# Patient Record
Sex: FEMALE | Race: WHITE | NOT HISPANIC OR LATINO | Employment: OTHER | URBAN - METROPOLITAN AREA
[De-identification: names, ages, dates, MRNs, and addresses within clinical notes are randomized per-mention and may not be internally consistent; named-entity substitution may affect disease eponyms.]

---

## 2017-01-02 DIAGNOSIS — F32.A DEPRESSION: ICD-10-CM

## 2017-01-03 RX ORDER — CITALOPRAM 40 MG/1
TABLET ORAL
Qty: 30 TABLET | Refills: 0 | Status: SHIPPED | OUTPATIENT
Start: 2017-01-03 | End: 2017-02-01 | Stop reason: SDUPTHER

## 2017-01-13 ENCOUNTER — OFFICE VISIT (OUTPATIENT)
Dept: FAMILY MEDICINE CLINIC | Facility: CLINIC | Age: 62
End: 2017-01-13

## 2017-01-13 VITALS
WEIGHT: 209 LBS | SYSTOLIC BLOOD PRESSURE: 140 MMHG | BODY MASS INDEX: 29.26 KG/M2 | OXYGEN SATURATION: 99 % | HEIGHT: 71 IN | HEART RATE: 51 BPM | DIASTOLIC BLOOD PRESSURE: 78 MMHG | TEMPERATURE: 97.9 F

## 2017-01-13 DIAGNOSIS — M65.312 TRIGGER THUMB OF BOTH THUMBS: ICD-10-CM

## 2017-01-13 DIAGNOSIS — M51.36 DEGENERATION OF INTERVERTEBRAL DISC OF LUMBAR REGION: Primary | ICD-10-CM

## 2017-01-13 DIAGNOSIS — M65.311 TRIGGER THUMB OF BOTH THUMBS: ICD-10-CM

## 2017-01-13 PROCEDURE — 99213 OFFICE O/P EST LOW 20 MIN: CPT | Performed by: NURSE PRACTITIONER

## 2017-01-13 NOTE — LETTER
January 13, 2017     Patient: Allie Temple   YOB: 1955   Date of Visit: 1/13/2017       To Whom It May Concern:    It is my medical opinion that Allie Temple may return to full duty immediately with no restrictions.           Sincerely,        ASH Allison    CC: No Recipients

## 2017-01-13 NOTE — PROGRESS NOTES
"Anam Temple is a 61 y.o. female who presents us with back pain. Patient states she is doing better.     History of Present Illness   Reports ready to return to full duty, has been careful with twisting, and has been wearing copper belt at work.   The following portions of the patient's history were reviewed and updated as appropriate: allergies, current medications, past family history, past medical history, past social history, past surgical history and problem list.    Review of Systems   Constitutional: Positive for activity change. Negative for fever.   Respiratory: Negative for chest tightness and shortness of breath.    Gastrointestinal: Negative for constipation (no bowel or bladder dysfunction) and diarrhea.   Genitourinary: Negative for difficulty urinating.   Musculoskeletal: Positive for arthralgias (hand arthritis), back pain and gait problem. Negative for joint swelling and myalgias.   Neurological: Negative for weakness (leg) and headaches.     Visit Vitals   • /78   • Pulse 51   • Temp 97.9 °F (36.6 °C) (Oral)   • Ht 70.5\" (179.1 cm)   • Wt 209 lb (94.8 kg)   • SpO2 99%   • BMI 29.56 kg/m2     Objective   Physical Exam   Constitutional: She appears well-developed and well-nourished. No distress.   Musculoskeletal: She exhibits no edema or tenderness.        Lumbar back: She exhibits decreased range of motion. She exhibits no bony tenderness, no swelling, no edema, no pain and no spasm.        Hands:  SLR eric negative, Jhoana negative eric, - SI tenderness, negative piriformis to distraction   Neurological: No sensory deficit. She exhibits normal muscle tone.   Reflex Scores:       Patellar reflexes are 2+ on the right side and 2+ on the left side.       Achilles reflexes are 2+ on the right side and 2+ on the left side.    Assessment/Plan   Problems Addressed this Visit        Musculoskeletal and Integument    Degeneration of intervertebral disc of lumbar region - Primary      Other " Visit Diagnoses     Trigger thumb of both thumbs            Reasonable for release to full duty. Consider thumb injection if  Thumb trigger not settling with Dr. Khan. [place] prn for low back

## 2017-01-13 NOTE — MR AVS SNAPSHOT
Allie Temple   1/13/2017 2:30 PM   Office Visit    Provider:  ASH Jacobs   Department:  [facility]   Dept Phone:  [phone]                Your Full Care Plan              Today's Medication Changes          These changes are accurate as of: 1/13/17  2:56 PM.  If you have any questions, ask your nurse or doctor.               Stop taking medication(s)listed here:     methocarbamol 750 MG tablet   Commonly known as:  ROBAXIN   Stopped by:  ASH Jacobs           mometasone 50 MCG/ACT nasal spray   Commonly known as:  NASONEX   Stopped by:  ASH Jacobs                      Your Updated Medication List          This list is accurate as of: 1/13/17  2:56 PM.  Always use your most recent med list.                atenolol 25 MG tablet   Commonly known as:  TENORMIN   TAKE 1 TABLET BY MOUTH ONE TIME A DAY FOR BLOOD PRESSURE       atorvastatin 40 MG tablet   Commonly known as:  LIPITOR       Azelastine-Fluticasone 137-50 MCG/ACT suspension   1 spray into each nostril 2 (two) times a day       citalopram 40 MG tablet   Commonly known as:  CeleXA   TAKE 1 TABLET BY MOUTH ONE TIME A DAY       diclofenac 50 MG tablet   Commonly known as:  CATAFLAM   Take 1 tablet by mouth 2 (two) times a day.       * HYDROcodone-acetaminophen  MG per tablet   Commonly known as:  NORCO   Take 1 tablet by mouth Every 6 (Six) Hours As Needed for moderate pain (4-6).       * HYDROcodone-acetaminophen  MG per tablet   Commonly known as:  NORCO   Take 1 tablet by mouth Every 6 (Six) Hours As Needed for moderate pain (4-6).       ketorolac 10 MG tablet   Commonly known as:  TORADOL       lisinopril 40 MG tablet   Commonly known as:  PRINIVIL,ZESTRIL       montelukast 10 MG tablet   Commonly known as:  SINGULAIR   TAKE 1 TABLET BY MOUTH AT BEDTIME       norethindrone-ethinyl estradiol 1-5 MG-MCG tablet   Commonly known as:  FEMHRT 1/5       "TiZANidine 4 MG capsule   Commonly known as:  ZANAFLEX   Take 1 capsule by mouth 3 (Three) Times a Day.       * Notice:  This list has 2 medication(s) that are the same as other medications prescribed for you. Read the directions carefully, and ask your doctor or other care provider to review them with you.            You Were Diagnosed With        Codes Comments    Degeneration of intervertebral disc of lumbar region    -  Primary ICD-10-CM: M51.36  ICD-9-CM: 722.52     Trigger thumb of both thumbs     ICD-10-CM: M65.311, M65.312  ICD-9-CM: 727.03       Instructions     None    Patient Instructions History      [employer] Signup     [facility] [employer] allows you to send messages to your doctor, view your test results, renew your prescriptions, schedule appointments, and more. To sign up, go to [employer] and click on the Sign Up Now link in the New User? box. Enter your [employer] Activation Code exactly as it appears below along with the last four digits of your Social Security Number and your Date of Birth () to complete the sign-up process. If you do not sign up before the expiration date, you must request a new code.    [employer] Activation Code: [email or web address]  Expires: 2017  2:55 PM    If you have questions, you can email [email or web address] or call [phone] to talk to our [employer] staff. Remember, [employer] is NOT to be used for urgent needs. For medical emergencies, dial 911.               Other Info from Your Visit           Allergies     Cetirizine      Gabapentin      Penicillins      Sulfa Antibiotics      Venlafaxine        Reason for Visit     Back Pain           Vital Signs     Blood Pressure Pulse Temperature Height Weight Oxygen Saturation    140/78 51 97.9 °F (36.6 °C) (Oral) 70.5\" (179.1 cm) 209 lb (94.8 kg) 99%    Body Mass Index Smoking Status                29.56 kg/m2 Current Every Day Smoker          Problems and Diagnoses Noted     Degeneration of lumbar or " lumbosacral intervertebral disc    Trigger thumb of both thumbs

## 2017-02-01 DIAGNOSIS — J30.9 ALLERGIC RHINITIS: ICD-10-CM

## 2017-02-01 DIAGNOSIS — F32.A DEPRESSION: ICD-10-CM

## 2017-02-02 RX ORDER — MONTELUKAST SODIUM 10 MG/1
TABLET ORAL
Qty: 30 TABLET | Refills: 1 | Status: SHIPPED | OUTPATIENT
Start: 2017-02-02 | End: 2017-04-08 | Stop reason: SDUPTHER

## 2017-02-02 RX ORDER — CITALOPRAM 40 MG/1
TABLET ORAL
Qty: 30 TABLET | Refills: 0 | Status: SHIPPED | OUTPATIENT
Start: 2017-02-02 | End: 2017-03-09 | Stop reason: SDUPTHER

## 2017-02-09 ENCOUNTER — TELEPHONE (OUTPATIENT)
Dept: FAMILY MEDICINE CLINIC | Facility: CLINIC | Age: 62
End: 2017-02-09

## 2017-02-14 ENCOUNTER — OFFICE VISIT (OUTPATIENT)
Dept: FAMILY MEDICINE CLINIC | Facility: CLINIC | Age: 62
End: 2017-02-14

## 2017-02-14 VITALS
HEART RATE: 54 BPM | SYSTOLIC BLOOD PRESSURE: 148 MMHG | OXYGEN SATURATION: 97 % | HEIGHT: 71 IN | DIASTOLIC BLOOD PRESSURE: 70 MMHG | TEMPERATURE: 97.6 F | BODY MASS INDEX: 28.84 KG/M2 | WEIGHT: 206 LBS

## 2017-02-14 DIAGNOSIS — M65.311 TRIGGER FINGER OF RIGHT THUMB: Primary | ICD-10-CM

## 2017-02-14 PROCEDURE — 20600 DRAIN/INJ JOINT/BURSA W/O US: CPT | Performed by: FAMILY MEDICINE

## 2017-02-14 RX ORDER — METHYLPREDNISOLONE ACETATE 80 MG/ML
20 INJECTION, SUSPENSION INTRA-ARTICULAR; INTRALESIONAL; INTRAMUSCULAR; SOFT TISSUE ONCE
Status: COMPLETED | OUTPATIENT
Start: 2017-02-14 | End: 2017-02-15

## 2017-02-14 RX ORDER — LIDOCAINE HYDROCHLORIDE 20 MG/ML
0.25 INJECTION, SOLUTION INFILTRATION; PERINEURAL ONCE
Status: COMPLETED | OUTPATIENT
Start: 2017-02-14 | End: 2017-02-15

## 2017-02-14 RX ORDER — ATORVASTATIN CALCIUM 40 MG/1
40 TABLET, FILM COATED ORAL DAILY
Qty: 30 TABLET | Refills: 12 | Status: SHIPPED | OUTPATIENT
Start: 2017-02-14 | End: 2019-03-13

## 2017-02-14 NOTE — PROGRESS NOTES
Subjective   Allie Temple is a 61 y.o. female. Presents today for   Chief Complaint   Patient presents with   • Hand Problem     pt would like injection in hand today. she was told by amanda to come for injection if not better.   • Hyperlipidemia     pt needs rf of statin       Hand Pain    Incident onset: Few weeks. The incident occurred at home. There was no injury mechanism. Pain location: Right thumb trigger finger, painful. The quality of the pain is described as aching. The pain does not radiate. The pain is moderate. The pain has been constant since the incident. Pertinent negatives include no numbness or tingling. The symptoms are aggravated by movement. She has tried ice and NSAIDs for the symptoms. The treatment provided mild relief.       hld - needs statin.  Review of Systems   Neurological: Negative for tingling and numbness.       The following portions of the patient's history were reviewed and updated as appropriate: allergies, current medications, past medical history and problem list.    Patient Active Problem List   Diagnosis   • Generalized anxiety disorder   • Benign essential hypertension   • Carpal tunnel syndrome   • Chronic pain syndrome   • Radial styloid tenosynovitis   • Osteoarthritis of hand   • Depression   • Degeneration of intervertebral disc of lumbar region   • Dyslipidemia   • Menopausal flushing   • Tendinitis of shoulder   • Scapulocostal syndrome   • Bruit   • Allergic rhinitis       Allergies   Allergen Reactions   • Cetirizine    • Gabapentin    • Penicillins    • Sulfa Antibiotics    • Venlafaxine        Current Outpatient Prescriptions on File Prior to Visit   Medication Sig Dispense Refill   • atenolol (TENORMIN) 25 MG tablet TAKE 1 TABLET BY MOUTH ONE TIME A DAY FOR BLOOD PRESSURE 30 tablet 4   • Azelastine-Fluticasone 137-50 MCG/ACT suspension 1 spray into each nostril 2 (two) times a day 1 bottle 2   • citalopram (CeleXA) 40 MG tablet TAKE 1 TABLET BY MOUTH ONE TIME A DAY  " 30 tablet 0   • diclofenac (CATAFLAM) 50 MG tablet Take 1 tablet by mouth 2 (two) times a day. 60 tablet 5   • HYDROcodone-acetaminophen (NORCO)  MG per tablet Take 1 tablet by mouth Every 6 (Six) Hours As Needed for moderate pain (4-6). 120 tablet 0   • HYDROcodone-acetaminophen (NORCO)  MG per tablet Take 1 tablet by mouth Every 6 (Six) Hours As Needed for moderate pain (4-6). 120 tablet 0   • ketorolac (TORADOL) 10 MG tablet   0   • lisinopril (PRINIVIL,ZESTRIL) 40 MG tablet Take 40 mg by mouth daily        • montelukast (SINGULAIR) 10 MG tablet TAKE 1 TABLET BY MOUTH AT BEDTIME  30 tablet 1   • norethindrone-ethinyl estradiol (FEMHRT 1/5) 1-5 MG-MCG tablet Take 1 tablet by mouth daily        • TiZANidine (ZANAFLEX) 4 MG capsule Take 1 capsule by mouth 3 (Three) Times a Day. 90 capsule 0     No current facility-administered medications on file prior to visit.        Objective   Vitals:    02/14/17 1517   BP: 148/70   BP Location: Left arm   Patient Position: Sitting   Cuff Size: Large Adult   Pulse: 54   Temp: 97.6 °F (36.4 °C)   TempSrc: Oral   SpO2: 97%   Weight: 206 lb (93.4 kg)   Height: 70.5\" (179.1 cm)       Physical Exam   Constitutional: She is oriented to person, place, and time. She appears well-developed and well-nourished.   Musculoskeletal:        Right hand: She exhibits tenderness. She exhibits normal range of motion.   Trigger finger right thumb.   Neurological: She is alert and oriented to person, place, and time.   Skin: Skin is warm and dry.   Psychiatric: She has a normal mood and affect. Her behavior is normal.   Nursing note and vitals reviewed.  Injection Tendon or Ligament - right thumb trigger finger  Date/Time: 2/19/2017 9:22 PM  Performed by: KOMAL VERA  Authorized by: KOMAL VERA   Consent: Verbal consent obtained.  Risks and benefits: risks, benefits and alternatives were discussed  Consent given by: patient  Patient understanding: patient states " "understanding of the procedure being performed  Site marked: the operative site was marked  Required items: required blood products, implants, devices, and special equipment available  Patient identity confirmed: verbally with patient  Time out: Immediately prior to procedure a \"time out\" was called to verify the correct patient, procedure, equipment, support staff and site/side marked as required.  Preparation: Patient was prepped and draped in the usual sterile fashion.  Local anesthesia used: yes    Anesthesia:  Local anesthesia used: yes  Local Anesthetic: topical anesthetic   Sedation:  Patient sedated: no    Patient tolerance: Patient tolerated the procedure well with no immediate complications  Comments: Lidocaine without epi 2% 0.25ml  Depo Medrol 20mg          Assessment/Plan   Allie was seen today for hand problem and hyperlipidemia.    Diagnoses and all orders for this visit:    Trigger finger of right thumb  -     lidocaine (XYLOCAINE) 2% injection 0.3 mL; Inject 0.3 mL as directed 1 (One) Time.  -     methylPREDNISolone acetate (DEPO-medrol) injection 20 mg; Inject 0.25 mL into the joint 1 (One) Time.  -     Injection Tendon or Ligament    Other orders  -     atorvastatin (LIPITOR) 40 MG tablet; Take 1 tablet by mouth Daily.    Post-joint injection instructions given, warned may be sore and achy next 2-3 days, recommend ice as directed.           -Follow up: Prn - RTC if worse or no improvement.          Current Outpatient Prescriptions:   •  atenolol (TENORMIN) 25 MG tablet, TAKE 1 TABLET BY MOUTH ONE TIME A DAY FOR BLOOD PRESSURE, Disp: 30 tablet, Rfl: 4  •  atorvastatin (LIPITOR) 40 MG tablet, Take 1 tablet by mouth Daily., Disp: 30 tablet, Rfl: 12  •  Azelastine-Fluticasone 137-50 MCG/ACT suspension, 1 spray into each nostril 2 (two) times a day, Disp: 1 bottle, Rfl: 2  •  citalopram (CeleXA) 40 MG tablet, TAKE 1 TABLET BY MOUTH ONE TIME A DAY , Disp: 30 tablet, Rfl: 0  •  diclofenac (CATAFLAM) 50 " MG tablet, Take 1 tablet by mouth 2 (two) times a day., Disp: 60 tablet, Rfl: 5  •  HYDROcodone-acetaminophen (NORCO)  MG per tablet, Take 1 tablet by mouth Every 6 (Six) Hours As Needed for moderate pain (4-6)., Disp: 120 tablet, Rfl: 0  •  HYDROcodone-acetaminophen (NORCO)  MG per tablet, Take 1 tablet by mouth Every 6 (Six) Hours As Needed for moderate pain (4-6)., Disp: 120 tablet, Rfl: 0  •  ketorolac (TORADOL) 10 MG tablet, , Disp: , Rfl: 0  •  lisinopril (PRINIVIL,ZESTRIL) 40 MG tablet, Take 40 mg by mouth daily  , Disp: , Rfl:   •  montelukast (SINGULAIR) 10 MG tablet, TAKE 1 TABLET BY MOUTH AT BEDTIME , Disp: 30 tablet, Rfl: 1  •  norethindrone-ethinyl estradiol (FEMHRT 1/5) 1-5 MG-MCG tablet, Take 1 tablet by mouth daily  , Disp: , Rfl:   •  TiZANidine (ZANAFLEX) 4 MG capsule, Take 1 capsule by mouth 3 (Three) Times a Day., Disp: 90 capsule, Rfl: 0

## 2017-02-15 RX ADMIN — METHYLPREDNISOLONE ACETATE 20 MG: 80 INJECTION, SUSPENSION INTRA-ARTICULAR; INTRALESIONAL; INTRAMUSCULAR; SOFT TISSUE at 08:47

## 2017-02-15 RX ADMIN — LIDOCAINE HYDROCHLORIDE 0.3 ML: 20 INJECTION, SOLUTION INFILTRATION; PERINEURAL at 08:46

## 2017-03-09 DIAGNOSIS — F32.A DEPRESSION: ICD-10-CM

## 2017-03-09 RX ORDER — CITALOPRAM 40 MG/1
TABLET ORAL
Qty: 30 TABLET | Refills: 2 | Status: SHIPPED | OUTPATIENT
Start: 2017-03-09 | End: 2017-06-10 | Stop reason: SDUPTHER

## 2017-04-08 DIAGNOSIS — J30.9 ALLERGIC RHINITIS: ICD-10-CM

## 2017-04-08 DIAGNOSIS — M50.30 DEGENERATIVE DISC DISEASE, CERVICAL: ICD-10-CM

## 2017-04-08 DIAGNOSIS — M54.2 CERVICAL PAIN (NECK): ICD-10-CM

## 2017-04-10 RX ORDER — MONTELUKAST SODIUM 10 MG/1
TABLET ORAL
Qty: 30 TABLET | Refills: 11 | Status: SHIPPED | OUTPATIENT
Start: 2017-04-10 | End: 2018-05-04 | Stop reason: SDUPTHER

## 2017-04-10 RX ORDER — ATENOLOL 25 MG/1
TABLET ORAL
Qty: 30 TABLET | Refills: 3 | Status: SHIPPED | OUTPATIENT
Start: 2017-04-10 | End: 2017-08-03 | Stop reason: SDUPTHER

## 2017-04-10 RX ORDER — DICLOFENAC POTASSIUM 50 MG/1
TABLET, FILM COATED ORAL
Qty: 60 TABLET | Refills: 4 | Status: SHIPPED | OUTPATIENT
Start: 2017-04-10 | End: 2017-07-14

## 2017-04-11 DIAGNOSIS — M54.2 NECK PAIN: ICD-10-CM

## 2017-04-12 RX ORDER — HYDROCODONE BITARTRATE AND ACETAMINOPHEN 10; 325 MG/1; MG/1
1 TABLET ORAL EVERY 6 HOURS PRN
Qty: 120 TABLET | Refills: 0 | Status: SHIPPED | OUTPATIENT
Start: 2017-04-12 | End: 2017-06-09 | Stop reason: SDUPTHER

## 2017-04-12 RX ORDER — HYDROCODONE BITARTRATE AND ACETAMINOPHEN 10; 325 MG/1; MG/1
1 TABLET ORAL EVERY 6 HOURS PRN
Qty: 120 TABLET | Refills: 0 | Status: SHIPPED | OUTPATIENT
Start: 2017-04-12 | End: 2017-07-14

## 2017-04-13 DIAGNOSIS — Z79.899 DRUG THERAPY: Primary | ICD-10-CM

## 2017-04-21 LAB
DRUGS UR: NORMAL
REPORT: NORMAL

## 2017-06-09 RX ORDER — HYDROCODONE BITARTRATE AND ACETAMINOPHEN 10; 325 MG/1; MG/1
1 TABLET ORAL EVERY 6 HOURS PRN
Qty: 120 TABLET | Refills: 0 | Status: SHIPPED | OUTPATIENT
Start: 2017-06-09 | End: 2017-07-14

## 2017-06-10 DIAGNOSIS — F32.A DEPRESSION: ICD-10-CM

## 2017-06-12 RX ORDER — CITALOPRAM 40 MG/1
TABLET ORAL
Qty: 30 TABLET | Refills: 1 | Status: SHIPPED | OUTPATIENT
Start: 2017-06-12 | End: 2017-08-07 | Stop reason: SDUPTHER

## 2017-07-14 ENCOUNTER — OFFICE VISIT (OUTPATIENT)
Dept: FAMILY MEDICINE CLINIC | Facility: CLINIC | Age: 62
End: 2017-07-14

## 2017-07-14 VITALS
HEART RATE: 57 BPM | SYSTOLIC BLOOD PRESSURE: 126 MMHG | WEIGHT: 206 LBS | DIASTOLIC BLOOD PRESSURE: 70 MMHG | OXYGEN SATURATION: 96 % | TEMPERATURE: 97.7 F | BODY MASS INDEX: 29.14 KG/M2

## 2017-07-14 DIAGNOSIS — M19.042 PRIMARY OSTEOARTHRITIS OF BOTH HANDS: ICD-10-CM

## 2017-07-14 DIAGNOSIS — G56.81: ICD-10-CM

## 2017-07-14 DIAGNOSIS — I10 BENIGN ESSENTIAL HYPERTENSION: Primary | ICD-10-CM

## 2017-07-14 DIAGNOSIS — G89.4 CHRONIC PAIN SYNDROME: ICD-10-CM

## 2017-07-14 DIAGNOSIS — M19.041 PRIMARY OSTEOARTHRITIS OF BOTH HANDS: ICD-10-CM

## 2017-07-14 DIAGNOSIS — M50.30 DDD (DEGENERATIVE DISC DISEASE), CERVICAL: ICD-10-CM

## 2017-07-14 DIAGNOSIS — E78.5 DYSLIPIDEMIA: ICD-10-CM

## 2017-07-14 PROCEDURE — 99214 OFFICE O/P EST MOD 30 MIN: CPT | Performed by: FAMILY MEDICINE

## 2017-07-14 RX ORDER — TIZANIDINE HYDROCHLORIDE 4 MG/1
4 CAPSULE, GELATIN COATED ORAL 3 TIMES DAILY
Qty: 90 CAPSULE | Refills: 1 | Status: SHIPPED | OUTPATIENT
Start: 2017-07-14 | End: 2018-01-08 | Stop reason: SDUPTHER

## 2017-07-14 RX ORDER — HYDROCODONE BITARTRATE AND ACETAMINOPHEN 5; 325 MG/1; MG/1
TABLET ORAL
Qty: 105 TABLET | Refills: 0 | Status: SHIPPED | OUTPATIENT
Start: 2017-07-14 | End: 2017-08-14 | Stop reason: SDUPTHER

## 2017-07-14 RX ORDER — NAPROXEN 500 MG/1
500 TABLET ORAL 2 TIMES DAILY WITH MEALS
Qty: 60 TABLET | Refills: 5 | Status: SHIPPED | OUTPATIENT
Start: 2017-07-14 | End: 2017-11-17

## 2017-07-14 NOTE — PROGRESS NOTES
Anam Temple is a 62 y.o. female. Presents today for   Chief Complaint   Patient presents with   • Follow-up     med check wants hydrocodone refill.  Diclofenac not working and has rt neck pain.       Arthritis   Presents for follow-up visit. She complains of pain and joint swelling. Symptom course: waxing and waning. Affected locations include the right shoulder, neck, left MCP and right MCP (+back pain). Pain scale currently: moderate. (Reports diclofenac doesn't help pain.  On chronic HC;  Due for refills.) Compliance with total regimen is %.   Hypertension   This is a chronic problem. The current episode started more than 1 year ago. The problem is unchanged. The problem is controlled. Pertinent negatives include no chest pain, orthopnea, palpitations, peripheral edema, PND or shortness of breath. (Overdue for labs, usually comes for acute complaints.) There are no associated agents to hypertension. Risk factors for coronary artery disease include dyslipidemia and post-menopausal state (LIpids mildly elevated 2 years ago and needs rechecked). Past treatments include ACE inhibitors and beta blockers. The current treatment provides significant improvement. There are no compliance problems.  There is no history of kidney disease, CAD/MI or CVA.     Reports sweats a lot;  Some hot flashes, on HRT;  Works in hot kitchen.    Review of Systems   Respiratory: Negative for shortness of breath.    Cardiovascular: Negative for chest pain, palpitations, orthopnea and PND.   Gastrointestinal: Negative for abdominal pain, constipation, nausea and vomiting.   Musculoskeletal: Positive for arthralgias, arthritis, back pain and joint swelling.   Neurological: Negative for dizziness, syncope and light-headedness.       The following portions of the patient's history were reviewed and updated as appropriate: allergies, current medications, past medical history and problem list.    Patient Active Problem List    Diagnosis   • Generalized anxiety disorder   • Benign essential hypertension   • Carpal tunnel syndrome   • Chronic pain syndrome   • Radial styloid tenosynovitis   • Osteoarthritis of hand   • Depression   • Degeneration of intervertebral disc of lumbar region   • Dyslipidemia   • Menopausal flushing   • Tendinitis of shoulder   • Scapulocostal syndrome   • Bruit   • Allergic rhinitis       Allergies   Allergen Reactions   • Cetirizine    • Gabapentin    • Penicillins    • Sulfa Antibiotics    • Venlafaxine        Current Outpatient Prescriptions on File Prior to Visit   Medication Sig Dispense Refill   • atenolol (TENORMIN) 25 MG tablet TAKE 1 TABLET BY MOUTH ONE TIME A DAY for blood pressure 30 tablet 3   • atorvastatin (LIPITOR) 40 MG tablet Take 1 tablet by mouth Daily. 30 tablet 12   • Azelastine-Fluticasone 137-50 MCG/ACT suspension 1 spray into each nostril 2 (two) times a day 1 bottle 2   • citalopram (CeleXA) 40 MG tablet TAKE 1 TABLET BY MOUTH ONE TIME A DAY  30 tablet 1   • lisinopril (PRINIVIL,ZESTRIL) 40 MG tablet Take 40 mg by mouth daily        • montelukast (SINGULAIR) 10 MG tablet TAKE 1 TABLET BY MOUTH AT BEDTIME  30 tablet 11   • norethindrone-ethinyl estradiol (FEMHRT 1/5) 1-5 MG-MCG tablet Take 1 tablet by mouth daily        • [DISCONTINUED] diclofenac (CATAFLAM) 50 MG tablet TAKE 1 TABLET BY MOUTH TWO TIMES A DAY  60 tablet 4   • [DISCONTINUED] HYDROcodone-acetaminophen (NORCO)  MG per tablet Take 1 tablet by mouth Every 6 (Six) Hours As Needed for Moderate Pain (4-6). 120 tablet 0   • [DISCONTINUED] HYDROcodone-acetaminophen (NORCO)  MG per tablet Take 1 tablet by mouth Every 6 (Six) Hours As Needed for Moderate Pain (4-6). 120 tablet 0   • [DISCONTINUED] ketorolac (TORADOL) 10 MG tablet   0   • [DISCONTINUED] TiZANidine (ZANAFLEX) 4 MG capsule Take 1 capsule by mouth 3 (Three) Times a Day. 90 capsule 0     No current facility-administered medications on file prior to visit.         Objective   Vitals:    07/14/17 1330   BP: 126/70   Pulse: 57   Temp: 97.7 °F (36.5 °C)   SpO2: 96%   Weight: 206 lb (93.4 kg)       Physical Exam   Constitutional: She appears well-developed and well-nourished.   HENT:   Head: Normocephalic and atraumatic.   Neck: Neck supple. No JVD present. No thyromegaly present.   Cardiovascular: Normal rate, regular rhythm and normal heart sounds.  Exam reveals no gallop and no friction rub.    No murmur heard.  Pulmonary/Chest: Effort normal and breath sounds normal. No respiratory distress. She has no wheezes. She has no rales.   Abdominal: Soft. Bowel sounds are normal. She exhibits no distension. There is no tenderness. There is no rebound and no guarding.   Musculoskeletal: She exhibits no edema.        Cervical back: She exhibits tenderness and pain. She exhibits normal range of motion.        Right hand: She exhibits tenderness. She exhibits normal range of motion.        Left hand: She exhibits tenderness. She exhibits normal range of motion.   Neurological: She is alert.   Skin: Skin is warm and dry.   Psychiatric: She has a normal mood and affect. Her behavior is normal.   Nursing note and vitals reviewed.      Assessment/Plan   Allie was seen today for follow-up.    Diagnoses and all orders for this visit:    Benign essential hypertension  -     Comprehensive Metabolic Panel    DDD (degenerative disc disease), cervical  -     TiZANidine (ZANAFLEX) 4 MG capsule; Take 1 capsule by mouth 3 (Three) Times a Day.  -     HYDROcodone-acetaminophen (NORCO) 5-325 MG per tablet; 1/2 to 1 every 6 hours as needed for pain (max 3.5 in 24 hours).  -     naproxen (NAPROSYN) 500 MG tablet; Take 1 tablet by mouth 2 (Two) Times a Day With Meals.    Chronic pain syndrome  -     HYDROcodone-acetaminophen (NORCO) 5-325 MG per tablet; 1/2 to 1 every 6 hours as needed for pain (max 3.5 in 24 hours).  -     naproxen (NAPROSYN) 500 MG tablet; Take 1 tablet by mouth 2 (Two) Times a  Day With Meals.    Scapulocostal syndrome, right  -     HYDROcodone-acetaminophen (NORCO) 5-325 MG per tablet; 1/2 to 1 every 6 hours as needed for pain (max 3.5 in 24 hours).  -     naproxen (NAPROSYN) 500 MG tablet; Take 1 tablet by mouth 2 (Two) Times a Day With Meals.    Primary osteoarthritis of both hands  -     HYDROcodone-acetaminophen (NORCO) 5-325 MG per tablet; 1/2 to 1 every 6 hours as needed for pain (max 3.5 in 24 hours).  -     naproxen (NAPROSYN) 500 MG tablet; Take 1 tablet by mouth 2 (Two) Times a Day With Meals.  -     diclofenac (VOLTAREN) 1 % gel gel; Apply 4 g topically 4 (Four) Times a Day As Needed (hands).    Dyslipidemia  -     Lipid Panel    -hypertension - controlled, continue medications  -hld - recheck lipids  -I discussed due to safety concerns, new laws, rules and standard of care changes, I am phasing out Rx of Schedule II opioids for non-cancer pain.  Patient had diclofenac in past with no relief, but reports naproxen works.  Ready to start wean if slow as just wants to be able to work.  Will cut HC 10mg 4 daily average to 3.5 daily average and continue slow wean over time.  Has seen pain mgmt in past, but prefers to not go back.  Will add voltaren gel for hands.  I gave HC new Rx.         -Follow up: 3 months       Current Outpatient Prescriptions:   •  atenolol (TENORMIN) 25 MG tablet, TAKE 1 TABLET BY MOUTH ONE TIME A DAY for blood pressure, Disp: 30 tablet, Rfl: 3  •  atorvastatin (LIPITOR) 40 MG tablet, Take 1 tablet by mouth Daily., Disp: 30 tablet, Rfl: 12  •  Azelastine-Fluticasone 137-50 MCG/ACT suspension, 1 spray into each nostril 2 (two) times a day, Disp: 1 bottle, Rfl: 2  •  citalopram (CeleXA) 40 MG tablet, TAKE 1 TABLET BY MOUTH ONE TIME A DAY , Disp: 30 tablet, Rfl: 1  •  lisinopril (PRINIVIL,ZESTRIL) 40 MG tablet, Take 40 mg by mouth daily  , Disp: , Rfl:   •  montelukast (SINGULAIR) 10 MG tablet, TAKE 1 TABLET BY MOUTH AT BEDTIME , Disp: 30 tablet, Rfl: 11  •   norethindrone-ethinyl estradiol (FEMHRT 1/5) 1-5 MG-MCG tablet, Take 1 tablet by mouth daily  , Disp: , Rfl:   •  TiZANidine (ZANAFLEX) 4 MG capsule, Take 1 capsule by mouth 3 (Three) Times a Day., Disp: 90 capsule, Rfl: 1  •  diclofenac (VOLTAREN) 1 % gel gel, Apply 4 g topically 4 (Four) Times a Day As Needed (hands)., Disp: 100 g, Rfl: 5  •  HYDROcodone-acetaminophen (NORCO) 5-325 MG per tablet, 1/2 to 1 every 6 hours as needed for pain (max 3.5 in 24 hours)., Disp: 105 tablet, Rfl: 0  •  naproxen (NAPROSYN) 500 MG tablet, Take 1 tablet by mouth 2 (Two) Times a Day With Meals., Disp: 60 tablet, Rfl: 5

## 2017-07-15 LAB
ALBUMIN SERPL-MCNC: 4.4 G/DL (ref 3.6–4.8)
ALBUMIN/GLOB SERPL: 1.8 {RATIO} (ref 1.2–2.2)
ALP SERPL-CCNC: 69 IU/L (ref 39–117)
ALT SERPL-CCNC: 11 IU/L (ref 0–32)
AST SERPL-CCNC: 19 IU/L (ref 0–40)
BILIRUB SERPL-MCNC: 0.3 MG/DL (ref 0–1.2)
BUN SERPL-MCNC: 10 MG/DL (ref 8–27)
BUN/CREAT SERPL: 13 (ref 12–28)
CALCIUM SERPL-MCNC: 9.4 MG/DL (ref 8.7–10.3)
CHLORIDE SERPL-SCNC: 101 MMOL/L (ref 96–106)
CHOLEST SERPL-MCNC: 180 MG/DL (ref 100–199)
CO2 SERPL-SCNC: 23 MMOL/L (ref 18–29)
CREAT SERPL-MCNC: 0.78 MG/DL (ref 0.57–1)
GLOBULIN SER CALC-MCNC: 2.4 G/DL (ref 1.5–4.5)
GLUCOSE SERPL-MCNC: 80 MG/DL (ref 65–99)
HDLC SERPL-MCNC: 36 MG/DL
LDLC SERPL CALC-MCNC: 108 MG/DL (ref 0–99)
POTASSIUM SERPL-SCNC: 4.2 MMOL/L (ref 3.5–5.2)
PROT SERPL-MCNC: 6.8 G/DL (ref 6–8.5)
SODIUM SERPL-SCNC: 140 MMOL/L (ref 134–144)
TRIGL SERPL-MCNC: 178 MG/DL (ref 0–149)
VLDLC SERPL CALC-MCNC: 36 MG/DL (ref 5–40)

## 2017-07-16 NOTE — PROGRESS NOTES
Call and mail copy of results to patient.  Cholesterol borderline, work on low cholesterol diet.  Kidney, liver function normal.

## 2017-07-27 ENCOUNTER — TELEPHONE (OUTPATIENT)
Dept: FAMILY MEDICINE CLINIC | Facility: CLINIC | Age: 62
End: 2017-07-27

## 2017-07-27 NOTE — TELEPHONE ENCOUNTER
"WITHDRAWLS; SHAKING AND JERKING, ANTSY, SHORT TEMPER, \"FEELS LIKE GOING TO EXPLODE ON SOMEONE\" , NECK TIGHT AND SORE  "

## 2017-07-27 NOTE — TELEPHONE ENCOUNTER
When you say hard time dose she mean withdrawal symptoms, increase pain?  If so, what kind of pain, where is her pain located?    RRJ

## 2017-07-28 ENCOUNTER — TELEPHONE (OUTPATIENT)
Dept: FAMILY MEDICINE CLINIC | Facility: CLINIC | Age: 62
End: 2017-07-28

## 2017-07-28 NOTE — TELEPHONE ENCOUNTER
Clonidine 0.1mg 1 po twice daily, may lower BP as what originally for, but helps with any withdrawal symptoms.  We cut dose only slightly, should be at 3.5 tabs in 24 hrs total instead of 4 tabs in 24 hours.  Make sure not just stopping cold turkey as I gave enough for 30 days at new quantity.    RRJ

## 2017-07-31 RX ORDER — CLONIDINE HYDROCHLORIDE 0.1 MG/1
0.1 TABLET ORAL 2 TIMES DAILY
Qty: 60 TABLET | Refills: 1 | Status: SHIPPED | OUTPATIENT
Start: 2017-07-31 | End: 2017-09-28 | Stop reason: SDUPTHER

## 2017-08-03 RX ORDER — ATENOLOL 25 MG/1
TABLET ORAL
Qty: 30 TABLET | Refills: 2 | Status: SHIPPED | OUTPATIENT
Start: 2017-08-03 | End: 2017-08-04 | Stop reason: ALTCHOICE

## 2017-08-04 ENCOUNTER — TELEPHONE (OUTPATIENT)
Dept: FAMILY MEDICINE CLINIC | Facility: CLINIC | Age: 62
End: 2017-08-04

## 2017-08-04 RX ORDER — METOPROLOL SUCCINATE 25 MG/1
25 TABLET, EXTENDED RELEASE ORAL DAILY
Qty: 30 TABLET | Refills: 5 | OUTPATIENT
Start: 2017-08-04 | End: 2018-03-09 | Stop reason: SDUPTHER

## 2017-08-07 DIAGNOSIS — F32.A DEPRESSION: ICD-10-CM

## 2017-08-11 ENCOUNTER — TELEPHONE (OUTPATIENT)
Dept: FAMILY MEDICINE CLINIC | Facility: CLINIC | Age: 62
End: 2017-08-11

## 2017-08-11 RX ORDER — CITALOPRAM 40 MG/1
TABLET ORAL
Qty: 30 TABLET | Refills: 6 | Status: SHIPPED | OUTPATIENT
Start: 2017-08-11 | End: 2018-04-09 | Stop reason: SDUPTHER

## 2017-08-14 DIAGNOSIS — M50.30 DDD (DEGENERATIVE DISC DISEASE), CERVICAL: ICD-10-CM

## 2017-08-14 DIAGNOSIS — G89.4 CHRONIC PAIN SYNDROME: ICD-10-CM

## 2017-08-14 DIAGNOSIS — M19.041 PRIMARY OSTEOARTHRITIS OF BOTH HANDS: ICD-10-CM

## 2017-08-14 DIAGNOSIS — G56.81: ICD-10-CM

## 2017-08-14 DIAGNOSIS — M19.042 PRIMARY OSTEOARTHRITIS OF BOTH HANDS: ICD-10-CM

## 2017-08-14 RX ORDER — HYDROCODONE BITARTRATE AND ACETAMINOPHEN 5; 325 MG/1; MG/1
TABLET ORAL
Qty: 105 TABLET | Refills: 0 | Status: SHIPPED | OUTPATIENT
Start: 2017-08-14 | End: 2017-08-14 | Stop reason: SDUPTHER

## 2017-08-14 RX ORDER — HYDROCODONE BITARTRATE AND ACETAMINOPHEN 5; 325 MG/1; MG/1
TABLET ORAL
Qty: 90 TABLET | Refills: 0 | Status: SHIPPED | OUTPATIENT
Start: 2017-08-14 | End: 2017-09-15 | Stop reason: SDUPTHER

## 2017-08-14 NOTE — TELEPHONE ENCOUNTER
Pt phone mailbox full and rx ready.  Dr. Khan is weaning hydrocodone changed to 1 q 8 hrs prn #[phone] refills [id number]

## 2017-09-06 ENCOUNTER — OFFICE VISIT (OUTPATIENT)
Dept: FAMILY MEDICINE CLINIC | Facility: CLINIC | Age: 62
End: 2017-09-06

## 2017-09-06 VITALS
HEART RATE: 55 BPM | WEIGHT: 208 LBS | OXYGEN SATURATION: 97 % | SYSTOLIC BLOOD PRESSURE: 132 MMHG | BODY MASS INDEX: 29.12 KG/M2 | HEIGHT: 71 IN | TEMPERATURE: 98.2 F | DIASTOLIC BLOOD PRESSURE: 84 MMHG

## 2017-09-06 DIAGNOSIS — F41.1 GENERALIZED ANXIETY DISORDER: Primary | ICD-10-CM

## 2017-09-06 PROCEDURE — 99213 OFFICE O/P EST LOW 20 MIN: CPT | Performed by: NURSE PRACTITIONER

## 2017-09-06 RX ORDER — BUSPIRONE HYDROCHLORIDE 5 MG/1
5 TABLET ORAL 3 TIMES DAILY
Qty: 90 TABLET | Refills: 0 | Status: SHIPPED | OUTPATIENT
Start: 2017-09-06 | End: 2017-10-04 | Stop reason: SDUPTHER

## 2017-09-06 NOTE — PROGRESS NOTES
"Subjective   Allie Temple is a 62 y.o. female who presents to discuss increased anxiety and depression. Current medications are not helping.     History of Present Illness   Medications less helpful for 5-6 weeks, though daughter has moved back in with kids. Multigenerational household. Feels like going to snap at times. Does function well at work, but senses negative tension when gets at home. Sleeping a lot lately, using this as avoidance.     Trying to get off medications, but having increased overall pain, mostly tension related. Feels tight when stressed, zanaflex helpful, makes sleepy, so doesn't take at work. Really does ok at work, some leg numbness and pain when stationary standing, does better when moving.      Sleep is interrupted 2-3 x nightly by muscle spasm and tension. Waking for 2-3 weeks. Though Bhumi moved in 3 weeks ago.   The following portions of the patient's history were reviewed and updated as appropriate: allergies, current medications, past family history, past medical history, past social history, past surgical history and problem list.    Review of Systems   Constitutional: Positive for activity change and fatigue. Negative for fever and unexpected weight change.   HENT: Negative.    Respiratory: Negative.    Cardiovascular: Negative.    Gastrointestinal: Negative.  Negative for abdominal pain.   Endocrine: Negative for cold intolerance, heat intolerance, polydipsia, polyphagia and polyuria.   Neurological: Negative for seizures, light-headedness and headaches.   Psychiatric/Behavioral: Positive for decreased concentration, dysphoric mood and sleep disturbance. Negative for agitation, confusion, hallucinations, self-injury and suicidal ideas. The patient is nervous/anxious. The patient is not hyperactive.      /84  Pulse 55  Temp 98.2 °F (36.8 °C) (Oral)   Ht 70.5\" (179.1 cm)  Wt 208 lb (94.3 kg)  SpO2 97%  BMI 29.42 kg/m2    Objective   Physical Exam   Constitutional: She is " oriented to person, place, and time. She appears well-developed and well-nourished.   Eyes: Conjunctivae are normal. Pupils are equal, round, and reactive to light.   Neck: Normal range of motion. Neck supple. No JVD present. No tracheal deviation present. No thyromegaly present.   Cardiovascular: Normal rate, regular rhythm and normal heart sounds.    Pulmonary/Chest: Effort normal and breath sounds normal.   Abdominal: Soft. There is no tenderness.   Lymphadenopathy:     She has no cervical adenopathy.   Neurological: She is alert and oriented to person, place, and time.   Skin: Skin is warm and dry.   Psychiatric: Her speech is normal and behavior is normal. Judgment normal. Her mood appears anxious. Her affect is not angry, not blunt, not labile and not inappropriate. Cognition and memory are normal. She exhibits a depressed mood. She expresses no homicidal and no suicidal ideation. She expresses no suicidal plans and no homicidal plans.   Nursing note and vitals reviewed.    Assessment/Plan   Problems Addressed this Visit        Other    Generalized anxiety disorder - Primary    Relevant Medications    busPIRone (BUSPAR) 5 MG tablet        Short term trial of buspirone, to address increased levels of anxiety, desires to continue weaning HC. Discussed role of limit setting in the home. Has been meaningfully setting limits in multigenerational household, to continue.     Discussed increased levels of pain and sleep pattern change can be associated with withdrawal from HC, pt currently weaning and understands.     Has Fu with Dr. Khan in October, will move up sooner if needed.

## 2017-09-13 ENCOUNTER — TELEPHONE (OUTPATIENT)
Dept: FAMILY MEDICINE CLINIC | Facility: CLINIC | Age: 62
End: 2017-09-13

## 2017-09-15 DIAGNOSIS — M19.042 PRIMARY OSTEOARTHRITIS OF BOTH HANDS: ICD-10-CM

## 2017-09-15 DIAGNOSIS — G89.4 CHRONIC PAIN SYNDROME: ICD-10-CM

## 2017-09-15 DIAGNOSIS — G56.81: ICD-10-CM

## 2017-09-15 DIAGNOSIS — M19.041 PRIMARY OSTEOARTHRITIS OF BOTH HANDS: ICD-10-CM

## 2017-09-15 DIAGNOSIS — M50.30 DDD (DEGENERATIVE DISC DISEASE), CERVICAL: ICD-10-CM

## 2017-09-15 RX ORDER — HYDROCODONE BITARTRATE AND ACETAMINOPHEN 5; 325 MG/1; MG/1
TABLET ORAL
Qty: 90 TABLET | Refills: 0 | Status: SHIPPED | OUTPATIENT
Start: 2017-09-15 | End: 2017-10-19 | Stop reason: SDUPTHER

## 2017-09-28 RX ORDER — CLONIDINE HYDROCHLORIDE 0.1 MG/1
TABLET ORAL
Qty: 60 TABLET | Refills: 4 | Status: SHIPPED | OUTPATIENT
Start: 2017-09-28 | End: 2018-07-09

## 2017-10-04 DIAGNOSIS — F41.1 GENERALIZED ANXIETY DISORDER: ICD-10-CM

## 2017-10-04 RX ORDER — BUSPIRONE HYDROCHLORIDE 5 MG/1
TABLET ORAL
Qty: 90 TABLET | Refills: 0 | Status: SHIPPED | OUTPATIENT
Start: 2017-10-04 | End: 2017-11-17 | Stop reason: SDUPTHER

## 2017-10-06 ENCOUNTER — OFFICE VISIT (OUTPATIENT)
Dept: FAMILY MEDICINE CLINIC | Facility: CLINIC | Age: 62
End: 2017-10-06

## 2017-10-06 VITALS
WEIGHT: 210 LBS | DIASTOLIC BLOOD PRESSURE: 78 MMHG | HEIGHT: 71 IN | TEMPERATURE: 98.1 F | BODY MASS INDEX: 29.4 KG/M2 | HEART RATE: 94 BPM | OXYGEN SATURATION: 98 % | SYSTOLIC BLOOD PRESSURE: 124 MMHG

## 2017-10-06 DIAGNOSIS — J01.10 ACUTE NON-RECURRENT FRONTAL SINUSITIS: Primary | ICD-10-CM

## 2017-10-06 PROCEDURE — 99213 OFFICE O/P EST LOW 20 MIN: CPT | Performed by: NURSE PRACTITIONER

## 2017-10-06 RX ORDER — CIPROFLOXACIN 500 MG/1
500 TABLET, FILM COATED ORAL 2 TIMES DAILY
Qty: 14 TABLET | Refills: 0 | Status: SHIPPED | OUTPATIENT
Start: 2017-10-06 | End: 2017-10-19

## 2017-10-06 RX ORDER — DEXTROMETHORPHAN HYDROBROMIDE AND PROMETHAZINE HYDROCHLORIDE 15; 6.25 MG/5ML; MG/5ML
SYRUP ORAL
COMMUNITY
Start: 2017-10-05 | End: 2017-10-19

## 2017-10-06 RX ORDER — METHYLPREDNISOLONE 4 MG/1
TABLET ORAL
COMMUNITY
Start: 2017-10-05 | End: 2017-10-19

## 2017-10-06 RX ORDER — BENZONATATE 200 MG/1
CAPSULE ORAL
COMMUNITY
Start: 2017-10-05 | End: 2017-10-19

## 2017-10-06 NOTE — PROGRESS NOTES
"Anam Temple is a 62 y.o. female who presents for a follow up on bronchitis. Was dx at immediate care on 10/5/17 and prescribed a medrol dose pack, cough syrup and tessalon pearles.     History of Present Illness   Overall reports feeling no better since diagnosed. Thought had sinus infection as head feels very full, like having a helmet on. Taking medrol dose pack, but not helping with sinus congestion or cough. Thinks needs antibiotic, sinus drainage no better. Cough is improving.     Taking claritin, nose spray, and mucinex.   The following portions of the patient's history were reviewed and updated as appropriate: allergies, current medications, past family history, past medical history, past social history, past surgical history and problem list.    Review of Systems   Constitutional: Positive for chills. Negative for fever.   HENT: Positive for congestion, postnasal drip, sinus pressure and sore throat. Negative for ear pain.    Eyes: Negative.    Respiratory: Positive for cough. Negative for shortness of breath.    Cardiovascular: Negative.    Musculoskeletal: Negative.    Skin: Negative.    Neurological: Positive for headaches.   Hematological: Negative.    Psychiatric/Behavioral: Negative.      /78  Pulse 94  Temp 98.1 °F (36.7 °C) (Oral)   Ht 70.5\" (179.1 cm)  Wt 210 lb (95.3 kg)  SpO2 98%  BMI 29.71 kg/m2    Objective   Physical Exam   Constitutional: She appears well-developed and well-nourished.   HENT:   Head: Normocephalic.   Right Ear: Tympanic membrane is not retracted. A middle ear effusion is present.   Left Ear: Tympanic membrane is not retracted. A middle ear effusion is present.   Nose: Mucosal edema present. Right sinus exhibits frontal sinus tenderness. Left sinus exhibits frontal sinus tenderness.   Mouth/Throat: Posterior oropharyngeal erythema present.   Neck: Normal range of motion. Neck supple. No JVD present. No tracheal deviation present. No thyromegaly " present.   Cardiovascular: Normal rate, regular rhythm and normal heart sounds.    Pulmonary/Chest: Effort normal and breath sounds normal.   Lymphadenopathy:     She has no cervical adenopathy.   Skin: Skin is warm and dry.   Psychiatric: She has a normal mood and affect. Her behavior is normal. Judgment and thought content normal.   Nursing note and vitals reviewed.    Assessment/Plan   Problems Addressed this Visit     None      Visit Diagnoses     Acute non-recurrent frontal sinusitis    -  Primary        Continue all symptom controllers, follow up if not settling. Consider smoking cessation. Would like to consider allergy testing in the future so as not to have similar illnesses every spring or fall.

## 2017-10-19 ENCOUNTER — OFFICE VISIT (OUTPATIENT)
Dept: FAMILY MEDICINE CLINIC | Facility: CLINIC | Age: 62
End: 2017-10-19

## 2017-10-19 VITALS
TEMPERATURE: 97.8 F | SYSTOLIC BLOOD PRESSURE: 146 MMHG | HEIGHT: 71 IN | BODY MASS INDEX: 30.24 KG/M2 | HEART RATE: 52 BPM | WEIGHT: 216 LBS | DIASTOLIC BLOOD PRESSURE: 80 MMHG | OXYGEN SATURATION: 98 %

## 2017-10-19 DIAGNOSIS — M50.30 DDD (DEGENERATIVE DISC DISEASE), CERVICAL: ICD-10-CM

## 2017-10-19 DIAGNOSIS — M19.042 PRIMARY OSTEOARTHRITIS OF BOTH HANDS: ICD-10-CM

## 2017-10-19 DIAGNOSIS — M51.36 DEGENERATION OF INTERVERTEBRAL DISC OF LUMBAR REGION: ICD-10-CM

## 2017-10-19 DIAGNOSIS — G89.4 CHRONIC PAIN SYNDROME: ICD-10-CM

## 2017-10-19 DIAGNOSIS — M19.041 PRIMARY OSTEOARTHRITIS OF BOTH HANDS: ICD-10-CM

## 2017-10-19 DIAGNOSIS — I10 BENIGN ESSENTIAL HYPERTENSION: Primary | ICD-10-CM

## 2017-10-19 DIAGNOSIS — G56.81: ICD-10-CM

## 2017-10-19 PROCEDURE — 96372 THER/PROPH/DIAG INJ SC/IM: CPT | Performed by: FAMILY MEDICINE

## 2017-10-19 PROCEDURE — 99213 OFFICE O/P EST LOW 20 MIN: CPT | Performed by: FAMILY MEDICINE

## 2017-10-19 RX ORDER — METHYLPREDNISOLONE ACETATE 80 MG/ML
80 INJECTION, SUSPENSION INTRA-ARTICULAR; INTRALESIONAL; INTRAMUSCULAR; SOFT TISSUE ONCE
Status: COMPLETED | OUTPATIENT
Start: 2017-10-19 | End: 2017-10-19

## 2017-10-19 RX ORDER — KETOROLAC TROMETHAMINE 30 MG/ML
60 INJECTION, SOLUTION INTRAMUSCULAR; INTRAVENOUS ONCE
Status: COMPLETED | OUTPATIENT
Start: 2017-10-19 | End: 2017-10-19

## 2017-10-19 RX ORDER — HYDROCODONE BITARTRATE AND ACETAMINOPHEN 5; 325 MG/1; MG/1
TABLET ORAL
Qty: 90 TABLET | Refills: 0 | Status: SHIPPED | OUTPATIENT
Start: 2017-10-19 | End: 2017-11-16 | Stop reason: SDUPTHER

## 2017-10-19 RX ORDER — HYDROCODONE BITARTRATE AND ACETAMINOPHEN 5; 325 MG/1; MG/1
TABLET ORAL
Qty: 90 TABLET | Refills: 0 | Status: SHIPPED | OUTPATIENT
Start: 2017-10-19 | End: 2017-10-19 | Stop reason: SDUPTHER

## 2017-10-19 RX ADMIN — KETOROLAC TROMETHAMINE 60 MG: 30 INJECTION, SOLUTION INTRAMUSCULAR; INTRAVENOUS at 15:03

## 2017-10-19 RX ADMIN — METHYLPREDNISOLONE ACETATE 80 MG: 80 INJECTION, SUSPENSION INTRA-ARTICULAR; INTRALESIONAL; INTRAMUSCULAR; SOFT TISSUE at 14:19

## 2017-11-16 DIAGNOSIS — M19.041 PRIMARY OSTEOARTHRITIS OF BOTH HANDS: ICD-10-CM

## 2017-11-16 DIAGNOSIS — G56.81: ICD-10-CM

## 2017-11-16 DIAGNOSIS — M50.30 DDD (DEGENERATIVE DISC DISEASE), CERVICAL: ICD-10-CM

## 2017-11-16 DIAGNOSIS — G89.4 CHRONIC PAIN SYNDROME: ICD-10-CM

## 2017-11-16 DIAGNOSIS — M19.042 PRIMARY OSTEOARTHRITIS OF BOTH HANDS: ICD-10-CM

## 2017-11-16 RX ORDER — HYDROCODONE BITARTRATE AND ACETAMINOPHEN 5; 325 MG/1; MG/1
TABLET ORAL
Qty: 90 TABLET | Refills: 0 | Status: SHIPPED | OUTPATIENT
Start: 2017-11-16 | End: 2017-12-14 | Stop reason: SDUPTHER

## 2017-11-17 ENCOUNTER — OFFICE VISIT (OUTPATIENT)
Dept: FAMILY MEDICINE CLINIC | Facility: CLINIC | Age: 62
End: 2017-11-17

## 2017-11-17 VITALS
TEMPERATURE: 97.9 F | BODY MASS INDEX: 30.1 KG/M2 | OXYGEN SATURATION: 96 % | HEART RATE: 56 BPM | HEIGHT: 71 IN | WEIGHT: 215 LBS | SYSTOLIC BLOOD PRESSURE: 138 MMHG | DIASTOLIC BLOOD PRESSURE: 76 MMHG

## 2017-11-17 DIAGNOSIS — F41.1 GENERALIZED ANXIETY DISORDER: ICD-10-CM

## 2017-11-17 DIAGNOSIS — J40 BRONCHITIS: Primary | ICD-10-CM

## 2017-11-17 PROCEDURE — 99213 OFFICE O/P EST LOW 20 MIN: CPT | Performed by: NURSE PRACTITIONER

## 2017-11-17 RX ORDER — BUSPIRONE HYDROCHLORIDE 10 MG/1
10 TABLET ORAL 2 TIMES DAILY
Qty: 60 TABLET | Refills: 0 | Status: SHIPPED | OUTPATIENT
Start: 2017-11-17 | End: 2018-01-11 | Stop reason: SDUPTHER

## 2017-11-17 RX ORDER — DOXYCYCLINE HYCLATE 100 MG/1
100 CAPSULE ORAL 2 TIMES DAILY
Qty: 14 CAPSULE | Refills: 0 | Status: SHIPPED | OUTPATIENT
Start: 2017-11-17 | End: 2018-01-08

## 2017-11-17 NOTE — PROGRESS NOTES
"Anam Temple is a 62 y.o. female who presents c/o cough, congestion x several weeks. Was seen for similar 10/6/17 and never fully resolved.   History of Present Illness   Symptoms more sinus 10/6, was treated with medrol dose pack and cipro. Symptoms settled some, but then settled into chest 1-2 weeks ago. Cough is minimally productive, has tried mucinex and claritin to address.     buspar has been helpful to address anxiety, but feels effectiveness fading as HC gets weaned down. Having more trouble with sleep and anxiety during the day. Having falls not associated with dizziness. Associated with leg giving away.    The following portions of the patient's history were reviewed and updated as appropriate: allergies, current medications, past family history, past medical history, past social history, past surgical history and problem list.    Review of Systems   Constitutional: Negative for chills and fever.   HENT: Positive for congestion and rhinorrhea. Negative for ear discharge, ear pain, facial swelling, hearing loss, nosebleeds, sinus pressure, sneezing, sore throat and trouble swallowing.    Respiratory: Positive for cough. Negative for shortness of breath and wheezing.    Cardiovascular: Negative.  Negative for chest pain.   Gastrointestinal: Negative for abdominal pain.   Endocrine: Negative.    Musculoskeletal: Negative for arthralgias.   Allergic/Immunologic: Negative for environmental allergies.   Neurological: Positive for headaches (L frontal intermittent).   Hematological: Negative.    Psychiatric/Behavioral: Negative for dysphoric mood. The patient is nervous/anxious.      /76  Pulse 56  Temp 97.9 °F (36.6 °C) (Oral)   Ht 70.5\" (179.1 cm)  Wt 215 lb (97.5 kg)  SpO2 96%  BMI 30.41 kg/m2    Objective   Physical Exam   Constitutional: She appears well-developed and well-nourished.   HENT:   Right Ear: Tympanic membrane is retracted. A middle ear effusion is present.   Left Ear: " Tympanic membrane is retracted. A middle ear effusion is present.   Nose: No mucosal edema or rhinorrhea.   Mouth/Throat: Posterior oropharyngeal erythema present.   Neck: Normal range of motion. Neck supple. No JVD present. No tracheal deviation present. No thyromegaly present.   Cardiovascular: Normal rate, regular rhythm and normal heart sounds.    Pulmonary/Chest: Effort normal and breath sounds normal.   Bronchitic cough   Lymphadenopathy:     She has no cervical adenopathy.   Skin: Skin is warm and dry.   Psychiatric: She has a normal mood and affect. Her behavior is normal. Judgment and thought content normal.   Nursing note and vitals reviewed.    Assessment/Plan   Problems Addressed this Visit        Other    Generalized anxiety disorder    Relevant Medications    busPIRone (BUSPAR) 10 MG tablet      Other Visit Diagnoses     Bronchitis    -  Primary        Cover with doxycycline, secondary to drug allergies and interactions with citalopram. Continue mucinex. Follow up if not settling 1 week.   Anxiety--increase buspar, follow up if not settling  FU regarding chronic pain with Dr. Khan.

## 2017-12-14 DIAGNOSIS — G56.81: ICD-10-CM

## 2017-12-14 DIAGNOSIS — G89.4 CHRONIC PAIN SYNDROME: ICD-10-CM

## 2017-12-14 DIAGNOSIS — M50.30 DDD (DEGENERATIVE DISC DISEASE), CERVICAL: ICD-10-CM

## 2017-12-14 DIAGNOSIS — M19.042 PRIMARY OSTEOARTHRITIS OF BOTH HANDS: ICD-10-CM

## 2017-12-14 DIAGNOSIS — M19.041 PRIMARY OSTEOARTHRITIS OF BOTH HANDS: ICD-10-CM

## 2017-12-14 RX ORDER — HYDROCODONE BITARTRATE AND ACETAMINOPHEN 5; 325 MG/1; MG/1
TABLET ORAL
Qty: 90 TABLET | Refills: 0 | Status: SHIPPED | OUTPATIENT
Start: 2017-12-14 | End: 2018-01-08 | Stop reason: SDUPTHER

## 2018-01-08 ENCOUNTER — OFFICE VISIT (OUTPATIENT)
Dept: FAMILY MEDICINE CLINIC | Facility: CLINIC | Age: 63
End: 2018-01-08

## 2018-01-08 VITALS
BODY MASS INDEX: 30.8 KG/M2 | HEART RATE: 56 BPM | SYSTOLIC BLOOD PRESSURE: 152 MMHG | TEMPERATURE: 98.1 F | WEIGHT: 220 LBS | HEIGHT: 71 IN | DIASTOLIC BLOOD PRESSURE: 92 MMHG | OXYGEN SATURATION: 96 %

## 2018-01-08 DIAGNOSIS — M19.042 PRIMARY OSTEOARTHRITIS OF BOTH HANDS: ICD-10-CM

## 2018-01-08 DIAGNOSIS — J30.89 CHRONIC NONSEASONAL ALLERGIC RHINITIS DUE TO FUNGAL SPORES: ICD-10-CM

## 2018-01-08 DIAGNOSIS — M19.041 PRIMARY OSTEOARTHRITIS OF BOTH HANDS: ICD-10-CM

## 2018-01-08 DIAGNOSIS — M50.30 DDD (DEGENERATIVE DISC DISEASE), CERVICAL: ICD-10-CM

## 2018-01-08 DIAGNOSIS — G56.81: ICD-10-CM

## 2018-01-08 DIAGNOSIS — M51.36 DEGENERATION OF INTERVERTEBRAL DISC OF LUMBAR REGION: ICD-10-CM

## 2018-01-08 DIAGNOSIS — G89.4 CHRONIC PAIN SYNDROME: ICD-10-CM

## 2018-01-08 DIAGNOSIS — J01.11 ACUTE RECURRENT FRONTAL SINUSITIS: Primary | ICD-10-CM

## 2018-01-08 PROCEDURE — 99214 OFFICE O/P EST MOD 30 MIN: CPT | Performed by: FAMILY MEDICINE

## 2018-01-08 RX ORDER — TIZANIDINE HYDROCHLORIDE 4 MG/1
CAPSULE, GELATIN COATED ORAL
Qty: 90 CAPSULE | Refills: 0 | Status: SHIPPED | OUTPATIENT
Start: 2018-01-08 | End: 2018-02-22 | Stop reason: SDUPTHER

## 2018-01-08 RX ORDER — PREDNISONE 10 MG/1
TABLET ORAL
Qty: 32 TABLET | Refills: 0 | Status: SHIPPED | OUTPATIENT
Start: 2018-01-08 | End: 2018-05-29

## 2018-01-08 RX ORDER — HYDROCODONE BITARTRATE AND ACETAMINOPHEN 5; 325 MG/1; MG/1
TABLET ORAL
Qty: 90 TABLET | Refills: 0 | Status: SHIPPED | OUTPATIENT
Start: 2018-01-08 | End: 2018-08-24 | Stop reason: SDUPTHER

## 2018-01-08 RX ORDER — DOXYCYCLINE HYCLATE 100 MG/1
100 CAPSULE ORAL 2 TIMES DAILY
Qty: 20 CAPSULE | Refills: 0 | Status: SHIPPED | OUTPATIENT
Start: 2018-01-08 | End: 2018-05-29

## 2018-01-08 NOTE — PROGRESS NOTES
Anam Temple is a 62 y.o. female. Presents today for   Chief Complaint   Patient presents with   • Illness     pt has had cough and congestion for one week.   • Shoulder Pain     pt is having shoulder pain.       Sinusitis   This is a new problem. The current episode started in the past 7 days. The problem is unchanged. There has been no fever. The pain is moderate. Associated symptoms include congestion, coughing, sinus pressure and sneezing. Pertinent negatives include no chills, ear pain, shortness of breath or sore throat. (IN new house and doing better, but old house has mold and goes over to move and sneezing.    Trying to quit smoking, has cut back;  Doesn't smoke in house now.) Treatments tried: taking benadryl and singulair. The treatment provided moderate (doesn't help sinus pressure) relief.   Shoulder Injury    The incident occurred at home. The right shoulder is affected. The incident occurred more than 1 week ago. Injury mechanism: heavy lifting (carrying box), felt painful and weak. The quality of the pain is described as aching. Radiates to: refer to left shoulder. The pain is moderate. Pertinent negatives include no numbness or tingling. The symptoms are aggravated by movement and overhead lifting. She has tried NSAIDs (some relief;  Trying to wean off HC, but reports was more function on it.) for the symptoms.         Review of Systems   Constitutional: Negative for chills and fever.   HENT: Positive for congestion, postnasal drip, sinus pressure and sneezing. Negative for ear pain and sore throat.    Respiratory: Positive for cough. Negative for shortness of breath and wheezing.    Neurological: Negative for tingling and numbness.       The following portions of the patient's history were reviewed and updated as appropriate: allergies, current medications, past medical history and problem list.    Patient Active Problem List   Diagnosis   • Generalized anxiety disorder   • Benign  essential hypertension   • Carpal tunnel syndrome   • Chronic pain syndrome   • Radial styloid tenosynovitis   • Osteoarthritis of hand   • Depression   • Degeneration of intervertebral disc of lumbar region   • Dyslipidemia   • Menopausal flushing   • Tendinitis of shoulder   • Scapulocostal syndrome   • Bruit   • Allergic rhinitis   • Lumbar degenerative disc disease       Allergies   Allergen Reactions   • Cetirizine    • Gabapentin    • Naproxen      Pt reports severe vaginal itching    • Penicillins    • Sulfa Antibiotics    • Venlafaxine        Current Outpatient Prescriptions on File Prior to Visit   Medication Sig Dispense Refill   • atorvastatin (LIPITOR) 40 MG tablet Take 1 tablet by mouth Daily. 30 tablet 12   • Azelastine-Fluticasone 137-50 MCG/ACT suspension 1 spray into each nostril 2 (two) times a day 1 bottle 2   • busPIRone (BUSPAR) 10 MG tablet Take 1 tablet by mouth 2 (Two) Times a Day. 60 tablet 0   • citalopram (CeleXA) 40 MG tablet TAKE 1 TABLET BY MOUTH ONE TIME A DAY  30 tablet 6   • CloNIDine (CATAPRES) 0.1 MG tablet TAKE 1 TABLET BY MOUTH TWO TIMES A DAY  60 tablet 4   • diclofenac (VOLTAREN) 1 % gel gel Apply 4 g topically 4 (Four) Times a Day As Needed (hands). 100 g 5   • lisinopril (PRINIVIL,ZESTRIL) 40 MG tablet Take 40 mg by mouth daily        • metoprolol succinate XL (TOPROL XL) 25 MG 24 hr tablet Take 1 tablet by mouth Daily. 30 tablet 5   • montelukast (SINGULAIR) 10 MG tablet TAKE 1 TABLET BY MOUTH AT BEDTIME  30 tablet 11   • norethindrone-ethinyl estradiol (FEMHRT 1/5) 1-5 MG-MCG tablet Take 1 tablet by mouth daily        • [DISCONTINUED] HYDROcodone-acetaminophen (NORCO) 5-325 MG per tablet 1 every 8 hours 90 tablet 0   • [DISCONTINUED] doxycycline (VIBRAMYCIN) 100 MG capsule Take 1 capsule by mouth 2 (Two) Times a Day. 14 capsule 0   • [DISCONTINUED] TiZANidine (ZANAFLEX) 4 MG capsule Take 1 capsule by mouth 3 (Three) Times a Day. 90 capsule 1     No current  "facility-administered medications on file prior to visit.        Objective   Vitals:    01/08/18 1043   BP: 152/92   BP Location: Left arm   Patient Position: Sitting   Cuff Size: Large Adult   Pulse: 56   Temp: 98.1 °F (36.7 °C)   TempSrc: Oral   SpO2: 96%   Weight: 99.8 kg (220 lb)   Height: 179.1 cm (70.51\")       Physical Exam   Constitutional: She appears well-developed and well-nourished.   HENT:   Head: Normocephalic and atraumatic.   Right Ear: Tympanic membrane normal.   Left Ear: Tympanic membrane normal.   Nose: Mucosal edema present. Right sinus exhibits frontal sinus tenderness. Left sinus exhibits frontal sinus tenderness.   Mouth/Throat: Uvula is midline and mucous membranes are normal.   Neck: Neck supple. No JVD present. No thyromegaly present.   Cardiovascular: Normal rate, regular rhythm and normal heart sounds.  Exam reveals no gallop and no friction rub.    No murmur heard.  Pulmonary/Chest: Effort normal and breath sounds normal. No respiratory distress. She has no wheezes. She has no rales.   Abdominal: Soft. Bowel sounds are normal. She exhibits no distension. There is no tenderness. There is no rebound and no guarding.   Musculoskeletal: She exhibits no edema.        Right shoulder: She exhibits tenderness. She exhibits normal range of motion, no bony tenderness, normal pulse and normal strength.   Pain over scapula and medial to, with pain noted with abduction of shoulder over shoulder blade.  Negative drop arm test.  Rotator cuff +5/5 b/l.   Neurological: She is alert.   Skin: Skin is warm and dry.   Psychiatric: She has a normal mood and affect. Her behavior is normal.   Nursing note and vitals reviewed.      Assessment/Plan   Allie was seen today for illness and shoulder pain.    Diagnoses and all orders for this visit:    Acute recurrent frontal sinusitis  -     doxycycline (VIBRAMYCIN) 100 MG capsule; Take 1 capsule by mouth 2 (Two) Times a Day.    Degeneration of intervertebral disc " of lumbar region  -     Ambulatory Referral to Pain Management    Chronic pain syndrome  -     Ambulatory Referral to Pain Management  -     HYDROcodone-acetaminophen (NORCO) 5-325 MG per tablet; 1 every 8 hours    Chronic nonseasonal allergic rhinitis due to fungal spores  -     predniSONE (DELTASONE) 10 MG tablet; 6 tabs po qd x 2 days, 4 tabs po qd x 2 days, 3 tabs po qd x 2 days, 2 tabs po qd x 2 days, 1 tab po qd x 2 days    Right scapulocostal syndrome  -     predniSONE (DELTASONE) 10 MG tablet; 6 tabs po qd x 2 days, 4 tabs po qd x 2 days, 3 tabs po qd x 2 days, 2 tabs po qd x 2 days, 1 tab po qd x 2 days    DDD (degenerative disc disease), cervical  -     HYDROcodone-acetaminophen (NORCO) 5-325 MG per tablet; 1 every 8 hours    Scapulocostal syndrome, right  -     HYDROcodone-acetaminophen (NORCO) 5-325 MG per tablet; 1 every 8 hours    Primary osteoarthritis of both hands  -     HYDROcodone-acetaminophen (NORCO) 5-325 MG per tablet; 1 every 8 hours    -Reviewed steven, no concerns;  Patient has not asked for early refills or had lost or stolen medication.  Has maintained on same dose of opioid therapy for some time.  I discussed given new rules, regulations and standards of care in addition to risks, I am phasing pain mgmt with the use of Schedule II narcotics for non-cancer pain out of my practice.  Patient would be suitable for referral to pain management, though cannot make promise they will prescribe opioids.  Have had this discussion at length and patient requested to be weaned off, had done well but reports not functional at work.  Would like continue same dose on (have cut back), gave Rx for next due, reports not due for 8 days.  -right scapulothoracic syndrome - prednisone  -sinusitis - steroid tapered dose and abx             -Follow up: 4 months       Current Outpatient Prescriptions:   •  atorvastatin (LIPITOR) 40 MG tablet, Take 1 tablet by mouth Daily., Disp: 30 tablet, Rfl: 12  •   Azelastine-Fluticasone 137-50 MCG/ACT suspension, 1 spray into each nostril 2 (two) times a day, Disp: 1 bottle, Rfl: 2  •  busPIRone (BUSPAR) 10 MG tablet, Take 1 tablet by mouth 2 (Two) Times a Day., Disp: 60 tablet, Rfl: 0  •  citalopram (CeleXA) 40 MG tablet, TAKE 1 TABLET BY MOUTH ONE TIME A DAY , Disp: 30 tablet, Rfl: 6  •  CloNIDine (CATAPRES) 0.1 MG tablet, TAKE 1 TABLET BY MOUTH TWO TIMES A DAY , Disp: 60 tablet, Rfl: 4  •  diclofenac (VOLTAREN) 1 % gel gel, Apply 4 g topically 4 (Four) Times a Day As Needed (hands)., Disp: 100 g, Rfl: 5  •  HYDROcodone-acetaminophen (NORCO) 5-325 MG per tablet, 1 every 8 hours, Disp: 90 tablet, Rfl: 0  •  lisinopril (PRINIVIL,ZESTRIL) 40 MG tablet, Take 40 mg by mouth daily  , Disp: , Rfl:   •  metoprolol succinate XL (TOPROL XL) 25 MG 24 hr tablet, Take 1 tablet by mouth Daily., Disp: 30 tablet, Rfl: 5  •  montelukast (SINGULAIR) 10 MG tablet, TAKE 1 TABLET BY MOUTH AT BEDTIME , Disp: 30 tablet, Rfl: 11  •  norethindrone-ethinyl estradiol (FEMHRT 1/5) 1-5 MG-MCG tablet, Take 1 tablet by mouth daily  , Disp: , Rfl:   •  TiZANidine (ZANAFLEX) 4 MG capsule, TAKE 1 CAPSULE BY MOUTH THREE TIMES A DAY , Disp: 90 capsule, Rfl: 0  •  doxycycline (VIBRAMYCIN) 100 MG capsule, Take 1 capsule by mouth 2 (Two) Times a Day., Disp: 20 capsule, Rfl: 0  •  predniSONE (DELTASONE) 10 MG tablet, 6 tabs po qd x 2 days, 4 tabs po qd x 2 days, 3 tabs po qd x 2 days, 2 tabs po qd x 2 days, 1 tab po qd x 2 days, Disp: 32 tablet, Rfl: 0

## 2018-01-11 DIAGNOSIS — F41.1 GENERALIZED ANXIETY DISORDER: ICD-10-CM

## 2018-01-11 RX ORDER — BUSPIRONE HYDROCHLORIDE 10 MG/1
TABLET ORAL
Qty: 60 TABLET | Refills: 0 | Status: SHIPPED | OUTPATIENT
Start: 2018-01-11 | End: 2018-03-19 | Stop reason: SDUPTHER

## 2018-02-22 DIAGNOSIS — M50.30 DDD (DEGENERATIVE DISC DISEASE), CERVICAL: ICD-10-CM

## 2018-02-23 RX ORDER — TIZANIDINE 4 MG/1
TABLET ORAL
Qty: 90 TABLET | Refills: 0 | Status: SHIPPED | OUTPATIENT
Start: 2018-02-23 | End: 2018-11-30

## 2018-03-09 RX ORDER — METOPROLOL SUCCINATE 25 MG/1
TABLET, EXTENDED RELEASE ORAL
Qty: 30 TABLET | Refills: 4 | Status: SHIPPED | OUTPATIENT
Start: 2018-03-09 | End: 2018-11-24 | Stop reason: SDUPTHER

## 2018-03-19 DIAGNOSIS — F41.1 GENERALIZED ANXIETY DISORDER: ICD-10-CM

## 2018-03-19 RX ORDER — BUSPIRONE HYDROCHLORIDE 10 MG/1
TABLET ORAL
Qty: 60 TABLET | Refills: 5 | Status: SHIPPED | OUTPATIENT
Start: 2018-03-19 | End: 2018-09-10 | Stop reason: SDUPTHER

## 2018-04-09 DIAGNOSIS — F32.A DEPRESSION: ICD-10-CM

## 2018-04-09 RX ORDER — CITALOPRAM 40 MG/1
TABLET ORAL
Qty: 30 TABLET | Refills: 5 | Status: SHIPPED | OUTPATIENT
Start: 2018-04-09 | End: 2019-04-08 | Stop reason: SDUPTHER

## 2018-05-04 DIAGNOSIS — J30.9 ALLERGIC RHINITIS: ICD-10-CM

## 2018-05-04 RX ORDER — MONTELUKAST SODIUM 10 MG/1
TABLET ORAL
Qty: 30 TABLET | Refills: 10 | Status: SHIPPED | OUTPATIENT
Start: 2018-05-04 | End: 2018-11-30

## 2018-05-29 ENCOUNTER — OFFICE VISIT (OUTPATIENT)
Dept: FAMILY MEDICINE CLINIC | Facility: CLINIC | Age: 63
End: 2018-05-29

## 2018-05-29 VITALS
HEART RATE: 58 BPM | WEIGHT: 228 LBS | OXYGEN SATURATION: 96 % | DIASTOLIC BLOOD PRESSURE: 80 MMHG | BODY MASS INDEX: 31.92 KG/M2 | TEMPERATURE: 98 F | SYSTOLIC BLOOD PRESSURE: 148 MMHG | HEIGHT: 71 IN

## 2018-05-29 DIAGNOSIS — L30.9 DERMATITIS: Primary | ICD-10-CM

## 2018-05-29 DIAGNOSIS — J01.40 ACUTE NON-RECURRENT PANSINUSITIS: ICD-10-CM

## 2018-05-29 PROCEDURE — 99213 OFFICE O/P EST LOW 20 MIN: CPT | Performed by: NURSE PRACTITIONER

## 2018-05-29 RX ORDER — TRIAMCINOLONE ACETONIDE 0.25 MG/G
OINTMENT TOPICAL 2 TIMES DAILY
Qty: 80 G | Refills: 0 | Status: SHIPPED | OUTPATIENT
Start: 2018-05-29 | End: 2019-03-13

## 2018-05-29 RX ORDER — DOXYCYCLINE HYCLATE 100 MG/1
100 CAPSULE ORAL 2 TIMES DAILY
Qty: 14 CAPSULE | Refills: 0 | Status: SHIPPED | OUTPATIENT
Start: 2018-05-29 | End: 2018-07-09

## 2018-05-29 NOTE — PROGRESS NOTES
"Anam Temple is a 63 y.o. female who presents c/o rash on both arms x 1 month. Also thinks may have sinus infection.     History of Present Illness   Sudden onset rash after weeding. Taking benadryl with flonase, itching worse with any heat. Has tried calamine and oatmeal to address itching.   Has sinus congestion, cough and congestion x 1 week.   The following portions of the patient's history were reviewed and updated as appropriate: allergies, current medications, past family history, past medical history, past social history, past surgical history and problem list.    Review of Systems   Constitutional: Negative for chills and fever.   HENT: Positive for congestion, ear pain, postnasal drip and sinus pressure. Negative for sore throat.    Respiratory: Positive for cough. Negative for chest tightness.    Cardiovascular: Negative.    Skin: Positive for rash.   Hematological: Negative.    Psychiatric/Behavioral: Negative.      /80   Pulse 58   Temp 98 °F (36.7 °C) (Oral)   Ht 179.1 cm (70.5\")   Wt 103 kg (228 lb)   SpO2 96%   BMI 32.25 kg/m²     Objective   Physical Exam   Constitutional: She appears well-developed and well-nourished.   HENT:   Right Ear: Tympanic membrane normal.   Left Ear: Tympanic membrane normal.   Nose: Mucosal edema and rhinorrhea present. Right sinus exhibits maxillary sinus tenderness and frontal sinus tenderness. Left sinus exhibits maxillary sinus tenderness and frontal sinus tenderness.   Mouth/Throat: Uvula is midline, oropharynx is clear and moist and mucous membranes are normal.   Neck: Normal range of motion. Neck supple. No thyromegaly present.   Cardiovascular: Normal rate, regular rhythm and normal heart sounds.    Pulmonary/Chest: Effort normal and breath sounds normal.   Skin: Skin is warm and dry. Rash (to dorsal forearms only) noted. Rash is vesicular.   Psychiatric: She has a normal mood and affect. Her behavior is normal. Judgment and thought " content normal.   Nursing note and vitals reviewed.    Assessment/Plan   Problems Addressed this Visit     None      Visit Diagnoses     Dermatitis    -  Primary    Relevant Medications    triamcinolone (KENALOG) 0.025 % ointment    Acute non-recurrent pansinusitis            Has cut back on cigarettes. Will cover with doxycycline for sinusitis. Follow up if sinuses or rash not settling 1 week.

## 2018-07-09 ENCOUNTER — OFFICE VISIT (OUTPATIENT)
Dept: FAMILY MEDICINE CLINIC | Facility: CLINIC | Age: 63
End: 2018-07-09

## 2018-07-09 VITALS
HEART RATE: 76 BPM | SYSTOLIC BLOOD PRESSURE: 140 MMHG | DIASTOLIC BLOOD PRESSURE: 78 MMHG | TEMPERATURE: 98.2 F | BODY MASS INDEX: 31.36 KG/M2 | OXYGEN SATURATION: 98 % | HEIGHT: 71 IN | WEIGHT: 224 LBS

## 2018-07-09 DIAGNOSIS — L23.9 ALLERGIC CONTACT DERMATITIS, UNSPECIFIED TRIGGER: Primary | ICD-10-CM

## 2018-07-09 PROCEDURE — 96372 THER/PROPH/DIAG INJ SC/IM: CPT | Performed by: NURSE PRACTITIONER

## 2018-07-09 PROCEDURE — 99213 OFFICE O/P EST LOW 20 MIN: CPT | Performed by: NURSE PRACTITIONER

## 2018-07-09 RX ORDER — METHYLPREDNISOLONE ACETATE 80 MG/ML
80 INJECTION, SUSPENSION INTRA-ARTICULAR; INTRALESIONAL; INTRAMUSCULAR; SOFT TISSUE ONCE
Status: COMPLETED | OUTPATIENT
Start: 2018-07-09 | End: 2018-07-09

## 2018-07-09 RX ADMIN — METHYLPREDNISOLONE ACETATE 80 MG: 80 INJECTION, SUSPENSION INTRA-ARTICULAR; INTRALESIONAL; INTRAMUSCULAR; SOFT TISSUE at 13:24

## 2018-07-09 NOTE — PROGRESS NOTES
"Anam Temple is a 63 y.o. female who presents for a follow up on rash. Still covering arms and lower legs.     History of Present Illness   Rash initially present after weeding. Treated with triamcinolone ointment. Reports interval lack of clearing, continued itching. Washing with dial soap, has applied triamcinolone, bactroban, ready for relief. No changed soaps, has started estroven about 5 weeks ago. Spots did not respond to oral steroids while on.   The following portions of the patient's history were reviewed and updated as appropriate: allergies, current medications, past family history, past medical history, past social history, past surgical history and problem list.    Review of Systems   Constitutional: Negative for chills and fever.   Skin: Positive for rash. Negative for skin lesions.   Psychiatric/Behavioral: Negative.      /78   Pulse 76   Temp 98.2 °F (36.8 °C) (Oral)   Ht 179.1 cm (70.5\")   Wt 102 kg (224 lb)   SpO2 98%   BMI 31.69 kg/m²     Objective   Physical Exam   Constitutional: She appears well-developed and well-nourished.   Skin: Capillary refill takes less than 2 seconds.   Still with pustules, and papules to arms and legs. To sun exposed areas only.    Psychiatric: She has a normal mood and affect. Her behavior is normal. Judgment and thought content normal.   Nursing note and vitals reviewed.    Assessment/Plan   Problems Addressed this Visit     None      Visit Diagnoses     Allergic contact dermatitis, unspecified trigger    -  Primary    Relevant Medications    methylPREDNISolone acetate (DEPO-medrol) injection 80 mg (Start on 7/9/2018  2:00 PM)        Would stop dial soap, as too drying. Restart caress to affected areas. Continue topical steroid to affected areas. FU if not settling 1 week. Use sunscreen when outside.          "

## 2018-08-20 ENCOUNTER — TELEPHONE (OUTPATIENT)
Dept: FAMILY MEDICINE CLINIC | Facility: CLINIC | Age: 63
End: 2018-08-20

## 2018-08-22 NOTE — TELEPHONE ENCOUNTER
Hydrocodone 5/325mg 1po bid x 7 days, 1 po daily x 7 days, 1/2 po daily x 10days then stop.  #26 no ref;  Clonidine 0.1mg 1 po bid #60 no ref (to help with coming off).  In interim could try new pain mgmt?    RRJ

## 2018-08-24 DIAGNOSIS — G89.4 CHRONIC PAIN SYNDROME: ICD-10-CM

## 2018-08-24 DIAGNOSIS — M19.041 PRIMARY OSTEOARTHRITIS OF BOTH HANDS: ICD-10-CM

## 2018-08-24 DIAGNOSIS — G56.81: ICD-10-CM

## 2018-08-24 DIAGNOSIS — M19.042 PRIMARY OSTEOARTHRITIS OF BOTH HANDS: ICD-10-CM

## 2018-08-24 DIAGNOSIS — M50.30 DDD (DEGENERATIVE DISC DISEASE), CERVICAL: ICD-10-CM

## 2018-08-24 RX ORDER — CLONIDINE HYDROCHLORIDE 0.1 MG/1
0.1 TABLET ORAL EVERY 12 HOURS SCHEDULED
Qty: 60 TABLET | Refills: 0 | Status: SHIPPED | OUTPATIENT
Start: 2018-08-24 | End: 2018-08-24 | Stop reason: SDUPTHER

## 2018-08-24 RX ORDER — CLONIDINE HYDROCHLORIDE 0.1 MG/1
0.1 TABLET ORAL EVERY 12 HOURS SCHEDULED
Qty: 60 TABLET | Refills: 0 | Status: SHIPPED | OUTPATIENT
Start: 2018-08-24 | End: 2018-09-19 | Stop reason: SDUPTHER

## 2018-08-24 RX ORDER — HYDROCODONE BITARTRATE AND ACETAMINOPHEN 5; 325 MG/1; MG/1
TABLET ORAL
Qty: 26 TABLET | Refills: 0 | Status: SHIPPED | OUTPATIENT
Start: 2018-08-24 | End: 2018-11-30

## 2018-09-10 DIAGNOSIS — F41.1 GENERALIZED ANXIETY DISORDER: ICD-10-CM

## 2018-09-10 RX ORDER — BUSPIRONE HYDROCHLORIDE 10 MG/1
TABLET ORAL
Qty: 60 TABLET | Refills: 4 | Status: SHIPPED | OUTPATIENT
Start: 2018-09-10 | End: 2018-11-30

## 2018-09-19 RX ORDER — CLONIDINE HYDROCHLORIDE 0.1 MG/1
TABLET ORAL
Qty: 60 TABLET | Refills: 0 | Status: SHIPPED | OUTPATIENT
Start: 2018-09-19 | End: 2018-10-28 | Stop reason: SDUPTHER

## 2018-10-29 RX ORDER — CLONIDINE HYDROCHLORIDE 0.1 MG/1
TABLET ORAL
Qty: 30 TABLET | Refills: 5 | Status: SHIPPED | OUTPATIENT
Start: 2018-10-29 | End: 2019-04-29

## 2018-11-26 RX ORDER — METOPROLOL SUCCINATE 25 MG/1
TABLET, EXTENDED RELEASE ORAL
Qty: 30 TABLET | Refills: 3 | Status: SHIPPED | OUTPATIENT
Start: 2018-11-26 | End: 2019-05-19 | Stop reason: SDUPTHER

## 2018-11-30 ENCOUNTER — OFFICE VISIT (OUTPATIENT)
Dept: FAMILY MEDICINE CLINIC | Facility: CLINIC | Age: 63
End: 2018-11-30

## 2018-11-30 VITALS
HEIGHT: 71 IN | WEIGHT: 224 LBS | BODY MASS INDEX: 31.36 KG/M2 | DIASTOLIC BLOOD PRESSURE: 74 MMHG | SYSTOLIC BLOOD PRESSURE: 128 MMHG | OXYGEN SATURATION: 97 % | TEMPERATURE: 98.3 F | HEART RATE: 58 BPM

## 2018-11-30 DIAGNOSIS — M50.90 CERVICAL DISC DISORDER: ICD-10-CM

## 2018-11-30 DIAGNOSIS — G56.81: Primary | ICD-10-CM

## 2018-11-30 DIAGNOSIS — J01.10 ACUTE NON-RECURRENT FRONTAL SINUSITIS: ICD-10-CM

## 2018-11-30 PROCEDURE — 99213 OFFICE O/P EST LOW 20 MIN: CPT | Performed by: NURSE PRACTITIONER

## 2018-11-30 RX ORDER — MELOXICAM 7.5 MG/1
7.5 TABLET ORAL DAILY
Qty: 30 TABLET | Refills: 0 | Status: SHIPPED | OUTPATIENT
Start: 2018-11-30 | End: 2018-12-25 | Stop reason: SDUPTHER

## 2018-11-30 RX ORDER — DOXYCYCLINE HYCLATE 100 MG/1
100 CAPSULE ORAL 2 TIMES DAILY
Qty: 14 CAPSULE | Refills: 0 | Status: SHIPPED | OUTPATIENT
Start: 2018-11-30 | End: 2019-03-13

## 2018-12-06 ENCOUNTER — TELEPHONE (OUTPATIENT)
Dept: FAMILY MEDICINE CLINIC | Facility: CLINIC | Age: 63
End: 2018-12-06

## 2018-12-07 RX ORDER — METHYLPREDNISOLONE 4 MG/1
TABLET ORAL
Qty: 21 EACH | Refills: 0 | Status: SHIPPED | OUTPATIENT
Start: 2018-12-07 | End: 2019-03-13

## 2018-12-07 NOTE — TELEPHONE ENCOUNTER
She really just needs injections from pain management, but could try medrol dose pack for now and see if helps

## 2018-12-26 RX ORDER — MELOXICAM 7.5 MG/1
TABLET ORAL
Qty: 30 TABLET | Refills: 0 | Status: SHIPPED | OUTPATIENT
Start: 2018-12-26 | End: 2019-01-21 | Stop reason: SDUPTHER

## 2019-01-21 RX ORDER — MELOXICAM 7.5 MG/1
TABLET ORAL
Qty: 30 TABLET | Refills: 0 | Status: SHIPPED | OUTPATIENT
Start: 2019-01-21 | End: 2019-03-13 | Stop reason: ALTCHOICE

## 2019-02-01 RX ORDER — MELOXICAM 7.5 MG/1
TABLET ORAL
Qty: 30 TABLET | Refills: 0 | Status: SHIPPED | OUTPATIENT
Start: 2019-02-01 | End: 2019-03-13 | Stop reason: SDUPTHER

## 2019-02-26 RX ORDER — MELOXICAM 7.5 MG/1
TABLET ORAL
Qty: 30 TABLET | Refills: 0 | OUTPATIENT
Start: 2019-02-26

## 2019-03-13 ENCOUNTER — OFFICE VISIT (OUTPATIENT)
Dept: FAMILY MEDICINE CLINIC | Facility: CLINIC | Age: 64
End: 2019-03-13

## 2019-03-13 VITALS
HEIGHT: 71 IN | DIASTOLIC BLOOD PRESSURE: 68 MMHG | HEART RATE: 57 BPM | WEIGHT: 221 LBS | BODY MASS INDEX: 30.94 KG/M2 | TEMPERATURE: 97.5 F | SYSTOLIC BLOOD PRESSURE: 124 MMHG | OXYGEN SATURATION: 97 %

## 2019-03-13 DIAGNOSIS — N64.4 BREAST PAIN, LEFT: ICD-10-CM

## 2019-03-13 DIAGNOSIS — N63.20 BREAST MASS, LEFT: ICD-10-CM

## 2019-03-13 DIAGNOSIS — I10 BENIGN ESSENTIAL HYPERTENSION: Primary | ICD-10-CM

## 2019-03-13 DIAGNOSIS — E78.5 DYSLIPIDEMIA: ICD-10-CM

## 2019-03-13 DIAGNOSIS — J30.2 SEASONAL ALLERGIC RHINITIS, UNSPECIFIED TRIGGER: ICD-10-CM

## 2019-03-13 PROCEDURE — 99214 OFFICE O/P EST MOD 30 MIN: CPT | Performed by: NURSE PRACTITIONER

## 2019-03-13 RX ORDER — MONTELUKAST SODIUM 10 MG/1
10 TABLET ORAL NIGHTLY
Qty: 30 TABLET | Refills: 11 | Status: SHIPPED | OUTPATIENT
Start: 2019-03-13 | End: 2020-01-23

## 2019-03-13 RX ORDER — IBUPROFEN 800 MG/1
800 TABLET ORAL EVERY 6 HOURS PRN
Qty: 90 TABLET | Refills: 0 | Status: SHIPPED | OUTPATIENT
Start: 2019-03-13 | End: 2019-03-31 | Stop reason: SDUPTHER

## 2019-03-13 NOTE — PROGRESS NOTES
"Anam Temple is a 64 y.o. female who presents c/o lump on left breast. Also with drainage, pressure in face x several weeks.     History of Present Illness   L breast lump present x 1 week, warm, painful. Initially thought just bruised. Noticed by spouse. Throbbing. Sleeping with pillow propped between breasts.     Drainage, pressure in the face x several weeks. Trying allergy relief, ibuprofen, previously on singular, does typically help.   The following portions of the patient's history were reviewed and updated as appropriate: allergies, current medications, past family history, past medical history, past social history, past surgical history and problem list.    Review of Systems   Constitutional: Negative for chills and fever.   HENT: Positive for congestion, postnasal drip, sinus pressure and sore throat. Negative for ear pain, rhinorrhea and voice change.    Respiratory: Positive for cough.    Cardiovascular: Negative.    Genitourinary: Positive for breast lump and breast pain.   Musculoskeletal: Negative.    Hematological: Negative.    Psychiatric/Behavioral: Negative.      /68   Pulse 57   Temp 97.5 °F (36.4 °C) (Oral)   Ht 179.1 cm (70.5\")   Wt 100 kg (221 lb)   SpO2 97%   BMI 31.26 kg/m²     Objective   Physical Exam   Constitutional: She appears well-developed and well-nourished.   HENT:   Head: Normocephalic and atraumatic.   Right Ear: Tympanic membrane normal.   Left Ear: Tympanic membrane normal.   Nose: Mucosal edema present. Right sinus exhibits no maxillary sinus tenderness and no frontal sinus tenderness. Left sinus exhibits maxillary sinus tenderness. Left sinus exhibits no frontal sinus tenderness.   Mouth/Throat: Uvula is midline, oropharynx is clear and moist and mucous membranes are normal. Abnormal dentition.   Neck: Normal range of motion. Neck supple. No tracheal deviation present. No thyromegaly present.   Cardiovascular: Normal rate, regular rhythm and normal " heart sounds.   Pulmonary/Chest: Effort normal and breath sounds normal. Right breast exhibits inverted nipple. Right breast exhibits no mass, no nipple discharge, no skin change and no tenderness. Left breast exhibits inverted nipple, mass and tenderness. Left breast exhibits no nipple discharge and no skin change. Breasts are symmetrical.       Lymphadenopathy:     She has no cervical adenopathy.     She has no axillary adenopathy.   Skin: Skin is warm and dry.   Psychiatric: She has a normal mood and affect. Her behavior is normal. Judgment and thought content normal.   Nursing note and vitals reviewed.    Assessment/Plan   Problems Addressed this Visit        Cardiovascular and Mediastinum    Benign essential hypertension - Primary    Relevant Orders    Comprehensive Metabolic Panel       Respiratory    Allergic rhinitis       Other    Dyslipidemia    Relevant Orders    Lipid Panel      Other Visit Diagnoses     Breast pain, left        Relevant Orders    Mammo Diagnostic Bilateral With CAD    US Breast Left Limited    Breast mass, left        Relevant Orders    Mammo Diagnostic Bilateral With CAD    US Breast Left Limited        HTN--to goal--update labs  Allergic rhinitis--restart singular  HLD--update labs  Breast pain and mass--last mammo4-5 yrs ago at [facility]--repeat mammo--diagnostic at [facility] with US  FU annually and prn.

## 2019-04-01 RX ORDER — IBUPROFEN 800 MG/1
TABLET ORAL
Qty: 90 TABLET | Refills: 0 | Status: SHIPPED | OUTPATIENT
Start: 2019-04-01 | End: 2019-04-22 | Stop reason: SDUPTHER

## 2019-04-03 ENCOUNTER — OFFICE VISIT (OUTPATIENT)
Dept: FAMILY MEDICINE CLINIC | Facility: CLINIC | Age: 64
End: 2019-04-03

## 2019-04-03 VITALS
WEIGHT: 218 LBS | BODY MASS INDEX: 30.52 KG/M2 | HEART RATE: 57 BPM | HEIGHT: 71 IN | OXYGEN SATURATION: 93 % | DIASTOLIC BLOOD PRESSURE: 80 MMHG | SYSTOLIC BLOOD PRESSURE: 140 MMHG

## 2019-04-03 DIAGNOSIS — N60.02 BREAST CYST, LEFT: Primary | ICD-10-CM

## 2019-04-03 PROCEDURE — 99213 OFFICE O/P EST LOW 20 MIN: CPT | Performed by: NURSE PRACTITIONER

## 2019-04-03 RX ORDER — DOXYCYCLINE HYCLATE 100 MG/1
100 CAPSULE ORAL 2 TIMES DAILY
Qty: 14 CAPSULE | Refills: 0 | Status: SHIPPED | OUTPATIENT
Start: 2019-04-03 | End: 2019-04-29

## 2019-04-03 NOTE — PROGRESS NOTES
"Anam Temple is a 64 y.o. female who presents to us today for a follow up on her breast ultrasound.     History of Present Illness   L breast has a painful cyst, described as 2.8 x 2.5 x 2.5 cm cyst. No skin redness or changes. Some warmth. Ibuprofen helping some with the pain.   The following portions of the patient's history were reviewed and updated as appropriate: allergies, current medications, past family history, past medical history, past social history, past surgical history and problem list.    Review of Systems   Constitutional: Negative for activity change, appetite change and unexpected weight gain.   Respiratory: Negative for shortness of breath.    Cardiovascular: Negative.    Genitourinary: Positive for breast lump and breast pain. Negative for breast discharge.   Skin: Negative.    Psychiatric/Behavioral: The patient is nervous/anxious.      /80 (BP Location: Left arm, Patient Position: Sitting, Cuff Size: Adult)   Pulse 57   Ht 179.1 cm (70.5\")   Wt 98.9 kg (218 lb)   SpO2 93%   BMI 30.84 kg/m²     Objective   Physical Exam   Constitutional: She appears well-developed and well-nourished. No distress.   Cardiovascular: Normal rate.   Pulmonary/Chest: Effort normal. Left breast exhibits mass and tenderness. Left breast exhibits no skin change.       Skin: Skin is warm and dry. Capillary refill takes less than 2 seconds.   Psychiatric: She has a normal mood and affect. Her behavior is normal. Judgment and thought content normal.   Nursing note and vitals reviewed.    Assessment/Plan   Problems Addressed this Visit     None      Visit Diagnoses     Breast cyst, left    -  Primary    Relevant Medications    doxycycline (VIBRAMYCIN) 100 MG capsule        Cyst--radiology recommended she try antibiotics to address cyst, consider aspiration if not settling for patient comfort. To call and will set up.          "

## 2019-04-08 ENCOUNTER — TELEPHONE (OUTPATIENT)
Dept: FAMILY MEDICINE CLINIC | Facility: CLINIC | Age: 64
End: 2019-04-08

## 2019-04-08 DIAGNOSIS — F32.A DEPRESSION: ICD-10-CM

## 2019-04-08 RX ORDER — CITALOPRAM 40 MG/1
40 TABLET ORAL DAILY
Qty: 30 TABLET | Refills: 5 | Status: SHIPPED | OUTPATIENT
Start: 2019-04-08 | End: 2019-04-22 | Stop reason: SDUPTHER

## 2019-04-16 ENCOUNTER — TELEPHONE (OUTPATIENT)
Dept: FAMILY MEDICINE CLINIC | Facility: CLINIC | Age: 64
End: 2019-04-16

## 2019-04-16 DIAGNOSIS — N60.02 BENIGN BREAST CYST IN FEMALE, LEFT: Primary | ICD-10-CM

## 2019-04-16 DIAGNOSIS — N63.20 BREAST MASS, LEFT: ICD-10-CM

## 2019-04-16 DIAGNOSIS — N64.4 BREAST PAIN, LEFT: ICD-10-CM

## 2019-04-16 NOTE — TELEPHONE ENCOUNTER
Patient states she has completed antibiotics for cyst in breast and was asked to call back to report progress. The inflammation has resolved but the knot is still present. Please advise.

## 2019-04-16 NOTE — TELEPHONE ENCOUNTER
Patient agrees to aspiration and [facility] does perform. I cannot find the order for this in Epic, can you? Patient needs to go on a Thursday.

## 2019-04-17 NOTE — TELEPHONE ENCOUNTER
YUMIKO Melendez the order that was placed for this patient is for the breast cyst aspiration at [facility] that we talked about. This is the only way we can find to order it in [facility]. Let me know if we need to fill out one of those paper orders you have. Thanks.

## 2019-04-22 ENCOUNTER — TELEPHONE (OUTPATIENT)
Dept: FAMILY MEDICINE CLINIC | Facility: CLINIC | Age: 64
End: 2019-04-22

## 2019-04-22 DIAGNOSIS — F32.A DEPRESSION: ICD-10-CM

## 2019-04-22 DIAGNOSIS — C50.912 ADENOCARCINOMA, BREAST, LEFT (HCC): Primary | ICD-10-CM

## 2019-04-22 RX ORDER — CITALOPRAM 40 MG/1
40 TABLET ORAL DAILY
Qty: 30 TABLET | Refills: 5 | Status: SHIPPED | OUTPATIENT
Start: 2019-04-22 | End: 2019-11-29 | Stop reason: SDUPTHER

## 2019-04-22 RX ORDER — IBUPROFEN 800 MG/1
TABLET ORAL
Qty: 90 TABLET | Refills: 0 | Status: SHIPPED | OUTPATIENT
Start: 2019-04-22 | End: 2019-06-07

## 2019-04-29 ENCOUNTER — OFFICE VISIT (OUTPATIENT)
Dept: SURGERY | Facility: CLINIC | Age: 64
End: 2019-04-29

## 2019-04-29 VITALS — WEIGHT: 219 LBS | HEART RATE: 58 BPM | HEIGHT: 69 IN | BODY MASS INDEX: 32.44 KG/M2 | OXYGEN SATURATION: 98 %

## 2019-04-29 DIAGNOSIS — C50.212 MALIGNANT NEOPLASM OF UPPER-INNER QUADRANT OF LEFT FEMALE BREAST, UNSPECIFIED ESTROGEN RECEPTOR STATUS (HCC): Primary | ICD-10-CM

## 2019-04-29 PROCEDURE — 99203 OFFICE O/P NEW LOW 30 MIN: CPT | Performed by: SURGERY

## 2019-04-29 RX ORDER — CEFAZOLIN SODIUM 2 G/100ML
2 INJECTION, SOLUTION INTRAVENOUS ONCE
Status: CANCELLED | OUTPATIENT
Start: 2019-05-02 | End: 2019-04-29

## 2019-04-29 RX ORDER — MELOXICAM 7.5 MG/1
7.5 TABLET ORAL DAILY
COMMUNITY
End: 2019-06-07

## 2019-04-29 RX ORDER — UBIDECARENONE 75 MG
100 CAPSULE ORAL DAILY
COMMUNITY
End: 2020-07-23

## 2019-04-29 RX ORDER — BUSPIRONE HYDROCHLORIDE 10 MG/1
10 TABLET ORAL 2 TIMES DAILY
COMMUNITY
End: 2020-02-20

## 2019-05-01 NOTE — H&P (VIEW-ONLY)
Cc: Left breast mass    History of presenting illness:   This is a 64-year-old lady who says that around a month ago she noted a lump in her left breast.  This prompted a visit to her family doctor and she subsequently underwent mammogram and ultrasound.  There was a cystic lesion found in the left breast which prompted an aspiration.  This was felt to be more solid and was followed by a core needle biopsy.  The aspiration is read as being suspicious for malignant cells, but there is really no further data such as invasive nature of this or any tumor markers.  The patient had some significant bruising afterwards but this is improved.  She denies any other concerns or changes.    Past Medical History: Hypertension, osteoarthritis, degenerative disc disease, dyslipidemia    Past Surgical History: Appendectomy, tubal ligation    Medications: Reviewed and reconciled in epic.  She is not on anticoagulants    Allergies: Effexor, gabapentin    Social History: Patient admits to smoking about 5 or 6 cigarettes a day and has for many years.  She understands that there is an increased risk of surgical wound infection, pain and wound healing problems but is not inclined to try quitting at this time.  She denies alcohol use.    Family History: Negative for colorectal or breast cancer    Review of Systems:  Constitutional: Positive for decreased activity, sweats and fatigue and chills  Neck: no swollen glands or dysphagia or odynophagia  Respiratory: negative for SOB, cough, hemoptysis or wheezing  Cardiovascular: negative for chest pain, palpitations or peripheral edema  Gastrointestinal: Negative for nausea, vomiting, abdominal pain      Physical Exam:   Body mass index 32.3  General: alert and oriented, appropriate, no acute distress  Neck: Supple without lymphadenopathy or thyromegaly, trachea is in the midline  Respiratory: Lungs are clear bilaterally without wheezing, no use of accessory muscles is noted  Cardiovascular:  Regular rate and rhythm without murmur, no peripheral edema  Gastrointestinal: Soft, benign, no mass, no hernia identified  Breast: Right breast exam normal with no masses.  Bilateral nipples are slightly retracted.  In the left breast at approximately the 9 o'clock position there is a firm and mildly tender mass palpable.  There is some overlying bruising.  Lymph: Bilateral axilla are benign, no masses are noted    Laboratory data: Pathology report reviewed.  This is read as having findings suspicious for mammary adenocarcinoma.    Imaging data: Mammogram and ultrasound are reviewed.  Right breast is read as normal.  In the left breast there is a 3 x 3 cm mass at about the 9 o'clock position.  It appears to look thick-walled and cystic in nature.  There are no clustered microcalcifications identified.      Assessment and plan:   -Left breast mass, suspicious for malignancy given the aspiration of these cells concerning for mammary adenocarcinoma.  I have told the patient that since the diagnosis is uncertain that formal excision would be the next appropriate step.  We will plan for excisional breast biopsy.  We will hold off on axillary investigation at this point.  The risks including bleeding, infection, the possible need for further surgical intervention and other problems were also discussed with the patient, who agrees to proceed.      Jase Evans MD, FACS  General, Minimally Invasive and Endoscopic Surgery  [facility]    [address], [address]  [place], [place], [address]  P: [phone]  F: [phone]

## 2019-05-01 NOTE — PROGRESS NOTES
Cc: Left breast mass    History of presenting illness:   This is a 64-year-old lady who says that around a month ago she noted a lump in her left breast.  This prompted a visit to her family doctor and she subsequently underwent mammogram and ultrasound.  There was a cystic lesion found in the left breast which prompted an aspiration.  This was felt to be more solid and was followed by a core needle biopsy.  The aspiration is read as being suspicious for malignant cells, but there is really no further data such as invasive nature of this or any tumor markers.  The patient had some significant bruising afterwards but this is improved.  She denies any other concerns or changes.    Past Medical History: Hypertension, osteoarthritis, degenerative disc disease, dyslipidemia    Past Surgical History: Appendectomy, tubal ligation    Medications: Reviewed and reconciled in epic.  She is not on anticoagulants    Allergies: Effexor, gabapentin    Social History: Patient admits to smoking about 5 or 6 cigarettes a day and has for many years.  She understands that there is an increased risk of surgical wound infection, pain and wound healing problems but is not inclined to try quitting at this time.  She denies alcohol use.    Family History: Negative for colorectal or breast cancer    Review of Systems:  Constitutional: Positive for decreased activity, sweats and fatigue and chills  Neck: no swollen glands or dysphagia or odynophagia  Respiratory: negative for SOB, cough, hemoptysis or wheezing  Cardiovascular: negative for chest pain, palpitations or peripheral edema  Gastrointestinal: Negative for nausea, vomiting, abdominal pain      Physical Exam:   Body mass index 32.3  General: alert and oriented, appropriate, no acute distress  Neck: Supple without lymphadenopathy or thyromegaly, trachea is in the midline  Respiratory: Lungs are clear bilaterally without wheezing, no use of accessory muscles is noted  Cardiovascular:  Regular rate and rhythm without murmur, no peripheral edema  Gastrointestinal: Soft, benign, no mass, no hernia identified  Breast: Right breast exam normal with no masses.  Bilateral nipples are slightly retracted.  In the left breast at approximately the 9 o'clock position there is a firm and mildly tender mass palpable.  There is some overlying bruising.  Lymph: Bilateral axilla are benign, no masses are noted    Laboratory data: Pathology report reviewed.  This is read as having findings suspicious for mammary adenocarcinoma.    Imaging data: Mammogram and ultrasound are reviewed.  Right breast is read as normal.  In the left breast there is a 3 x 3 cm mass at about the 9 o'clock position.  It appears to look thick-walled and cystic in nature.  There are no clustered microcalcifications identified.      Assessment and plan:   -Left breast mass, suspicious for malignancy given the aspiration of these cells concerning for mammary adenocarcinoma.  I have told the patient that since the diagnosis is uncertain that formal excision would be the next appropriate step.  We will plan for excisional breast biopsy.  We will hold off on axillary investigation at this point.  The risks including bleeding, infection, the possible need for further surgical intervention and other problems were also discussed with the patient, who agrees to proceed.      Jase Evans MD, FACS  General, Minimally Invasive and Endoscopic Surgery  [facility]    [address], [address]  [place], [place], [address]  P: [phone]  F: [phone]

## 2019-05-02 ENCOUNTER — ANESTHESIA EVENT (OUTPATIENT)
Dept: PERIOP | Facility: HOSPITAL | Age: 64
End: 2019-05-02

## 2019-05-02 ENCOUNTER — HOSPITAL ENCOUNTER (OUTPATIENT)
Facility: HOSPITAL | Age: 64
Setting detail: HOSPITAL OUTPATIENT SURGERY
Discharge: HOME | End: 2019-05-02
Attending: SURGERY | Admitting: SURGERY

## 2019-05-02 ENCOUNTER — ANESTHESIA (OUTPATIENT)
Dept: PERIOP | Facility: HOSPITAL | Age: 64
End: 2019-05-02

## 2019-05-02 VITALS
TEMPERATURE: 98 F | OXYGEN SATURATION: 98 % | HEIGHT: 69 IN | WEIGHT: 217.25 LBS | DIASTOLIC BLOOD PRESSURE: 86 MMHG | SYSTOLIC BLOOD PRESSURE: 150 MMHG | RESPIRATION RATE: 16 BRPM | HEART RATE: 66 BPM | BODY MASS INDEX: 32.18 KG/M2

## 2019-05-02 DIAGNOSIS — C50.212 MALIGNANT NEOPLASM OF UPPER-INNER QUADRANT OF LEFT FEMALE BREAST, UNSPECIFIED ESTROGEN RECEPTOR STATUS (HCC): ICD-10-CM

## 2019-05-02 LAB
ANION GAP SERPL CALCULATED.3IONS-SCNC: 11.9 MMOL/L
BASOPHILS # BLD AUTO: 0.03 10*3/MM3 (ref 0–0.2)
BASOPHILS NFR BLD AUTO: 0.5 % (ref 0–1.5)
BUN BLD-MCNC: 9 MG/DL (ref 8–23)
BUN/CREAT SERPL: 13.4 (ref 7–25)
CALCIUM SPEC-SCNC: 9.8 MG/DL (ref 8.6–10.5)
CHLORIDE SERPL-SCNC: 105 MMOL/L (ref 98–107)
CO2 SERPL-SCNC: 23.1 MMOL/L (ref 22–29)
CREAT BLD-MCNC: 0.67 MG/DL (ref 0.57–1)
DEPRECATED RDW RBC AUTO: 40 FL (ref 37–54)
EOSINOPHIL # BLD AUTO: 0.24 10*3/MM3 (ref 0–0.4)
EOSINOPHIL NFR BLD AUTO: 3.8 % (ref 0.3–6.2)
ERYTHROCYTE [DISTWIDTH] IN BLOOD BY AUTOMATED COUNT: 12.2 % (ref 12.3–15.4)
GFR SERPL CREATININE-BSD FRML MDRD: 89 ML/MIN/1.73
GLUCOSE BLD-MCNC: 96 MG/DL (ref 65–99)
HCT VFR BLD AUTO: 38.6 % (ref 34–46.6)
HGB BLD-MCNC: 12.8 G/DL (ref 12–15.9)
IMM GRANULOCYTES # BLD AUTO: 0.02 10*3/MM3 (ref 0–0.05)
IMM GRANULOCYTES NFR BLD AUTO: 0.3 % (ref 0–0.5)
LYMPHOCYTES # BLD AUTO: 1.99 10*3/MM3 (ref 0.7–3.1)
LYMPHOCYTES NFR BLD AUTO: 31.7 % (ref 19.6–45.3)
MCH RBC QN AUTO: 30 PG (ref 26.6–33)
MCHC RBC AUTO-ENTMCNC: 33.2 G/DL (ref 31.5–35.7)
MCV RBC AUTO: 90.4 FL (ref 79–97)
MONOCYTES # BLD AUTO: 0.45 10*3/MM3 (ref 0.1–0.9)
MONOCYTES NFR BLD AUTO: 7.2 % (ref 5–12)
NEUTROPHILS # BLD AUTO: 3.54 10*3/MM3 (ref 1.7–7)
NEUTROPHILS NFR BLD AUTO: 56.5 % (ref 42.7–76)
NRBC BLD AUTO-RTO: 0 /100 WBC (ref 0–0.2)
PLATELET # BLD AUTO: 230 10*3/MM3 (ref 140–450)
PMV BLD AUTO: 10.2 FL (ref 6–12)
POTASSIUM BLD-SCNC: 4 MMOL/L (ref 3.5–5.2)
RBC # BLD AUTO: 4.27 10*6/MM3 (ref 3.77–5.28)
SODIUM BLD-SCNC: 140 MMOL/L (ref 136–145)
WBC NRBC COR # BLD: 6.27 10*3/MM3 (ref 3.4–10.8)

## 2019-05-02 PROCEDURE — 88307 TISSUE EXAM BY PATHOLOGIST: CPT | Performed by: SURGERY

## 2019-05-02 PROCEDURE — 25010000002 PROPOFOL 10 MG/ML EMULSION: Performed by: NURSE ANESTHETIST, CERTIFIED REGISTERED

## 2019-05-02 PROCEDURE — 19120 REMOVAL OF BREAST LESION: CPT | Performed by: SURGERY

## 2019-05-02 PROCEDURE — 25010000002 HYDROMORPHONE PER 4 MG: Performed by: NURSE ANESTHETIST, CERTIFIED REGISTERED

## 2019-05-02 PROCEDURE — 88341 IMHCHEM/IMCYTCHM EA ADD ANTB: CPT | Performed by: SURGERY

## 2019-05-02 PROCEDURE — 93010 ELECTROCARDIOGRAM REPORT: CPT | Performed by: INTERNAL MEDICINE

## 2019-05-02 PROCEDURE — 93005 ELECTROCARDIOGRAM TRACING: CPT | Performed by: SURGERY

## 2019-05-02 PROCEDURE — 85025 COMPLETE CBC W/AUTO DIFF WBC: CPT | Performed by: SURGERY

## 2019-05-02 PROCEDURE — 80048 BASIC METABOLIC PNL TOTAL CA: CPT | Performed by: SURGERY

## 2019-05-02 PROCEDURE — 25010000002 DEXAMETHASONE PER 1 MG: Performed by: NURSE ANESTHETIST, CERTIFIED REGISTERED

## 2019-05-02 PROCEDURE — 25010000002 ONDANSETRON PER 1 MG: Performed by: NURSE ANESTHETIST, CERTIFIED REGISTERED

## 2019-05-02 PROCEDURE — 25010000002 FENTANYL CITRATE (PF) 100 MCG/2ML SOLUTION: Performed by: NURSE ANESTHETIST, CERTIFIED REGISTERED

## 2019-05-02 PROCEDURE — 25010000003 CEFAZOLIN IN DEXTROSE 2-4 GM/100ML-% SOLUTION: Performed by: SURGERY

## 2019-05-02 PROCEDURE — 88342 IMHCHEM/IMCYTCHM 1ST ANTB: CPT | Performed by: SURGERY

## 2019-05-02 PROCEDURE — 88360 TUMOR IMMUNOHISTOCHEM/MANUAL: CPT | Performed by: SURGERY

## 2019-05-02 PROCEDURE — 25010000002 MIDAZOLAM PER 1 MG: Performed by: ANESTHESIOLOGY

## 2019-05-02 RX ORDER — PROMETHAZINE HYDROCHLORIDE 25 MG/ML
12.5 INJECTION, SOLUTION INTRAMUSCULAR; INTRAVENOUS ONCE AS NEEDED
Status: DISCONTINUED | OUTPATIENT
Start: 2019-05-02 | End: 2019-05-02 | Stop reason: HOSPADM

## 2019-05-02 RX ORDER — DEXAMETHASONE SODIUM PHOSPHATE 10 MG/ML
INJECTION INTRAMUSCULAR; INTRAVENOUS AS NEEDED
Status: DISCONTINUED | OUTPATIENT
Start: 2019-05-02 | End: 2019-05-02 | Stop reason: SURG

## 2019-05-02 RX ORDER — HYDROCODONE BITARTRATE AND ACETAMINOPHEN 7.5; 325 MG/1; MG/1
1 TABLET ORAL EVERY 6 HOURS PRN
Qty: 30 TABLET | Refills: 0 | Status: SHIPPED | OUTPATIENT
Start: 2019-05-02 | End: 2019-06-07

## 2019-05-02 RX ORDER — ACETAMINOPHEN 325 MG/1
650 TABLET ORAL ONCE AS NEEDED
Status: DISCONTINUED | OUTPATIENT
Start: 2019-05-02 | End: 2019-05-02 | Stop reason: HOSPADM

## 2019-05-02 RX ORDER — SODIUM CHLORIDE, SODIUM LACTATE, POTASSIUM CHLORIDE, CALCIUM CHLORIDE 600; 310; 30; 20 MG/100ML; MG/100ML; MG/100ML; MG/100ML
9 INJECTION, SOLUTION INTRAVENOUS CONTINUOUS
Status: DISCONTINUED | OUTPATIENT
Start: 2019-05-02 | End: 2019-05-02 | Stop reason: HOSPADM

## 2019-05-02 RX ORDER — MAGNESIUM HYDROXIDE 1200 MG/15ML
LIQUID ORAL AS NEEDED
Status: DISCONTINUED | OUTPATIENT
Start: 2019-05-02 | End: 2019-05-02 | Stop reason: HOSPADM

## 2019-05-02 RX ORDER — SODIUM CHLORIDE 0.9 % (FLUSH) 0.9 %
1-10 SYRINGE (ML) INJECTION AS NEEDED
Status: DISCONTINUED | OUTPATIENT
Start: 2019-05-02 | End: 2019-05-02 | Stop reason: HOSPADM

## 2019-05-02 RX ORDER — PROMETHAZINE HYDROCHLORIDE 25 MG/1
25 TABLET ORAL ONCE AS NEEDED
Status: DISCONTINUED | OUTPATIENT
Start: 2019-05-02 | End: 2019-05-02 | Stop reason: HOSPADM

## 2019-05-02 RX ORDER — PROPOFOL 10 MG/ML
VIAL (ML) INTRAVENOUS AS NEEDED
Status: DISCONTINUED | OUTPATIENT
Start: 2019-05-02 | End: 2019-05-02 | Stop reason: SURG

## 2019-05-02 RX ORDER — LIDOCAINE HYDROCHLORIDE 10 MG/ML
0.5 INJECTION, SOLUTION EPIDURAL; INFILTRATION; INTRACAUDAL; PERINEURAL ONCE AS NEEDED
Status: DISCONTINUED | OUTPATIENT
Start: 2019-05-02 | End: 2019-05-02 | Stop reason: HOSPADM

## 2019-05-02 RX ORDER — OXYCODONE AND ACETAMINOPHEN 7.5; 325 MG/1; MG/1
1 TABLET ORAL ONCE AS NEEDED
Status: DISCONTINUED | OUTPATIENT
Start: 2019-05-02 | End: 2019-05-02 | Stop reason: HOSPADM

## 2019-05-02 RX ORDER — BUPIVACAINE HYDROCHLORIDE 2.5 MG/ML
INJECTION, SOLUTION INFILTRATION; PERINEURAL AS NEEDED
Status: DISCONTINUED | OUTPATIENT
Start: 2019-05-02 | End: 2019-05-02 | Stop reason: HOSPADM

## 2019-05-02 RX ORDER — FLUMAZENIL 0.1 MG/ML
0.2 INJECTION INTRAVENOUS AS NEEDED
Status: DISCONTINUED | OUTPATIENT
Start: 2019-05-02 | End: 2019-05-02 | Stop reason: HOSPADM

## 2019-05-02 RX ORDER — PROMETHAZINE HYDROCHLORIDE 25 MG/1
25 SUPPOSITORY RECTAL ONCE AS NEEDED
Status: DISCONTINUED | OUTPATIENT
Start: 2019-05-02 | End: 2019-05-02 | Stop reason: HOSPADM

## 2019-05-02 RX ORDER — MIDAZOLAM HYDROCHLORIDE 1 MG/ML
2 INJECTION INTRAMUSCULAR; INTRAVENOUS
Status: DISCONTINUED | OUTPATIENT
Start: 2019-05-02 | End: 2019-05-02 | Stop reason: HOSPADM

## 2019-05-02 RX ORDER — CEFAZOLIN SODIUM 2 G/100ML
2 INJECTION, SOLUTION INTRAVENOUS ONCE
Status: COMPLETED | OUTPATIENT
Start: 2019-05-02 | End: 2019-05-02

## 2019-05-02 RX ORDER — DIPHENHYDRAMINE HYDROCHLORIDE 50 MG/ML
12.5 INJECTION INTRAMUSCULAR; INTRAVENOUS
Status: DISCONTINUED | OUTPATIENT
Start: 2019-05-02 | End: 2019-05-02 | Stop reason: HOSPADM

## 2019-05-02 RX ORDER — HYDRALAZINE HYDROCHLORIDE 20 MG/ML
5 INJECTION INTRAMUSCULAR; INTRAVENOUS
Status: DISCONTINUED | OUTPATIENT
Start: 2019-05-02 | End: 2019-05-02 | Stop reason: HOSPADM

## 2019-05-02 RX ORDER — GLYCOPYRROLATE 0.2 MG/ML
INJECTION INTRAMUSCULAR; INTRAVENOUS AS NEEDED
Status: DISCONTINUED | OUTPATIENT
Start: 2019-05-02 | End: 2019-05-02 | Stop reason: SURG

## 2019-05-02 RX ORDER — FENTANYL CITRATE 50 UG/ML
50 INJECTION, SOLUTION INTRAMUSCULAR; INTRAVENOUS
Status: DISCONTINUED | OUTPATIENT
Start: 2019-05-02 | End: 2019-05-02 | Stop reason: HOSPADM

## 2019-05-02 RX ORDER — MIDAZOLAM HYDROCHLORIDE 1 MG/ML
1 INJECTION INTRAMUSCULAR; INTRAVENOUS
Status: DISCONTINUED | OUTPATIENT
Start: 2019-05-02 | End: 2019-05-02 | Stop reason: HOSPADM

## 2019-05-02 RX ORDER — LABETALOL HYDROCHLORIDE 5 MG/ML
5 INJECTION, SOLUTION INTRAVENOUS
Status: DISCONTINUED | OUTPATIENT
Start: 2019-05-02 | End: 2019-05-02 | Stop reason: HOSPADM

## 2019-05-02 RX ORDER — HYDROMORPHONE HYDROCHLORIDE 1 MG/ML
0.5 INJECTION, SOLUTION INTRAMUSCULAR; INTRAVENOUS; SUBCUTANEOUS
Status: DISCONTINUED | OUTPATIENT
Start: 2019-05-02 | End: 2019-05-02 | Stop reason: HOSPADM

## 2019-05-02 RX ORDER — FENTANYL CITRATE 50 UG/ML
INJECTION, SOLUTION INTRAMUSCULAR; INTRAVENOUS AS NEEDED
Status: DISCONTINUED | OUTPATIENT
Start: 2019-05-02 | End: 2019-05-02 | Stop reason: SURG

## 2019-05-02 RX ORDER — FAMOTIDINE 10 MG/ML
20 INJECTION, SOLUTION INTRAVENOUS ONCE
Status: COMPLETED | OUTPATIENT
Start: 2019-05-02 | End: 2019-05-02

## 2019-05-02 RX ORDER — ONDANSETRON 2 MG/ML
INJECTION INTRAMUSCULAR; INTRAVENOUS AS NEEDED
Status: DISCONTINUED | OUTPATIENT
Start: 2019-05-02 | End: 2019-05-02 | Stop reason: SURG

## 2019-05-02 RX ORDER — EPHEDRINE SULFATE 50 MG/ML
5 INJECTION, SOLUTION INTRAVENOUS ONCE AS NEEDED
Status: DISCONTINUED | OUTPATIENT
Start: 2019-05-02 | End: 2019-05-02 | Stop reason: HOSPADM

## 2019-05-02 RX ORDER — HYDROCODONE BITARTRATE AND ACETAMINOPHEN 7.5; 325 MG/1; MG/1
1 TABLET ORAL ONCE AS NEEDED
Status: COMPLETED | OUTPATIENT
Start: 2019-05-02 | End: 2019-05-02

## 2019-05-02 RX ORDER — NALOXONE HCL 0.4 MG/ML
0.2 VIAL (ML) INJECTION AS NEEDED
Status: DISCONTINUED | OUTPATIENT
Start: 2019-05-02 | End: 2019-05-02 | Stop reason: HOSPADM

## 2019-05-02 RX ORDER — ONDANSETRON 2 MG/ML
4 INJECTION INTRAMUSCULAR; INTRAVENOUS ONCE AS NEEDED
Status: DISCONTINUED | OUTPATIENT
Start: 2019-05-02 | End: 2019-05-02 | Stop reason: HOSPADM

## 2019-05-02 RX ORDER — LIDOCAINE HYDROCHLORIDE 20 MG/ML
INJECTION, SOLUTION INFILTRATION; PERINEURAL AS NEEDED
Status: DISCONTINUED | OUTPATIENT
Start: 2019-05-02 | End: 2019-05-02 | Stop reason: SURG

## 2019-05-02 RX ORDER — DIPHENHYDRAMINE HCL 25 MG
25 CAPSULE ORAL
Status: DISCONTINUED | OUTPATIENT
Start: 2019-05-02 | End: 2019-05-02 | Stop reason: HOSPADM

## 2019-05-02 RX ADMIN — DEXAMETHASONE SODIUM PHOSPHATE 8 MG: 10 INJECTION INTRAMUSCULAR; INTRAVENOUS at 12:06

## 2019-05-02 RX ADMIN — HYDROMORPHONE HYDROCHLORIDE 0.5 MG: 1 INJECTION, SOLUTION INTRAMUSCULAR; INTRAVENOUS; SUBCUTANEOUS at 13:05

## 2019-05-02 RX ADMIN — FENTANYL CITRATE 50 MCG: 50 INJECTION, SOLUTION INTRAMUSCULAR; INTRAVENOUS at 13:10

## 2019-05-02 RX ADMIN — FENTANYL CITRATE 50 MCG: 50 INJECTION INTRAMUSCULAR; INTRAVENOUS at 12:13

## 2019-05-02 RX ADMIN — HYDROCODONE BITARTRATE AND ACETAMINOPHEN 1 TABLET: 7.5; 325 TABLET ORAL at 13:10

## 2019-05-02 RX ADMIN — CEFAZOLIN SODIUM 2 G: 2 INJECTION, SOLUTION INTRAVENOUS at 12:04

## 2019-05-02 RX ADMIN — FENTANYL CITRATE 50 MCG: 50 INJECTION INTRAMUSCULAR; INTRAVENOUS at 11:58

## 2019-05-02 RX ADMIN — PROPOFOL 150 MG: 10 INJECTION, EMULSION INTRAVENOUS at 12:01

## 2019-05-02 RX ADMIN — FENTANYL CITRATE 50 MCG: 50 INJECTION, SOLUTION INTRAMUSCULAR; INTRAVENOUS at 12:59

## 2019-05-02 RX ADMIN — SODIUM CHLORIDE, POTASSIUM CHLORIDE, SODIUM LACTATE AND CALCIUM CHLORIDE: 600; 310; 30; 20 INJECTION, SOLUTION INTRAVENOUS at 11:54

## 2019-05-02 RX ADMIN — ONDANSETRON 4 MG: 2 INJECTION INTRAMUSCULAR; INTRAVENOUS at 12:30

## 2019-05-02 RX ADMIN — LIDOCAINE HYDROCHLORIDE 100 MG: 20 INJECTION, SOLUTION INFILTRATION; PERINEURAL at 12:01

## 2019-05-02 RX ADMIN — FAMOTIDINE 20 MG: 10 INJECTION INTRAVENOUS at 11:30

## 2019-05-02 RX ADMIN — SODIUM CHLORIDE, POTASSIUM CHLORIDE, SODIUM LACTATE AND CALCIUM CHLORIDE 9 ML/HR: 600; 310; 30; 20 INJECTION, SOLUTION INTRAVENOUS at 11:31

## 2019-05-02 RX ADMIN — MIDAZOLAM 2 MG: 1 INJECTION INTRAMUSCULAR; INTRAVENOUS at 11:30

## 2019-05-02 RX ADMIN — HYDROMORPHONE HYDROCHLORIDE 0.5 MG: 1 INJECTION, SOLUTION INTRAMUSCULAR; INTRAVENOUS; SUBCUTANEOUS at 13:15

## 2019-05-02 RX ADMIN — GLYCOPYRROLATE 0.2 MG: 0.2 INJECTION INTRAMUSCULAR; INTRAVENOUS at 11:58

## 2019-05-02 NOTE — ANESTHESIA PROCEDURE NOTES
Airway  Urgency: elective    Airway not difficult    General Information and Staff    Patient location during procedure: OR  Anesthesiologist: Marshal Wetzel MD  CRNA: Sammi Flores CRNA    Indications and Patient Condition  Indications for airway management: airway protection    Preoxygenated: yes  Mask difficulty assessment: 0 - not attempted    Final Airway Details  Final airway type: supraglottic airway      Successful airway: unique  Size 4    Number of attempts at approach: 1

## 2019-05-02 NOTE — ANESTHESIA POSTPROCEDURE EVALUATION
Patient: Allie Temple    Procedure Summary     Date:  05/02/19 Room / Location:  [facility] OR  / [facility] MAIN OR    Anesthesia Start:  1154 Anesthesia Stop:  1249    Procedure:  Left BREAST LUMPECTOMY (Left Breast) Diagnosis:       Malignant neoplasm of upper-inner quadrant of left female breast, unspecified estrogen receptor status (CMS/HCC)      (Malignant neoplasm of upper-inner quadrant of left female breast, unspecified estrogen receptor status (CMS/HCC) [C50.212])    Surgeon:  Jase Evans MD Provider:  Magali Sharma MD    Anesthesia Type:  general ASA Status:  3          Anesthesia Type: general  Last vitals  BP   175/90 (05/02/19 1330)   Temp   36.7 °C (98 °F) (05/02/19 1248)   Pulse   69 (05/02/19 1330)   Resp   16 (05/02/19 1330)     SpO2   97 % (05/02/19 1330)     Post Anesthesia Care and Evaluation    Patient location during evaluation: PACU  Patient participation: complete - patient participated  Level of consciousness: awake and alert  Pain management: adequate  Airway patency: patent  Anesthetic complications: No anesthetic complications    Cardiovascular status: acceptable  Respiratory status: acceptable  Hydration status: acceptable    Comments: --------------------            05/02/19               1330     --------------------   BP:       175/90     Pulse:      69       Resp:       16       Temp:                SpO2:      97%      --------------------

## 2019-05-02 NOTE — OP NOTE
Operative Note :   Jase Evans MD    Allie Temple  1955    Procedure Date: 05/02/19    Pre-op Diagnosis:  Left breast mass    Post-Op Diagnosis:   Same    Procedure:   · Excisional biopsy left breast mass    Surgeon: Jase Evans MD    Assistant: None    Anesthesia:  General (LMA)    EBL:   10cc    Specimens:   Left breast mass 9 o'clock position    Indications:  · 64-year-old lady with a palpable lesion in her left breast.  This was felt to be cystic on imaging studies but an aspiration was read equivocally for invasive adenocarcinoma and as such she is brought at this time for formal excision    Findings:   · Left breast cystic mass    Recommendations:   · Follow-up on pathology    Description of procedure:    After obtaining informed consent, the patient was brought to the operating room and placed under general anesthetic via laryngal mask airway.  The area had been marked preoperatively.  Her left breast was then sterilely prepped and draped.  This mass was about 3 cm and was easily palpable.  I made an incision directly over this on the upper inner quadrant of the left breast and dissected through the subcutaneous tissues and onto the mass itself.  I dissected around it completely using a combination of blunt dissection and electrocautery.  The mass was slightly elongated, probably more like 4 x 3 cm.  Circumferentially dissected around it, including essentially getting all the way down onto the pectoralis major fascia and dissecting it free from this.  The mass was removed in its entirety and sent off to pathology for formal evaluation.  There were no other masses or abnormalities noted.  The wound was irrigated and hemostasis was established.  I then closed the deep tissues with 3-0 Vicryl and the skin was then closed with a running 4-0 Monocryl subcuticular.    Jase Evans MD  General and Endoscopic Surgery  [facility]    [address], [address]  [place], [place], [address]  P:  [phone]  F: [phone]

## 2019-05-02 NOTE — ANESTHESIA PREPROCEDURE EVALUATION
Anesthesia Evaluation     Patient summary reviewed and Nursing notes reviewed                Airway   Mallampati: II  Dental    (+) poor dentition    Pulmonary    (+) a smoker Current,   Cardiovascular     ECG reviewed  Rhythm: regular  Rate: normal    (+) hypertension,       Neuro/Psych  (+) numbness, psychiatric history Depression and Anxiety,     GI/Hepatic/Renal/Endo - negative ROS     Musculoskeletal     Abdominal    Substance History - negative use     OB/GYN negative ob/gyn ROS         Other   (+) arthritis   history of cancer                    Anesthesia Plan    ASA 3     general   (Positive TB history in chart  Very poor dentition but only 7 teeth remaining)  intravenous induction   Anesthetic plan, all risks, benefits, and alternatives have been provided, discussed and informed consent has been obtained with: patient.

## 2019-05-06 LAB
CYTO UR: NORMAL
LAB AP CASE REPORT: NORMAL
LAB AP DIAGNOSIS COMMENT: NORMAL
LAB AP SPECIAL STAINS: NORMAL
LAB AP SYNOPTIC CHECKLIST: NORMAL
PATH REPORT.ADDENDUM SPEC: NORMAL
PATH REPORT.FINAL DX SPEC: NORMAL
PATH REPORT.GROSS SPEC: NORMAL

## 2019-05-15 ENCOUNTER — OFFICE VISIT (OUTPATIENT)
Dept: SURGERY | Facility: CLINIC | Age: 64
End: 2019-05-15

## 2019-05-15 DIAGNOSIS — C50.212 MALIGNANT NEOPLASM OF UPPER-INNER QUADRANT OF LEFT FEMALE BREAST, UNSPECIFIED ESTROGEN RECEPTOR STATUS (HCC): Primary | ICD-10-CM

## 2019-05-15 PROCEDURE — 99024 POSTOP FOLLOW-UP VISIT: CPT | Performed by: SURGERY

## 2019-05-15 RX ORDER — CLINDAMYCIN PHOSPHATE 600 MG/50ML
600 INJECTION INTRAVENOUS ONCE
Status: CANCELLED | OUTPATIENT
Start: 2019-06-21 | End: 2019-05-15

## 2019-05-15 NOTE — PROGRESS NOTES
Cc: Left breast mass     History of presenting illness:   This is a 64-year-old lady who presents after recent left breast biopsy.  She is recovering well.  She has some tenderness but nothing out of the ordinary.  She has no other complaints.  Her biopsies did come back positive for ER WA negative and HER-2/kali positive cancer.  There was a positive margin.     Past Medical History: Hypertension, osteoarthritis, degenerative disc disease, dyslipidemia     Past Surgical History: Appendectomy, tubal ligation, left breast lumpectomy     Medications: Reviewed and reconciled in epic.  She is not on anticoagulants     Allergies: Effexor, gabapentin     Social History: Patient admits to smoking about 5 or 6 cigarettes a day and has for many years.  She understands that there is an increased risk of surgical wound infection, pain and wound healing problems but is not inclined to try quitting at this time.  She denies alcohol use.     Family History: Negative for colorectal or breast cancer     Review of Systems:  Constitutional: Positive for decreased activity, sweats and fatigue and chills  Neck: no swollen glands or dysphagia or odynophagia  Respiratory: negative for SOB, cough, hemoptysis or wheezing  Cardiovascular: negative for chest pain, palpitations or peripheral edema  Gastrointestinal: Negative for nausea, vomiting, abdominal pain        Physical Exam:   Body mass index 32.3  General: alert and oriented, appropriate, no acute distress  Neck: Supple without lymphadenopathy or thyromegaly, trachea is in the midline  Respiratory: Lungs are clear bilaterally without wheezing, no use of accessory muscles is noted  Cardiovascular: Regular rate and rhythm without murmur, no peripheral edema  Gastrointestinal: Soft, benign, no mass, no hernia identified  Breast: Right breast exam normal with no masses.  Left breast incision site healing nicely.  No drainage or sign of infection.  There is some typical firmness  Lymph:  Bilateral axilla are benign, no masses are noted     Laboratory data:  Biopsy specimen demonstrates poorly differentiated invasive mammary adenocarcinoma with an ER/NC negative status and HER-2/kali positive.  There is a margin positive as well.     Imaging data: Mammogram and ultrasound are reviewed.  Right breast is read as normal.  In the left breast there is a 3 x 3 cm mass at about the 9 o'clock position.  It appears to look thick-walled and cystic in nature.  There are no clustered microcalcifications identified.        Assessment and plan:   -Left breast invasive mammary cancer.  Excisional biopsy positive for margin.  Options discussed with patient.  I told her that reexcision could be considered with sentinel lymph node biopsy and subsequent radiation.  I told her that an alternative option would be left total mastectomy including axillary lymph nodes and this is the route the patient prefers to go.  She does not wish to consider reconstruction at this time.  The risks including bleeding, infection, the need for further interventions, the possibility of disfigurement, pain, injury to nerves and vascular structures in the axilla discussed, and the patient agrees to proceed.    Plan: Left breast total mastectomy       Jase Evans MD, FACS  General, Minimally Invasive and Endoscopic Surgery  [facility]     [address], [address]  [place], [place], [address]  P: [phone]  F: [phone]

## 2019-05-17 NOTE — TELEPHONE ENCOUNTER
Pt called requesting refill on pain medication s/p left breast biopsy on 5/2/19. She is aware that you are not in the office today and that if you are ok with refilling it may not be until Monday. Advised to take OTC tylenol or ibuprofen for pain in the mean time.

## 2019-05-20 RX ORDER — HYDROCODONE BITARTRATE AND ACETAMINOPHEN 7.5; 325 MG/1; MG/1
TABLET ORAL
Qty: 30 TABLET | Refills: 0 | OUTPATIENT
Start: 2019-05-20

## 2019-05-20 RX ORDER — METOPROLOL SUCCINATE 25 MG/1
TABLET, EXTENDED RELEASE ORAL
Qty: 30 TABLET | Refills: 2 | Status: SHIPPED | OUTPATIENT
Start: 2019-05-20 | End: 2019-06-14

## 2019-05-23 ENCOUNTER — TELEPHONE (OUTPATIENT)
Dept: SURGERY | Facility: CLINIC | Age: 64
End: 2019-05-23

## 2019-05-23 DIAGNOSIS — Z17.1 MALIGNANT NEOPLASM OF LEFT BREAST IN FEMALE, ESTROGEN RECEPTOR NEGATIVE, UNSPECIFIED SITE OF BREAST (HCC): Primary | ICD-10-CM

## 2019-05-23 DIAGNOSIS — C50.912 MALIGNANT NEOPLASM OF LEFT BREAST IN FEMALE, ESTROGEN RECEPTOR NEGATIVE, UNSPECIFIED SITE OF BREAST (HCC): Primary | ICD-10-CM

## 2019-05-23 NOTE — TELEPHONE ENCOUNTER
Okay, I put orders through for a consult to Dr. Waterhouse.  Please let me know if the patient has a different preference for plastic surgeon.  I guess for the time being we can leave her OR as scheduled, but we will have to see what the recommendations are from plastic surgery.  I suppose that our operative plans may have to be adjusted.

## 2019-05-23 NOTE — TELEPHONE ENCOUNTER
Pt scheduled for a consultation 6/5/19 to see Dr. Waterhouse. I did want to let you know that Dr. Waterhouse does not have a surgery opening on 6/20/19 should the patient decide to proceed with reconstruction. I left her a voicemail to call me for appointment information. Thank you

## 2019-06-07 ENCOUNTER — OFFICE VISIT (OUTPATIENT)
Dept: FAMILY MEDICINE CLINIC | Facility: CLINIC | Age: 64
End: 2019-06-07

## 2019-06-07 VITALS
HEIGHT: 69 IN | SYSTOLIC BLOOD PRESSURE: 148 MMHG | DIASTOLIC BLOOD PRESSURE: 82 MMHG | HEART RATE: 55 BPM | WEIGHT: 214 LBS | BODY MASS INDEX: 31.7 KG/M2 | TEMPERATURE: 98.1 F | OXYGEN SATURATION: 98 %

## 2019-06-07 DIAGNOSIS — F41.1 GENERALIZED ANXIETY DISORDER: Primary | ICD-10-CM

## 2019-06-07 DIAGNOSIS — F32.9 REACTIVE DEPRESSION: ICD-10-CM

## 2019-06-07 DIAGNOSIS — F17.210 CIGARETTE NICOTINE DEPENDENCE WITHOUT COMPLICATION: ICD-10-CM

## 2019-06-07 DIAGNOSIS — Z71.6 ENCOUNTER FOR SMOKING CESSATION COUNSELING: ICD-10-CM

## 2019-06-07 PROCEDURE — 99213 OFFICE O/P EST LOW 20 MIN: CPT | Performed by: NURSE PRACTITIONER

## 2019-06-07 PROCEDURE — 99406 BEHAV CHNG SMOKING 3-10 MIN: CPT | Performed by: NURSE PRACTITIONER

## 2019-06-07 RX ORDER — ALPRAZOLAM 0.5 MG/1
0.5 TABLET ORAL 2 TIMES DAILY PRN
Qty: 6 TABLET | Refills: 0 | Status: SHIPPED | OUTPATIENT
Start: 2019-06-07 | End: 2019-08-07

## 2019-06-07 RX ORDER — BUPROPION HYDROCHLORIDE 150 MG/1
150 TABLET ORAL DAILY
Qty: 30 TABLET | Refills: 3 | Status: SHIPPED | OUTPATIENT
Start: 2019-06-07 | End: 2019-09-20 | Stop reason: SDUPTHER

## 2019-06-07 NOTE — PROGRESS NOTES
"Subjective   Allie Temple is a 64 y.o. female who present to discuss smoking cessation.     History of Present Illness   Wants her to be smoke free for 30 days for breast reconstruction. Only smoking 5-6 cigarettes daily now. Nerves are up secondary to diagnosis. Buspirone is not really helping with nerves. Nerves are worse day and night secondary to multigenerational household. Family is being supportive. Wants to ask about CBD oil, didn't do anything. Has been on cymbalta and paxil in the past.   The following portions of the patient's history were reviewed and updated as appropriate: allergies, current medications, past family history, past medical history, past social history, past surgical history and problem list.    Review of Systems   Constitutional: Negative for activity change, appetite change and unexpected weight gain.   Respiratory: Negative for shortness of breath.    Cardiovascular: Negative.    Genitourinary: Positive for breast pain.   Psychiatric/Behavioral: Positive for agitation, sleep disturbance, depressed mood and stress. Negative for self-injury and suicidal ideas. The patient is nervous/anxious.      /82   Pulse 55   Temp 98.1 °F (36.7 °C) (Oral)   Ht 175.3 cm (69\")   Wt 97.1 kg (214 lb)   SpO2 98%   BMI 31.60 kg/m²     Objective   Physical Exam   Constitutional: She appears well-developed and well-nourished. No distress.   Cardiovascular: Normal rate.   Pulmonary/Chest: Effort normal.   Skin: Skin is warm and dry.   Psychiatric: Her behavior is normal. Judgment and thought content normal. Her mood appears anxious. She exhibits a depressed mood.   tearful   Nursing note and vitals reviewed.    Assessment/Plan   Problems Addressed this Visit        Other    Generalized anxiety disorder - Primary    Relevant Medications    buPROPion XL (WELLBUTRIN XL) 150 MG 24 hr tablet    ALPRAZolam (XANAX) 0.5 MG tablet    varenicline (CHANTIX STARTING MONTH PAK) 0.5 MG X 11 & 1 MG X 42 tablet "    Depression    Relevant Medications    buPROPion XL (WELLBUTRIN XL) 150 MG 24 hr tablet    ALPRAZolam (XANAX) 0.5 MG tablet    varenicline (CHANTIX STARTING MONTH PAK) 0.5 MG X 11 & 1 MG X 42 tablet      Other Visit Diagnoses     Encounter for smoking cessation counseling        Relevant Medications    varenicline (CHANTIX STARTING MONTH PAK) 0.5 MG X 11 & 1 MG X 42 tablet    Cigarette nicotine dependence without complication        Relevant Medications    varenicline (CHANTIX STARTING MONTH PAK) 0.5 MG X 11 & 1 MG X 42 tablet        Reactive depression and JIMBO--For a few days before surgery, ok for xanax prn, will augment antidepressive agent with bupropion. Will hold start x 1 week, while starting chantix. Discussed when starting new agent, can make mood worse, if occurs to stop right away and call, ER if need.     Smoking cessation--start chantix, set quit date, very motivated to quit smoking for breast reconstruction. Discussed role and use. Must be 30 days smoke free for reconstructive surgery. Risks, benefits, side effects discussed. 10 minutes of 20 minute visit spent on smoking cessation.     JUDY Report:      As part of this patient's treatment plan, I am prescribing controlled substances. The patient has been made aware of appropriate use of such medications, including potential risk of somnolence, limited ability to drive and /or work safely, and potential for dependence or overdose. It has also been made clear that these medications are for use by this patient only, without concomitant use of alcohol or other substances unless prescribed.    JUDY report has been reviewed by: ASH Allison on 06/07/19.  The report was scanned into the patient's chart.    Date of last JUDY:  6/7/2019

## 2019-06-12 ENCOUNTER — TELEPHONE (OUTPATIENT)
Dept: SURGERY | Facility: CLINIC | Age: 64
End: 2019-06-12

## 2019-06-12 NOTE — TELEPHONE ENCOUNTER
Hospital calling and asking if you were doing a sentinel node inj or needle loc?  surgery on 6/21.

## 2019-06-14 ENCOUNTER — APPOINTMENT (OUTPATIENT)
Dept: PREADMISSION TESTING | Facility: HOSPITAL | Age: 64
End: 2019-06-14

## 2019-06-14 VITALS
HEART RATE: 53 BPM | WEIGHT: 217 LBS | BODY MASS INDEX: 32.14 KG/M2 | RESPIRATION RATE: 16 BRPM | SYSTOLIC BLOOD PRESSURE: 174 MMHG | DIASTOLIC BLOOD PRESSURE: 79 MMHG | TEMPERATURE: 97.9 F | OXYGEN SATURATION: 99 % | HEIGHT: 69 IN

## 2019-06-14 DIAGNOSIS — C50.212 MALIGNANT NEOPLASM OF UPPER-INNER QUADRANT OF LEFT FEMALE BREAST, UNSPECIFIED ESTROGEN RECEPTOR STATUS (HCC): ICD-10-CM

## 2019-06-14 LAB
ANION GAP SERPL CALCULATED.3IONS-SCNC: 10 MMOL/L
BASOPHILS # BLD AUTO: 0.04 10*3/MM3 (ref 0–0.2)
BASOPHILS NFR BLD AUTO: 0.7 % (ref 0–1.5)
BUN BLD-MCNC: 7 MG/DL (ref 8–23)
BUN/CREAT SERPL: 10.6 (ref 7–25)
CALCIUM SPEC-SCNC: 9.5 MG/DL (ref 8.6–10.5)
CHLORIDE SERPL-SCNC: 102 MMOL/L (ref 98–107)
CO2 SERPL-SCNC: 28 MMOL/L (ref 22–29)
CREAT BLD-MCNC: 0.66 MG/DL (ref 0.57–1)
DEPRECATED RDW RBC AUTO: 41.1 FL (ref 37–54)
EOSINOPHIL # BLD AUTO: 0.16 10*3/MM3 (ref 0–0.4)
EOSINOPHIL NFR BLD AUTO: 2.9 % (ref 0.3–6.2)
ERYTHROCYTE [DISTWIDTH] IN BLOOD BY AUTOMATED COUNT: 12.3 % (ref 12.3–15.4)
GFR SERPL CREATININE-BSD FRML MDRD: 90 ML/MIN/1.73
GLUCOSE BLD-MCNC: 99 MG/DL (ref 65–99)
HCT VFR BLD AUTO: 39.2 % (ref 34–46.6)
HGB BLD-MCNC: 12.9 G/DL (ref 12–15.9)
IMM GRANULOCYTES # BLD AUTO: 0.01 10*3/MM3 (ref 0–0.05)
IMM GRANULOCYTES NFR BLD AUTO: 0.2 % (ref 0–0.5)
LYMPHOCYTES # BLD AUTO: 2.13 10*3/MM3 (ref 0.7–3.1)
LYMPHOCYTES NFR BLD AUTO: 39 % (ref 19.6–45.3)
MCH RBC QN AUTO: 30.3 PG (ref 26.6–33)
MCHC RBC AUTO-ENTMCNC: 32.9 G/DL (ref 31.5–35.7)
MCV RBC AUTO: 92 FL (ref 79–97)
MONOCYTES # BLD AUTO: 0.4 10*3/MM3 (ref 0.1–0.9)
MONOCYTES NFR BLD AUTO: 7.3 % (ref 5–12)
NEUTROPHILS # BLD AUTO: 2.72 10*3/MM3 (ref 1.7–7)
NEUTROPHILS NFR BLD AUTO: 49.9 % (ref 42.7–76)
NRBC BLD AUTO-RTO: 0 /100 WBC (ref 0–0.2)
PLATELET # BLD AUTO: 210 10*3/MM3 (ref 140–450)
PMV BLD AUTO: 10.6 FL (ref 6–12)
POTASSIUM BLD-SCNC: 3.9 MMOL/L (ref 3.5–5.2)
RBC # BLD AUTO: 4.26 10*6/MM3 (ref 3.77–5.28)
SODIUM BLD-SCNC: 140 MMOL/L (ref 136–145)
WBC NRBC COR # BLD: 5.46 10*3/MM3 (ref 3.4–10.8)

## 2019-06-14 PROCEDURE — 85025 COMPLETE CBC W/AUTO DIFF WBC: CPT | Performed by: SURGERY

## 2019-06-14 PROCEDURE — 36415 COLL VENOUS BLD VENIPUNCTURE: CPT

## 2019-06-14 PROCEDURE — 80048 BASIC METABOLIC PNL TOTAL CA: CPT | Performed by: SURGERY

## 2019-06-14 RX ORDER — METOPROLOL SUCCINATE 25 MG/1
25 TABLET, EXTENDED RELEASE ORAL DAILY
COMMUNITY
End: 2019-08-22 | Stop reason: SDUPTHER

## 2019-06-14 NOTE — DISCHARGE INSTRUCTIONS
Take the following medications the morning of surgery with a small sip of water:  METOPROLOL    PLEASE ARRIVE AT THE HOSPITAL AT 6 AM ON June 21, 2019    General Instructions:  • Do not eat solid food after midnight the night before surgery.  • You may drink clear liquids day of surgery but must stop at least one hour before your hospital arrival time.  • It is beneficial for you to have a clear drink that contains carbohydrates the day of surgery.  We suggest a 12 to 20 ounce bottle of Gatorade or Powerade for non-diabetic patients or a 12 to 20 ounce bottle of G2 or Powerade Zero for diabetic patients. (Pediatric patients, are not advised to drink a 12 to 20 ounce carbohydrate drink)    Clear liquids are liquids you can see through.  Nothing red in color.     Plain water                               Sports drinks  Sodas                                   Gelatin (Jell-O)  Fruit juices without pulp such as white grape juice and apple juice  Popsicles that contain no fruit or yogurt  Tea or coffee (no cream or milk added)  Gatorade / Powerade  G2 / Powerade Zero    • Infants may have breast milk up to four hours before surgery.  • Infants drinking formula may drink formula up to six hours before surgery.   • Patients who avoid smoking, chewing tobacco and alcohol for 4 weeks prior to surgery have a reduced risk of post-operative complications.  Quit smoking as many days before surgery as you can.  • Do not smoke, use chewing tobacco or drink alcohol the day of surgery.   • If applicable bring your C-PAP/ BI-PAP machine.  • Bring any papers given to you in the doctor’s office.  • Wear clean comfortable clothes and socks.  • Do not wear contact lenses, false eyelashes or make-up.  Bring a case for your glasses.   • Bring crutches or walker if applicable.  • Remove all piercings.  Leave jewelry and any other valuables at home.  • Hair extensions with metal clips must be removed prior to surgery.  • The Pre-Admission  Testing nurse will instruct you to bring medications if unable to obtain an accurate list in Pre-Admission Testing.      Preventing a Surgical Site Infection:  • For 2 to 3 days before surgery, avoid shaving with a razor because the razor can irritate skin and make it easier to develop an infection.    • Any areas of open skin can increase the risk of a post-operative wound infection by allowing bacteria to enter and travel throughout the body.  Notify your surgeon if you have any skin wounds / rashes even if it is not near the expected surgical site.  The area will need assessed to determine if surgery should be delayed until it is healed.  • The night prior to surgery sleep in a clean bed with clean clothing.  Do not allow pets to sleep with you.  • Shower on the morning of surgery using a fresh bar of anti-bacterial soap (such as Dial) and clean washcloth.  Dry with a clean towel and dress in clean clothing.  • Ask your surgeon if you will be receiving antibiotics prior to surgery.  • Make sure you, your family, and all healthcare providers clean their hands with soap and water or an alcohol based hand  before caring for you or your wound.    Day of surgery:  Upon arrival, a Pre-op nurse and Anesthesiologist will review your health history, obtain vital signs, and answer questions you may have.  The only belongings needed at this time will be a list of your home medications and if applicable your C-PAP/BI-PAP machine.  If you are staying overnight your family can leave the rest of your belongings in the car and bring them to your room later.  A Pre-op nurse will start an IV and you may receive medication in preparation for surgery, including something to help you relax.  Your family will be able to see you in the Pre-op area.  While you are in surgery your family should notify the waiting room  if they leave the waiting room area and provide a contact phone number.    Please be aware that  surgery does come with discomfort.  We want to make every effort to control your discomfort so please discuss any uncontrolled symptoms with your nurse.   Your doctor will most likely have prescribed pain medications.      If you are going home after surgery you will receive individualized written care instructions before being discharged.  A responsible adult must drive you to and from the hospital on the day of your surgery and stay with you for 24 hours.    If you are staying overnight following surgery, you will be transported to your hospital room following the recovery period.  [facility] has all private rooms.    You have received a list of surgical assistants for your reference.  If you have any questions please call Pre-Admission Testing at [phone].  Deductibles and co-payments are collected on the day of service. Please be prepared to pay the required co-pay, deductible or deposit on the day of service as defined by your plan.

## 2019-06-21 ENCOUNTER — ANESTHESIA EVENT (OUTPATIENT)
Dept: PERIOP | Facility: HOSPITAL | Age: 64
End: 2019-06-21

## 2019-06-21 ENCOUNTER — ANESTHESIA (OUTPATIENT)
Dept: PERIOP | Facility: HOSPITAL | Age: 64
End: 2019-06-21

## 2019-06-21 ENCOUNTER — HOSPITAL ENCOUNTER (OUTPATIENT)
Facility: HOSPITAL | Age: 64
Discharge: HOME | End: 2019-06-22
Attending: SURGERY | Admitting: SURGERY

## 2019-06-21 DIAGNOSIS — C50.212 MALIGNANT NEOPLASM OF UPPER-INNER QUADRANT OF LEFT FEMALE BREAST, UNSPECIFIED ESTROGEN RECEPTOR STATUS (HCC): ICD-10-CM

## 2019-06-21 PROCEDURE — S0260 H&P FOR SURGERY: HCPCS | Performed by: SURGERY

## 2019-06-21 PROCEDURE — G0378 HOSPITAL OBSERVATION PER HR: HCPCS

## 2019-06-21 PROCEDURE — 88305 TISSUE EXAM BY PATHOLOGIST: CPT | Performed by: SURGERY

## 2019-06-21 PROCEDURE — 25010000002 FENTANYL CITRATE (PF) 100 MCG/2ML SOLUTION: Performed by: NURSE ANESTHETIST, CERTIFIED REGISTERED

## 2019-06-21 PROCEDURE — 88341 IMHCHEM/IMCYTCHM EA ADD ANTB: CPT | Performed by: SURGERY

## 2019-06-21 PROCEDURE — 25010000002 ONDANSETRON PER 1 MG: Performed by: NURSE ANESTHETIST, CERTIFIED REGISTERED

## 2019-06-21 PROCEDURE — 88309 TISSUE EXAM BY PATHOLOGIST: CPT | Performed by: SURGERY

## 2019-06-21 PROCEDURE — 25010000002 DEXAMETHASONE PER 1 MG: Performed by: NURSE ANESTHETIST, CERTIFIED REGISTERED

## 2019-06-21 PROCEDURE — 88342 IMHCHEM/IMCYTCHM 1ST ANTB: CPT | Performed by: SURGERY

## 2019-06-21 PROCEDURE — 25010000002 NEOSTIGMINE PER 0.5 MG: Performed by: NURSE ANESTHETIST, CERTIFIED REGISTERED

## 2019-06-21 PROCEDURE — 19307 MAST MOD RAD: CPT | Performed by: SURGERY

## 2019-06-21 PROCEDURE — 25010000002 MIDAZOLAM PER 1 MG: Performed by: ANESTHESIOLOGY

## 2019-06-21 PROCEDURE — 25010000002 PROPOFOL 10 MG/ML EMULSION: Performed by: NURSE ANESTHETIST, CERTIFIED REGISTERED

## 2019-06-21 RX ORDER — CLINDAMYCIN PHOSPHATE 600 MG/50ML
600 INJECTION INTRAVENOUS ONCE
Status: COMPLETED | OUTPATIENT
Start: 2019-06-21 | End: 2019-06-21

## 2019-06-21 RX ORDER — ACETAMINOPHEN 500 MG
1000 TABLET ORAL ONCE
Status: COMPLETED | OUTPATIENT
Start: 2019-06-21 | End: 2019-06-21

## 2019-06-21 RX ORDER — BUSPIRONE HYDROCHLORIDE 10 MG/1
10 TABLET ORAL 2 TIMES DAILY
Status: DISCONTINUED | OUTPATIENT
Start: 2019-06-21 | End: 2019-06-22 | Stop reason: HOSPADM

## 2019-06-21 RX ORDER — GLYCOPYRROLATE 0.2 MG/ML
INJECTION INTRAMUSCULAR; INTRAVENOUS AS NEEDED
Status: DISCONTINUED | OUTPATIENT
Start: 2019-06-21 | End: 2019-06-21 | Stop reason: SURG

## 2019-06-21 RX ORDER — CITALOPRAM 40 MG/1
40 TABLET ORAL DAILY
Status: DISCONTINUED | OUTPATIENT
Start: 2019-06-21 | End: 2019-06-21

## 2019-06-21 RX ORDER — OXYCODONE AND ACETAMINOPHEN 7.5; 325 MG/1; MG/1
1 TABLET ORAL ONCE AS NEEDED
Status: DISCONTINUED | OUTPATIENT
Start: 2019-06-21 | End: 2019-06-21 | Stop reason: HOSPADM

## 2019-06-21 RX ORDER — METOPROLOL SUCCINATE 25 MG/1
25 TABLET, EXTENDED RELEASE ORAL DAILY
Status: DISCONTINUED | OUTPATIENT
Start: 2019-06-21 | End: 2019-06-22 | Stop reason: HOSPADM

## 2019-06-21 RX ORDER — ONDANSETRON 2 MG/ML
4 INJECTION INTRAMUSCULAR; INTRAVENOUS EVERY 6 HOURS PRN
Status: DISCONTINUED | OUTPATIENT
Start: 2019-06-21 | End: 2019-06-22 | Stop reason: HOSPADM

## 2019-06-21 RX ORDER — PROMETHAZINE HYDROCHLORIDE 25 MG/ML
6.25 INJECTION, SOLUTION INTRAMUSCULAR; INTRAVENOUS
Status: DISCONTINUED | OUTPATIENT
Start: 2019-06-21 | End: 2019-06-21 | Stop reason: HOSPADM

## 2019-06-21 RX ORDER — NALOXONE HCL 0.4 MG/ML
0.1 VIAL (ML) INJECTION
Status: DISCONTINUED | OUTPATIENT
Start: 2019-06-21 | End: 2019-06-22 | Stop reason: HOSPADM

## 2019-06-21 RX ORDER — ACETAMINOPHEN 650 MG/1
650 SUPPOSITORY RECTAL EVERY 4 HOURS PRN
Status: DISCONTINUED | OUTPATIENT
Start: 2019-06-21 | End: 2019-06-22 | Stop reason: HOSPADM

## 2019-06-21 RX ORDER — SODIUM CHLORIDE, SODIUM LACTATE, POTASSIUM CHLORIDE, CALCIUM CHLORIDE 600; 310; 30; 20 MG/100ML; MG/100ML; MG/100ML; MG/100ML
9 INJECTION, SOLUTION INTRAVENOUS CONTINUOUS
Status: DISCONTINUED | OUTPATIENT
Start: 2019-06-21 | End: 2019-06-21

## 2019-06-21 RX ORDER — ONDANSETRON 2 MG/ML
INJECTION INTRAMUSCULAR; INTRAVENOUS AS NEEDED
Status: DISCONTINUED | OUTPATIENT
Start: 2019-06-21 | End: 2019-06-21 | Stop reason: SURG

## 2019-06-21 RX ORDER — LABETALOL HYDROCHLORIDE 5 MG/ML
5 INJECTION, SOLUTION INTRAVENOUS
Status: DISCONTINUED | OUTPATIENT
Start: 2019-06-21 | End: 2019-06-21 | Stop reason: HOSPADM

## 2019-06-21 RX ORDER — HYDROMORPHONE HYDROCHLORIDE 1 MG/ML
0.5 INJECTION, SOLUTION INTRAMUSCULAR; INTRAVENOUS; SUBCUTANEOUS
Status: DISCONTINUED | OUTPATIENT
Start: 2019-06-21 | End: 2019-06-21 | Stop reason: HOSPADM

## 2019-06-21 RX ORDER — ACETAMINOPHEN 325 MG/1
650 TABLET ORAL EVERY 4 HOURS PRN
Status: DISCONTINUED | OUTPATIENT
Start: 2019-06-21 | End: 2019-06-22 | Stop reason: HOSPADM

## 2019-06-21 RX ORDER — HYDROMORPHONE HYDROCHLORIDE 1 MG/ML
0.5 INJECTION, SOLUTION INTRAMUSCULAR; INTRAVENOUS; SUBCUTANEOUS
Status: DISCONTINUED | OUTPATIENT
Start: 2019-06-21 | End: 2019-06-22 | Stop reason: HOSPADM

## 2019-06-21 RX ORDER — ONDANSETRON 2 MG/ML
4 INJECTION INTRAMUSCULAR; INTRAVENOUS ONCE AS NEEDED
Status: DISCONTINUED | OUTPATIENT
Start: 2019-06-21 | End: 2019-06-21 | Stop reason: HOSPADM

## 2019-06-21 RX ORDER — LIDOCAINE HYDROCHLORIDE 10 MG/ML
0.5 INJECTION, SOLUTION EPIDURAL; INFILTRATION; INTRACAUDAL; PERINEURAL ONCE AS NEEDED
Status: DISCONTINUED | OUTPATIENT
Start: 2019-06-21 | End: 2019-06-21 | Stop reason: HOSPADM

## 2019-06-21 RX ORDER — DIPHENHYDRAMINE HCL 25 MG
25 CAPSULE ORAL
Status: DISCONTINUED | OUTPATIENT
Start: 2019-06-21 | End: 2019-06-21 | Stop reason: HOSPADM

## 2019-06-21 RX ORDER — FENTANYL CITRATE 50 UG/ML
INJECTION, SOLUTION INTRAMUSCULAR; INTRAVENOUS AS NEEDED
Status: DISCONTINUED | OUTPATIENT
Start: 2019-06-21 | End: 2019-06-21 | Stop reason: SURG

## 2019-06-21 RX ORDER — PROMETHAZINE HYDROCHLORIDE 25 MG/ML
12.5 INJECTION, SOLUTION INTRAMUSCULAR; INTRAVENOUS ONCE AS NEEDED
Status: DISCONTINUED | OUTPATIENT
Start: 2019-06-21 | End: 2019-06-21 | Stop reason: HOSPADM

## 2019-06-21 RX ORDER — NALOXONE HCL 0.4 MG/ML
0.2 VIAL (ML) INJECTION AS NEEDED
Status: DISCONTINUED | OUTPATIENT
Start: 2019-06-21 | End: 2019-06-21 | Stop reason: HOSPADM

## 2019-06-21 RX ORDER — BUPIVACAINE HYDROCHLORIDE AND EPINEPHRINE 2.5; 5 MG/ML; UG/ML
INJECTION, SOLUTION EPIDURAL; INFILTRATION; INTRACAUDAL; PERINEURAL AS NEEDED
Status: DISCONTINUED | OUTPATIENT
Start: 2019-06-21 | End: 2019-06-21 | Stop reason: HOSPADM

## 2019-06-21 RX ORDER — PROPOFOL 10 MG/ML
VIAL (ML) INTRAVENOUS AS NEEDED
Status: DISCONTINUED | OUTPATIENT
Start: 2019-06-21 | End: 2019-06-21 | Stop reason: SURG

## 2019-06-21 RX ORDER — DIPHENHYDRAMINE HYDROCHLORIDE 50 MG/ML
12.5 INJECTION INTRAMUSCULAR; INTRAVENOUS
Status: DISCONTINUED | OUTPATIENT
Start: 2019-06-21 | End: 2019-06-21 | Stop reason: HOSPADM

## 2019-06-21 RX ORDER — MONTELUKAST SODIUM 10 MG/1
10 TABLET ORAL NIGHTLY
Status: DISCONTINUED | OUTPATIENT
Start: 2019-06-21 | End: 2019-06-22 | Stop reason: HOSPADM

## 2019-06-21 RX ORDER — HYDROCODONE BITARTRATE AND ACETAMINOPHEN 7.5; 325 MG/1; MG/1
1 TABLET ORAL EVERY 4 HOURS PRN
Status: DISCONTINUED | OUTPATIENT
Start: 2019-06-21 | End: 2019-06-22 | Stop reason: HOSPADM

## 2019-06-21 RX ORDER — SODIUM CHLORIDE 0.9 % (FLUSH) 0.9 %
1-10 SYRINGE (ML) INJECTION AS NEEDED
Status: DISCONTINUED | OUTPATIENT
Start: 2019-06-21 | End: 2019-06-21 | Stop reason: HOSPADM

## 2019-06-21 RX ORDER — EPHEDRINE SULFATE 50 MG/ML
5 INJECTION, SOLUTION INTRAVENOUS ONCE AS NEEDED
Status: DISCONTINUED | OUTPATIENT
Start: 2019-06-21 | End: 2019-06-21 | Stop reason: HOSPADM

## 2019-06-21 RX ORDER — PROMETHAZINE HYDROCHLORIDE 25 MG/1
25 TABLET ORAL ONCE AS NEEDED
Status: DISCONTINUED | OUTPATIENT
Start: 2019-06-21 | End: 2019-06-21 | Stop reason: HOSPADM

## 2019-06-21 RX ORDER — CLINDAMYCIN PHOSPHATE 600 MG/50ML
600 INJECTION INTRAVENOUS EVERY 8 HOURS
Status: COMPLETED | OUTPATIENT
Start: 2019-06-21 | End: 2019-06-22

## 2019-06-21 RX ORDER — FENTANYL CITRATE 50 UG/ML
50 INJECTION, SOLUTION INTRAMUSCULAR; INTRAVENOUS
Status: DISCONTINUED | OUTPATIENT
Start: 2019-06-21 | End: 2019-06-21 | Stop reason: HOSPADM

## 2019-06-21 RX ORDER — DEXAMETHASONE SODIUM PHOSPHATE 10 MG/ML
INJECTION INTRAMUSCULAR; INTRAVENOUS AS NEEDED
Status: DISCONTINUED | OUTPATIENT
Start: 2019-06-21 | End: 2019-06-21 | Stop reason: SURG

## 2019-06-21 RX ORDER — ONDANSETRON 4 MG/1
4 TABLET, FILM COATED ORAL EVERY 6 HOURS PRN
Status: DISCONTINUED | OUTPATIENT
Start: 2019-06-21 | End: 2019-06-22 | Stop reason: HOSPADM

## 2019-06-21 RX ORDER — VARENICLINE TARTRATE 0.5 MG/1
0.5 TABLET, FILM COATED ORAL DAILY
Status: DISCONTINUED | OUTPATIENT
Start: 2019-06-21 | End: 2019-06-22 | Stop reason: HOSPADM

## 2019-06-21 RX ORDER — ACETAMINOPHEN 160 MG/5ML
650 SOLUTION ORAL EVERY 4 HOURS PRN
Status: DISCONTINUED | OUTPATIENT
Start: 2019-06-21 | End: 2019-06-22 | Stop reason: HOSPADM

## 2019-06-21 RX ORDER — ALPRAZOLAM 0.5 MG/1
0.5 TABLET ORAL 2 TIMES DAILY PRN
Status: DISCONTINUED | OUTPATIENT
Start: 2019-06-21 | End: 2019-06-22 | Stop reason: HOSPADM

## 2019-06-21 RX ORDER — ACETAMINOPHEN 325 MG/1
650 TABLET ORAL ONCE AS NEEDED
Status: DISCONTINUED | OUTPATIENT
Start: 2019-06-21 | End: 2019-06-21 | Stop reason: HOSPADM

## 2019-06-21 RX ORDER — FLUMAZENIL 0.1 MG/ML
0.2 INJECTION INTRAVENOUS AS NEEDED
Status: DISCONTINUED | OUTPATIENT
Start: 2019-06-21 | End: 2019-06-21 | Stop reason: HOSPADM

## 2019-06-21 RX ORDER — SCOLOPAMINE TRANSDERMAL SYSTEM 1 MG/1
1 PATCH, EXTENDED RELEASE TRANSDERMAL ONCE
Status: DISCONTINUED | OUTPATIENT
Start: 2019-06-21 | End: 2019-06-21

## 2019-06-21 RX ORDER — CITALOPRAM 40 MG/1
40 TABLET ORAL NIGHTLY
Status: DISCONTINUED | OUTPATIENT
Start: 2019-06-21 | End: 2019-06-22 | Stop reason: HOSPADM

## 2019-06-21 RX ORDER — MIDAZOLAM HYDROCHLORIDE 1 MG/ML
2 INJECTION INTRAMUSCULAR; INTRAVENOUS
Status: DISCONTINUED | OUTPATIENT
Start: 2019-06-21 | End: 2019-06-21 | Stop reason: HOSPADM

## 2019-06-21 RX ORDER — MIDAZOLAM HYDROCHLORIDE 1 MG/ML
1 INJECTION INTRAMUSCULAR; INTRAVENOUS
Status: DISCONTINUED | OUTPATIENT
Start: 2019-06-21 | End: 2019-06-21 | Stop reason: HOSPADM

## 2019-06-21 RX ORDER — FAMOTIDINE 10 MG/ML
20 INJECTION, SOLUTION INTRAVENOUS ONCE
Status: COMPLETED | OUTPATIENT
Start: 2019-06-21 | End: 2019-06-21

## 2019-06-21 RX ORDER — ROCURONIUM BROMIDE 10 MG/ML
INJECTION, SOLUTION INTRAVENOUS AS NEEDED
Status: DISCONTINUED | OUTPATIENT
Start: 2019-06-21 | End: 2019-06-21 | Stop reason: SURG

## 2019-06-21 RX ORDER — PROMETHAZINE HYDROCHLORIDE 25 MG/1
25 SUPPOSITORY RECTAL ONCE AS NEEDED
Status: DISCONTINUED | OUTPATIENT
Start: 2019-06-21 | End: 2019-06-21 | Stop reason: HOSPADM

## 2019-06-21 RX ORDER — LIDOCAINE HYDROCHLORIDE 20 MG/ML
INJECTION, SOLUTION INFILTRATION; PERINEURAL AS NEEDED
Status: DISCONTINUED | OUTPATIENT
Start: 2019-06-21 | End: 2019-06-21 | Stop reason: SURG

## 2019-06-21 RX ORDER — HYDROCODONE BITARTRATE AND ACETAMINOPHEN 7.5; 325 MG/1; MG/1
1 TABLET ORAL ONCE AS NEEDED
Status: DISCONTINUED | OUTPATIENT
Start: 2019-06-21 | End: 2019-06-21 | Stop reason: HOSPADM

## 2019-06-21 RX ORDER — BUPROPION HYDROCHLORIDE 150 MG/1
150 TABLET ORAL DAILY
Status: DISCONTINUED | OUTPATIENT
Start: 2019-06-21 | End: 2019-06-22 | Stop reason: HOSPADM

## 2019-06-21 RX ORDER — MAGNESIUM HYDROXIDE 1200 MG/15ML
LIQUID ORAL AS NEEDED
Status: DISCONTINUED | OUTPATIENT
Start: 2019-06-21 | End: 2019-06-21 | Stop reason: HOSPADM

## 2019-06-21 RX ORDER — HYDRALAZINE HYDROCHLORIDE 20 MG/ML
5 INJECTION INTRAMUSCULAR; INTRAVENOUS
Status: DISCONTINUED | OUTPATIENT
Start: 2019-06-21 | End: 2019-06-21 | Stop reason: HOSPADM

## 2019-06-21 RX ADMIN — FENTANYL CITRATE 50 MCG: 50 INJECTION, SOLUTION INTRAMUSCULAR; INTRAVENOUS at 07:57

## 2019-06-21 RX ADMIN — NEOSTIGMINE METHYLSULFATE 3 MG: 1 INJECTION INTRAMUSCULAR; INTRAVENOUS; SUBCUTANEOUS at 09:32

## 2019-06-21 RX ADMIN — FENTANYL CITRATE 50 MCG: 50 INJECTION, SOLUTION INTRAMUSCULAR; INTRAVENOUS at 08:27

## 2019-06-21 RX ADMIN — PROPOFOL 200 MG: 10 INJECTION, EMULSION INTRAVENOUS at 07:57

## 2019-06-21 RX ADMIN — DEXAMETHASONE SODIUM PHOSPHATE 4 MG: 10 INJECTION INTRAMUSCULAR; INTRAVENOUS at 08:05

## 2019-06-21 RX ADMIN — SODIUM CHLORIDE, POTASSIUM CHLORIDE, SODIUM LACTATE AND CALCIUM CHLORIDE: 600; 310; 30; 20 INJECTION, SOLUTION INTRAVENOUS at 09:21

## 2019-06-21 RX ADMIN — CITALOPRAM 40 MG: 40 TABLET, FILM COATED ORAL at 21:14

## 2019-06-21 RX ADMIN — HYDROCODONE BITARTRATE AND ACETAMINOPHEN 1 TABLET: 7.5; 325 TABLET ORAL at 18:09

## 2019-06-21 RX ADMIN — HYDROCODONE BITARTRATE AND ACETAMINOPHEN 1 TABLET: 7.5; 325 TABLET ORAL at 14:01

## 2019-06-21 RX ADMIN — PROPOFOL 50 MG: 10 INJECTION, EMULSION INTRAVENOUS at 09:15

## 2019-06-21 RX ADMIN — FENTANYL CITRATE 50 MCG: 50 INJECTION, SOLUTION INTRAMUSCULAR; INTRAVENOUS at 09:51

## 2019-06-21 RX ADMIN — VARENICLINE TARTRATE 0.5 MG: 0.5 TABLET, FILM COATED ORAL at 15:14

## 2019-06-21 RX ADMIN — ONDANSETRON 4 MG: 2 INJECTION INTRAMUSCULAR; INTRAVENOUS at 09:17

## 2019-06-21 RX ADMIN — CLINDAMYCIN PHOSPHATE 600 MG: 12 INJECTION, SOLUTION INTRAVENOUS at 07:57

## 2019-06-21 RX ADMIN — SODIUM CHLORIDE, POTASSIUM CHLORIDE, SODIUM LACTATE AND CALCIUM CHLORIDE 9 ML/HR: 600; 310; 30; 20 INJECTION, SOLUTION INTRAVENOUS at 06:45

## 2019-06-21 RX ADMIN — MONTELUKAST SODIUM 10 MG: 10 TABLET, FILM COATED ORAL at 18:18

## 2019-06-21 RX ADMIN — FENTANYL CITRATE 50 MCG: 50 INJECTION, SOLUTION INTRAMUSCULAR; INTRAVENOUS at 09:15

## 2019-06-21 RX ADMIN — BUSPIRONE HYDROCHLORIDE 10 MG: 10 TABLET ORAL at 18:18

## 2019-06-21 RX ADMIN — GLYCOPYRROLATE 0.1 MG: 0.2 INJECTION INTRAMUSCULAR; INTRAVENOUS at 07:58

## 2019-06-21 RX ADMIN — GLYCOPYRROLATE 0.5 MG: 0.2 INJECTION INTRAMUSCULAR; INTRAVENOUS at 09:32

## 2019-06-21 RX ADMIN — ACETAMINOPHEN 1000 MG: 500 TABLET, FILM COATED ORAL at 06:47

## 2019-06-21 RX ADMIN — LIDOCAINE HYDROCHLORIDE 100 MG: 20 INJECTION, SOLUTION INFILTRATION; PERINEURAL at 07:57

## 2019-06-21 RX ADMIN — ROCURONIUM BROMIDE 50 MG: 10 INJECTION INTRAVENOUS at 07:57

## 2019-06-21 RX ADMIN — FENTANYL CITRATE 50 MCG: 50 INJECTION, SOLUTION INTRAMUSCULAR; INTRAVENOUS at 09:59

## 2019-06-21 RX ADMIN — FAMOTIDINE 20 MG: 10 INJECTION INTRAVENOUS at 06:49

## 2019-06-21 RX ADMIN — FENTANYL CITRATE 50 MCG: 50 INJECTION, SOLUTION INTRAMUSCULAR; INTRAVENOUS at 10:55

## 2019-06-21 RX ADMIN — GLYCOPYRROLATE 0.1 MG: 0.2 INJECTION INTRAMUSCULAR; INTRAVENOUS at 08:12

## 2019-06-21 RX ADMIN — SCOPALAMINE 1 PATCH: 1 PATCH, EXTENDED RELEASE TRANSDERMAL at 06:45

## 2019-06-21 RX ADMIN — BUPROPION HYDROCHLORIDE 150 MG: 150 TABLET, FILM COATED, EXTENDED RELEASE ORAL at 15:32

## 2019-06-21 RX ADMIN — MIDAZOLAM 2 MG: 1 INJECTION INTRAMUSCULAR; INTRAVENOUS at 06:48

## 2019-06-21 RX ADMIN — CLINDAMYCIN PHOSPHATE 600 MG: 600 INJECTION, SOLUTION INTRAVENOUS at 16:29

## 2019-06-21 NOTE — OP NOTE
Operative Note :   Jase Evans MD    Allie Merrillin  1955    Procedure Date: 06/21/19    Pre-op Diagnosis:  Malignant neoplasm of upper-inner quadrant of left female breast    Post-Op Diagnosis:  Same    Procedure:   · Left modified radical mastectomy    Surgeon: Jase Evans MD    Assistant: Adan Shanks    Anesthesia:  General (general endotracheal tube)    EBL:   25 mL    Specimens:   Left modified radical mastectomy  Additional left axillary lymph node    Indications:  · Nice 64-year-old lady who had an abnormal mammogram and a needle biopsy subsequently performed which came back with a tentative diagnosis.  Subsequently she underwent open biopsy which demonstrated a poorly differentiated invasive mammary carcinoma which was ER and MT negative and HER-2/kali positive.  There was a positive margin and as such she was counseled on the next steps.  We discussed the possibility of reexcision and node sampling versus modified radical mastectomy and the patient preferred mastectomy as she wished to avoid radiation if possible.    Findings:   · None    Recommendations:   Follow-up on pathology     Description of procedure:    After obtaining informed consent, the patient was brought to the operating room placed under a general anesthetic.  Her left breast, chest, arm and axilla were sterilely prepped and draped.  I then marked out the planned incision in a typical mastectomy fashion.  Incision was then made in elliptical fashion starting medially at the sternum working first inferior to the nipple areolar complex and then out laterally into the axilla.  A similar incision was made superior to the nipple areolar complex.  I then began my dissection through the skin and subcutaneous tissue, starting first medially and dissecting down onto the junction of the sternum and the pectoralis muscle.  I then first developed the inferior flap, working my way through subcutaneous tissue, leaving the adequate skin flap  until I got down onto the superior aspect of the rectus sheath.  I then developed the space laterally all the way until we got out towards the axilla.  I then began working on the superior flap, again developing this subcutaneous space and  the breast tissue from the skin and superficial subcutaneous tissue up to the clavicle.  I then began peeling the breast and pectoralis fascia off of the pectoris major muscle and  this all the way out until I was at the lateral edge of the pectoralis muscle.  I then began my dissection in the axilla and stayed in the soft axillary fat.  The thoracodorsal vessels were identified and spared.  There was an intercostal brachial nerve which extended through into this axillary space which I did sacrifice.  The axillary vein was identified and this was the superior aspect of my dissection.  I then  the axillary fat away from the subcutaneous flap of the axillary skin and then sent off the entire specimen as modified radical mastectomy.  I did identify an additional relatively small lymph node in the axilla which I sent off separately.  We then irrigated the tissues and ensure there was no bleeding.  I introduced to large 19 Pashto Bart drains.  The inferiorly placed drain courses medially and sits on top of the pectoralis muscle.  The superiorly placed drain sits in the axilla.  The subcutaneous tissues were then closed with interrupted 2-0 Vicryl sutures.  Skin was closed with staples.  Sterile dressings were applied and the patient was awakened and brought back to recovery in stable condition.    Jase Evans MD  General and Endoscopic Surgery  [facility] [place], operated by [facility]    [address], [address]  [place], [place], [address]  P: [phone]  F: [phone]

## 2019-06-21 NOTE — ANESTHESIA POSTPROCEDURE EVALUATION
"Patient: Allie Temple    Procedure Summary     Date:  06/21/19 Room / Location:  [facility] OR 05 / [facility] MAIN OR    Anesthesia Start:  0754 Anesthesia Stop:  1000    Procedure:  Left BREAST MASTECTOMY (Left Breast) Diagnosis:       Malignant neoplasm of upper-inner quadrant of left female breast, unspecified estrogen receptor status (CMS/HCC)      (Malignant neoplasm of upper-inner quadrant of left female breast, unspecified estrogen receptor status (CMS/HCC) [C50.212])    Surgeon:  Jase Evans MD Provider:  Alexa Han MD    Anesthesia Type:  general ASA Status:  3          Anesthesia Type: general  Last vitals  BP   149/79 (06/21/19 1130)   Temp   37 °C (98.6 °F) (06/21/19 0957)   Pulse   60 (06/21/19 1130)   Resp   16 (06/21/19 1130)     SpO2   98 % (06/21/19 1130)     Post Anesthesia Care and Evaluation    Patient location during evaluation: bedside  Patient participation: complete - patient participated  Level of consciousness: awake  Pain score: 2  Pain management: adequate  Airway patency: patent  Anesthetic complications: No anesthetic complications    Cardiovascular status: acceptable  Respiratory status: acceptable  Hydration status: acceptable    Comments: /79   Pulse 60   Temp 37 °C (98.6 °F) (Oral)   Resp 16   Ht 175.3 cm (69\")   Wt 97.7 kg (215 lb 6.2 oz)   SpO2 98%   BMI 31.81 kg/m²         "

## 2019-06-21 NOTE — ANESTHESIA PREPROCEDURE EVALUATION
Anesthesia Evaluation     Patient summary reviewed and Nursing notes reviewed   NPO Solid Status: > 8 hours  NPO Liquid Status: > 2 hours           Airway   Mallampati: II  Dental    (+) poor dentition    Comment: Sparse teeth. Very poor dentition.Risk of dental injury discussed with patient    Pulmonary - normal exam   (+) a smoker Current Smoked day of surgery,   Cardiovascular - normal exam    ECG reviewed    (+) hypertension,       Neuro/Psych  (+) psychiatric history Anxiety and Depression,     GI/Hepatic/Renal/Endo      Musculoskeletal     Abdominal    Substance History      OB/GYN          Other   (+) arthritis   history of cancer                    Anesthesia Plan    ASA 3     general

## 2019-06-21 NOTE — ANESTHESIA PROCEDURE NOTES
Airway  Urgency: elective    Date/Time: 6/21/2019 8:01 AM  Airway not difficult    General Information and Staff    Patient location during procedure: OR  Anesthesiologist: Alexa Han MD  CRNA: Anirudh Lazaro CRNA    Indications and Patient Condition  Indications for airway management: airway protection    Preoxygenated: yes  MILS maintained throughout  Mask difficulty assessment: 2 - vent by mask + OA or adjuvant +/- NMBA    Final Airway Details  Final airway type: endotracheal airway      Successful airway: ETT  Cuffed: yes   Successful intubation technique: direct laryngoscopy  Facilitating devices/methods: intubating stylet  Endotracheal tube insertion site: oral  Blade: Leonila  Blade size: 3  ETT size (mm): 7.0  Cormack-Lehane Classification: grade IIb - view of arytenoids or posterior of glottis only  Placement verified by: chest auscultation and capnometry   Cuff volume (mL): 7  Measured from: lips  ETT to lips (cm): 21  Number of attempts at approach: 1    Additional Comments  Patient in OR. Monitors on. BLVS. Pre 02 100%. Poor dentition. SIVI. DL x1. DVVC. Atraumatic placement of ETT. Placement verified with ETC02 and BBS. ETT secured. Teeth/lips in pre-op condition.

## 2019-06-21 NOTE — PLAN OF CARE
Problem: Patient Care Overview  Goal: Plan of Care Review  Outcome: Ongoing (interventions implemented as appropriate)   06/21/19 3802   Coping/Psychosocial   Plan of Care Reviewed With patient   Plan of Care Review   Progress no change   OTHER   Outcome Summary vss and afebrile, incision dressing intact with small amt of drainage, ALEC x2 with small amt of bloody drainage, voiding well, tolerating regular diet without nausea, scds on     Goal: Individualization and Mutuality  Outcome: Ongoing (interventions implemented as appropriate)    Goal: Discharge Needs Assessment  Outcome: Ongoing (interventions implemented as appropriate)    Goal: Interprofessional Rounds/Family Conf  Outcome: Ongoing (interventions implemented as appropriate)      Problem: Pain, Chronic (Adult)  Goal: Acceptable Pain/Comfort Level and Functional Ability  Outcome: Ongoing (interventions implemented as appropriate)      Problem: Breast Surgery/Reconstruction (Adult)  Goal: Signs and Symptoms of Listed Potential Problems Will be Absent, Minimized or Managed (Breast Surgery/Reconstruction)  Outcome: Ongoing (interventions implemented as appropriate)

## 2019-06-21 NOTE — H&P
Cc: Left breast mass     History of presenting illness:   This is a 64-year-old lady who presents after recent left breast biopsy.  She is recovering well.  She has some tenderness but nothing out of the ordinary.  She has no other complaints.  Her biopsies did come back positive for ER WI negative and HER-2/kali positive cancer.  There was a positive margin.     Past Medical History: Hypertension, osteoarthritis, degenerative disc disease, dyslipidemia     Past Surgical History: Appendectomy, tubal ligation, left breast lumpectomy     Medications: Reviewed and reconciled in epic.  She is not on anticoagulants     Allergies: Effexor, gabapentin     Social History: Patient admits to smoking about 5 or 6 cigarettes a day and has for many years.  She understands that there is an increased risk of surgical wound infection, pain and wound healing problems but is not inclined to try quitting at this time.  She denies alcohol use.     Family History: Negative for colorectal or breast cancer     Review of Systems:  Constitutional: Positive for decreased activity, sweats and fatigue and chills  Neck: no swollen glands or dysphagia or odynophagia  Respiratory: negative for SOB, cough, hemoptysis or wheezing  Cardiovascular: negative for chest pain, palpitations or peripheral edema  Gastrointestinal: Negative for nausea, vomiting, abdominal pain        Physical Exam:   Body mass index 32.3  General: alert and oriented, appropriate, no acute distress  Neck: Supple without lymphadenopathy or thyromegaly, trachea is in the midline  Respiratory: Lungs are clear bilaterally without wheezing, no use of accessory muscles is noted  Cardiovascular: Regular rate and rhythm without murmur, no peripheral edema  Gastrointestinal: Soft, benign, no mass, no hernia identified  Breast: Right breast exam normal with no masses.  Left breast incision site healing nicely.  No drainage or sign of infection.  There is some typical  firmness  Lymph: Bilateral axilla are benign, no masses are noted     Laboratory data:  Biopsy specimen demonstrates poorly differentiated invasive mammary adenocarcinoma with an ER/IL negative status and HER-2/kali positive.  There is a margin positive as well.     Imaging data: Mammogram and ultrasound are reviewed.  Right breast is read as normal.  In the left breast there is a 3 x 3 cm mass at about the 9 o'clock position.  It appears to look thick-walled and cystic in nature.  There are no clustered microcalcifications identified.        Assessment and plan:   -Left breast invasive mammary cancer.  Excisional biopsy positive for margin.  Options discussed with patient.  I told her that reexcision could be considered with sentinel lymph node biopsy and subsequent radiation.  I told her that an alternative option would be left total mastectomy including axillary lymph nodes and this is the route the patient prefers to go.  She does not wish to consider reconstruction at this time.  The risks including bleeding, infection, the need for further interventions, the possibility of disfigurement, pain, injury to nerves and vascular structures in the axilla discussed, and the patient agrees to proceed.     Plan: Left breast total mastectomy        Jase Evans MD, FACS  General, Minimally Invasive and Endoscopic Surgery  [facility]     [address], [address]  [place], [place], [address]  P: [phone]  F: [phone]

## 2019-06-22 VITALS
RESPIRATION RATE: 16 BRPM | WEIGHT: 215.39 LBS | BODY MASS INDEX: 31.9 KG/M2 | OXYGEN SATURATION: 96 % | TEMPERATURE: 97.4 F | HEIGHT: 69 IN | SYSTOLIC BLOOD PRESSURE: 124 MMHG | DIASTOLIC BLOOD PRESSURE: 56 MMHG | HEART RATE: 53 BPM

## 2019-06-22 LAB
ANION GAP SERPL CALCULATED.3IONS-SCNC: 12.7 MMOL/L
BASOPHILS # BLD AUTO: 0.02 10*3/MM3 (ref 0–0.2)
BASOPHILS NFR BLD AUTO: 0.3 % (ref 0–1.5)
BUN BLD-MCNC: 7 MG/DL (ref 8–23)
BUN/CREAT SERPL: 11.5 (ref 7–25)
CALCIUM SPEC-SCNC: 9.3 MG/DL (ref 8.6–10.5)
CHLORIDE SERPL-SCNC: 103 MMOL/L (ref 98–107)
CO2 SERPL-SCNC: 25.3 MMOL/L (ref 22–29)
CREAT BLD-MCNC: 0.61 MG/DL (ref 0.57–1)
DEPRECATED RDW RBC AUTO: 41.8 FL (ref 37–54)
EOSINOPHIL # BLD AUTO: 0.02 10*3/MM3 (ref 0–0.4)
EOSINOPHIL NFR BLD AUTO: 0.3 % (ref 0.3–6.2)
ERYTHROCYTE [DISTWIDTH] IN BLOOD BY AUTOMATED COUNT: 12.3 % (ref 12.3–15.4)
GFR SERPL CREATININE-BSD FRML MDRD: 99 ML/MIN/1.73
GLUCOSE BLD-MCNC: 109 MG/DL (ref 65–99)
HCT VFR BLD AUTO: 35.6 % (ref 34–46.6)
HGB BLD-MCNC: 11.7 G/DL (ref 12–15.9)
IMM GRANULOCYTES # BLD AUTO: 0.01 10*3/MM3 (ref 0–0.05)
IMM GRANULOCYTES NFR BLD AUTO: 0.1 % (ref 0–0.5)
LYMPHOCYTES # BLD AUTO: 2.31 10*3/MM3 (ref 0.7–3.1)
LYMPHOCYTES NFR BLD AUTO: 28.9 % (ref 19.6–45.3)
MCH RBC QN AUTO: 30.5 PG (ref 26.6–33)
MCHC RBC AUTO-ENTMCNC: 32.9 G/DL (ref 31.5–35.7)
MCV RBC AUTO: 93 FL (ref 79–97)
MONOCYTES # BLD AUTO: 0.65 10*3/MM3 (ref 0.1–0.9)
MONOCYTES NFR BLD AUTO: 8.1 % (ref 5–12)
NEUTROPHILS # BLD AUTO: 4.97 10*3/MM3 (ref 1.7–7)
NEUTROPHILS NFR BLD AUTO: 62.3 % (ref 42.7–76)
NRBC BLD AUTO-RTO: 0 /100 WBC (ref 0–0.2)
PLATELET # BLD AUTO: 200 10*3/MM3 (ref 140–450)
PMV BLD AUTO: 10.6 FL (ref 6–12)
POTASSIUM BLD-SCNC: 4 MMOL/L (ref 3.5–5.2)
RBC # BLD AUTO: 3.83 10*6/MM3 (ref 3.77–5.28)
SODIUM BLD-SCNC: 141 MMOL/L (ref 136–145)
WBC NRBC COR # BLD: 7.98 10*3/MM3 (ref 3.4–10.8)

## 2019-06-22 PROCEDURE — 80048 BASIC METABOLIC PNL TOTAL CA: CPT | Performed by: SURGERY

## 2019-06-22 PROCEDURE — G0378 HOSPITAL OBSERVATION PER HR: HCPCS

## 2019-06-22 PROCEDURE — 85025 COMPLETE CBC W/AUTO DIFF WBC: CPT | Performed by: SURGERY

## 2019-06-22 RX ORDER — HYDROCODONE BITARTRATE AND ACETAMINOPHEN 7.5; 325 MG/1; MG/1
1 TABLET ORAL EVERY 4 HOURS PRN
Qty: 30 TABLET | Refills: 0 | Status: SHIPPED | OUTPATIENT
Start: 2019-06-22 | End: 2019-07-01

## 2019-06-22 RX ADMIN — CLINDAMYCIN PHOSPHATE 600 MG: 600 INJECTION, SOLUTION INTRAVENOUS at 00:02

## 2019-06-22 RX ADMIN — HYDROCODONE BITARTRATE AND ACETAMINOPHEN 1 TABLET: 7.5; 325 TABLET ORAL at 11:16

## 2019-06-22 RX ADMIN — HYDROCODONE BITARTRATE AND ACETAMINOPHEN 1 TABLET: 7.5; 325 TABLET ORAL at 00:08

## 2019-06-22 RX ADMIN — BUSPIRONE HYDROCHLORIDE 10 MG: 10 TABLET ORAL at 08:21

## 2019-06-22 RX ADMIN — VARENICLINE TARTRATE 0.5 MG: 0.5 TABLET, FILM COATED ORAL at 08:21

## 2019-06-22 RX ADMIN — HYDROCODONE BITARTRATE AND ACETAMINOPHEN 1 TABLET: 7.5; 325 TABLET ORAL at 06:14

## 2019-06-22 RX ADMIN — METOPROLOL SUCCINATE 25 MG: 25 TABLET, FILM COATED, EXTENDED RELEASE ORAL at 08:21

## 2019-06-22 RX ADMIN — BUPROPION HYDROCHLORIDE 150 MG: 150 TABLET, FILM COATED, EXTENDED RELEASE ORAL at 10:19

## 2019-06-22 NOTE — PLAN OF CARE
Problem: Patient Care Overview  Goal: Plan of Care Review  Outcome: Ongoing (interventions implemented as appropriate)   06/22/19 6207   Coping/Psychosocial   Plan of Care Reviewed With patient   Plan of Care Review   Progress improving   OTHER   Outcome Summary VSS. Norco given for pain. L island dressing with small amt of dried drainage, unchanged from beginning of shift. ALEC x2 with bloody drainage. Up with asst to void, ambulated x1. Tolerating regular diet without nausea.      Goal: Individualization and Mutuality  Outcome: Ongoing (interventions implemented as appropriate)    Goal: Discharge Needs Assessment  Outcome: Ongoing (interventions implemented as appropriate)    Goal: Interprofessional Rounds/Family Conf  Outcome: Ongoing (interventions implemented as appropriate)      Problem: Breast Surgery/Reconstruction (Adult)  Goal: Signs and Symptoms of Listed Potential Problems Will be Absent, Minimized or Managed (Breast Surgery/Reconstruction)  Outcome: Ongoing (interventions implemented as appropriate)    Goal: Anesthesia/Sedation Recovery  Outcome: Ongoing (interventions implemented as appropriate)

## 2019-06-22 NOTE — DISCHARGE SUMMARY
Discharge Summary    Patient name: Allie Temple    Medical record number: [id number]    Admission date: 6/21/2019  Discharge date: 6/22/2019    Attending physician: Jase Evans MD    Primary care physician: Harman Khan DO    Referring physician: Jase Evans MD  [address] [address]    Consulting physician(s): None    Condition on discharge: improving    Primary Diagnoses: Breast cancer    Secondary Diagnoses: Same    Operative Procedure: Left modified radical mastectomy    Hospital Course: The patient is a  64 y.o. female that was admitted to the hospital with breast cancer.  She underwent left modified radical mastectomy.  The next morning she was feeling well.  She was confident in taking care of her drains.  Pain was well controlled and she was eating well.    Discharge medications:      Discharge Medications      New Medications      Instructions Start Date   HYDROcodone-acetaminophen 7.5-325 MG per tablet  Commonly known as:  NORCO   1 tablet, Oral, Every 4 Hours PRN         Continue These Medications      Instructions Start Date   ALPRAZolam 0.5 MG tablet  Commonly known as:  XANAX   0.5 mg, Oral, 2 Times Daily PRN      buPROPion  MG 24 hr tablet  Commonly known as:  WELLBUTRIN XL   150 mg, Oral, Daily      busPIRone 10 MG tablet  Commonly known as:  BUSPAR   10 mg, Oral, 2 Times Daily      CENTRUM SILVER PO   1 tablet, Oral, Daily, HOLD FOR SURGERY      citalopram 40 MG tablet  Commonly known as:  CeleXA   40 mg, Oral, Daily      metoprolol succinate XL 25 MG 24 hr tablet  Commonly known as:  TOPROL-XL   25 mg, Oral, Daily      montelukast 10 MG tablet  Commonly known as:  SINGULAIR   10 mg, Oral, Nightly      varenicline 0.5 MG X 11 & 1 MG X 42 tablet  Commonly known as:  CHANTIX STARTING MONTH ZHEN   Take 0.5 mg one daily on days 1-3 and and 0.5 mg twice daily on days 4-7.Then 1 mg twice daily for a total of 12 weeks.      vitamin B-12 100 MCG  tablet  Commonly known as:  CYANOCOBALAMIN   100 mcg, Oral, Daily             Discharge instructions: Record ALEC output and call the office in 2 days with these numbers.  Diet as tolerated.  Activity as tolerated.  May shower tomorrow.      Follow-up appointment: As scheduled, call the office on Monday.

## 2019-06-24 ENCOUNTER — TELEPHONE (OUTPATIENT)
Dept: SURGERY | Facility: CLINIC | Age: 64
End: 2019-06-24

## 2019-06-24 NOTE — PROGRESS NOTES
Case Management Discharge Note    Final Note: home; self-care. Fabiola Huffman CSW     Destination      No service has been selected for the patient.      Durable Medical Equipment      No service has been selected for the patient.      Dialysis/Infusion      No service has been selected for the patient.      Home Medical Care      No service has been selected for the patient.      Therapy      No service has been selected for the patient.      Community Resources      No service has been selected for the patient.             Final Discharge Disposition Code: 01 - home or self-care

## 2019-06-24 NOTE — TELEPHONE ENCOUNTER
Ms Temple called and left message on  to call regarding drain totals.  Have attempted to return call 2x and had to leave a message    Ms Temple returns my call   Drain # 1 80cc & drain # 2 100 cc in 24 hour period.  Advised her that the drains need to be under 30 cc in a 24 hour period before removing.  She will call back in a few days with a report

## 2019-06-25 LAB
CYTO UR: NORMAL
LAB AP CASE REPORT: NORMAL
LAB AP DIAGNOSIS COMMENT: NORMAL
LAB AP SPECIAL STAINS: NORMAL
PATH REPORT.ADDENDUM SPEC: NORMAL
PATH REPORT.FINAL DX SPEC: NORMAL
PATH REPORT.GROSS SPEC: NORMAL

## 2019-07-01 ENCOUNTER — CLINICAL SUPPORT (OUTPATIENT)
Dept: SURGERY | Facility: CLINIC | Age: 64
End: 2019-07-01

## 2019-07-01 NOTE — PROGRESS NOTES
"Patient presented to the office today for Left Breast ALEC Drain removal and Left Axillary ALEC Drain Removal. Dr. Evans was present in the office today and came in to check for staple removal as well. Per his instructions, every other staple was removed and covered with 1/4\" steri-strips. The patients incision site did not show any obvious signs of infection. Both ALEC Drains were removed and covered with Bacitracin ointment, sterile 4 x 4 gauze and large tegaderm bandage. Patient was given verbal instructions regarding the care of the steri-strips and verbal and paper care instructions for post-drain removal. Patient verbalized both and is scheduled for post-operative visit with Dr. Evans on 07/10/19 @ 9:36 am.  "

## 2019-07-03 ENCOUNTER — TELEPHONE (OUTPATIENT)
Dept: SURGERY | Facility: CLINIC | Age: 64
End: 2019-07-03

## 2019-07-03 RX ORDER — HYDROCODONE BITARTRATE AND ACETAMINOPHEN 7.5; 325 MG/1; MG/1
1 TABLET ORAL EVERY 6 HOURS PRN
Qty: 20 TABLET | Refills: 0 | Status: SHIPPED | OUTPATIENT
Start: 2019-07-03 | End: 2019-08-07

## 2019-07-03 NOTE — TELEPHONE ENCOUNTER
Notified Allie that the [facility] is ready to  in the office.  Advised her that this will be the last pain RX

## 2019-07-09 ENCOUNTER — TELEPHONE (OUTPATIENT)
Dept: OTHER | Facility: HOSPITAL | Age: 64
End: 2019-07-09

## 2019-07-09 NOTE — TELEPHONE ENCOUNTER
I called Allie to introduce myself and navigational services. I left a message with my contact information for her to call back at her convenience. I was hoping to set up a time to meet tomorrow after her appointment with Dr. Evans.

## 2019-07-10 ENCOUNTER — OFFICE VISIT (OUTPATIENT)
Dept: SURGERY | Facility: CLINIC | Age: 64
End: 2019-07-10

## 2019-07-10 VITALS — BODY MASS INDEX: 31.28 KG/M2 | HEART RATE: 98 BPM | HEIGHT: 69 IN | WEIGHT: 211.2 LBS | OXYGEN SATURATION: 98 %

## 2019-07-10 DIAGNOSIS — C50.912 MALIGNANT NEOPLASM OF LEFT BREAST IN FEMALE, ESTROGEN RECEPTOR NEGATIVE, UNSPECIFIED SITE OF BREAST (HCC): Primary | ICD-10-CM

## 2019-07-10 DIAGNOSIS — Z17.1 MALIGNANT NEOPLASM OF LEFT BREAST IN FEMALE, ESTROGEN RECEPTOR NEGATIVE, UNSPECIFIED SITE OF BREAST (HCC): Primary | ICD-10-CM

## 2019-07-10 PROCEDURE — 99024 POSTOP FOLLOW-UP VISIT: CPT | Performed by: SURGERY

## 2019-07-22 NOTE — PROGRESS NOTES
Postop left mastectomy    Subjective:  Overall feeling well.  She does complain of some numbness and burning in the arm, although this is somewhat improving.  Pain control seems reasonable overall.  Drain output minimal and they were removed.    Objective:  Blood pressure 98/57  General awake and alert, no acute distress  Incision is well-healed with no sign of infection.  There is some numbness in the armpit and on the medial surface of the left arm.    Assessment and plan:  -Postop left mastectomy for cancer  -Mastectomy specimen demonstrated only high-grade ductal carcinoma, although the prior tumor obviously was malignant  -Lymph nodes were all negative  -Tumor was ER and DC negative, HER-2/kali positive  -I will make referral to medical oncology at this time  -Follow-up with me in 4 weeks    Jase Evans MD  General and Endoscopic Surgery  [facility]    [address] [place] [address], [address]  [place], [place], [address]  P: [phone]  F: [phone]

## 2019-07-23 ENCOUNTER — TELEPHONE (OUTPATIENT)
Dept: OTHER | Facility: HOSPITAL | Age: 64
End: 2019-07-23

## 2019-07-23 NOTE — TELEPHONE ENCOUNTER
I called Ms. Temple and left a message introducing myself and navigational services. Asked her to call me back at her convenience and left my contact information.

## 2019-07-25 ENCOUNTER — APPOINTMENT (OUTPATIENT)
Dept: LAB | Facility: HOSPITAL | Age: 64
End: 2019-07-25

## 2019-07-25 ENCOUNTER — APPOINTMENT (OUTPATIENT)
Dept: ONCOLOGY | Facility: CLINIC | Age: 64
End: 2019-07-25

## 2019-08-01 ENCOUNTER — APPOINTMENT (OUTPATIENT)
Dept: LAB | Facility: HOSPITAL | Age: 64
End: 2019-08-01

## 2019-08-01 ENCOUNTER — PREP FOR SURGERY (OUTPATIENT)
Dept: OTHER | Facility: HOSPITAL | Age: 64
End: 2019-08-01

## 2019-08-01 ENCOUNTER — CLINICAL SUPPORT (OUTPATIENT)
Dept: ONCOLOGY | Facility: CLINIC | Age: 64
End: 2019-08-01

## 2019-08-01 ENCOUNTER — CONSULT (OUTPATIENT)
Dept: ONCOLOGY | Facility: CLINIC | Age: 64
End: 2019-08-01

## 2019-08-01 VITALS
HEART RATE: 56 BPM | TEMPERATURE: 97.8 F | OXYGEN SATURATION: 96 % | HEIGHT: 70 IN | SYSTOLIC BLOOD PRESSURE: 153 MMHG | RESPIRATION RATE: 16 BRPM | DIASTOLIC BLOOD PRESSURE: 84 MMHG | WEIGHT: 213.6 LBS | BODY MASS INDEX: 30.58 KG/M2

## 2019-08-01 DIAGNOSIS — Z17.1 MALIGNANT NEOPLASM OF UPPER-INNER QUADRANT OF LEFT BREAST IN FEMALE, ESTROGEN RECEPTOR NEGATIVE (HCC): Primary | ICD-10-CM

## 2019-08-01 DIAGNOSIS — C50.212 MALIGNANT NEOPLASM OF UPPER-INNER QUADRANT OF LEFT BREAST IN FEMALE, ESTROGEN RECEPTOR NEGATIVE (HCC): Primary | ICD-10-CM

## 2019-08-01 LAB
BASOPHILS # BLD AUTO: 0.06 10*3/MM3 (ref 0–0.2)
BASOPHILS NFR BLD AUTO: 0.9 % (ref 0–1.5)
DEPRECATED RDW RBC AUTO: 41 FL (ref 37–54)
EOSINOPHIL # BLD AUTO: 0.22 10*3/MM3 (ref 0–0.4)
EOSINOPHIL NFR BLD AUTO: 3.4 % (ref 0.3–6.2)
ERYTHROCYTE [DISTWIDTH] IN BLOOD BY AUTOMATED COUNT: 12.5 % (ref 12.3–15.4)
HCT VFR BLD AUTO: 40.3 % (ref 34–46.6)
HGB BLD-MCNC: 13.6 G/DL (ref 12–15.9)
IMM GRANULOCYTES # BLD AUTO: 0.02 10*3/MM3 (ref 0–0.05)
IMM GRANULOCYTES NFR BLD AUTO: 0.3 % (ref 0–0.5)
LYMPHOCYTES # BLD AUTO: 2.59 10*3/MM3 (ref 0.7–3.1)
LYMPHOCYTES NFR BLD AUTO: 39.6 % (ref 19.6–45.3)
MCH RBC QN AUTO: 30.4 PG (ref 26.6–33)
MCHC RBC AUTO-ENTMCNC: 33.7 G/DL (ref 31.5–35.7)
MCV RBC AUTO: 90.2 FL (ref 79–97)
MONOCYTES # BLD AUTO: 0.49 10*3/MM3 (ref 0.1–0.9)
MONOCYTES NFR BLD AUTO: 7.5 % (ref 5–12)
NEUTROPHILS # BLD AUTO: 3.16 10*3/MM3 (ref 1.7–7)
NEUTROPHILS NFR BLD AUTO: 48.3 % (ref 42.7–76)
NRBC BLD AUTO-RTO: 0 /100 WBC (ref 0–0.2)
PLATELET # BLD AUTO: 215 10*3/MM3 (ref 140–450)
PMV BLD AUTO: 9.6 FL (ref 6–12)
RBC # BLD AUTO: 4.47 10*6/MM3 (ref 3.77–5.28)
WBC NRBC COR # BLD: 6.54 10*3/MM3 (ref 3.4–10.8)

## 2019-08-01 PROCEDURE — 99245 OFF/OP CONSLTJ NEW/EST HI 55: CPT | Performed by: INTERNAL MEDICINE

## 2019-08-01 PROCEDURE — 36416 COLLJ CAPILLARY BLOOD SPEC: CPT | Performed by: INTERNAL MEDICINE

## 2019-08-01 PROCEDURE — 85025 COMPLETE CBC W/AUTO DIFF WBC: CPT | Performed by: INTERNAL MEDICINE

## 2019-08-01 RX ORDER — LEVOFLOXACIN 5 MG/ML
500 INJECTION, SOLUTION INTRAVENOUS ONCE
Status: CANCELLED | OUTPATIENT
Start: 2019-08-14 | End: 2019-08-01

## 2019-08-01 RX ORDER — SODIUM CHLORIDE 0.9 % (FLUSH) 0.9 %
3-10 SYRINGE (ML) INJECTION AS NEEDED
Status: CANCELLED | OUTPATIENT
Start: 2019-08-14

## 2019-08-01 RX ORDER — SODIUM CHLORIDE 0.9 % (FLUSH) 0.9 %
3 SYRINGE (ML) INJECTION EVERY 12 HOURS SCHEDULED
Status: CANCELLED | OUTPATIENT
Start: 2019-08-14

## 2019-08-01 NOTE — PROGRESS NOTES
Subjective     REASON FOR CONSULTATION: Left breast cancer T2N0 grade 3 ER ND negative HER-2 positive infiltrating ductal carcinoma post excisional biopsy, followed by mastectomy and axillary dissection  Provide an opinion on any further workup or treatment                             REQUESTING PHYSICIAN: Jase Khan DO    RECORDS OBTAINED:  Records of the patients history including those obtained from the referring provider were reviewed and summarized in detail.    HISTORY OF PRESENT ILLNESS:  The patient is a 64 y.o. year old female who is here for an opinion about the above issue.    History of Present Illness patient is a 64-year-old retired  with hypertension hypercholesterolemia and degenerative arthritis who felt a mass in her left breast in March.  She showed it to her family doctor and underwent imaging at  P on 3/13/2019 which Showed a 3 x 3.2 cm mass at 9:00 which on ultrasound showed a thick-walled benign-appearing 2.8 x 2.5 cm cyst which was felt to not be suspicious .because of persistence of symptoms she was reevaluated on 4/16/2019 and underwent aspiration and core biopsy because there was a significant soft tissue component to the mass at that time this was aspirated and the final report  showed fine-needle aspiration suspicious for malignant cells favor mammary carcinoma  Patient was referred to Dr. Kee who took her for an excisional biopsy on 5/2/2019 with the final report showing a 4 mass grade 3x3.6cm with tumor extending to one margin and DCIS also 0.2 mm from one margin.  The tumor was ER ND negative HER-2 3+.  The patient was then taken for surgery on 6/21/2019 at which time she had a completion mastectomy and axillary node biopsy with the findings of residual high-grade DCIS with clear margins and an incidental intraductal papilloma and 17- lymph nodes making this a T2N0 tumor    She has done well  postoperatively and is here to discuss adjuvant treatment    She is  3 para 3 menarche  at age 15 and menopause in her early 40s when she had a hysterectomy for heavy menstrual bleeding.  She took hormone replacement for 10 years and stopped 10 years ago first childbirth was at age 25 she did not breast-feed  Family history is positive for mother who had uterine cancer at age 60 and  of it at age 65 she has a brother who  of liver cancer related to drinking there is no other malignancy in the family that she is aware of    She is a significant smoker for the last 48 years but does not drink    Past Medical History:   Diagnosis Date   • Adjustment disorder    • Allergic rhinitis    • Amenorrhea    • Anxiety    • Breast cancer (CMS/HCC) 2019    Left breast mammary carcinoma   • Bruit    • Carpal tunnel syndrome    • Carrier of tuberculosis    • Chronic pain    • De Quervain's tenosynovitis    • Degenerative cervical disc    • Depression    • Dyslipidemia    • Eustachian tube dysfunction    • History of UTI    • Hyperlipidemia    • Hypertension    • Impaired fasting glucose    • Lumbar degenerative disc disease    • Numbness and tingling     LEFT LEG   • OAB (overactive bladder)    • Scapulothoracic syndrome    • Sciatica         Past Surgical History:   Procedure Laterality Date   • APPENDECTOMY N/A    • BREAST BIOPSY Left 2019    Left breast ultrasound guided cyst aspiration, 9:00 position-Dr. Gerry Taylor, [facility]   • BREAST LUMPECTOMY Left 2019    Procedure: Left BREAST LUMPECTOMY;  Surgeon: Jase Evans MD;  Location: [facility];  Service: General   • LUMBAR EPIDURAL INJECTION N/A    • MASTECTOMY Left 2019    Procedure: Left BREAST MASTECTOMY;  Surgeon: Jase Evans MD;  Location: [facility];  Service: General   • TUBAL ABDOMINAL LIGATION Bilateral    • VAGINAL DELIVERY      x3        Current Outpatient Medications on File Prior to Visit   Medication Sig  Dispense Refill   • ALPRAZolam (XANAX) 0.5 MG tablet Take 1 tablet by mouth 2 (Two) Times a Day As Needed for Anxiety. 6 tablet 0   • buPROPion XL (WELLBUTRIN XL) 150 MG 24 hr tablet Take 1 tablet by mouth Daily. 30 tablet 3   • busPIRone (BUSPAR) 10 MG tablet Take 10 mg by mouth 2 (Two) Times a Day.     • citalopram (CeleXA) 40 MG tablet Take 1 tablet by mouth Daily. 30 tablet 5   • HYDROcodone-acetaminophen (NORCO) 7.5-325 MG per tablet Take 1 tablet by mouth Every 6 (Six) Hours As Needed for Moderate Pain . 20 tablet 0   • metoprolol succinate XL (TOPROL-XL) 25 MG 24 hr tablet Take 25 mg by mouth Daily.     • montelukast (SINGULAIR) 10 MG tablet Take 1 tablet by mouth Every Night. 30 tablet 11   • Multiple Vitamins-Minerals (CENTRUM SILVER PO) Take 1 tablet by mouth Daily. HOLD FOR SURGERY     • vitamin B-12 (CYANOCOBALAMIN) 100 MCG tablet Take 100 mcg by mouth Daily.     • [DISCONTINUED] varenicline (CHANTIX STARTING MONTH PAK) 0.5 MG X 11 & 1 MG X 42 tablet Take 0.5 mg one daily on days 1-3 and and 0.5 mg twice daily on days 4-7.Then 1 mg twice daily for a total of 12 weeks. 53 tablet 0     No current facility-administered medications on file prior to visit.         ALLERGIES:    Allergies   Allergen Reactions   • Effexor [Venlafaxine] Itching   • Gabapentin Hallucinations   • Ibuprofen Other (See Comments)     Burns while urinating  Can take low dose Ibuprofen   • Naproxen Itching     Pt reports severe vaginal itching    • Zyrtec [Cetirizine] Other (See Comments)     Non-effecious   • Penicillins Rash   • Sulfa Antibiotics Rash        Social History     Socioeconomic History   • Marital status:      Spouse name: Pawan   • Number of children: 3   • Years of education: Not on file   • Highest education level: Not on file   Occupational History     Employer: [employer]   Tobacco Use   • Smoking status: Current Every Day Smoker     Packs/day: 0.25     Types: Cigarettes   • Smokeless tobacco: Never Used   •  "Tobacco comment: CURRENTLY TAKING CHANTIX, TRYING TO QUIT   Substance and Sexual Activity   • Alcohol use: No   • Drug use: No   • Sexual activity: Yes     Partners: Male     Birth control/protection: Post-menopausal        Family History   Problem Relation Age of Onset   • Ovarian cancer Mother    • Endometrial cancer Mother    • COPD Father    • Malig Hyperthermia Neg Hx         Review of Systems   Constitutional: Positive for diaphoresis.   Respiratory: Positive for cough.    Endocrine: Positive for cold intolerance and heat intolerance.   Neurological: Positive for numbness (Left axilla from surgery).        Objective     Vitals:    08/01/19 1407   BP: 153/84   Pulse: 56   Resp: 16   Temp: 97.8 °F (36.6 °C)   SpO2: 96%   Weight: 96.9 kg (213 lb 9.6 oz)   Height: 177 cm (69.69\")  Comment: new ht w/shoes   PainSc:   5   PainLoc: Breast  Comment: left breast     No flowsheet data found.    Physical Exam    GENERAL:  Well-developed, well-nourished in no acute distress.   SKIN:  Warm, dry without rashes, purpura or petechiae.  EYES:  Pupils equal, round and reactive to light.  EOMs intact.  Conjunctivae normal.  EARS:  Hearing intact.  NOSE:  Septum midline.  No excoriations or nasal discharge.  MOUTH:  Tongue is well-papillated; no stomatitis or ulcers.  Lips normal.  THROAT:  Oropharynx without lesions or exudates.  NECK:  Supple with good range of motion; no thyromegaly or masses, no JVD.  LYMPHATICS:  No cervical, supraclavicular, axillary or inguinal adenopathy.  CHEST:  Lungs clear to auscultation. Good airflow.  BREASTS: Right breast is benign the left chest wall shows a well-healed mastectomy scar  CARDIAC:  Regular rate and rhythm without murmurs, rubs or gallops. Normal S1,S2.  ABDOMEN:  Soft, nontender with no hepatosplenomegaly or masses.  EXTREMITIES:  No clubbing, cyanosis or edema.  NEUROLOGICAL:  Cranial Nerves II-XII grossly intact.  No focal neurological deficits.  PSYCHIATRIC:  Normal affect and " mood.        RECENT LABS:  Hematology WBC   Date Value Ref Range Status   08/01/2019 6.54 3.40 - 10.80 10*3/mm3 Final     RBC   Date Value Ref Range Status   08/01/2019 4.47 3.77 - 5.28 10*6/mm3 Final     Hemoglobin   Date Value Ref Range Status   08/01/2019 13.6 12.0 - 15.9 g/dL Final     Hematocrit   Date Value Ref Range Status   08/01/2019 40.3 34.0 - 46.6 % Final     Platelets   Date Value Ref Range Status   08/01/2019 215 140 - 450 10*3/mm3 Final          US Breat Left Limited    1. Left Breast Lumpectomy (47 Grams):  A. Invasive poorly differentiated mammary carcinoma of no special type (ductal carcinoma)  with apocrine features.  1. Invasive carcinoma measures up to 40 x 36 x 27 mm (measured grossly).  2. Overall Hutchinson Grade III (glandular score = 3, nuclear score = 3, mitotic score = 3).  3. No definitive lymphovascular space invasion identified.  B. Invasive carcinoma focally extends to one undesignated peripheral inked margin of excision.  C. Associated ductal carcinoma in situ (DCIS).  1. Ductal carcinoma in situ (DCIS) spans area measuring 40 mm in single greatest  aggregate dimension (estimated from gross and glass slides).  2. DCIS predominantly solid and comedo type with high grade nuclear features.  3. High grade DCIS approximates the closest peripheral inked margin to 0.2 mm.  Page: 1 of 5 Printed: 5/6/2019 1:10 PM  [phone]  Resulting  Tissue Pathology Exam: [id number]   Order: [id number]   Collected:  5/2/2019 12:26 Status:  Edited Result - FINAL   Visible to patient:  No (Not Released) Dx:  Malignant neoplasm of upper-inner hector...   Component    Addendum   HER2 by Immunohistochemistry   Positive (Score 3+)     HER2 by Immunohistochemistry  FDA approved (specify test/vendor): [employer]     Primary Antibody  CB11     Cold Ischemia and Fixation Times   Meet requirements specified in latest version of the ASCO/CAP guidelines         Cold Ischemia Time: 18 minutes  Fixation Time: 8.25  hours  Testing Performed on Block Number(s): 1E  Fixative: Formalin   Addendum electronically signed by Harman Perry MD on 5/6/2019          Final Diagnosis  1. Left Breast, Modified Radical Mastectomy (935 grams): HIGH GRADE DUCTAL CARCINOMA IN-SITU  (DCIS) .  A. Nuclear Grade: High.  B. Architectural Type: Solid and comedo with lobular extension.  1. Size (extent) of DCIS: DCIS multifocal in the lower inner quadrant, measuring 0.9 cm in maximal  dimension (DCIS present in 3/18 tissue blocks).  C. No LCIS identified.  D. Skin and nipple uninvolved by DCIS.  E. Margins: Uninvolved by DCIS.  1. DCIS comes to within 1.3 cm of the anterior margin of excision (closest margin).  F. Additional findings: Incidental intraductal papilloma, florid ductal hyperplasia and apocrine cysts.  G. Lymph nodes: Sixteen benign lymph nodes (0/16).  H. Hormone receptor status: ER and PA negative, Her2/kali positive by IHC (performed on prior resection  [id number]).  I. Pathologic stage: pT2, N0.  Swm/kds    Assessment/Plan   1.  T2N0 grade 3 infiltrating ductal carcinoma left breast ER PA negative HER-2 positive post mastectomy and axillary node dissection with clear margins    2.  Tobacco abuse and COPD    Plan  1.  Echocardiogram  2.  Port placement  3.  Chemo education next week for Taxol Herceptin perjeta for 12 weeks followed by Adriamycin and Cytoxan for 4 doses and Perjeta Herceptin for 1 year adjuvantly  I told her I did not think radiation would be4.  Needed in the situation although there was a fair amount of skin left behind to facilitate implant placement but this should not change the plan.  Patient and her  were very  Taken to back by the suggestion about chemotherapy and apparently had thought that there was no further treatment needed and I spent almost an hour presenting the data concerning the extreme effectiveness of HER-2 directed therapy at the situation with a high risk of recurrence without adjuvant  treatment and she finally was convinced to do the treatment    We discussed smoking cessation but at this point she is so nervous and agitated with the prospect of chemotherapy that we will hold off on this until she is detention through the chemotherapy and rediscuss it    I discussed the case also with Dr. Kee will place a port and we will plan to start chemotherapy in 2 weeks

## 2019-08-05 ENCOUNTER — TELEPHONE (OUTPATIENT)
Dept: OTHER | Facility: HOSPITAL | Age: 64
End: 2019-08-05

## 2019-08-05 DIAGNOSIS — C50.212 MALIGNANT NEOPLASM OF UPPER-INNER QUADRANT OF LEFT BREAST IN FEMALE, ESTROGEN RECEPTOR NEGATIVE (HCC): Primary | ICD-10-CM

## 2019-08-05 DIAGNOSIS — Z17.1 MALIGNANT NEOPLASM OF UPPER-INNER QUADRANT OF LEFT BREAST IN FEMALE, ESTROGEN RECEPTOR NEGATIVE (HCC): Primary | ICD-10-CM

## 2019-08-05 NOTE — TELEPHONE ENCOUNTER
Called Allie and left a message introducing myself and navigational services. Asked her to call me back at her convenience and left my contact information.

## 2019-08-06 NOTE — PROGRESS NOTES
"On 8/1/19, [facility] met with the patient and her  prior to her consultation with Dr. Jhaveri about treatment for her breast cancer. She had a mastectomy on 6/21/19, and said her lymph nodes were clear. She has seen a reconstruction surgeon. She said that when she was initially diagnosed, she felt that everyone was looking at her. This sensation has decreased significantly.    Pt has been  for 25 years. She and her  live in their own 3 bedroom home. Her mother-in-law lives with her. Her 27 year old daughter and her 3 children live with them. Those children are ages 1 1/2 - 7 years. Her  has 3 children from an earlier relationship. There are other young grandchildren, ages 5 - 11 years old that they watch overnight frequently. Pt retired from [employer] after 40 years of employment. Her  works in sales for [employer]. He used to work at [employer], where they met.     Pt reports taking Buspar, Wellbutrin and a generic for Celexa. The addition of Celexa has made a difference for her, and she states it really helps. Pt was alert and oriented x 3. Her Distress score was 5. She reports mild impact on her functioning. She marked \", dealing with children, appearance, fatigue, and sleep\" as areas of distress. She has neglected dentition. Her distress seems connected to  having several extended family members in their home. It seems this is a fairly permanent arrangement. [facility] [place] reviewed social work services and offered assistance if needed in the future.  "

## 2019-08-07 ENCOUNTER — OFFICE VISIT (OUTPATIENT)
Dept: ONCOLOGY | Facility: CLINIC | Age: 64
End: 2019-08-07

## 2019-08-07 ENCOUNTER — APPOINTMENT (OUTPATIENT)
Dept: LAB | Facility: HOSPITAL | Age: 64
End: 2019-08-07

## 2019-08-07 ENCOUNTER — OFFICE VISIT (OUTPATIENT)
Dept: SURGERY | Facility: CLINIC | Age: 64
End: 2019-08-07

## 2019-08-07 ENCOUNTER — TELEPHONE (OUTPATIENT)
Dept: CARDIOLOGY | Facility: CLINIC | Age: 64
End: 2019-08-07

## 2019-08-07 ENCOUNTER — HOSPITAL ENCOUNTER (OUTPATIENT)
Dept: CARDIOLOGY | Facility: HOSPITAL | Age: 64
Discharge: HOME | End: 2019-08-07
Admitting: INTERNAL MEDICINE

## 2019-08-07 ENCOUNTER — DOCUMENTATION (OUTPATIENT)
Dept: OTHER | Facility: HOSPITAL | Age: 64
End: 2019-08-07

## 2019-08-07 VITALS — HEIGHT: 69 IN | BODY MASS INDEX: 31.55 KG/M2 | WEIGHT: 213 LBS

## 2019-08-07 VITALS — BODY MASS INDEX: 31.85 KG/M2 | WEIGHT: 215.7 LBS

## 2019-08-07 DIAGNOSIS — C50.912 MALIGNANT NEOPLASM OF LEFT BREAST IN FEMALE, ESTROGEN RECEPTOR NEGATIVE, UNSPECIFIED SITE OF BREAST (HCC): Primary | ICD-10-CM

## 2019-08-07 DIAGNOSIS — Z17.1 MALIGNANT NEOPLASM OF LEFT BREAST IN FEMALE, ESTROGEN RECEPTOR NEGATIVE, UNSPECIFIED SITE OF BREAST (HCC): Primary | ICD-10-CM

## 2019-08-07 DIAGNOSIS — C50.212 MALIGNANT NEOPLASM OF UPPER-INNER QUADRANT OF LEFT BREAST IN FEMALE, ESTROGEN RECEPTOR NEGATIVE (HCC): ICD-10-CM

## 2019-08-07 DIAGNOSIS — Z17.1 MALIGNANT NEOPLASM OF UPPER-INNER QUADRANT OF LEFT BREAST IN FEMALE, ESTROGEN RECEPTOR NEGATIVE (HCC): ICD-10-CM

## 2019-08-07 DIAGNOSIS — Z17.1 MALIGNANT NEOPLASM OF UPPER-INNER QUADRANT OF LEFT BREAST IN FEMALE, ESTROGEN RECEPTOR NEGATIVE (HCC): Primary | ICD-10-CM

## 2019-08-07 DIAGNOSIS — C50.212 MALIGNANT NEOPLASM OF UPPER-INNER QUADRANT OF LEFT BREAST IN FEMALE, ESTROGEN RECEPTOR NEGATIVE (HCC): Primary | ICD-10-CM

## 2019-08-07 DIAGNOSIS — F41.9 ANXIETY: ICD-10-CM

## 2019-08-07 LAB
BH CV ECHO MEAS - ACS: 2.4 CM
BH CV ECHO MEAS - AO MAX PG (FULL): 3.3 MMHG
BH CV ECHO MEAS - AO MAX PG: 9.6 MMHG
BH CV ECHO MEAS - AO MEAN PG (FULL): 3 MMHG
BH CV ECHO MEAS - AO MEAN PG: 5 MMHG
BH CV ECHO MEAS - AO ROOT AREA (BSA CORRECTED): 1.6
BH CV ECHO MEAS - AO ROOT AREA: 8.6 CM^2
BH CV ECHO MEAS - AO ROOT DIAM: 3.3 CM
BH CV ECHO MEAS - AO V2 MAX: 155 CM/SEC
BH CV ECHO MEAS - AO V2 MEAN: 101 CM/SEC
BH CV ECHO MEAS - AO V2 VTI: 35.4 CM
BH CV ECHO MEAS - AVA(I,A): 2.8 CM^2
BH CV ECHO MEAS - AVA(I,D): 2.8 CM^2
BH CV ECHO MEAS - AVA(V,A): 3.1 CM^2
BH CV ECHO MEAS - AVA(V,D): 3.1 CM^2
BH CV ECHO MEAS - BSA(HAYCOCK): 2.2 M^2
BH CV ECHO MEAS - BSA: 2.1 M^2
BH CV ECHO MEAS - BZI_BMI: 31.5 KILOGRAMS/M^2
BH CV ECHO MEAS - BZI_METRIC_HEIGHT: 175.3 CM
BH CV ECHO MEAS - BZI_METRIC_WEIGHT: 96.6 KG
BH CV ECHO MEAS - EDV(CUBED): 91.1 ML
BH CV ECHO MEAS - EDV(MOD-SP2): 72 ML
BH CV ECHO MEAS - EDV(MOD-SP4): 94 ML
BH CV ECHO MEAS - EDV(TEICH): 92.4 ML
BH CV ECHO MEAS - EF(CUBED): 44.4 %
BH CV ECHO MEAS - EF(MOD-BP): 53 %
BH CV ECHO MEAS - EF(MOD-SP2): 50.2 %
BH CV ECHO MEAS - EF(MOD-SP4): 54.4 %
BH CV ECHO MEAS - EF(TEICH): 37.1 %
BH CV ECHO MEAS - ESV(CUBED): 50.7 ML
BH CV ECHO MEAS - ESV(MOD-SP2): 38 ML
BH CV ECHO MEAS - ESV(MOD-SP4): 54 ML
BH CV ECHO MEAS - ESV(TEICH): 58.1 ML
BH CV ECHO MEAS - FS: 17.8 %
BH CV ECHO MEAS - IVS/LVPW: 1.1
BH CV ECHO MEAS - IVSD: 1.3 CM
BH CV ECHO MEAS - LAT PEAK E' VEL: 5 CM/SEC
BH CV ECHO MEAS - LV DIASTOLIC VOL/BSA (35-75): 44.3 ML/M^2
BH CV ECHO MEAS - LV MASS(C)D: 210.2 GRAMS
BH CV ECHO MEAS - LV MASS(C)DI: 99.1 GRAMS/M^2
BH CV ECHO MEAS - LV MAX PG: 6.4 MMHG
BH CV ECHO MEAS - LV MEAN PG: 2 MMHG
BH CV ECHO MEAS - LV SYSTOLIC VOL/BSA (12-30): 25.5 ML/M^2
BH CV ECHO MEAS - LV V1 MAX: 126 CM/SEC
BH CV ECHO MEAS - LV V1 MEAN: 69.4 CM/SEC
BH CV ECHO MEAS - LV V1 VTI: 26 CM
BH CV ECHO MEAS - LVIDD: 4.5 CM
BH CV ECHO MEAS - LVIDS: 3.7 CM
BH CV ECHO MEAS - LVLD AP2: 7 CM
BH CV ECHO MEAS - LVLD AP4: 7.6 CM
BH CV ECHO MEAS - LVLS AP2: 6 CM
BH CV ECHO MEAS - LVLS AP4: 5.5 CM
BH CV ECHO MEAS - LVOT AREA (M): 3.8 CM^2
BH CV ECHO MEAS - LVOT AREA: 3.8 CM^2
BH CV ECHO MEAS - LVOT DIAM: 2.2 CM
BH CV ECHO MEAS - LVPWD: 1.2 CM
BH CV ECHO MEAS - MED PEAK E' VEL: 4 CM/SEC
BH CV ECHO MEAS - MV A DUR: 0.13 SEC
BH CV ECHO MEAS - MV A MAX VEL: 76.2 CM/SEC
BH CV ECHO MEAS - MV DEC SLOPE: 309 CM/SEC^2
BH CV ECHO MEAS - MV DEC TIME: 0.25 SEC
BH CV ECHO MEAS - MV E MAX VEL: 57.2 CM/SEC
BH CV ECHO MEAS - MV E/A: 0.75
BH CV ECHO MEAS - MV MAX PG: 2.8 MMHG
BH CV ECHO MEAS - MV MEAN PG: 1 MMHG
BH CV ECHO MEAS - MV P1/2T MAX VEL: 74.7 CM/SEC
BH CV ECHO MEAS - MV P1/2T: 70.8 MSEC
BH CV ECHO MEAS - MV V2 MAX: 83.2 CM/SEC
BH CV ECHO MEAS - MV V2 MEAN: 40.1 CM/SEC
BH CV ECHO MEAS - MV V2 VTI: 31.9 CM
BH CV ECHO MEAS - MVA P1/2T LCG: 2.9 CM^2
BH CV ECHO MEAS - MVA(P1/2T): 3.1 CM^2
BH CV ECHO MEAS - MVA(VTI): 3.1 CM^2
BH CV ECHO MEAS - PA ACC TIME: 0.13 SEC
BH CV ECHO MEAS - PA MAX PG (FULL): 1.2 MMHG
BH CV ECHO MEAS - PA MAX PG: 4.4 MMHG
BH CV ECHO MEAS - PA PR(ACCEL): 21.9 MMHG
BH CV ECHO MEAS - PA V2 MAX: 105 CM/SEC
BH CV ECHO MEAS - PI END-D VEL: 42.1 CM/SEC
BH CV ECHO MEAS - PULM A REVS DUR: 0.12 SEC
BH CV ECHO MEAS - PULM A REVS VEL: 25.7 CM/SEC
BH CV ECHO MEAS - PULM DIAS VEL: 35.9 CM/SEC
BH CV ECHO MEAS - PULM S/D: 0.88
BH CV ECHO MEAS - PULM SYS VEL: 31.5 CM/SEC
BH CV ECHO MEAS - PVA(V,A): 3.9 CM^2
BH CV ECHO MEAS - PVA(V,D): 3.9 CM^2
BH CV ECHO MEAS - QP/QS: 1.1
BH CV ECHO MEAS - RAP SYSTOLE: 3 MMHG
BH CV ECHO MEAS - RV MAX PG: 3.2 MMHG
BH CV ECHO MEAS - RV MEAN PG: 2 MMHG
BH CV ECHO MEAS - RV V1 MAX: 89.4 CM/SEC
BH CV ECHO MEAS - RV V1 MEAN: 60.5 CM/SEC
BH CV ECHO MEAS - RV V1 VTI: 23.5 CM
BH CV ECHO MEAS - RVOT AREA: 4.5 CM^2
BH CV ECHO MEAS - RVOT DIAM: 2.4 CM
BH CV ECHO MEAS - RVSP: 15 MMHG
BH CV ECHO MEAS - SI(AO): 142.7 ML/M^2
BH CV ECHO MEAS - SI(CUBED): 19.1 ML/M^2
BH CV ECHO MEAS - SI(LVOT): 46.6 ML/M^2
BH CV ECHO MEAS - SI(MOD-SP2): 16 ML/M^2
BH CV ECHO MEAS - SI(MOD-SP4): 18.9 ML/M^2
BH CV ECHO MEAS - SI(TEICH): 16.2 ML/M^2
BH CV ECHO MEAS - SV(AO): 302.8 ML
BH CV ECHO MEAS - SV(CUBED): 40.5 ML
BH CV ECHO MEAS - SV(LVOT): 98.8 ML
BH CV ECHO MEAS - SV(MOD-SP2): 34 ML
BH CV ECHO MEAS - SV(MOD-SP4): 40 ML
BH CV ECHO MEAS - SV(RVOT): 106.3 ML
BH CV ECHO MEAS - SV(TEICH): 34.3 ML
BH CV ECHO MEAS - TAPSE (>1.6): 2.5 CM2
BH CV ECHO MEAS - TR MAX VEL: 170 CM/SEC
BH CV ECHO MEASUREMENTS AVERAGE E/E' RATIO: 12.71
BH CV VAS BP RIGHT ARM: NORMAL MMHG
BH CV XLRA - TDI S': 13 CM/SEC
LEFT ATRIUM VOLUME INDEX: 39 ML/M2
LV EF 2D ECHO EST: 53 %
MAXIMAL PREDICTED HEART RATE: 156 BPM
STRESS TARGET HR: 133 BPM

## 2019-08-07 PROCEDURE — 93306 TTE W/DOPPLER COMPLETE: CPT

## 2019-08-07 PROCEDURE — 99215 OFFICE O/P EST HI 40 MIN: CPT | Performed by: NURSE PRACTITIONER

## 2019-08-07 PROCEDURE — 0399T ADULT TRANSTHORACIC ECHO COMPLETE W/ CONT IF NECESSARY PER PROTOCOL: CPT | Performed by: INTERNAL MEDICINE

## 2019-08-07 PROCEDURE — 93306 TTE W/DOPPLER COMPLETE: CPT | Performed by: INTERNAL MEDICINE

## 2019-08-07 PROCEDURE — 0399T HC MYOCARDL STRAIN IMAG QUAN ASSMT PER SESS: CPT

## 2019-08-07 PROCEDURE — G0463 HOSPITAL OUTPT CLINIC VISIT: HCPCS | Performed by: NURSE PRACTITIONER

## 2019-08-07 PROCEDURE — 99024 POSTOP FOLLOW-UP VISIT: CPT | Performed by: SURGERY

## 2019-08-07 PROCEDURE — 25010000002 PERFLUTREN (DEFINITY) 8.476 MG IN SODIUM CHLORIDE 0.9 % 10 ML INJECTION: Performed by: INTERNAL MEDICINE

## 2019-08-07 RX ORDER — ALPRAZOLAM 0.25 MG/1
0.5 TABLET ORAL 2 TIMES DAILY PRN
Qty: 60 TABLET | Refills: 0 | Status: SHIPPED | OUTPATIENT
Start: 2019-08-07 | End: 2019-09-06 | Stop reason: SDUPTHER

## 2019-08-07 RX ADMIN — SODIUM CHLORIDE 2 ML: 9 INJECTION INTRAMUSCULAR; INTRAVENOUS; SUBCUTANEOUS at 08:30

## 2019-08-07 NOTE — PROGRESS NOTES
Cc: Left breast cancer     History of presenting illness:   This is a 64-year-old lady who  follows up after left mastectomy for a ER KS negative, HER-2/kali positive cancer.  She has now recovered quite well.  She has followed up with oncology who have recommended adjuvant chemotherapy and Herceptin.  She needs a Rbjwto-k-Ngwc to be placed.     Past Medical History: Hypertension, osteoarthritis, degenerative disc disease, dyslipidemia     Past Surgical History: Appendectomy, tubal ligation, left breast lumpectomy, left modified radical mastectomy     Medications: Reviewed and reconciled in epic.  She is not on anticoagulants     Allergies: Effexor, gabapentin     Social History: Patient admits to smoking about 5 or 6 cigarettes a day and has for many years.  She understands that there is an increased risk of surgical wound infection, pain and wound healing problems but is not inclined to try quitting at this time.  She denies alcohol use.   Discussed risks of surgery with patient and in particular increased risk of wound infection, poor wound healing, hernias (with abdominal surgery) and post-operative pulmonary complications associated with smoking.     Family History: Negative for colorectal or breast cancer     Review of Systems:  Constitutional: Positive for decreased activity, sweats and fatigue and chills  Neck: no swollen glands or dysphagia or odynophagia  Respiratory: negative for SOB, cough, hemoptysis or wheezing  Cardiovascular: negative for chest pain, palpitations or peripheral edema  Gastrointestinal: Negative for nausea, vomiting, abdominal pain        Physical Exam:   Body mass index 31.5  General: alert and oriented, appropriate, no acute distress  Neck: Supple without lymphadenopathy or thyromegaly, trachea is in the midline  Respiratory: Lungs are clear bilaterally without wheezing, no use of accessory muscles is noted  Cardiovascular: Regular rate and rhythm without murmur, no peripheral  edema  Gastrointestinal: Soft, benign, no mass, no hernia identified  Breast: Right breast exam normal with no masses.   Left breast mastectomy site nicely healed, minimal edema, no sign of infection.  .        Assessment and plan:   -Left breast cancer status post modified radical mastectomy  -We will plan for Tztnbe-v-Jtxs placement to assist with adjuvant chemotherapy.        Jase Evans MD, FACS  General, Minimally Invasive and Endoscopic Surgery  [facility]     [address], [address]  [place], [place], [address]  P: [phone]  F: [phone]

## 2019-08-07 NOTE — TELEPHONE ENCOUNTER
I talked to Dr. Jhaveri regarding echo results.  We will remeasure EF, global strain is abnormal.  Dr. Jhaveri asked us to contact the patient to see her in consultation.    Please call the patient and let her know that echocardiogram had some abnormalities which Dr. Jhaveri would like us to address.  Please get her in to see me in the next week. jodi

## 2019-08-07 NOTE — H&P (VIEW-ONLY)
Cc: Left breast cancer     History of presenting illness:   This is a 64-year-old lady who  follows up after left mastectomy for a ER [place] negative, HER-2/kali positive cancer.  She has now recovered quite well.  She has followed up with oncology who have recommended adjuvant chemotherapy and Herceptin.  She needs a Ujidic-m-Bqne to be placed.     Past Medical History: Hypertension, osteoarthritis, degenerative disc disease, dyslipidemia     Past Surgical History: Appendectomy, tubal ligation, left breast lumpectomy, left modified radical mastectomy     Medications: Reviewed and reconciled in epic.  She is not on anticoagulants     Allergies: Effexor, gabapentin     Social History: Patient admits to smoking about 5 or 6 cigarettes a day and has for many years.  She understands that there is an increased risk of surgical wound infection, pain and wound healing problems but is not inclined to try quitting at this time.  She denies alcohol use.   Discussed risks of surgery with patient and in particular increased risk of wound infection, poor wound healing, hernias (with abdominal surgery) and post-operative pulmonary complications associated with smoking.     Family History: Negative for colorectal or breast cancer     Review of Systems:  Constitutional: Positive for decreased activity, sweats and fatigue and chills  Neck: no swollen glands or dysphagia or odynophagia  Respiratory: negative for SOB, cough, hemoptysis or wheezing  Cardiovascular: negative for chest pain, palpitations or peripheral edema  Gastrointestinal: Negative for nausea, vomiting, abdominal pain        Physical Exam:   Body mass index 31.5  General: alert and oriented, appropriate, no acute distress  Neck: Supple without lymphadenopathy or thyromegaly, trachea is in the midline  Respiratory: Lungs are clear bilaterally without wheezing, no use of accessory muscles is noted  Cardiovascular: Regular rate and rhythm without murmur, no peripheral  edema  Gastrointestinal: Soft, benign, no mass, no hernia identified  Breast: Right breast exam normal with no masses.   Left breast mastectomy site nicely healed, minimal edema, no sign of infection.  .        Assessment and plan:   -Left breast cancer status post modified radical mastectomy  -We will plan for Rejink-g-Wkws placement to assist with adjuvant chemotherapy.        Jase Evans MD, FACS  General, Minimally Invasive and Endoscopic Surgery  [facility]     [address], [address]  [place], [place], [address]  P: [phone]  F: [phone]

## 2019-08-07 NOTE — PROGRESS NOTES
____________________PATIENT EDUCATION____________________    PATIENT EDUCATION:  Today I met with the patient to discuss the chemotherapy regimen recommended for treatment of his disease.  The patient was given explanation of treatment premed side effects including office policy that prohibits patients to drive if sedating medications are administered, MD explanation given regarding benefits, side effects, toxicities and goals of treatment.  The patient received a Chemotherapy/Biotherapy Plan Summary including diagnosis and specific treatment plan.    SIDE EFFECTS:  Common side effects were discussed with the patient and/or significant other.  Discussion included hair loss/discoloration, anemia/fatigue, infection/chills/fever, appetite, bleeding risk/precautions, constipation, diarrhea, mouth sores, taste alteration, loss of appetite,nausea/vomiting, peripheral neuropathy, skin/nail changes, rash, muscle aches/weakness, photosensitivity, weight gain/loss, hearing loss, dizziness, menopausal symptoms, menstrrual irregularity, sterility, high blood pressure, heart damage, liver damage, lung damage, kidney damage, DVT/PE risk, fluid retention, pleural/pericardial effusion, somnolence, electrolyte/LFT imbalance, vein exercises and/or the possible need for vascular access/port placement.  The patient was advice that although uncommon, leakage of an infused medication from the vein or venous access device (port) may lead to skin breakdown and/or other tissue damage.  The patient was advised that he/she may have pain, bleeding, and/or bruising from the insertion of a needle in their vein or venous access device (port).  The patient was further advised that, in spite of proper technique, infection with redness and irritation may rarely occur at the site where the needle was inserted.  The patient was advised that if complications occur, additional medical treatment is available.    Discussion also included side effects  specific to drugs in the treatment plan, specifically Taxol, Herceptin, Perjeta; Adriamycin/Cytoxan with Neulasta support.    We discussed the need for Claritin with Neulasta support and also the need for oral dexamethasone with Adriamycin. All of this was written down for her as well.    She was provided a wig Rx and is meeting today with Taryn Gregory, Breast Cancer Nurse Navigator who will provide a wig voucher.    PHYSICAL EXAM:  In no acute distress.  Awake, alert, appropriately verbal.  Breathing unlabored, no apparent use of accessory muscles of respiration.  Cranial nerves II-XII grossly intact, no focal deficits.  Appropriate affect.  Asking appropriate questions.    Patient is struggling with anxiety and sleep disturbance and we will provide Xanax to take BID PRN.    Survivorship referral placed if appropriate - YES.    A total of 60 minutes were spent with the patient, with 100% of time spent in education and counseling.

## 2019-08-07 NOTE — TELEPHONE ENCOUNTER
LMM re: call to go over.    Marito- Can you PLEASE put a hold for this pt on for next Wednesday at EP @ 1pm with Dr Dudley?  I have NOT spoke with her yet and I will talk with her just need appt held.

## 2019-08-08 NOTE — PROGRESS NOTES
I  Met Ms. Temple after her chemo education class. I introduced myself and navigational services. She had a left mastectomy with Dr. Evans in June and will have delayed reconstruction with Dr. Waterhouse. We discussed lymphedema briefly and she should be hearing from the Lymphedema clinic regarding an appointment soon.     We discussed integrative therapies and other services at the [facility]. I gave her a navigation folder with the following information:     [facility], [facility] Breast Cancer Support Group, Cancer and Restorative Exercise (CARE), [employer] Exercise program, Together for Breast Cancer Survival, For Women Facing Breast Cancer, Road to Recovery, [facility], [facility], Massage Therapy, Reiki Therapy, [employer], Cancer Nutrition, Survivorship Clinic, and Cancer and Career.    We walked through the infusion area for a brief tour before heading down to the Resource Center. She was interested in a wig voucher and I gave her the voucher with the brochure for the [employer]  We also looked in the design room and she was able to use a port pillow as well. She stated she will stop by another time to try on wigs and hats.     She verbalized appreciation for navigational services and she has my contact information and will call with any questions that arise.

## 2019-08-13 ENCOUNTER — OFFICE VISIT (OUTPATIENT)
Dept: CARDIOLOGY | Facility: CLINIC | Age: 64
End: 2019-08-13

## 2019-08-13 VITALS
BODY MASS INDEX: 32.14 KG/M2 | WEIGHT: 217 LBS | HEART RATE: 49 BPM | DIASTOLIC BLOOD PRESSURE: 80 MMHG | HEIGHT: 69 IN | SYSTOLIC BLOOD PRESSURE: 132 MMHG

## 2019-08-13 DIAGNOSIS — R40.0 DAYTIME SOMNOLENCE: ICD-10-CM

## 2019-08-13 DIAGNOSIS — Z01.810 PREOP CARDIOVASCULAR EXAM: ICD-10-CM

## 2019-08-13 DIAGNOSIS — I10 BENIGN ESSENTIAL HYPERTENSION: ICD-10-CM

## 2019-08-13 DIAGNOSIS — R93.1 ABNORMAL ECHOCARDIOGRAM: Primary | ICD-10-CM

## 2019-08-13 DIAGNOSIS — R07.2 PRECORDIAL PAIN: ICD-10-CM

## 2019-08-13 PROCEDURE — 93000 ELECTROCARDIOGRAM COMPLETE: CPT | Performed by: INTERNAL MEDICINE

## 2019-08-13 PROCEDURE — 99204 OFFICE O/P NEW MOD 45 MIN: CPT | Performed by: INTERNAL MEDICINE

## 2019-08-13 RX ORDER — ONDANSETRON 8 MG/1
8 TABLET, ORALLY DISINTEGRATING ORAL EVERY 8 HOURS PRN
Qty: 30 TABLET | Refills: 2 | Status: SHIPPED | OUTPATIENT
Start: 2019-08-13 | End: 2019-09-19 | Stop reason: SDUPTHER

## 2019-08-13 NOTE — PROGRESS NOTES
Date of Office Visit: 2019  Encounter Provider: Keara Dudley MD  Place of Service: [facility] CARDIOLOGY  Patient Name: Allie Temple  :1955    Chief complaint  Consult requested by Dr. Jhaveri for evaluation of abnormal echocardiogram    History of Present Illness  Patient is a 64-year-old female with history of hypertension, hyperlipidemia, impaired fasting glucose, breast cancer and she was recently diagnosed with breast cancer and had an echocardiogram prior to initiating chemotherapy.  An echocardiogram performed on  showed an ejection fraction of 53% with normal left ventricular size, moderate left ventricular hypertrophy, grade 1A diastolic dysfunction.  Global longitudinal strain was very abnormal is -9.  There was trivial tricuspid regurgitation with normal right-sided pressures.  The left atrium was moderately enlarged.  It appears that in  she had seen Dr. Eubanks and that showed normal systolic function.    ........................    Past Medical History:   Diagnosis Date   • Adjustment disorder    • Allergic rhinitis    • Amenorrhea    • Anxiety    • Breast cancer (CMS/[facility]) 2019    Left breast mammary carcinoma   • Bruit    • Carpal tunnel syndrome    • Carrier of tuberculosis    • Chronic pain    • De Quervain's tenosynovitis    • Degenerative cervical disc    • Depression    • Dyslipidemia    • Eustachian tube dysfunction    • History of UTI    • Hyperlipidemia    • Hypertension    • Impaired fasting glucose    • Lumbar degenerative disc disease    • Numbness and tingling     LEFT LEG   • OAB (overactive bladder)    • Scapulothoracic syndrome    • Sciatica      Past Surgical History:   Procedure Laterality Date   • APPENDECTOMY N/A    • BREAST BIOPSY Left 2019    Left breast ultrasound guided cyst aspiration, 9:00 position-Dr. Gerry Taylor, [facility]   • BREAST LUMPECTOMY Left 2019    Procedure: Left BREAST LUMPECTOMY;  Surgeon:  Jase Evans MD;  Location: [facility] OR;  Service: General   • LUMBAR EPIDURAL INJECTION N/A    • MASTECTOMY Left 6/21/2019    Procedure: Left BREAST MASTECTOMY;  Surgeon: Jase Evans MD;  Location: [facility] OR;  Service: General   • TUBAL ABDOMINAL LIGATION Bilateral    • VAGINAL DELIVERY      x3     Outpatient Medications Prior to Visit   Medication Sig Dispense Refill   • ALPRAZolam (XANAX) 0.25 MG tablet Take 2 tablets by mouth 2 (Two) Times a Day As Needed for Anxiety for up to 60 doses. 60 tablet 0   • buPROPion XL (WELLBUTRIN XL) 150 MG 24 hr tablet Take 1 tablet by mouth Daily. 30 tablet 3   • busPIRone (BUSPAR) 10 MG tablet Take 10 mg by mouth 2 (Two) Times a Day.     • citalopram (CeleXA) 40 MG tablet Take 1 tablet by mouth Daily. 30 tablet 5   • metoprolol succinate XL (TOPROL-XL) 25 MG 24 hr tablet Take 25 mg by mouth Daily.     • montelukast (SINGULAIR) 10 MG tablet Take 1 tablet by mouth Every Night. 30 tablet 11   • Multiple Vitamins-Minerals (CENTRUM SILVER PO) Take 1 tablet by mouth Daily.     • vitamin B-12 (CYANOCOBALAMIN) 100 MCG tablet Take 100 mcg by mouth Daily.       No facility-administered medications prior to visit.        Allergies as of 08/13/2019 - Reviewed 08/13/2019   Allergen Reaction Noted   • Effexor [venlafaxine] Itching 02/01/2016   • Gabapentin Hallucinations 02/01/2016   • Ibuprofen Other (See Comments) 04/29/2019   • Naproxen Itching 11/17/2017   • Zyrtec [cetirizine] Other (See Comments) 02/01/2016   • Penicillins Rash 02/01/2016   • Sulfa antibiotics Rash 02/01/2016     Social History     Socioeconomic History   • Marital status:      Spouse name: Pawan   • Number of children: 3   • Years of education: High school   • Highest education level: Not on file   Occupational History     Employer: RETIRED   Tobacco Use   • Smoking status: Current Every Day Smoker     Packs/day: 0.25     Years: 48.00     Pack years: 12.00     Types: Cigarettes   • Smokeless  "tobacco: Never Used   • Tobacco comment: CURRENTLY TAKING CHANTIX, TRYING TO QUIT   Substance and Sexual Activity   • Alcohol use: No   • Drug use: No   • Sexual activity: Yes     Partners: Male     Birth control/protection: Post-menopausal     Family History   Problem Relation Age of Onset   • Ovarian cancer Mother    • Endometrial cancer Mother    • COPD Father    • Stroke Brother    • Malig Hyperthermia Neg Hx      Review of Systems   Constitution: Negative for fever, malaise/fatigue, weight gain and weight loss.   HENT: Negative for ear pain, hearing loss, nosebleeds and sore throat.    Eyes: Negative for double vision, pain, vision loss in left eye and vision loss in right eye.   Cardiovascular: Positive for chest pain.        See history of present illness.   Respiratory: Negative for cough, shortness of breath, sleep disturbances due to breathing, snoring and wheezing.    Endocrine: Negative for cold intolerance, heat intolerance and polyuria.   Skin: Negative for itching, poor wound healing and rash.   Musculoskeletal: Positive for joint pain. Negative for joint swelling and myalgias.   Gastrointestinal: Negative for abdominal pain, diarrhea, hematochezia, nausea and vomiting.   Genitourinary: Negative for hematuria and hesitancy.   Neurological: Negative for numbness, paresthesias and seizures.   Psychiatric/Behavioral: Negative for depression. The patient is not nervous/anxious.         Objective:     Vitals:    08/13/19 1149   BP: 132/80   BP Location: Right arm   Pulse: (!) 49   Weight: 98.4 kg (217 lb)   Height: 175.3 cm (69\")     Body mass index is 32.05 kg/m².    Physical Exam   Constitutional: She is oriented to person, place, and time. She appears well-developed and well-nourished.   Obese   HENT:   Head: Normocephalic.   Nose: Nose normal.   Mouth/Throat: Oropharynx is clear and moist.   Eyes: Conjunctivae and EOM are normal. Pupils are equal, round, and reactive to light. Right eye exhibits no " discharge. No scleral icterus.   Neck: Normal range of motion. Neck supple. No JVD present. No thyromegaly present.   Cardiovascular: Regular rhythm, normal heart sounds and intact distal pulses. Bradycardia present. Exam reveals no gallop and no friction rub.   No murmur heard.  Pulses:       Carotid pulses are 2+ on the right side, and 2+ on the left side.       Radial pulses are 2+ on the right side, and 2+ on the left side.        Femoral pulses are 2+ on the right side, and 2+ on the left side.       Popliteal pulses are 2+ on the right side, and 2+ on the left side.        Dorsalis pedis pulses are 2+ on the right side, and 2+ on the left side.        Posterior tibial pulses are 2+ on the right side, and 2+ on the left side.   Pulmonary/Chest: Effort normal and breath sounds normal. No respiratory distress. She has no wheezes. She has no rales.   Abdominal: Soft. Bowel sounds are normal. She exhibits no distension. There is no hepatosplenomegaly. There is no tenderness. There is no rebound.   Musculoskeletal: Normal range of motion. She exhibits no edema or tenderness.   Neurological: She is alert and oriented to person, place, and time.   Skin: Skin is warm and dry. No rash noted. No erythema.   Psychiatric: She has a normal mood and affect. Her behavior is normal. Judgment and thought content normal.   Vitals reviewed.    Lab Review:     ECG 12 Lead  Date/Time: 8/13/2019 11:58 AM  Performed by: Keara Dudley MD  Authorized by: Keara Dudley MD   Comparison: compared with previous ECG   Comparison to previous ECG: Heart rate is decreased nonspecific ST-T wave changes are present  Rhythm: sinus bradycardia  Other findings: non-specific ST-T wave changes    Clinical impression: abnormal EKG          Assessment:       Diagnosis Plan   1. Abnormal echocardiogram  ECG 12 Lead    Stress Test With Myocardial Perfusion One Day    Home Sleep Study   2. Precordial pain  ECG 12 Lead    Stress Test With Myocardial  Perfusion One Day    Home Sleep Study   3. Preop cardiovascular exam  Stress Test With Myocardial Perfusion One Day   4. Daytime somnolence  Home Sleep Study   5. Benign essential hypertension       Plan:       1.  Abnormal echocardiogram with abnormal myocardial strain prior to chemotherapy.  Strain imaging is markedly abnormal.  Likely has sleep apnea and will do a home sleep study.  We will also have her check some readings at home to verify her blood pressures well controlled and will also check for ischemia with a stress test given concomitant complaints of chest pain  2.  Breast cancer, is post left-sided mastectomy.  Patient was scheduled to get a port tomorrow and subsequent chemotherapy however she has residual questions about the need for this that she feels she was told by Dr. Kee that she was cured.  However she is HERS receptor positive.  I have contacted Dr. Jhaveri who is kindly agreed to discuss this further with the patient.  For now we will hold off on port placement until further discussion with Dr. Jhaveri.  The patient is to call to arrange this  3.  Preoperative clearance prior to port placement.  Will need a Lexiscan cardiac stress test prior to clearance hypertension  4.  Chest pain.  Will assess for ischemia.  There are some exertional and emotionally exacerbated complaints.  5.  Hyperlipidemia  6.  Hypertension  7.  Probable sleep apnea.  He snores at night has daytime somnolence.  Will check a home sleep study.       Your medication list           Accurate as of 8/13/19 11:59 PM. If you have any questions, ask your nurse or doctor.               START taking these medications      Instructions Last Dose Given Next Dose Due   ondansetron ODT 8 MG disintegrating tablet  Commonly known as:  ZOFRAN-ODT  Started by:  ASH Clement      Take 1 tablet by mouth Every 8 (Eight) Hours As Needed for Nausea or Vomiting.          CONTINUE taking these medications      Instructions Last  Dose Given Next Dose Due   ALPRAZolam 0.25 MG tablet  Commonly known as:  XANAX      Take 2 tablets by mouth 2 (Two) Times a Day As Needed for Anxiety for up to 60 doses.       buPROPion  MG 24 hr tablet  Commonly known as:  WELLBUTRIN XL      Take 1 tablet by mouth Daily.       busPIRone 10 MG tablet  Commonly known as:  BUSPAR      Take 10 mg by mouth 2 (Two) Times a Day.       CENTRUM SILVER PO      Take 1 tablet by mouth Daily.       citalopram 40 MG tablet  Commonly known as:  CeleXA      Take 1 tablet by mouth Daily.       metoprolol succinate XL 25 MG 24 hr tablet  Commonly known as:  TOPROL-XL      Take 25 mg by mouth Daily.       montelukast 10 MG tablet  Commonly known as:  SINGULAIR      Take 1 tablet by mouth Every Night.       vitamin B-12 100 MCG tablet  Commonly known as:  CYANOCOBALAMIN      Take 100 mcg by mouth Daily.             Where to Get Your Medications      These medications were sent to [id number] - [place], [place] - [address] - [phone]  - [phone]   [address] [address]    Phone:  [phone]   · ondansetron ODT 8 MG disintegrating tablet         Patient is no longer taking -.  I corrected the med list to reflect this.  I did not stop these medications.    Dictated utilizing Dragon dictation

## 2019-08-15 ENCOUNTER — HOSPITAL ENCOUNTER (OUTPATIENT)
Dept: CARDIOLOGY | Facility: HOSPITAL | Age: 64
Discharge: HOME | End: 2019-08-15
Admitting: INTERNAL MEDICINE

## 2019-08-15 VITALS — BODY MASS INDEX: 32.13 KG/M2 | HEIGHT: 69 IN | WEIGHT: 216.93 LBS

## 2019-08-15 DIAGNOSIS — R07.2 PRECORDIAL PAIN: ICD-10-CM

## 2019-08-15 DIAGNOSIS — Z01.810 PREOP CARDIOVASCULAR EXAM: ICD-10-CM

## 2019-08-15 DIAGNOSIS — R93.1 ABNORMAL ECHOCARDIOGRAM: ICD-10-CM

## 2019-08-15 LAB
BH CV NUCLEAR PRIOR STUDY: 3
BH CV STRESS BP STAGE 1: NORMAL
BH CV STRESS COMMENTS STAGE 1: NORMAL
BH CV STRESS DOSE REGADENOSON STAGE 1: 0.4
BH CV STRESS DURATION MIN STAGE 1: 0
BH CV STRESS DURATION SEC STAGE 1: 10
BH CV STRESS HR STAGE 1: 94
BH CV STRESS PROTOCOL 1: NORMAL
BH CV STRESS RECOVERY BP: NORMAL MMHG
BH CV STRESS RECOVERY HR: 75 BPM
BH CV STRESS STAGE 1: 1
LV EF NUC BP: 50 %
MAXIMAL PREDICTED HEART RATE: 156 BPM
PERCENT MAX PREDICTED HR: 60.26 %
STRESS BASELINE BP: NORMAL MMHG
STRESS BASELINE HR: 56 BPM
STRESS PERCENT HR: 71 %
STRESS POST EXERCISE DUR SEC: 10 SEC
STRESS POST PEAK BP: NORMAL MMHG
STRESS POST PEAK HR: 94 BPM
STRESS TARGET HR: 133 BPM

## 2019-08-15 PROCEDURE — 78452 HT MUSCLE IMAGE SPECT MULT: CPT

## 2019-08-15 PROCEDURE — 25010000002 REGADENOSON 0.4 MG/5ML SOLUTION: Performed by: INTERNAL MEDICINE

## 2019-08-15 PROCEDURE — 93018 CV STRESS TEST I&R ONLY: CPT | Performed by: INTERNAL MEDICINE

## 2019-08-15 PROCEDURE — 78452 HT MUSCLE IMAGE SPECT MULT: CPT | Performed by: INTERNAL MEDICINE

## 2019-08-15 PROCEDURE — 93016 CV STRESS TEST SUPVJ ONLY: CPT | Performed by: INTERNAL MEDICINE

## 2019-08-15 PROCEDURE — A9502 TC99M TETROFOSMIN: HCPCS | Performed by: INTERNAL MEDICINE

## 2019-08-15 PROCEDURE — 93017 CV STRESS TEST TRACING ONLY: CPT

## 2019-08-15 PROCEDURE — 0 TECHNETIUM TETROFOSMIN KIT: Performed by: INTERNAL MEDICINE

## 2019-08-15 RX ADMIN — TETROFOSMIN 1 DOSE: 1.38 INJECTION, POWDER, LYOPHILIZED, FOR SOLUTION INTRAVENOUS at 11:44

## 2019-08-15 RX ADMIN — REGADENOSON 0.4 MG: 0.08 INJECTION, SOLUTION INTRAVENOUS at 11:44

## 2019-08-15 RX ADMIN — TETROFOSMIN 1 DOSE: 1.38 INJECTION, POWDER, LYOPHILIZED, FOR SOLUTION INTRAVENOUS at 10:55

## 2019-08-16 ENCOUNTER — APPOINTMENT (OUTPATIENT)
Dept: ONCOLOGY | Facility: HOSPITAL | Age: 64
End: 2019-08-16

## 2019-08-16 ENCOUNTER — APPOINTMENT (OUTPATIENT)
Dept: ONCOLOGY | Facility: CLINIC | Age: 64
End: 2019-08-16

## 2019-08-16 DIAGNOSIS — C50.212 MALIGNANT NEOPLASM OF UPPER-INNER QUADRANT OF LEFT BREAST IN FEMALE, ESTROGEN RECEPTOR NEGATIVE (HCC): ICD-10-CM

## 2019-08-16 DIAGNOSIS — Z17.1 MALIGNANT NEOPLASM OF UPPER-INNER QUADRANT OF LEFT BREAST IN FEMALE, ESTROGEN RECEPTOR NEGATIVE (HCC): ICD-10-CM

## 2019-08-16 PROBLEM — Z45.2 ENCOUNTER FOR FITTING AND ADJUSTMENT OF VASCULAR CATHETER: Status: ACTIVE | Noted: 2019-08-16

## 2019-08-16 RX ORDER — SODIUM CHLORIDE 0.9 % (FLUSH) 0.9 %
10 SYRINGE (ML) INJECTION AS NEEDED
Status: CANCELLED | OUTPATIENT
Start: 2019-08-22

## 2019-08-17 PROBLEM — R07.2 PRECORDIAL PAIN: Status: ACTIVE | Noted: 2019-08-17

## 2019-08-17 PROBLEM — Z01.810 PREOP CARDIOVASCULAR EXAM: Status: ACTIVE | Noted: 2019-08-16

## 2019-08-17 PROBLEM — R93.1 ABNORMAL ECHOCARDIOGRAM: Status: ACTIVE | Noted: 2019-08-17

## 2019-08-17 PROBLEM — R40.0 DAYTIME SOMNOLENCE: Status: ACTIVE | Noted: 2019-08-17

## 2019-08-19 ENCOUNTER — TELEPHONE (OUTPATIENT)
Dept: CARDIOLOGY | Facility: CLINIC | Age: 64
End: 2019-08-19

## 2019-08-19 RX ORDER — METOPROLOL SUCCINATE 25 MG/1
TABLET, EXTENDED RELEASE ORAL
Qty: 30 TABLET | Refills: 1 | Status: SHIPPED | OUTPATIENT
Start: 2019-08-19 | End: 2019-08-27 | Stop reason: HOSPADM

## 2019-08-21 ENCOUNTER — TELEPHONE (OUTPATIENT)
Dept: ONCOLOGY | Facility: HOSPITAL | Age: 64
End: 2019-08-21

## 2019-08-21 NOTE — PROGRESS NOTES
Subjective     REASON FOR CONSULTATION:   1.Left breast cancer T2N0 3.6 cm grade 3 ER OR negative HER-2 positive infiltrating ductal carcinoma post excisional biopsy, followed by mastectomy and axillary dissection                               REQUESTING PHYSICIAN: Jase Khan,         History of Present Illness patient is a 64-year-old female with a large ER OR negative HER-2 positive breast cancer surgically excised here to receive adjuvant treatment.  Initially because of the large size and comorbidities we were thinking of weekly Taxol Herceptin perjeta followed by Adriamycin Cytoxan however she had cardiac evaluation with echo with a borderline ejection fraction and had a stress test which showed no ischemia but again the ejection fraction of 50 to 52% and based on her shortness of breath with exertion and symptomatology I think we may be better off just treating her with weekly Taxol Herceptin perjeta followed by a year of Herceptin perjeta because I think the anthracycline may contribute to cardiotoxicity in a patient with borderline cardiac function    She herself is finally agreeable to chemotherapy and is due to have her port placed next week and start treatment provided the cardiologist give the final okay and we are waiting for them to do that    She does complain of fatigue and exertional dyspnea and we checked her O2 sats while walking and they were totally normal.   states she sits on the couch most of the time and does not do a lot of work around the house and I encouraged her to exercise more and walk and make sure that she is active      Past Medical History:   Diagnosis Date   • Adjustment disorder    • Allergic rhinitis    • Amenorrhea    • Anxiety    • Breast cancer (CMS/[facility]) 04/18/2019    Left breast mammary carcinoma   • Bruit    • Carpal tunnel syndrome    • Carrier of tuberculosis    • Chronic pain    • De  Quervain's tenosynovitis    • Degenerative cervical disc    • Depression    • Dyslipidemia    • Eustachian tube dysfunction    • History of UTI    • Hyperlipidemia    • Hypertension    • Impaired fasting glucose    • Lumbar degenerative disc disease    • Numbness and tingling     LEFT LEG   • OAB (overactive bladder)    • Scapulothoracic syndrome    • Sciatica         Past Surgical History:   Procedure Laterality Date   • APPENDECTOMY N/A    • BREAST BIOPSY Left 2019    Left breast ultrasound guided cyst aspiration, 9:00 position-Dr. Gerry Taylor, [facility]   • BREAST LUMPECTOMY Left 5/2/2019    Procedure: Left BREAST LUMPECTOMY;  Surgeon: Jase Evans MD;  Location: [facility];  Service: General   • LUMBAR EPIDURAL INJECTION N/A    • MASTECTOMY Left 6/21/2019    Procedure: Left BREAST MASTECTOMY;  Surgeon: Jase Evans MD;  Location: [facility];  Service: General   • TUBAL ABDOMINAL LIGATION Bilateral    • VAGINAL DELIVERY      x3      ONC HISTORY;  patient is a 64-year-old retired  with hypertension hypercholesterolemia and degenerative arthritis who felt a mass in her left breast in March.  She showed it to her family doctor and underwent imaging at [facility] on 3/13/2019 which Showed a 3 x 3.2 cm mass at 9:00 which on ultrasound showed a thick-walled benign-appearing 2.8 x 2.5 cm cyst which was felt to not be suspicious .because of persistence of symptoms she was reevaluated on 4/16/2019 and underwent aspiration and core biopsy because there was a significant soft tissue component to the mass at that time this was aspirated and the final report  showed fine-needle aspiration suspicious for malignant cells favor mammary carcinoma  Patient was referred to Dr. Kee who took her for an excisional biopsy on 5/2/2019 with the final report showing a 4 mass grade 3x3.6cm with tumor extending to one margin and DCIS also 0.2 mm from one margin.  The tumor was ER UT negative HER-2 3+.  The  patient was then taken for surgery on 2019 at which time she had a completion mastectomy and axillary node biopsy with the findings of residual high-grade DCIS with clear margins and an incidental intraductal papilloma and 17- lymph nodes making this a T2N0 tumor    She has done well postoperatively and is here to discuss adjuvant treatment    She is  3 para 3 menarche  at age 15 and menopause in her early 40s when she had a hysterectomy for heavy menstrual bleeding.  She took hormone replacement for 10 years and stopped 10 years ago first childbirth was at age 25 she did not breast-feed  Family history is positive for mother who had uterine cancer at age 60 and  of it at age 65 she has a brother who  of liver cancer related to drinking there is no other malignancy in the family that she is aware of    She is a significant smoker for the last 48 years but does not drink     Patient and her  were very taken to back by the suggestion about chemotherapy and apparently had thought that there was no further treatment needed and I spent almost an hour presenting the data concerning the extreme effectiveness of HER-2 directed therapy and this situation with a high risk of recurrence without adjuvant treatment and she finally was convinced to do the treatment    We discussed smoking cessation but at this point she is so nervous and agitated with the prospect of chemotherapy that we will hold off on this until she is skilled nursing through the chemotherapy and rediscuss it    I discussed the case also with Dr. Kee will place a port and we will plan to start chemotherapy in 2 weeks            Current Outpatient Medications on File Prior to Visit   Medication Sig Dispense Refill   • ALPRAZolam (XANAX) 0.25 MG tablet Take 2 tablets by mouth 2 (Two) Times a Day As Needed for Anxiety for up to 60 doses. 60 tablet 0   • buPROPion XL (WELLBUTRIN XL) 150 MG 24 hr tablet Take 1 tablet by mouth Daily. 30 tablet  3   • busPIRone (BUSPAR) 10 MG tablet Take 10 mg by mouth 2 (Two) Times a Day.     • citalopram (CeleXA) 40 MG tablet Take 1 tablet by mouth Daily. 30 tablet 5   • metoprolol succinate XL (TOPROL-XL) 25 MG 24 hr tablet TAKE 1 TABLET BY MOUTH ONE TIME A DAY  30 tablet 1   • montelukast (SINGULAIR) 10 MG tablet Take 1 tablet by mouth Every Night. 30 tablet 11   • vitamin B-12 (CYANOCOBALAMIN) 100 MCG tablet Take 100 mcg by mouth Daily.     • Multiple Vitamins-Minerals (CENTRUM SILVER PO) Take 1 tablet by mouth Daily.     • ondansetron ODT (ZOFRAN-ODT) 8 MG disintegrating tablet Take 1 tablet by mouth Every 8 (Eight) Hours As Needed for Nausea or Vomiting. 30 tablet 2   • [DISCONTINUED] metoprolol succinate XL (TOPROL-XL) 25 MG 24 hr tablet Take 25 mg by mouth Daily.       No current facility-administered medications on file prior to visit.         ALLERGIES:    Allergies   Allergen Reactions   • Effexor [Venlafaxine] Itching   • Gabapentin Hallucinations   • Ibuprofen Other (See Comments)     Burns while urinating  Can take low dose Ibuprofen   • Naproxen Itching     Pt reports severe vaginal itching    • Zyrtec [Cetirizine] Other (See Comments)     Non-effecious   • Penicillins Rash   • Sulfa Antibiotics Rash        Social History     Socioeconomic History   • Marital status:      Spouse name: Pawan   • Number of children: 3   • Years of education: High school   • Highest education level: Not on file   Occupational History     Employer: RETIRED   Tobacco Use   • Smoking status: Current Every Day Smoker     Packs/day: 0.25     Years: 48.00     Pack years: 12.00     Types: Cigarettes   • Smokeless tobacco: Never Used   • Tobacco comment: CURRENTLY TAKING CHANTIX, TRYING TO QUIT   Substance and Sexual Activity   • Alcohol use: No   • Drug use: No   • Sexual activity: Yes     Partners: Male     Birth control/protection: Post-menopausal        Family History   Problem Relation Age of Onset   • Ovarian cancer Mother   "  • Endometrial cancer Mother    • COPD Father    • Stroke Brother    • Malig Hyperthermia Neg Hx         Review of Systems   Constitutional: Positive for diaphoresis (worse 8/22/19).   Respiratory: Positive for cough (better since cutting back on smoking 8/22/19) and chest tightness (left side breast 8/22/19).    Endocrine: Positive for cold intolerance (can not get feet warm 8/22/19) and heat intolerance (worse 8/22/19).   Musculoskeletal: Positive for back pain (lower back left side 8/22/19).   Neurological: Positive for numbness (same 8/22/19 left underarm form surgery  left foot 8/22/19).        Objective     Vitals:    08/22/19 0831   BP: 173/68   Pulse: 55   Resp: 16   Temp: 98.1 °F (36.7 °C)   SpO2: 95%   Weight: 98 kg (216 lb 1.6 oz)   Height: 175.3 cm (69.02\")   PainSc:   4   PainLoc: Back  Comment: lower left back     Current Status 8/22/2019   ECOG score 0       Physical Exam    GENERAL:  Well-developed, well-nourished in no acute distress.   SKIN:  Warm, dry without rashes, purpura or petechiae.  EYES:  Pupils equal, round and reactive to light.  EOMs intact.  Conjunctivae normal.  EARS:  Hearing intact.  NOSE:  Septum midline.  No excoriations or nasal discharge.  MOUTH:  Tongue is well-papillated; no stomatitis or ulcers.  Lips normal.  THROAT:  Oropharynx without lesions or exudates.  NECK:  Supple with good range of motion; no thyromegaly or masses, no JVD.  LYMPHATICS:  No cervical, supraclavicular, axillary or inguinal adenopathy.  CHEST:  Lungs clear to auscultation. Good airflow.  BREASTS: Right breast is benign the left chest wall shows a well-healed mastectomy scar  CARDIAC:  Regular rate and rhythm without murmurs, rubs or gallops. Normal S1,S2.  ABDOMEN:  Soft, nontender with no hepatosplenomegaly or masses.  EXTREMITIES:  No clubbing, cyanosis or edema.  NEUROLOGICAL:  Cranial Nerves II-XII grossly intact.  No focal neurological deficits.  PSYCHIATRIC:  Normal affect and mood.        RECENT " LABS:  Hematology WBC   Date Value Ref Range Status   08/22/2019 4.85 3.40 - 10.80 10*3/mm3 Final     RBC   Date Value Ref Range Status   08/22/2019 4.37 3.77 - 5.28 10*6/mm3 Final     Hemoglobin   Date Value Ref Range Status   08/22/2019 13.3 12.0 - 15.9 g/dL Final     Hematocrit   Date Value Ref Range Status   08/22/2019 39.7 34.0 - 46.6 % Final     Platelets   Date Value Ref Range Status   08/22/2019 216 140 - 450 10*3/mm3 Final          US Breat Left Limited    1. Left Breast Lumpectomy (47 Grams):  A. Invasive poorly differentiated mammary carcinoma of no special type (ductal carcinoma)  with apocrine features.  1. Invasive carcinoma measures up to 40 x 36 x 27 mm (measured grossly).  2. Overall Syl Grade III (glandular score = 3, nuclear score = 3, mitotic score = 3).  3. No definitive lymphovascular space invasion identified.  B. Invasive carcinoma focally extends to one undesignated peripheral inked margin of excision.  C. Associated ductal carcinoma in situ (DCIS).  1. Ductal carcinoma in situ (DCIS) spans area measuring 40 mm in single greatest  aggregate dimension (estimated from gross and glass slides).  2. DCIS predominantly solid and comedo type with high grade nuclear features.  3. High grade DCIS approximates the closest peripheral inked margin to 0.2 mm.  Page: 1 of 5 Printed: 5/6/2019 1:10 PM  [phone]  Resulting  Tissue Pathology Exam: [id number]   Order: [id number]   Collected:  5/2/2019 12:26 Status:  Edited Result - FINAL   Visible to patient:  No (Not Released) Dx:  Malignant neoplasm of upper-inner hector...   Component    Addendum   HER2 by Immunohistochemistry   Positive (Score 3+)     HER2 by Immunohistochemistry  FDA approved (specify test/vendor): Leica     Primary Antibody  [id number]     Cold Ischemia and Fixation Times   Meet requirements specified in latest version of the ASCO/CAP guidelines         Cold Ischemia Time: 18 minutes  Fixation Time: 8.25 hours  Testing Performed on  Block Number(s): 1E  Fixative: Formalin   Addendum electronically signed by Harman Perry MD on 5/6/2019          Final Diagnosis  1. Left Breast, Modified Radical Mastectomy (935 grams): HIGH GRADE DUCTAL CARCINOMA IN-SITU  (DCIS) .  A. Nuclear Grade: High.  B. Architectural Type: Solid and comedo with lobular extension.  1. Size (extent) of DCIS: DCIS multifocal in the lower inner quadrant, measuring 0.9 cm in maximal  dimension (DCIS present in 3/18 tissue blocks).  C. No LCIS identified.  D. Skin and nipple uninvolved by DCIS.  E. Margins: Uninvolved by DCIS.  1. DCIS comes to within 1.3 cm of the anterior margin of excision (closest margin).  F. Additional findings: Incidental intraductal papilloma, florid ductal hyperplasia and apocrine cysts.  G. Lymph nodes: Sixteen benign lymph nodes (0/16).  H. Hormone receptor status: ER and MS negative, Her2/kali positive by IHC (performed on prior resection  [id number]).  I. Pathologic stage: pT2, N0.  [facility]/s      Regadenoson Stress Test With Myocardial Perfusion SPECT   Interpretation Summary     · Myocardial perfusion imaging indicates a normal myocardial perfusion study with no evidence of ischemia.  · Left ventricular ejection fraction is borderline normal (Calculated EF = 50%).  · Impressions are consistent with a low risk study.      Study Description     Nuclear Study Description A 1-day rest/stress protocol myocardial perfusion imaging study was performed. A 22 G peripheral IV was started in the right antecubital fossa. While at rest, the patient was injected intravenously with 11.4 mCi of technetium tetrofosmin at 10:55 EDT. Rest imaging was performed approximately 30 minutes after the injection. 0.4 mg / 5 mL of regadenoson given intravenously over approximately 10 seconds. While at peak stress, the patient was injected intravenously with 31.7 mCi of technetium tetrofosmin at 11:45 EDT. Stress imaging was performed approximately 30 minutes after the  injection. The total amount of radiation received in the study is about 11.42 mSv.No prior studies were available for comparison   Nuclear Perfusion Images Overall image quality is excellent. There are no artifacts present. Raw images reviewed with no abnormalities noted.             Assessment/Plan   1.  T2N0 grade 3 infiltrating ductal carcinoma left breast ER GA negative HER-2 positive post mastectomy and axillary node dissection with clear margins    2.  Tobacco abuse and COPD    3.  Abnormal echo with stress test normal but borderline cardiac function at 52%  We will avoid Adriamycin Cytoxan  and treat with Taxol Herceptin perjeta and a year of Herceptin perjeta    Plan  1.   Port placement  2.. Return next week for NP visit to start weekly Taxol Herceptin and every 3-week perjeta  3.  Echocardiogram will be repeated every 6 weeks initially  4, see me in 3 weeks    Overall she is more symptomatic than I would expect at her age from her cardiopulmonary issues and we will have to watch her closely for deterioration in cardiac function and I am not sure we will be able to get the Adriamycin and Cytoxan in safely

## 2019-08-21 NOTE — TELEPHONE ENCOUNTER
----- Message from Estebandiana Mclean sent at 8/21/2019  2:16 PM EDT -----  Contact: [phone]  Has not had port placement yet. Has chemo apt tomorrow  Stated Dr Dudley told her to cancel port placement until get results of stress test and to make an appointment with Dr Jhaveri. Has an appointment with Dr Jhaveri at 0800 am in morning. She did not realize this. Will be here tomorrow.

## 2019-08-21 NOTE — TELEPHONE ENCOUNTER
I called patient listed number and got voicemail.  Left message to call back.  Please let her know that her stress test did not show any evidence of threatening heart attacks.  The strength of the heart is on the lower end of normal but the echocardiogram which looks at this better was normal.  Okay to proceed with chemotherapy. if it is felt to be warranted.  jodi

## 2019-08-22 ENCOUNTER — INFUSION (OUTPATIENT)
Dept: LAB | Facility: HOSPITAL | Age: 64
End: 2019-08-22

## 2019-08-22 ENCOUNTER — PREP FOR SURGERY (OUTPATIENT)
Dept: OTHER | Facility: HOSPITAL | Age: 64
End: 2019-08-22

## 2019-08-22 ENCOUNTER — OFFICE VISIT (OUTPATIENT)
Dept: ONCOLOGY | Facility: CLINIC | Age: 64
End: 2019-08-22

## 2019-08-22 ENCOUNTER — APPOINTMENT (OUTPATIENT)
Dept: ONCOLOGY | Facility: HOSPITAL | Age: 64
End: 2019-08-22

## 2019-08-22 ENCOUNTER — TELEPHONE (OUTPATIENT)
Dept: CARDIOLOGY | Facility: CLINIC | Age: 64
End: 2019-08-22

## 2019-08-22 VITALS
HEART RATE: 55 BPM | OXYGEN SATURATION: 95 % | TEMPERATURE: 98.1 F | WEIGHT: 216.1 LBS | SYSTOLIC BLOOD PRESSURE: 173 MMHG | HEIGHT: 69 IN | DIASTOLIC BLOOD PRESSURE: 68 MMHG | RESPIRATION RATE: 16 BRPM | BODY MASS INDEX: 32.01 KG/M2

## 2019-08-22 DIAGNOSIS — C50.212 MALIGNANT NEOPLASM OF UPPER-INNER QUADRANT OF LEFT BREAST IN FEMALE, ESTROGEN RECEPTOR NEGATIVE (HCC): ICD-10-CM

## 2019-08-22 DIAGNOSIS — Z17.1 MALIGNANT NEOPLASM OF UPPER-INNER QUADRANT OF LEFT BREAST IN FEMALE, ESTROGEN RECEPTOR NEGATIVE (HCC): Primary | ICD-10-CM

## 2019-08-22 DIAGNOSIS — C91.10 CLL (CHRONIC LYMPHOCYTIC LEUKEMIA) (HCC): Primary | ICD-10-CM

## 2019-08-22 DIAGNOSIS — Z17.1 MALIGNANT NEOPLASM OF UPPER-INNER QUADRANT OF LEFT BREAST IN FEMALE, ESTROGEN RECEPTOR NEGATIVE (HCC): ICD-10-CM

## 2019-08-22 DIAGNOSIS — C50.212 MALIGNANT NEOPLASM OF UPPER-INNER QUADRANT OF LEFT BREAST IN FEMALE, ESTROGEN RECEPTOR NEGATIVE (HCC): Primary | ICD-10-CM

## 2019-08-22 LAB
ALBUMIN SERPL-MCNC: 4.2 G/DL (ref 3.5–5.2)
ALBUMIN/GLOB SERPL: 1.6 G/DL (ref 1.1–2.4)
ALP SERPL-CCNC: 123 U/L (ref 38–116)
ALT SERPL W P-5'-P-CCNC: 11 U/L (ref 0–33)
ANION GAP SERPL CALCULATED.3IONS-SCNC: 10.3 MMOL/L (ref 5–15)
AST SERPL-CCNC: 16 U/L (ref 0–32)
BASOPHILS # BLD AUTO: 0.06 10*3/MM3 (ref 0–0.2)
BASOPHILS NFR BLD AUTO: 1.2 % (ref 0–1.5)
BILIRUB SERPL-MCNC: 0.4 MG/DL (ref 0.2–1.2)
BUN BLD-MCNC: 11 MG/DL (ref 6–20)
BUN/CREAT SERPL: 13.3 (ref 7.3–30)
CALCIUM SPEC-SCNC: 9.3 MG/DL (ref 8.5–10.2)
CHLORIDE SERPL-SCNC: 105 MMOL/L (ref 98–107)
CO2 SERPL-SCNC: 25.7 MMOL/L (ref 22–29)
CREAT BLD-MCNC: 0.83 MG/DL (ref 0.6–1.1)
DEPRECATED RDW RBC AUTO: 41.3 FL (ref 37–54)
EOSINOPHIL # BLD AUTO: 0.29 10*3/MM3 (ref 0–0.4)
EOSINOPHIL NFR BLD AUTO: 6 % (ref 0.3–6.2)
ERYTHROCYTE [DISTWIDTH] IN BLOOD BY AUTOMATED COUNT: 12.5 % (ref 12.3–15.4)
GFR SERPL CREATININE-BSD FRML MDRD: 69 ML/MIN/1.73
GLOBULIN UR ELPH-MCNC: 2.7 GM/DL (ref 1.8–3.5)
GLUCOSE BLD-MCNC: 83 MG/DL (ref 74–124)
HCT VFR BLD AUTO: 39.7 % (ref 34–46.6)
HGB BLD-MCNC: 13.3 G/DL (ref 12–15.9)
IMM GRANULOCYTES # BLD AUTO: 0.01 10*3/MM3 (ref 0–0.05)
IMM GRANULOCYTES NFR BLD AUTO: 0.2 % (ref 0–0.5)
LYMPHOCYTES # BLD AUTO: 1.98 10*3/MM3 (ref 0.7–3.1)
LYMPHOCYTES NFR BLD AUTO: 40.8 % (ref 19.6–45.3)
MCH RBC QN AUTO: 30.4 PG (ref 26.6–33)
MCHC RBC AUTO-ENTMCNC: 33.5 G/DL (ref 31.5–35.7)
MCV RBC AUTO: 90.8 FL (ref 79–97)
MONOCYTES # BLD AUTO: 0.46 10*3/MM3 (ref 0.1–0.9)
MONOCYTES NFR BLD AUTO: 9.5 % (ref 5–12)
NEUTROPHILS # BLD AUTO: 2.05 10*3/MM3 (ref 1.7–7)
NEUTROPHILS NFR BLD AUTO: 42.3 % (ref 42.7–76)
NRBC BLD AUTO-RTO: 0 /100 WBC (ref 0–0.2)
PLATELET # BLD AUTO: 216 10*3/MM3 (ref 140–450)
PMV BLD AUTO: 9.3 FL (ref 6–12)
POTASSIUM BLD-SCNC: 4 MMOL/L (ref 3.5–4.7)
PROT SERPL-MCNC: 6.9 G/DL (ref 6.3–8)
RBC # BLD AUTO: 4.37 10*6/MM3 (ref 3.77–5.28)
SODIUM BLD-SCNC: 141 MMOL/L (ref 134–145)
WBC NRBC COR # BLD: 4.85 10*3/MM3 (ref 3.4–10.8)

## 2019-08-22 PROCEDURE — 80053 COMPREHEN METABOLIC PANEL: CPT

## 2019-08-22 PROCEDURE — 85025 COMPLETE CBC W/AUTO DIFF WBC: CPT

## 2019-08-22 PROCEDURE — 99215 OFFICE O/P EST HI 40 MIN: CPT | Performed by: INTERNAL MEDICINE

## 2019-08-22 RX ORDER — SODIUM CHLORIDE 0.9 % (FLUSH) 0.9 %
3 SYRINGE (ML) INJECTION EVERY 12 HOURS SCHEDULED
Status: CANCELLED | OUTPATIENT
Start: 2019-08-27

## 2019-08-22 RX ORDER — CLINDAMYCIN PHOSPHATE 600 MG/50ML
600 INJECTION INTRAVENOUS ONCE
Status: CANCELLED | OUTPATIENT
Start: 2019-08-27 | End: 2019-08-22

## 2019-08-22 RX ORDER — SODIUM CHLORIDE 0.9 % (FLUSH) 0.9 %
3-10 SYRINGE (ML) INJECTION AS NEEDED
Status: CANCELLED | OUTPATIENT
Start: 2019-08-27

## 2019-08-22 NOTE — TELEPHONE ENCOUNTER
Called and left VM. Will go over results when pt calls us back.    Chantal Wood, RN  Triage RN [email or web address]

## 2019-08-22 NOTE — TELEPHONE ENCOUNTER
I talked to Dr. Jhaveri and ang to proceed with Herceptin and Perjeta chemotherapy with plans for repeat echocardiogram in 6 weeks.  Patient was also to have sleep apnea testing.    Please check to see where she stands with a sleep apnea testing and arrange a follow-up appointment with me in 8 weeks. jodi

## 2019-08-23 NOTE — TELEPHONE ENCOUNTER
Notified patient of results. Patient verbalized understanding.    Chantal Wood, RN  Triage RN [facility]

## 2019-08-27 ENCOUNTER — ANESTHESIA EVENT (OUTPATIENT)
Dept: PERIOP | Facility: HOSPITAL | Age: 64
End: 2019-08-27

## 2019-08-27 ENCOUNTER — HOSPITAL ENCOUNTER (OUTPATIENT)
Facility: HOSPITAL | Age: 64
Setting detail: HOSPITAL OUTPATIENT SURGERY
Discharge: HOME | End: 2019-08-27
Attending: SURGERY | Admitting: SURGERY

## 2019-08-27 ENCOUNTER — APPOINTMENT (OUTPATIENT)
Dept: GENERAL RADIOLOGY | Facility: HOSPITAL | Age: 64
End: 2019-08-27

## 2019-08-27 ENCOUNTER — ANESTHESIA (OUTPATIENT)
Dept: PERIOP | Facility: HOSPITAL | Age: 64
End: 2019-08-27

## 2019-08-27 VITALS
SYSTOLIC BLOOD PRESSURE: 180 MMHG | BODY MASS INDEX: 31.01 KG/M2 | WEIGHT: 216.6 LBS | DIASTOLIC BLOOD PRESSURE: 88 MMHG | RESPIRATION RATE: 16 BRPM | HEIGHT: 70 IN | HEART RATE: 60 BPM | TEMPERATURE: 97.8 F | OXYGEN SATURATION: 97 %

## 2019-08-27 DIAGNOSIS — C50.212 MALIGNANT NEOPLASM OF UPPER-INNER QUADRANT OF LEFT BREAST IN FEMALE, ESTROGEN RECEPTOR NEGATIVE (HCC): ICD-10-CM

## 2019-08-27 DIAGNOSIS — Z17.1 MALIGNANT NEOPLASM OF UPPER-INNER QUADRANT OF LEFT BREAST IN FEMALE, ESTROGEN RECEPTOR NEGATIVE (HCC): ICD-10-CM

## 2019-08-27 PROCEDURE — 25010000002 HEPARIN (PORCINE) PER 1000 UNITS: Performed by: SURGERY

## 2019-08-27 PROCEDURE — 25010000002 PROPOFOL 10 MG/ML EMULSION: Performed by: NURSE ANESTHETIST, CERTIFIED REGISTERED

## 2019-08-27 PROCEDURE — 77001 FLUOROGUIDE FOR VEIN DEVICE: CPT | Performed by: SURGERY

## 2019-08-27 PROCEDURE — 76937 US GUIDE VASCULAR ACCESS: CPT | Performed by: SURGERY

## 2019-08-27 PROCEDURE — 76000 FLUOROSCOPY <1 HR PHYS/QHP: CPT

## 2019-08-27 PROCEDURE — 25010000002 MIDAZOLAM PER 1 MG: Performed by: ANESTHESIOLOGY

## 2019-08-27 PROCEDURE — 36561 INSERT TUNNELED CV CATH: CPT | Performed by: SURGERY

## 2019-08-27 PROCEDURE — C1788 PORT, INDWELLING, IMP: HCPCS | Performed by: SURGERY

## 2019-08-27 DEVICE — POWERPORT CLEARVUE IMPLANTABLE PORT WITH ATTACHABLE 8F POLYURETHANE OPEN-ENDED SINGLE-LUMEN VENOUS CATHETER INTERMEDIATE KIT
Type: IMPLANTABLE DEVICE | Site: CHEST | Status: FUNCTIONAL
Brand: POWERPORT CLEARVUE

## 2019-08-27 RX ORDER — ONDANSETRON 2 MG/ML
4 INJECTION INTRAMUSCULAR; INTRAVENOUS ONCE AS NEEDED
Status: DISCONTINUED | OUTPATIENT
Start: 2019-08-27 | End: 2019-08-27 | Stop reason: HOSPADM

## 2019-08-27 RX ORDER — SODIUM CHLORIDE 0.9 % (FLUSH) 0.9 %
3 SYRINGE (ML) INJECTION EVERY 12 HOURS SCHEDULED
Status: DISCONTINUED | OUTPATIENT
Start: 2019-08-27 | End: 2019-08-27 | Stop reason: HOSPADM

## 2019-08-27 RX ORDER — EPHEDRINE SULFATE 50 MG/ML
5 INJECTION, SOLUTION INTRAVENOUS ONCE AS NEEDED
Status: DISCONTINUED | OUTPATIENT
Start: 2019-08-27 | End: 2019-08-27 | Stop reason: HOSPADM

## 2019-08-27 RX ORDER — METOPROLOL SUCCINATE 25 MG/1
25 TABLET, EXTENDED RELEASE ORAL DAILY
COMMUNITY
End: 2020-02-04

## 2019-08-27 RX ORDER — HYDROCODONE BITARTRATE AND ACETAMINOPHEN 7.5; 325 MG/1; MG/1
1 TABLET ORAL ONCE AS NEEDED
Status: COMPLETED | OUTPATIENT
Start: 2019-08-27 | End: 2019-08-27

## 2019-08-27 RX ORDER — SODIUM CHLORIDE 0.9 % (FLUSH) 0.9 %
3-10 SYRINGE (ML) INJECTION AS NEEDED
Status: DISCONTINUED | OUTPATIENT
Start: 2019-08-27 | End: 2019-08-27 | Stop reason: HOSPADM

## 2019-08-27 RX ORDER — PROMETHAZINE HYDROCHLORIDE 25 MG/1
25 TABLET ORAL ONCE AS NEEDED
Status: DISCONTINUED | OUTPATIENT
Start: 2019-08-27 | End: 2019-08-27 | Stop reason: HOSPADM

## 2019-08-27 RX ORDER — OXYCODONE AND ACETAMINOPHEN 7.5; 325 MG/1; MG/1
1 TABLET ORAL ONCE AS NEEDED
Status: DISCONTINUED | OUTPATIENT
Start: 2019-08-27 | End: 2019-08-27 | Stop reason: HOSPADM

## 2019-08-27 RX ORDER — NALOXONE HCL 0.4 MG/ML
0.2 VIAL (ML) INJECTION AS NEEDED
Status: DISCONTINUED | OUTPATIENT
Start: 2019-08-27 | End: 2019-08-27 | Stop reason: HOSPADM

## 2019-08-27 RX ORDER — FLUMAZENIL 0.1 MG/ML
0.2 INJECTION INTRAVENOUS AS NEEDED
Status: DISCONTINUED | OUTPATIENT
Start: 2019-08-27 | End: 2019-08-27 | Stop reason: HOSPADM

## 2019-08-27 RX ORDER — HYDROCODONE BITARTRATE AND ACETAMINOPHEN 5; 325 MG/1; MG/1
1 TABLET ORAL EVERY 6 HOURS PRN
Qty: 20 TABLET | Refills: 0 | Status: SHIPPED | OUTPATIENT
Start: 2019-08-27 | End: 2019-09-05 | Stop reason: SDUPTHER

## 2019-08-27 RX ORDER — PROMETHAZINE HYDROCHLORIDE 25 MG/ML
6.25 INJECTION, SOLUTION INTRAMUSCULAR; INTRAVENOUS
Status: DISCONTINUED | OUTPATIENT
Start: 2019-08-27 | End: 2019-08-27 | Stop reason: HOSPADM

## 2019-08-27 RX ORDER — DIPHENHYDRAMINE HYDROCHLORIDE 50 MG/ML
12.5 INJECTION INTRAMUSCULAR; INTRAVENOUS
Status: DISCONTINUED | OUTPATIENT
Start: 2019-08-27 | End: 2019-08-27 | Stop reason: HOSPADM

## 2019-08-27 RX ORDER — HYDROMORPHONE HYDROCHLORIDE 1 MG/ML
0.5 INJECTION, SOLUTION INTRAMUSCULAR; INTRAVENOUS; SUBCUTANEOUS
Status: DISCONTINUED | OUTPATIENT
Start: 2019-08-27 | End: 2019-08-27 | Stop reason: HOSPADM

## 2019-08-27 RX ORDER — FENTANYL CITRATE 50 UG/ML
50 INJECTION, SOLUTION INTRAMUSCULAR; INTRAVENOUS
Status: DISCONTINUED | OUTPATIENT
Start: 2019-08-27 | End: 2019-08-27 | Stop reason: HOSPADM

## 2019-08-27 RX ORDER — PROMETHAZINE HYDROCHLORIDE 25 MG/ML
12.5 INJECTION, SOLUTION INTRAMUSCULAR; INTRAVENOUS ONCE AS NEEDED
Status: DISCONTINUED | OUTPATIENT
Start: 2019-08-27 | End: 2019-08-27 | Stop reason: HOSPADM

## 2019-08-27 RX ORDER — PROPOFOL 10 MG/ML
VIAL (ML) INTRAVENOUS CONTINUOUS PRN
Status: DISCONTINUED | OUTPATIENT
Start: 2019-08-27 | End: 2019-08-27 | Stop reason: SURG

## 2019-08-27 RX ORDER — SODIUM CHLORIDE, SODIUM LACTATE, POTASSIUM CHLORIDE, CALCIUM CHLORIDE 600; 310; 30; 20 MG/100ML; MG/100ML; MG/100ML; MG/100ML
9 INJECTION, SOLUTION INTRAVENOUS CONTINUOUS
Status: DISCONTINUED | OUTPATIENT
Start: 2019-08-27 | End: 2019-08-27 | Stop reason: HOSPADM

## 2019-08-27 RX ORDER — SODIUM CHLORIDE, SODIUM LACTATE, POTASSIUM CHLORIDE, CALCIUM CHLORIDE 600; 310; 30; 20 MG/100ML; MG/100ML; MG/100ML; MG/100ML
INJECTION, SOLUTION INTRAVENOUS CONTINUOUS PRN
Status: DISCONTINUED | OUTPATIENT
Start: 2019-08-27 | End: 2019-08-27 | Stop reason: SURG

## 2019-08-27 RX ORDER — FAMOTIDINE 10 MG/ML
20 INJECTION, SOLUTION INTRAVENOUS ONCE
Status: COMPLETED | OUTPATIENT
Start: 2019-08-27 | End: 2019-08-27

## 2019-08-27 RX ORDER — ACETAMINOPHEN 325 MG/1
650 TABLET ORAL ONCE AS NEEDED
Status: DISCONTINUED | OUTPATIENT
Start: 2019-08-27 | End: 2019-08-27 | Stop reason: HOSPADM

## 2019-08-27 RX ORDER — LIDOCAINE HYDROCHLORIDE 10 MG/ML
0.5 INJECTION, SOLUTION EPIDURAL; INFILTRATION; INTRACAUDAL; PERINEURAL ONCE AS NEEDED
Status: DISCONTINUED | OUTPATIENT
Start: 2019-08-27 | End: 2019-08-27 | Stop reason: HOSPADM

## 2019-08-27 RX ORDER — MIDAZOLAM HYDROCHLORIDE 1 MG/ML
2 INJECTION INTRAMUSCULAR; INTRAVENOUS
Status: DISCONTINUED | OUTPATIENT
Start: 2019-08-27 | End: 2019-08-27 | Stop reason: HOSPADM

## 2019-08-27 RX ORDER — HYDRALAZINE HYDROCHLORIDE 20 MG/ML
5 INJECTION INTRAMUSCULAR; INTRAVENOUS
Status: DISCONTINUED | OUTPATIENT
Start: 2019-08-27 | End: 2019-08-27 | Stop reason: HOSPADM

## 2019-08-27 RX ORDER — LABETALOL HYDROCHLORIDE 5 MG/ML
5 INJECTION, SOLUTION INTRAVENOUS
Status: DISCONTINUED | OUTPATIENT
Start: 2019-08-27 | End: 2019-08-27 | Stop reason: HOSPADM

## 2019-08-27 RX ORDER — CLINDAMYCIN PHOSPHATE 600 MG/50ML
600 INJECTION INTRAVENOUS ONCE
Status: COMPLETED | OUTPATIENT
Start: 2019-08-27 | End: 2019-08-27

## 2019-08-27 RX ORDER — LIDOCAINE HYDROCHLORIDE 20 MG/ML
INJECTION, SOLUTION INFILTRATION; PERINEURAL AS NEEDED
Status: DISCONTINUED | OUTPATIENT
Start: 2019-08-27 | End: 2019-08-27 | Stop reason: SURG

## 2019-08-27 RX ORDER — PROMETHAZINE HYDROCHLORIDE 25 MG/1
25 SUPPOSITORY RECTAL ONCE AS NEEDED
Status: DISCONTINUED | OUTPATIENT
Start: 2019-08-27 | End: 2019-08-27 | Stop reason: HOSPADM

## 2019-08-27 RX ORDER — MIDAZOLAM HYDROCHLORIDE 1 MG/ML
1 INJECTION INTRAMUSCULAR; INTRAVENOUS
Status: DISCONTINUED | OUTPATIENT
Start: 2019-08-27 | End: 2019-08-27 | Stop reason: HOSPADM

## 2019-08-27 RX ORDER — METOPROLOL SUCCINATE 25 MG/1
25 TABLET, EXTENDED RELEASE ORAL
Status: COMPLETED | OUTPATIENT
Start: 2019-08-27 | End: 2019-08-27

## 2019-08-27 RX ORDER — DIPHENHYDRAMINE HCL 25 MG
25 CAPSULE ORAL
Status: DISCONTINUED | OUTPATIENT
Start: 2019-08-27 | End: 2019-08-27 | Stop reason: HOSPADM

## 2019-08-27 RX ADMIN — MIDAZOLAM 2 MG: 1 INJECTION INTRAMUSCULAR; INTRAVENOUS at 09:58

## 2019-08-27 RX ADMIN — METOPROLOL SUCCINATE 25 MG: 25 TABLET, FILM COATED, EXTENDED RELEASE ORAL at 09:00

## 2019-08-27 RX ADMIN — SODIUM CHLORIDE, POTASSIUM CHLORIDE, SODIUM LACTATE AND CALCIUM CHLORIDE 9 ML/HR: 600; 310; 30; 20 INJECTION, SOLUTION INTRAVENOUS at 09:11

## 2019-08-27 RX ADMIN — CLINDAMYCIN PHOSPHATE 600 MG: 12 INJECTION, SOLUTION INTRAVENOUS at 10:47

## 2019-08-27 RX ADMIN — FAMOTIDINE 20 MG: 10 INJECTION INTRAVENOUS at 09:11

## 2019-08-27 RX ADMIN — SODIUM CHLORIDE, POTASSIUM CHLORIDE, SODIUM LACTATE AND CALCIUM CHLORIDE: 600; 310; 30; 20 INJECTION, SOLUTION INTRAVENOUS at 10:47

## 2019-08-27 RX ADMIN — PROPOFOL 300 MCG/KG/MIN: 10 INJECTION, EMULSION INTRAVENOUS at 10:47

## 2019-08-27 RX ADMIN — MIDAZOLAM 2 MG: 1 INJECTION INTRAMUSCULAR; INTRAVENOUS at 09:11

## 2019-08-27 RX ADMIN — LIDOCAINE HYDROCHLORIDE 60 MG: 20 INJECTION, SOLUTION INFILTRATION; PERINEURAL at 10:47

## 2019-08-27 RX ADMIN — HYDROCODONE BITARTRATE AND ACETAMINOPHEN 1 TABLET: 7.5; 325 TABLET ORAL at 12:17

## 2019-08-27 NOTE — ANESTHESIA POSTPROCEDURE EVALUATION
Patient: Allie Temple    Procedure Summary     Date:  08/27/19 Room / Location:  [facility] OR 03 / [facility] MAIN OR    Anesthesia Start:  1047 Anesthesia Stop:  1136    Procedure:  INSERTION VENOUS ACCESS DEVICE (Right ) Diagnosis:       Malignant neoplasm of upper-inner quadrant of left breast in female, estrogen receptor negative (CMS/HCC)      (Malignant neoplasm of upper-inner quadrant of left breast in female, estrogen receptor negative (CMS/HCC) [C50.212, Z17.1])    Surgeon:  Jase Evans MD Provider:  Chapito Moralez MD    Anesthesia Type:  MAC ASA Status:  3          Anesthesia Type: MAC  Last vitals  BP   151/71 (08/27/19 0839)   Temp   36.6 °C (97.8 °F) (08/27/19 0839)   Pulse   (!) 48 (08/27/19 1000)   Resp   16 (08/27/19 1000)     SpO2   96 % (08/27/19 1000)     Post Anesthesia Care and Evaluation    Patient location during evaluation: PACU  Patient participation: complete - patient participated  Level of consciousness: awake and alert  Pain management: adequate  Airway patency: patent  Anesthetic complications: No anesthetic complications    Cardiovascular status: acceptable  Respiratory status: acceptable  Hydration status: acceptable    Comments: --------------------            08/27/19               1000     --------------------   BP:                  Pulse:    (!) 48     Resp:       16       Temp:                SpO2:      96%      --------------------

## 2019-08-27 NOTE — ANESTHESIA PREPROCEDURE EVALUATION
Anesthesia Evaluation     Patient summary reviewed and Nursing notes reviewed   no history of anesthetic complications:  NPO Solid Status: > 8 hours  NPO Liquid Status: > 2 hours           Airway   Mallampati: II  TM distance: >3 FB  Neck ROM: full  No difficulty expected  Dental    (+) poor dentition    Comment: Two upper and 5 lower teeth in various states of decondition      Pulmonary    (+) a smoker Current Smoked day of surgery, COPD,   Cardiovascular     ECG reviewed  Patient on routine beta blocker  Rhythm: regular  Rate: abnormal    (+) hypertension, valvular problems/murmurs TI, hyperlipidemia,       Neuro/Psych  (+) numbness, psychiatric history Depression and Anxiety,     GI/Hepatic/Renal/Endo    (+) obesity,       Musculoskeletal     Abdominal    Substance History - negative use     OB/GYN negative ob/gyn ROS         Other   (+) arthritis   history of cancer                  Anesthesia Plan    ASA 3     MAC     intravenous induction   Anesthetic plan, all risks, benefits, and alternatives have been provided, discussed and informed consent has been obtained with: patient.

## 2019-08-27 NOTE — OP NOTE
Operative Note :   Jase Evans MD    Allie Temple  1955    Procedure Date: 08/27/19    Pre-op Diagnosis:  Malignant neoplasm of upper-inner quadrant of left breast in female, estrogen receptor negative (CMS/HCC) [C50.212, Z17.1]    Post-Op Diagnosis:  Same    Procedure:   · Right internal jugular vein approach [id number] placement    Surgeon: Jase Evans MD    Assistant: None    Anesthesia:  MAC (monitored anesthetic care)    EBL:   minimal    Specimens:   None    Indications:  · 64-year-old lady with breast cancer status post mastectomy now with plans for adjuvant chemotherapy    Findings:   · None    Recommendations:   · Routine incisional and port care    Description of procedure:    After obtaining informed consent, the patient was brought to the operating room and placed under monitored anesthesia care.  Her bilateral neck and chest were sterilely prepped and draped.  She was positioned in Trendelenburg orientation.  The ultrasound was used to identify the right internal jugular vein and it easily compressed.  The area over this was anesthetized with a mixture of lidocaine and Marcaine.  The 18-gauge needle was then inserted under ultrasound guidance into the internal jugular vein without difficulty.  The wire was placed through the needle and then under fluoroscopic guidance it was directed towards the superior vena cava.  The needle was removed.  The wire was clamped into position.  A spot for the pocket was identified and anesthetized.  Skin incision was made of about 15 mm and carried through the subcutaneous tissue onto the fascia of the pectoralis.  The pocket was then bluntly created.  Hemostasis was gained.  The pocket was packed with a dry gauze.  The stick site was then enlarged with a #11 blade.  Under fluoroscopic guidance the vein dilator and sheath were passed over the wire.  The vein dilator and wire were withdrawn and replaced with the 8 Ukrainian catheter.  The catheter was  positioned around the atrial caval junction.  The peel-away sheath was removed.  The catheter was attached to the tunneler which was then tunneled through into the pocket.  The catheter was adjusted to the appropriate position.  The catheter was then cut to the appropriate length, attached to the Bard Clearvue port and then sewn onto the chest wall with 3-0 Prolene suture.  The port easily withdrew blood and was flushed.  It was then locked with the concentrated heparin solution.  The pocket was closed with 3-0 Vicryl.  Skin incisions were closed with glue.  Sterile dressings were applied.  The patient tolerated this well.    Jase Evans MD  General and Endoscopic Surgery  [facility]    [address], [address]  [place], [place], [address]  P: [phone]  F: [phone]

## 2019-08-27 NOTE — DISCHARGE INSTRUCTIONS
Discharge Instructions for Port Placement    1. Go home, rest and take it easy today.      2. You may experience some dizziness or memory loss from the anesthesia.  This may last for the next 24 hours.  Someone should plan on staying with you for the first 24 hours for your safety.    3. Do not make any important legal decisions or sign any legal papers for the next 24 hours.      4. Eat and drink lightly today.  Start off with liquids, jello, soup, crackers or other bland foods at first. You may advance your diet tomorrow as tolerated as long as you do not experience any nausea or vomiting.     5. If skin glue was used, your incision will be open to air.  No care is required.  If you have a dressing, you may remove it in 2-3 days or until your first treatment whichever comes first. If you have the little white tapes known as steri-strips, leave them alone.  They usually fall off in 1-2 weeks.  Do not worry if they come off sooner.     6. You may notice some bleeding/drainage. A little bloody drainage is normal.  Some bruising is also normal.    7. You may shower tomorrow.  No tub baths until your incisions are completely healed.    8. You have received a prescription for a narcotic pain medicine, as you may have some pain/discomfort following surgery.   You will not be totally pain free, but your pain medicine should make the pain tolerable.  Please take your pain medicine as prescribed and always take your pills with food to prevent nausea. If you are having severe pain that cannot be controlled by the pain medicine, please contact me.  If the pain is such that narcotic pain medicine is not required, you may take Tylenol or Ibuprofen as directed unless indicated otherwise.      9. No driving for 24 hours and for as long as you are taking your prescription pain medicine.      10. You should call the office at to schedule a follow-up in about 2 weeks.      11. Remember to contact me for any of the  following:    • Fever> 101 degrees  • Severe pain that cannot be controlled by taking your pain pills  • Severe nausea or vomiting   • Significant bleeding from your incision  • Drainage that has a bad smell or is yellow or green in appearance  • Any other questions or concerns

## 2019-08-28 NOTE — PROGRESS NOTES
Subjective     REASON FOR CONSULTATION:   1.Left breast cancer T2N0 3.6 cm grade 3 ER CA negative HER-2 positive infiltrating ductal carcinoma post excisional biopsy, followed by mastectomy and axillary dissection                               REQUESTING PHYSICIAN: Jase Khan DO        History of Present Illness patient is a 64-year-old female with a large ER CA negative HER-2 positive breast cancer surgically excised here to receive adjuvant treatment.  Initially because of the large size and comorbidities we were thinking of weekly Taxol Herceptin perjeta followed by Adriamycin Cytoxan however she had cardiac evaluation with echo with a borderline ejection fraction and had a stress test which showed no ischemia but again the ejection fraction of 50 to 52% and based on her shortness of breath with exertion and symptomatology, it was felt best to proceed instead with just weekly Taxol Herceptin perjeta followed by a year of Herceptin perjeta. Concern for the anthracycline possibly contributing to cardiotoxicity in a patient with borderline cardiac function.    She is followed by Dr. Dudley, cardiology, who has given clearance for the patient to proceed with chemotherapy. Planning repeat echo in 6 weeks.    She also had her Mediport placed 8/27/19.  She is having some expected discomfort from this but has hydrocodone which she took this morning.     Of note, patient's heart rate is around 44 this morning without her having even taken her Toprol yet.  We discussed holding her Toprol today and we will monitor her heart rate during treatment.  Otherwise she is ready to proceed today with cycle 1 of therapy.    Past Medical History:   Diagnosis Date   • Adjustment disorder    • Allergic rhinitis    • Amenorrhea    • Anxiety    • Breast cancer (CMS/[facility]) 04/18/2019    Left breast mammary carcinoma   • Bruit    • Carpal tunnel syndrome    • Carrier of  tuberculosis    • Chronic pain    • De Quervain's tenosynovitis    • Degenerative cervical disc    • Depression    • Dyslipidemia    • Eustachian tube dysfunction    • History of UTI    • Hyperlipidemia    • Hypertension    • Impaired fasting glucose    • Lumbar degenerative disc disease    • Numbness and tingling     LEFT LEG   • OAB (overactive bladder)    • Scapulothoracic syndrome    • Sciatica         Past Surgical History:   Procedure Laterality Date   • APPENDECTOMY N/A    • BREAST BIOPSY Left 2019    Left breast ultrasound guided cyst aspiration, 9:00 position-Dr. Gerry Taylor, [facility]   • BREAST LUMPECTOMY Left 5/2/2019    Procedure: Left BREAST LUMPECTOMY;  Surgeon: Jase Evans MD;  Location: [facility] OR;  Service: General   • LUMBAR EPIDURAL INJECTION N/A    • MASTECTOMY Left 6/21/2019    Procedure: Left BREAST MASTECTOMY;  Surgeon: Jase Evans MD;  Location: [facility] OR;  Service: General   • TUBAL ABDOMINAL LIGATION Bilateral    • VAGINAL DELIVERY      x3   • VENOUS ACCESS DEVICE (PORT) INSERTION Right 8/27/2019    Procedure: INSERTION VENOUS ACCESS DEVICE;  Surgeon: Jase Evans MD;  Location: [facility] OR;  Service: General      ONC HISTORY;  patient is a 64-year-old retired  with hypertension hypercholesterolemia and degenerative arthritis who felt a mass in her left breast in March.  She showed it to her family doctor and underwent imaging at [facility] on 3/13/2019 which Showed a 3 x 3.2 cm mass at 9:00 which on ultrasound showed a thick-walled benign-appearing 2.8 x 2.5 cm cyst which was felt to not be suspicious .because of persistence of symptoms she was reevaluated on 4/16/2019 and underwent aspiration and core biopsy because there was a significant soft tissue component to the mass at that time this was aspirated and the final report  showed fine-needle aspiration suspicious for malignant cells favor mammary carcinoma  Patient was referred to Dr. Kee who  took her for an excisional biopsy on 2019 with the final report showing a 4 mass grade 3x3.6cm with tumor extending to one margin and DCIS also 0.2 mm from one margin.  The tumor was ER AZ negative HER-2 3+.  The patient was then taken for surgery on 2019 at which time she had a completion mastectomy and axillary node biopsy with the findings of residual high-grade DCIS with clear margins and an incidental intraductal papilloma and 17- lymph nodes making this a T2N0 tumor    She has done well postoperatively and is here to discuss adjuvant treatment    She is  3 para 3 menarche  at age 15 and menopause in her early 40s when she had a hysterectomy for heavy menstrual bleeding.  She took hormone replacement for 10 years and stopped 10 years ago first childbirth was at age 25 she did not breast-feed  Family history is positive for mother who had uterine cancer at age 60 and  of it at age 65 she has a brother who  of liver cancer related to drinking there is no other malignancy in the family that she is aware of    She is a significant smoker for the last 48 years but does not drink     Patient and her  were very taken to back by the suggestion about chemotherapy and apparently had thought that there was no further treatment needed and I spent almost an hour presenting the data concerning the extreme effectiveness of HER-2 directed therapy and this situation with a high risk of recurrence without adjuvant treatment and she finally was convinced to do the treatment    We discussed smoking cessation but at this point she is so nervous and agitated with the prospect of chemotherapy that we will hold off on this until she is senior living through the chemotherapy and rediscuss it    I discussed the case also with Dr. Kee will place a port and we will plan to start chemotherapy in 2 weeks.    Patient initiating therapy with Taxol/Herceptin/Perjeta 19.                Current Outpatient  Medications on File Prior to Visit   Medication Sig Dispense Refill   • ALPRAZolam (XANAX) 0.25 MG tablet Take 2 tablets by mouth 2 (Two) Times a Day As Needed for Anxiety for up to 60 doses. 60 tablet 0   • buPROPion XL (WELLBUTRIN XL) 150 MG 24 hr tablet Take 1 tablet by mouth Daily. 30 tablet 3   • busPIRone (BUSPAR) 10 MG tablet Take 10 mg by mouth 2 (Two) Times a Day.     • citalopram (CeleXA) 40 MG tablet Take 1 tablet by mouth Daily. (Patient taking differently: Take 40 mg by mouth Every Night.) 30 tablet 5   • HYDROcodone-acetaminophen (NORCO) 5-325 MG per tablet Take 1 tablet by mouth Every 6 (Six) Hours As Needed for Moderate Pain  or Severe Pain . 20 tablet 0   • metoprolol succinate XL (TOPROL-XL) 25 MG 24 hr tablet Take 25 mg by mouth Daily.     • montelukast (SINGULAIR) 10 MG tablet Take 1 tablet by mouth Every Night. (Patient taking differently: Take 10 mg by mouth Daily.) 30 tablet 11   • Multiple Vitamins-Minerals (CENTRUM SILVER PO) Take 1 tablet by mouth Daily.     • ondansetron ODT (ZOFRAN-ODT) 8 MG disintegrating tablet Take 1 tablet by mouth Every 8 (Eight) Hours As Needed for Nausea or Vomiting. 30 tablet 2   • vitamin B-12 (CYANOCOBALAMIN) 100 MCG tablet Take 100 mcg by mouth Daily.       No current facility-administered medications on file prior to visit.         ALLERGIES:    Allergies   Allergen Reactions   • Gabapentin Hallucinations   • Effexor [Venlafaxine] Itching   • Naproxen Itching     Pt reports severe vaginal itching    • Zyrtec [Cetirizine] Other (See Comments)     Non-effecious   • Ibuprofen Other (See Comments)     Burns while urinating  Can take low dose Ibuprofen   • Penicillins Rash   • Sulfa Antibiotics Rash        Social History     Socioeconomic History   • Marital status:      Spouse name: Pawan   • Number of children: 3   • Years of education: High school   • Highest education level: Not on file   Occupational History     Employer: RETIRED   Tobacco Use   •  "Smoking status: Current Every Day Smoker     Packs/day: 0.25     Years: 48.00     Pack years: 12.00     Types: Cigarettes   • Smokeless tobacco: Never Used   • Tobacco comment: Trying to quit.    Substance and Sexual Activity   • Alcohol use: No   • Drug use: No   • Sexual activity: Yes     Partners: Male     Birth control/protection: Post-menopausal        Family History   Problem Relation Age of Onset   • Ovarian cancer Mother    • Endometrial cancer Mother    • COPD Father    • Stroke Brother    • Malig Hyperthermia Neg Hx         Review of Systems   Constitutional: Positive for diaphoresis (worse 8/22/19).   HENT: Negative.    Respiratory: Positive for cough (better since cutting back on smoking 8/22/19).    Cardiovascular: Negative for chest pain.   Gastrointestinal: Negative.    Endocrine: Positive for cold intolerance (can not get feet warm 8/22/19) and heat intolerance (worse 8/22/19).   Musculoskeletal: Positive for back pain (lower back left side 8/22/19).        Discomfort in right neck s/p Mediport placement 8/29/19   Neurological: Positive for numbness (same 8/22/19 left underarm from surgery 8/29/19).        Objective     Vitals:    08/29/19 0752   BP: 152/70   Pulse: (!) 45   Resp: 18   Temp: 98.2 °F (36.8 °C)   TempSrc: Oral   SpO2: 95%   Weight: 98 kg (216 lb)   Height: 176.5 cm (69.49\")   PainSc:   8   PainLoc: Neck     Current Status 8/29/2019   ECOG score 0       Physical Exam    GENERAL:  Well-developed, well-nourished in no acute distress.   SKIN:  Warm, dry without rashes, purpura or petechiae.  EYES:  Pupils equal, round and reactive to light.  EOMs intact.  Conjunctivae normal.  EARS:  Hearing intact.  NOSE:  Septum midline.  No excoriations or nasal discharge.  MOUTH:  Tongue is well-papillated; no stomatitis or ulcers.  Lips normal.  THROAT:  Oropharynx without lesions or exudates.  NECK:  Supple with good range of motion; no thyromegaly or masses, no JVD.  LYMPHATICS:  No cervical, " supraclavicular, axillary or inguinal adenopathy.  CHEST:  Lungs clear to auscultation. Good airflow.  BREASTS: Right breast is benign; left chest wall shows a well-healed mastectomy scar  CARDIAC:  Regular rate and rhythm without murmurs, rubs or gallops. Normal S1,S2.  ABDOMEN:  Soft, nontender with no hepatosplenomegaly or masses.  EXTREMITIES:  No clubbing, cyanosis or edema.  NEUROLOGICAL:  Cranial Nerves II-XII grossly intact.  No focal neurological deficits.  PSYCHIATRIC:  Normal affect and mood.        RECENT LABS:  Results from last 7 days   Lab Units 08/29/19  0810   WBC 10*3/mm3 5.38   NEUTROS ABS 10*3/mm3 2.65   HEMOGLOBIN g/dL 13.0   HEMATOCRIT % 38.3   PLATELETS 10*3/mm3 216     Results from last 7 days   Lab Units 08/29/19  0810   SODIUM mmol/L 140   POTASSIUM mmol/L 4.3   CHLORIDE mmol/L 103   CO2 mmol/L 27.7   BUN mg/dL 7   CREATININE mg/dL 0.77   CALCIUM mg/dL 9.3   ALBUMIN g/dL 4.30   BILIRUBIN mg/dL 0.3   ALK PHOS U/L 129*   ALT (SGPT) U/L 9   AST (SGOT) U/L 14   GLUCOSE mg/dL 90         RADIOGRAPHIC DATA:    US Breat Left Limited    1. Left Breast Lumpectomy (47 Grams):  A. Invasive poorly differentiated mammary carcinoma of no special type (ductal carcinoma)  with apocrine features.  1. Invasive carcinoma measures up to 40 x 36 x 27 mm (measured grossly).  2. Overall Moriches Grade III (glandular score = 3, nuclear score = 3, mitotic score = 3).  3. No definitive lymphovascular space invasion identified.  B. Invasive carcinoma focally extends to one undesignated peripheral inked margin of excision.  C. Associated ductal carcinoma in situ (DCIS).  1. Ductal carcinoma in situ (DCIS) spans area measuring 40 mm in single greatest  aggregate dimension (estimated from gross and glass slides).  2. DCIS predominantly solid and comedo type with high grade nuclear features.  3. High grade DCIS approximates the closest peripheral inked margin to 0.2 mm.  Page: 1 of 5 Printed: 5/6/2019 1:10  PM  [phone]  Resulting  Tissue Pathology Exam: [id number]   Order: [id number]   Collected:  5/2/2019 12:26 Status:  Edited Result - FINAL   Visible to patient:  No (Not Released) Dx:  Malignant neoplasm of upper-inner hector...   Component    Addendum   HER2 by Immunohistochemistry   Positive (Score 3+)     HER2 by Immunohistochemistry  FDA approved (specify test/vendor): [employer]     Primary Antibody  CB11     Cold Ischemia and Fixation Times   Meet requirements specified in latest version of the ASCO/CAP guidelines         Cold Ischemia Time: 18 minutes  Fixation Time: 8.25 hours  Testing Performed on Block Number(s): 1E  Fixative: Formalin   Addendum electronically signed by Harman Perry MD on 5/6/2019          Final Diagnosis  1. Left Breast, Modified Radical Mastectomy (935 grams): HIGH GRADE DUCTAL CARCINOMA IN-SITU  (DCIS) .  A. Nuclear Grade: High.  B. Architectural Type: Solid and comedo with lobular extension.  1. Size (extent) of DCIS: DCIS multifocal in the lower inner quadrant, measuring 0.9 cm in maximal  dimension (DCIS present in 3/18 tissue blocks).  C. No LCIS identified.  D. Skin and nipple uninvolved by DCIS.  E. Margins: Uninvolved by DCIS.  1. DCIS comes to within 1.3 cm of the anterior margin of excision (closest margin).  F. Additional findings: Incidental intraductal papilloma, florid ductal hyperplasia and apocrine cysts.  G. Lymph nodes: Sixteen benign lymph nodes (0/16).  H. Hormone receptor status: ER and AL negative, Her2/kali positive by IHC (performed on prior resection  [id number]).  I. Pathologic stage: pT2, N0.  Swm/kds      Regadenoson Stress Test With Myocardial Perfusion SPECT   Interpretation Summary     · Myocardial perfusion imaging indicates a normal myocardial perfusion study with no evidence of ischemia.  · Left ventricular ejection fraction is borderline normal (Calculated EF = 50%).  · Impressions are consistent with a low risk study.      Study Description     Nuclear  Study Description A 1-day rest/stress protocol myocardial perfusion imaging study was performed. A 22 G peripheral IV was started in the right antecubital fossa. While at rest, the patient was injected intravenously with 11.4 mCi of technetium tetrofosmin at 10:55 EDT. Rest imaging was performed approximately 30 minutes after the injection. 0.4 mg / 5 mL of regadenoson given intravenously over approximately 10 seconds. While at peak stress, the patient was injected intravenously with 31.7 mCi of technetium tetrofosmin at 11:45 EDT. Stress imaging was performed approximately 30 minutes after the injection. The total amount of radiation received in the study is about 11.42 mSv.No prior studies were available for comparison   Nuclear Perfusion Images Overall image quality is excellent. There are no artifacts present. Raw images reviewed with no abnormalities noted.           Assessment/Plan   1.  T2N0 grade 3 infiltrating ductal carcinoma left breast ER MT negative HER-2 positive post mastectomy and axillary node dissection with clear margins.  · Begin Taxol/Herceptin/Perjeta today, 8/29/19.    2.  Tobacco abuse and COPD    3.  Abnormal echo with stress test normal but borderline cardiac function at 52%  We will avoid Adriamycin Cytoxan  and treat with Taxol Herceptin perjeta and a year of Herceptin perjeta. Dr. Dudley, cardiology, has given clearance for patient to begin.    4.  Venous access.  Status post new Mediport placement 8/27/2019.  Working well today.  She has some expected associated pain with this, responsive to Norco.    5.  Bradycardia today.  Heart rate is around 44.  Patient was asked to not take her metoprolol this morning.  We will monitor her heart rate in the office during infusion.    Plan:  1.  Proceed with cycle 1 Taxol/herceptin/perjeta with plans to deliver Taxol/herceptin weekly and Perjeta every 3 weeks.  2.  Hold metoprolol today.  Monitor heart rate.  3.  Continue Norco for pain as needed.  4.   Echocardiogram will be repeated every 6 weeks initially  5.  Follow-up with Dr. Jhaveri in 1 week with cycle 1 day 8 Taxol and Herceptin.  For patient convenience and to help with the schedule we have gone ahead and made appointments for her for the next 3 weeks.    Patient has undergone formal chemotherapy education but I did remind her today of signs and symptoms of reaction during infusion and also symptoms to watch for at home following treatment today.  She verbalized understanding.

## 2019-08-29 ENCOUNTER — INFUSION (OUTPATIENT)
Dept: ONCOLOGY | Facility: HOSPITAL | Age: 64
End: 2019-08-29

## 2019-08-29 ENCOUNTER — OFFICE VISIT (OUTPATIENT)
Dept: ONCOLOGY | Facility: CLINIC | Age: 64
End: 2019-08-29

## 2019-08-29 VITALS — SYSTOLIC BLOOD PRESSURE: 184 MMHG | DIASTOLIC BLOOD PRESSURE: 89 MMHG | HEART RATE: 62 BPM

## 2019-08-29 VITALS
TEMPERATURE: 98.2 F | BODY MASS INDEX: 31.99 KG/M2 | HEART RATE: 45 BPM | RESPIRATION RATE: 18 BRPM | OXYGEN SATURATION: 95 % | WEIGHT: 216 LBS | HEIGHT: 69 IN | SYSTOLIC BLOOD PRESSURE: 152 MMHG | DIASTOLIC BLOOD PRESSURE: 70 MMHG

## 2019-08-29 DIAGNOSIS — C50.212 MALIGNANT NEOPLASM OF UPPER-INNER QUADRANT OF LEFT BREAST IN FEMALE, ESTROGEN RECEPTOR NEGATIVE (HCC): Primary | ICD-10-CM

## 2019-08-29 DIAGNOSIS — Z17.1 MALIGNANT NEOPLASM OF UPPER-INNER QUADRANT OF LEFT BREAST IN FEMALE, ESTROGEN RECEPTOR NEGATIVE (HCC): ICD-10-CM

## 2019-08-29 DIAGNOSIS — C50.212 MALIGNANT NEOPLASM OF UPPER-INNER QUADRANT OF LEFT BREAST IN FEMALE, ESTROGEN RECEPTOR NEGATIVE (HCC): ICD-10-CM

## 2019-08-29 DIAGNOSIS — Z17.1 MALIGNANT NEOPLASM OF UPPER-INNER QUADRANT OF LEFT BREAST IN FEMALE, ESTROGEN RECEPTOR NEGATIVE (HCC): Primary | ICD-10-CM

## 2019-08-29 DIAGNOSIS — Z01.810 PREOP CARDIOVASCULAR EXAM: ICD-10-CM

## 2019-08-29 DIAGNOSIS — R93.1 ABNORMAL ECHOCARDIOGRAM: ICD-10-CM

## 2019-08-29 DIAGNOSIS — I10 BENIGN ESSENTIAL HYPERTENSION: ICD-10-CM

## 2019-08-29 DIAGNOSIS — Z45.2 ENCOUNTER FOR FITTING AND ADJUSTMENT OF VASCULAR CATHETER: ICD-10-CM

## 2019-08-29 DIAGNOSIS — R00.1 BRADYCARDIA: ICD-10-CM

## 2019-08-29 LAB
ALBUMIN SERPL-MCNC: 4.3 G/DL (ref 3.5–5.2)
ALBUMIN/GLOB SERPL: 1.6 G/DL (ref 1.1–2.4)
ALP SERPL-CCNC: 129 U/L (ref 38–116)
ALT SERPL W P-5'-P-CCNC: 9 U/L (ref 0–33)
ANION GAP SERPL CALCULATED.3IONS-SCNC: 9.3 MMOL/L (ref 5–15)
AST SERPL-CCNC: 14 U/L (ref 0–32)
BASOPHILS # BLD AUTO: 0.04 10*3/MM3 (ref 0–0.2)
BASOPHILS NFR BLD AUTO: 0.7 % (ref 0–1.5)
BILIRUB SERPL-MCNC: 0.3 MG/DL (ref 0.2–1.2)
BUN BLD-MCNC: 7 MG/DL (ref 6–20)
BUN/CREAT SERPL: 9.1 (ref 7.3–30)
CALCIUM SPEC-SCNC: 9.3 MG/DL (ref 8.5–10.2)
CHLORIDE SERPL-SCNC: 103 MMOL/L (ref 98–107)
CO2 SERPL-SCNC: 27.7 MMOL/L (ref 22–29)
CREAT BLD-MCNC: 0.77 MG/DL (ref 0.6–1.1)
DEPRECATED RDW RBC AUTO: 41.4 FL (ref 37–54)
EOSINOPHIL # BLD AUTO: 0.25 10*3/MM3 (ref 0–0.4)
EOSINOPHIL NFR BLD AUTO: 4.6 % (ref 0.3–6.2)
ERYTHROCYTE [DISTWIDTH] IN BLOOD BY AUTOMATED COUNT: 12.4 % (ref 12.3–15.4)
GFR SERPL CREATININE-BSD FRML MDRD: 75 ML/MIN/1.73
GLOBULIN UR ELPH-MCNC: 2.7 GM/DL (ref 1.8–3.5)
GLUCOSE BLD-MCNC: 90 MG/DL (ref 74–124)
HCT VFR BLD AUTO: 38.3 % (ref 34–46.6)
HGB BLD-MCNC: 13 G/DL (ref 12–15.9)
IMM GRANULOCYTES # BLD AUTO: 0.01 10*3/MM3 (ref 0–0.05)
IMM GRANULOCYTES NFR BLD AUTO: 0.2 % (ref 0–0.5)
LYMPHOCYTES # BLD AUTO: 1.91 10*3/MM3 (ref 0.7–3.1)
LYMPHOCYTES NFR BLD AUTO: 35.5 % (ref 19.6–45.3)
MCH RBC QN AUTO: 31 PG (ref 26.6–33)
MCHC RBC AUTO-ENTMCNC: 33.9 G/DL (ref 31.5–35.7)
MCV RBC AUTO: 91.4 FL (ref 79–97)
MONOCYTES # BLD AUTO: 0.52 10*3/MM3 (ref 0.1–0.9)
MONOCYTES NFR BLD AUTO: 9.7 % (ref 5–12)
NEUTROPHILS # BLD AUTO: 2.65 10*3/MM3 (ref 1.7–7)
NEUTROPHILS NFR BLD AUTO: 49.3 % (ref 42.7–76)
NRBC BLD AUTO-RTO: 0 /100 WBC (ref 0–0.2)
PLATELET # BLD AUTO: 216 10*3/MM3 (ref 140–450)
PMV BLD AUTO: 9.5 FL (ref 6–12)
POTASSIUM BLD-SCNC: 4.3 MMOL/L (ref 3.5–4.7)
PROT SERPL-MCNC: 7 G/DL (ref 6.3–8)
RBC # BLD AUTO: 4.19 10*6/MM3 (ref 3.77–5.28)
SODIUM BLD-SCNC: 140 MMOL/L (ref 134–145)
WBC NRBC COR # BLD: 5.38 10*3/MM3 (ref 3.4–10.8)

## 2019-08-29 PROCEDURE — 96413 CHEMO IV INFUSION 1 HR: CPT

## 2019-08-29 PROCEDURE — 99214 OFFICE O/P EST MOD 30 MIN: CPT | Performed by: NURSE PRACTITIONER

## 2019-08-29 PROCEDURE — 25010000002 PACLITAXEL PER 30 MG: Performed by: INTERNAL MEDICINE

## 2019-08-29 PROCEDURE — 85025 COMPLETE CBC W/AUTO DIFF WBC: CPT

## 2019-08-29 PROCEDURE — 80053 COMPREHEN METABOLIC PANEL: CPT

## 2019-08-29 PROCEDURE — 96415 CHEMO IV INFUSION ADDL HR: CPT

## 2019-08-29 PROCEDURE — 25010000002 DIPHENHYDRAMINE PER 50 MG: Performed by: INTERNAL MEDICINE

## 2019-08-29 PROCEDURE — 96375 TX/PRO/DX INJ NEW DRUG ADDON: CPT

## 2019-08-29 PROCEDURE — 96417 CHEMO IV INFUS EACH ADDL SEQ: CPT

## 2019-08-29 PROCEDURE — 25010000002 TRASTUZUMAB PER 10 MG: Performed by: INTERNAL MEDICINE

## 2019-08-29 PROCEDURE — 25010000002 PERTUZUMAB 420 MG/14ML SOLUTION 420 MG VIAL: Performed by: INTERNAL MEDICINE

## 2019-08-29 PROCEDURE — 25010000002 DEXAMETHASONE SODIUM PHOSPHATE 100 MG/10ML SOLUTION: Performed by: INTERNAL MEDICINE

## 2019-08-29 RX ORDER — FAMOTIDINE 10 MG/ML
20 INJECTION, SOLUTION INTRAVENOUS AS NEEDED
Status: CANCELLED | OUTPATIENT
Start: 2019-09-05

## 2019-08-29 RX ORDER — DIPHENHYDRAMINE HYDROCHLORIDE 50 MG/ML
50 INJECTION INTRAMUSCULAR; INTRAVENOUS AS NEEDED
Status: CANCELLED | OUTPATIENT
Start: 2019-09-12

## 2019-08-29 RX ORDER — FAMOTIDINE 10 MG/ML
20 INJECTION, SOLUTION INTRAVENOUS AS NEEDED
Status: CANCELLED | OUTPATIENT
Start: 2019-09-12

## 2019-08-29 RX ORDER — FAMOTIDINE 10 MG/ML
20 INJECTION, SOLUTION INTRAVENOUS ONCE
Status: CANCELLED | OUTPATIENT
Start: 2019-09-12

## 2019-08-29 RX ORDER — DIPHENHYDRAMINE HYDROCHLORIDE 50 MG/ML
50 INJECTION INTRAMUSCULAR; INTRAVENOUS AS NEEDED
Status: CANCELLED | OUTPATIENT
Start: 2019-09-05

## 2019-08-29 RX ORDER — FAMOTIDINE 10 MG/ML
20 INJECTION, SOLUTION INTRAVENOUS ONCE
Status: COMPLETED | OUTPATIENT
Start: 2019-08-29 | End: 2019-08-29

## 2019-08-29 RX ORDER — SODIUM CHLORIDE 0.9 % (FLUSH) 0.9 %
10 SYRINGE (ML) INJECTION AS NEEDED
Status: CANCELLED | OUTPATIENT
Start: 2019-08-29

## 2019-08-29 RX ORDER — FAMOTIDINE 10 MG/ML
20 INJECTION, SOLUTION INTRAVENOUS AS NEEDED
Status: CANCELLED | OUTPATIENT
Start: 2019-08-29

## 2019-08-29 RX ORDER — DIPHENHYDRAMINE HYDROCHLORIDE 50 MG/ML
50 INJECTION INTRAMUSCULAR; INTRAVENOUS AS NEEDED
Status: CANCELLED | OUTPATIENT
Start: 2019-08-29

## 2019-08-29 RX ORDER — SODIUM CHLORIDE 0.9 % (FLUSH) 0.9 %
10 SYRINGE (ML) INJECTION AS NEEDED
Status: DISCONTINUED | OUTPATIENT
Start: 2019-08-29 | End: 2019-08-29 | Stop reason: HOSPADM

## 2019-08-29 RX ORDER — SODIUM CHLORIDE 9 MG/ML
250 INJECTION, SOLUTION INTRAVENOUS ONCE
Status: CANCELLED | OUTPATIENT
Start: 2019-09-05

## 2019-08-29 RX ORDER — HYDROCODONE BITARTRATE AND ACETAMINOPHEN 5; 325 MG/1; MG/1
1 TABLET ORAL ONCE AS NEEDED
Status: COMPLETED | OUTPATIENT
Start: 2019-08-29 | End: 2019-08-29

## 2019-08-29 RX ORDER — FAMOTIDINE 10 MG/ML
20 INJECTION, SOLUTION INTRAVENOUS ONCE
Status: CANCELLED | OUTPATIENT
Start: 2019-09-05

## 2019-08-29 RX ORDER — SODIUM CHLORIDE 9 MG/ML
250 INJECTION, SOLUTION INTRAVENOUS ONCE
Status: COMPLETED | OUTPATIENT
Start: 2019-08-29 | End: 2019-08-29

## 2019-08-29 RX ORDER — SODIUM CHLORIDE 9 MG/ML
250 INJECTION, SOLUTION INTRAVENOUS ONCE
Status: CANCELLED | OUTPATIENT
Start: 2019-09-12

## 2019-08-29 RX ADMIN — PERTUZUMAB 840 MG: 30 INJECTION, SOLUTION, CONCENTRATE INTRAVENOUS at 10:04

## 2019-08-29 RX ADMIN — DEXAMETHASONE SODIUM PHOSPHATE 12 MG: 10 INJECTION, SOLUTION INTRAMUSCULAR; INTRAVENOUS at 09:17

## 2019-08-29 RX ADMIN — TRASTUZUMAB 390 MG: 150 INJECTION, POWDER, LYOPHILIZED, FOR SOLUTION INTRAVENOUS at 12:05

## 2019-08-29 RX ADMIN — Medication 500 UNITS: at 14:55

## 2019-08-29 RX ADMIN — SODIUM CHLORIDE, PRESERVATIVE FREE 10 ML: 5 INJECTION INTRAVENOUS at 14:55

## 2019-08-29 RX ADMIN — FAMOTIDINE 20 MG: 10 INJECTION INTRAVENOUS at 08:57

## 2019-08-29 RX ADMIN — HYDROCODONE BITARTRATE AND ACETAMINOPHEN 1 TABLET: 5; 325 TABLET ORAL at 14:21

## 2019-08-29 RX ADMIN — DIPHENHYDRAMINE HYDROCHLORIDE 50 MG: 50 INJECTION, SOLUTION INTRAMUSCULAR; INTRAVENOUS at 09:00

## 2019-08-29 RX ADMIN — SODIUM CHLORIDE 250 ML: 9 INJECTION, SOLUTION INTRAVENOUS at 08:56

## 2019-08-29 RX ADMIN — PACLITAXEL 170 MG: 6 INJECTION, SOLUTION INTRAVENOUS at 13:46

## 2019-08-29 NOTE — PROGRESS NOTES
B/P elevated this afternoon.  HR 66.  OK per Dr Jhaveri to resume Toprol.  Pt with complaint of right shoulder pain r/t recent port placement.  Norco 5 mg ordered po per Dr. Jhaveri x 1.

## 2019-09-04 NOTE — PROGRESS NOTES
Subjective     REASON FOR CONSULTATION:   1. Left breast cancer T2N0 3.6 cm grade 3 ER [place] negative HER-2 positive infiltrating ductal carcinoma post excisional biopsy, followed by mastectomy and axillary dissection                               REQUESTING PHYSICIAN: Jase Khan DO    History of Present Illness patient is a 64-year-old female with a large ER W[place] negative HER-2 positive breast cancer surgically excised here to receive adjuvant treatment.  Initially because of the large size and comorbidities we were thinking of weekly Taxol Herceptin perjeta followed by Adriamycin Cytoxan however she had cardiac evaluation with echo with a borderline ejection fraction and had a stress test which showed no ischemia but again the ejection fraction of 50 to 52% and based on her shortness of breath with exertion and symptomatology, it was felt best to proceed instead with just weekly Taxol Herceptin perjeta followed by a year of Herceptin perjeta. Concern for the anthracycline possibly contributing to cardiotoxicity in a patient with borderline cardiac function.    She is followed by Dr. Dudley, cardiology, who gave clearance for the patient to proceed with chemotherapy. Planning repeat echo 6 weeks from last.    She returns back today, due for cycle 1 day 8 of Taxol/Herceptin only.  She did have 2 to 3 days of fairly significant diarrhea.  Her  actually brought the liquid Imodium and she cannot tell me exactly how much she took but it sounds like just an occasional dose over those 3 days.  I encouraged her to get the Imodium tablets and to start by taking 2 tablets with the first loose stool and one after each additional loose stool.  We also discussed that hopefully she will not have as much trouble with diarrhea in the 2 weeks of just Taxol/Herceptin therapy.    She continues to have some pain in her right chest related to previous surgery and  Mediport placement.  She is trying at this point to take just one Norco daily.  She is requesting a refill of this, hopeful to wean herself off with this next prescription.    Otherwise she denies further concerns today.    Past Medical History:   Diagnosis Date   • Adjustment disorder    • Allergic rhinitis    • Amenorrhea    • Anxiety    • Breast cancer (CMS/HCC) 04/18/2019    Left breast mammary carcinoma   • Bruit    • Carpal tunnel syndrome    • Carrier of tuberculosis    • Chronic pain    • De Quervain's tenosynovitis    • Degenerative cervical disc    • Depression    • Dyslipidemia    • Eustachian tube dysfunction    • History of UTI    • Hyperlipidemia    • Hypertension    • Impaired fasting glucose    • Lumbar degenerative disc disease    • Numbness and tingling     LEFT LEG   • OAB (overactive bladder)    • Scapulothoracic syndrome    • Sciatica         Past Surgical History:   Procedure Laterality Date   • APPENDECTOMY N/A    • BREAST BIOPSY Left 2019    Left breast ultrasound guided cyst aspiration, 9:00 position-Dr. Gerry Taylor, [facility]   • BREAST LUMPECTOMY Left 5/2/2019    Procedure: Left BREAST LUMPECTOMY;  Surgeon: Jase Evans MD;  Location: [facility] OR;  Service: General   • LUMBAR EPIDURAL INJECTION N/A    • MASTECTOMY Left 6/21/2019    Procedure: Left BREAST MASTECTOMY;  Surgeon: Jase Evans MD;  Location: [facility] OR;  Service: General   • TUBAL ABDOMINAL LIGATION Bilateral    • VAGINAL DELIVERY      x3   • VENOUS ACCESS DEVICE (PORT) INSERTION Right 8/27/2019    Procedure: INSERTION VENOUS ACCESS DEVICE;  Surgeon: Jase Evans MD;  Location: [facility] OR;  Service: General      ONC HISTORY;  patient is a 64-year-old retired  with hypertension hypercholesterolemia and degenerative arthritis who felt a mass in her left breast in March.  She showed it to her family doctor and underwent imaging at [facility] on 3/13/2019 which Showed a 3 x 3.2 cm mass at 9:00  which on ultrasound showed a thick-walled benign-appearing 2.8 x 2.5 cm cyst which was felt to not be suspicious .because of persistence of symptoms she was reevaluated on 2019 and underwent aspiration and core biopsy because there was a significant soft tissue component to the mass at that time this was aspirated and the final report  showed fine-needle aspiration suspicious for malignant cells favor mammary carcinoma  Patient was referred to Dr. Kee who took her for an excisional biopsy on 2019 with the final report showing a 4 mass grade 3x3.6cm with tumor extending to one margin and DCIS also 0.2 mm from one margin.  The tumor was ER TN negative HER-2 3+.  The patient was then taken for surgery on 2019 at which time she had a completion mastectomy and axillary node biopsy with the findings of residual high-grade DCIS with clear margins and an incidental intraductal papilloma and 17- lymph nodes making this a T2N0 tumor    She has done well postoperatively and is here to discuss adjuvant treatment    She is  3 para 3 menarche  at age 15 and menopause in her early 40s when she had a hysterectomy for heavy menstrual bleeding.  She took hormone replacement for 10 years and stopped 10 years ago first childbirth was at age 25 she did not breast-feed  Family history is positive for mother who had uterine cancer at age 60 and  of it at age 65 she has a brother who  of liver cancer related to drinking there is no other malignancy in the family that she is aware of    She is a significant smoker for the last 48 years but does not drink     Patient and her  were very taken to back by the suggestion about chemotherapy and apparently had thought that there was no further treatment needed and I spent almost an hour presenting the data concerning the extreme effectiveness of HER-2 directed therapy and this situation with a high risk of recurrence without adjuvant treatment and she  finally was convinced to do the treatment    We discussed smoking cessation but at this point she is so nervous and agitated with the prospect of chemotherapy that we will hold off on this until she is assisted through the chemotherapy and rediscuss it    I discussed the case also with Dr. Kee will place a port and we will plan to start chemotherapy in 2 weeks.    Patient initiating therapy with Taxol/Herceptin/Perjeta 8/29/19.    Current Outpatient Medications on File Prior to Visit   Medication Sig Dispense Refill   • ALPRAZolam (XANAX) 0.25 MG tablet Take 2 tablets by mouth 2 (Two) Times a Day As Needed for Anxiety for up to 60 doses. 60 tablet 0   • buPROPion XL (WELLBUTRIN XL) 150 MG 24 hr tablet Take 1 tablet by mouth Daily. 30 tablet 3   • busPIRone (BUSPAR) 10 MG tablet Take 10 mg by mouth 2 (Two) Times a Day.     • citalopram (CeleXA) 40 MG tablet Take 1 tablet by mouth Daily. (Patient taking differently: Take 40 mg by mouth Every Night.) 30 tablet 5   • HYDROcodone-acetaminophen (NORCO) 5-325 MG per tablet Take 1 tablet by mouth Every 6 (Six) Hours As Needed for Moderate Pain  or Severe Pain . 20 tablet 0   • metoprolol succinate XL (TOPROL-XL) 25 MG 24 hr tablet Take 25 mg by mouth Daily.     • montelukast (SINGULAIR) 10 MG tablet Take 1 tablet by mouth Every Night. (Patient taking differently: Take 10 mg by mouth Daily.) 30 tablet 11   • Multiple Vitamins-Minerals (CENTRUM SILVER PO) Take 1 tablet by mouth Daily.     • ondansetron ODT (ZOFRAN-ODT) 8 MG disintegrating tablet Take 1 tablet by mouth Every 8 (Eight) Hours As Needed for Nausea or Vomiting. 30 tablet 2   • vitamin B-12 (CYANOCOBALAMIN) 100 MCG tablet Take 100 mcg by mouth Daily.       No current facility-administered medications on file prior to visit.         ALLERGIES:    Allergies   Allergen Reactions   • Gabapentin Hallucinations   • Effexor [Venlafaxine] Itching   • Naproxen Itching     Pt reports severe vaginal itching    • Zyrtec  "[Cetirizine] Other (See Comments)     Non-effecious   • Ibuprofen Other (See Comments)     Burns while urinating  Can take low dose Ibuprofen   • Penicillins Rash   • Sulfa Antibiotics Rash        Social History     Socioeconomic History   • Marital status:      Spouse name: Pawan   • Number of children: 3   • Years of education: High school   • Highest education level: Not on file   Occupational History     Employer: RETIRED   Tobacco Use   • Smoking status: Current Every Day Smoker     Packs/day: 0.25     Years: 48.00     Pack years: 12.00     Types: Cigarettes   • Smokeless tobacco: Never Used   • Tobacco comment: Trying to quit.    Substance and Sexual Activity   • Alcohol use: No   • Drug use: No   • Sexual activity: Yes     Partners: Male     Birth control/protection: Post-menopausal        Family History   Problem Relation Age of Onset   • Ovarian cancer Mother    • Endometrial cancer Mother    • COPD Father    • Stroke Brother    • Malig Hyperthermia Neg Hx         Review of Systems   Constitutional: Positive for fatigue. Negative for diaphoresis.   HENT: Negative.    Eyes: Negative.    Respiratory: Positive for cough (better since cutting back on smoking 8/22/19).    Cardiovascular: Negative for chest pain.   Gastrointestinal: Positive for diarrhea.   Endocrine: Negative.    Genitourinary: Negative.    Musculoskeletal: Positive for neck pain (r/t port). Negative for back pain.   Neurological: Positive for numbness (improved 9/5/19 left underarm from surgery).   Hematological: Negative.    Psychiatric/Behavioral: Negative.         Objective     Vitals:    09/05/19 0933   BP: 173/82  Comment: pt did take b/p medication this am   Pulse: 53   Resp: 16   Temp: 98.6 °F (37 °C)   TempSrc: Oral   SpO2: 95%   Weight: 97.5 kg (215 lb)   Height: 176.5 cm (69.49\")   PainSc:   5     Current Status 9/5/2019   ECOG score 0       Physical Exam    GENERAL:  Well-developed, well-nourished in no acute distress.   SKIN:  " Warm, dry without rashes, purpura or petechiae.  EYES:  Pupils equal, round and reactive to light.  EOMs intact.  Conjunctivae normal.  EARS:  Hearing intact.  NOSE:  Septum midline.  No excoriations or nasal discharge.  MOUTH:  Tongue is well-papillated; no stomatitis or ulcers.  Lips normal.  THROAT:  Oropharynx without lesions or exudates.  NECK:  Supple with good range of motion; no thyromegaly or masses, no JVD.  LYMPHATICS:  No cervical, supraclavicular, axillary or inguinal adenopathy.  CHEST:  Lungs clear to auscultation. Good airflow.  Mediport right chest wall benign.  BREASTS: Right breast is benign; left chest wall shows a well-healed mastectomy scar  CARDIAC:  Regular rate and rhythm without murmurs, rubs or gallops. Normal S1,S2.  ABDOMEN:  Soft, nontender with no hepatosplenomegaly or masses.  EXTREMITIES:  No clubbing, cyanosis or edema.  NEUROLOGICAL:  Cranial Nerves II-XII grossly intact.  No focal neurological deficits.  PSYCHIATRIC:  Normal affect and mood.        RECENT LABS:  Results from last 7 days   Lab Units 09/05/19  0906   WBC 10*3/mm3 3.82   NEUTROS ABS 10*3/mm3 1.72   HEMOGLOBIN g/dL 11.8*   HEMATOCRIT % 36.3   PLATELETS 10*3/mm3 197     Results from last 7 days   Lab Units 09/05/19  0906   SODIUM mmol/L 141   POTASSIUM mmol/L 3.8   CHLORIDE mmol/L 107   CO2 mmol/L 24.3   BUN mg/dL 10   CREATININE mg/dL 0.78   CALCIUM mg/dL 9.0   ALBUMIN g/dL 3.90   BILIRUBIN mg/dL 0.3   ALK PHOS U/L 107   ALT (SGPT) U/L 15   AST (SGOT) U/L 15   GLUCOSE mg/dL 83         RADIOGRAPHIC DATA:    US Breat Left Limited    1. Left Breast Lumpectomy (47 Grams):  A. Invasive poorly differentiated mammary carcinoma of no special type (ductal carcinoma)  with apocrine features.  1. Invasive carcinoma measures up to 40 x 36 x 27 mm (measured grossly).  2. Overall Van Grade III (glandular score = 3, nuclear score = 3, mitotic score = 3).  3. No definitive lymphovascular space invasion identified.  B. Invasive  carcinoma focally extends to one undesignated peripheral inked margin of excision.  C. Associated ductal carcinoma in situ (DCIS).  1. Ductal carcinoma in situ (DCIS) spans area measuring 40 mm in single greatest  aggregate dimension (estimated from gross and glass slides).  2. DCIS predominantly solid and comedo type with high grade nuclear features.  3. High grade DCIS approximates the closest peripheral inked margin to 0.2 mm.  Page: 1 of 5 Printed: 5/6/2019 1:10 PM  [phone]  Resulting  Tissue Pathology Exam: [id number]   Order: [id number]   Collected:  5/2/2019 12:26 Status:  Edited Result - FINAL   Visible to patient:  No (Not Released) Dx:  Malignant neoplasm of upper-inner hector...   Component    Addendum   HER2 by Immunohistochemistry   Positive (Score 3+)     HER2 by Immunohistochemistry  FDA approved (specify test/vendor): [employer]     Primary Antibody  CB11     Cold Ischemia and Fixation Times   Meet requirements specified in latest version of the ASCO/CAP guidelines         Cold Ischemia Time: 18 minutes  Fixation Time: 8.25 hours  Testing Performed on Block Number(s): 1E  Fixative: Formalin   Addendum electronically signed by Harman Perry MD on 5/6/2019          Final Diagnosis  1. Left Breast, Modified Radical Mastectomy (935 grams): HIGH GRADE DUCTAL CARCINOMA IN-SITU  (DCIS) .  A. Nuclear Grade: High.  B. Architectural Type: Solid and comedo with lobular extension.  1. Size (extent) of DCIS: DCIS multifocal in the lower inner quadrant, measuring 0.9 cm in maximal  dimension (DCIS present in 3/18 tissue blocks).  C. No LCIS identified.  D. Skin and nipple uninvolved by DCIS.  E. Margins: Uninvolved by DCIS.  1. DCIS comes to within 1.3 cm of the anterior margin of excision (closest margin).  F. Additional findings: Incidental intraductal papilloma, florid ductal hyperplasia and apocrine cysts.  G. Lymph nodes: Sixteen benign lymph nodes (0/16).  H. Hormone receptor status: ER and MI negative,  Her2/kali positive by IHC (performed on prior resection  [id number]).  I. Pathologic stage: pT2, N0.  Swm/kds      Regadenoson Stress Test With Myocardial Perfusion SPECT   Interpretation Summary     · Myocardial perfusion imaging indicates a normal myocardial perfusion study with no evidence of ischemia.  · Left ventricular ejection fraction is borderline normal (Calculated EF = 50%).  · Impressions are consistent with a low risk study.      Study Description     Nuclear Study Description A 1-day rest/stress protocol myocardial perfusion imaging study was performed. A 22 G peripheral IV was started in the right antecubital fossa. While at rest, the patient was injected intravenously with 11.4 mCi of technetium tetrofosmin at 10:55 EDT. Rest imaging was performed approximately 30 minutes after the injection. 0.4 mg / 5 mL of regadenoson given intravenously over approximately 10 seconds. While at peak stress, the patient was injected intravenously with 31.7 mCi of technetium tetrofosmin at 11:45 EDT. Stress imaging was performed approximately 30 minutes after the injection. The total amount of radiation received in the study is about 11.42 mSv.No prior studies were available for comparison   Nuclear Perfusion Images Overall image quality is excellent. There are no artifacts present. Raw images reviewed with no abnormalities noted.           Assessment/Plan   1.  T2N0 grade 3 infiltrating ductal carcinoma left breast ER [place] negative HER-2 positive post mastectomy and axillary node dissection with clear margins.  · Initiated Taxol/Herceptin/Perjeta today, 8/29/19.    2.  Tobacco abuse and COPD    3.  Abnormal echo with stress test normal but borderline cardiac function at 52%  We will avoid Adriamycin Cytoxan  and treat with Taxol Herceptin perjeta and a year of Herceptin perjeta. Dr. Dudley, cardiology, has given clearance for patient to begin.  Planning repeat echocardiogram 6 weeks from previous.    4.  Venous access.   Status post new Mediport placement 8/27/2019.  Working well today.  She has some expected associated pain with this, responsive to Norco.  She does have both pain related to new Mediport and some related to previous mastectomy.  Patient is requesting a small refill of her Norco today.  She is trying to take just 1 a day hopeful to wean herself off soon.  We will give her #30 with no answer refill this going forward.  Her pain should resolve as she moves further out from Mediport placement.    5.  Diarrhea related to Perjeta.  Patient currently has liquid Imodium (her  about this not knowing there were pills) and cannot tell me exactly how much she has taken.  I encouraged her to switch to Imodium tablets, taking 2 after first loose stool and one after each additional loose stool.  We also discussed that this is more an issue with Perjeta versus just Taxol and Herceptin.  Hopeful that these next 2 weeks will be better for her in this regard.    Plan:  1.  Proceed with cycle 1, day 8 Taxol/herceptin  .  2.  Continue Norco 5/325, taking roughly 1 daily for pain.  #30 refill the patient's pharmacy.  3.  Utilize Imodium as outlined above.  4.  Echocardiogram will be repeated every 6 weeks initially  5.  Return in 1 week for cycle 1 day 15 Taxol/Herceptin.   6.  Return in 2 weeks for follow-up with Dr. Jhaveri and cycle 2-day 1 Taxol/Herceptin/Perjeta.     This patient is on drug therapy requiring intensive monitoring for toxicity.  We addressed current concerns of pain and diarrhea as outlined above.

## 2019-09-05 ENCOUNTER — OFFICE VISIT (OUTPATIENT)
Dept: ONCOLOGY | Facility: CLINIC | Age: 64
End: 2019-09-05

## 2019-09-05 ENCOUNTER — INFUSION (OUTPATIENT)
Dept: ONCOLOGY | Facility: HOSPITAL | Age: 64
End: 2019-09-05

## 2019-09-05 ENCOUNTER — DOCUMENTATION (OUTPATIENT)
Dept: ONCOLOGY | Facility: CLINIC | Age: 64
End: 2019-09-05

## 2019-09-05 VITALS
WEIGHT: 215 LBS | OXYGEN SATURATION: 95 % | RESPIRATION RATE: 16 BRPM | DIASTOLIC BLOOD PRESSURE: 82 MMHG | BODY MASS INDEX: 31.84 KG/M2 | TEMPERATURE: 98.6 F | HEIGHT: 69 IN | HEART RATE: 53 BPM | SYSTOLIC BLOOD PRESSURE: 173 MMHG

## 2019-09-05 DIAGNOSIS — Z17.1 MALIGNANT NEOPLASM OF UPPER-INNER QUADRANT OF LEFT BREAST IN FEMALE, ESTROGEN RECEPTOR NEGATIVE (HCC): Primary | ICD-10-CM

## 2019-09-05 DIAGNOSIS — K52.1 CHEMOTHERAPY-INDUCED DIARRHEA: ICD-10-CM

## 2019-09-05 DIAGNOSIS — M54.2 NECK PAIN: ICD-10-CM

## 2019-09-05 DIAGNOSIS — C50.212 MALIGNANT NEOPLASM OF UPPER-INNER QUADRANT OF LEFT BREAST IN FEMALE, ESTROGEN RECEPTOR NEGATIVE (HCC): Primary | ICD-10-CM

## 2019-09-05 DIAGNOSIS — T45.1X5A CHEMOTHERAPY-INDUCED DIARRHEA: ICD-10-CM

## 2019-09-05 PROBLEM — Z45.2 ENCOUNTER FOR FITTING AND ADJUSTMENT OF VASCULAR CATHETER: Status: ACTIVE | Noted: 2019-09-05

## 2019-09-05 LAB
ALBUMIN SERPL-MCNC: 3.9 G/DL (ref 3.5–5.2)
ALBUMIN/GLOB SERPL: 1.5 G/DL (ref 1.1–2.4)
ALP SERPL-CCNC: 107 U/L (ref 38–116)
ALT SERPL W P-5'-P-CCNC: 15 U/L (ref 0–33)
ANION GAP SERPL CALCULATED.3IONS-SCNC: 9.7 MMOL/L (ref 5–15)
AST SERPL-CCNC: 15 U/L (ref 0–32)
BASOPHILS # BLD AUTO: 0.06 10*3/MM3 (ref 0–0.2)
BASOPHILS NFR BLD AUTO: 1.6 % (ref 0–1.5)
BILIRUB SERPL-MCNC: 0.3 MG/DL (ref 0.2–1.2)
BUN BLD-MCNC: 10 MG/DL (ref 6–20)
BUN/CREAT SERPL: 12.8 (ref 7.3–30)
CALCIUM SPEC-SCNC: 9 MG/DL (ref 8.5–10.2)
CHLORIDE SERPL-SCNC: 107 MMOL/L (ref 98–107)
CO2 SERPL-SCNC: 24.3 MMOL/L (ref 22–29)
CREAT BLD-MCNC: 0.78 MG/DL (ref 0.6–1.1)
DEPRECATED RDW RBC AUTO: 42.7 FL (ref 37–54)
EOSINOPHIL # BLD AUTO: 0.22 10*3/MM3 (ref 0–0.4)
EOSINOPHIL NFR BLD AUTO: 5.8 % (ref 0.3–6.2)
ERYTHROCYTE [DISTWIDTH] IN BLOOD BY AUTOMATED COUNT: 12.4 % (ref 12.3–15.4)
GFR SERPL CREATININE-BSD FRML MDRD: 74 ML/MIN/1.73
GLOBULIN UR ELPH-MCNC: 2.6 GM/DL (ref 1.8–3.5)
GLUCOSE BLD-MCNC: 83 MG/DL (ref 74–124)
HCT VFR BLD AUTO: 36.3 % (ref 34–46.6)
HGB BLD-MCNC: 11.8 G/DL (ref 12–15.9)
IMM GRANULOCYTES # BLD AUTO: 0.02 10*3/MM3 (ref 0–0.05)
IMM GRANULOCYTES NFR BLD AUTO: 0.5 % (ref 0–0.5)
LYMPHOCYTES # BLD AUTO: 1.6 10*3/MM3 (ref 0.7–3.1)
LYMPHOCYTES NFR BLD AUTO: 41.9 % (ref 19.6–45.3)
MCH RBC QN AUTO: 30.5 PG (ref 26.6–33)
MCHC RBC AUTO-ENTMCNC: 32.5 G/DL (ref 31.5–35.7)
MCV RBC AUTO: 93.8 FL (ref 79–97)
MONOCYTES # BLD AUTO: 0.2 10*3/MM3 (ref 0.1–0.9)
MONOCYTES NFR BLD AUTO: 5.2 % (ref 5–12)
NEUTROPHILS # BLD AUTO: 1.72 10*3/MM3 (ref 1.7–7)
NEUTROPHILS NFR BLD AUTO: 45 % (ref 42.7–76)
NRBC BLD AUTO-RTO: 0 /100 WBC (ref 0–0.2)
PLATELET # BLD AUTO: 197 10*3/MM3 (ref 140–450)
PMV BLD AUTO: 9.2 FL (ref 6–12)
POTASSIUM BLD-SCNC: 3.8 MMOL/L (ref 3.5–4.7)
PROT SERPL-MCNC: 6.5 G/DL (ref 6.3–8)
RBC # BLD AUTO: 3.87 10*6/MM3 (ref 3.77–5.28)
SODIUM BLD-SCNC: 141 MMOL/L (ref 134–145)
WBC NRBC COR # BLD: 3.82 10*3/MM3 (ref 3.4–10.8)

## 2019-09-05 PROCEDURE — 25010000002 TRASTUZUMAB PER 10 MG: Performed by: INTERNAL MEDICINE

## 2019-09-05 PROCEDURE — 25010000002 DIPHENHYDRAMINE PER 50 MG: Performed by: INTERNAL MEDICINE

## 2019-09-05 PROCEDURE — 96375 TX/PRO/DX INJ NEW DRUG ADDON: CPT | Performed by: NURSE PRACTITIONER

## 2019-09-05 PROCEDURE — 85025 COMPLETE CBC W/AUTO DIFF WBC: CPT

## 2019-09-05 PROCEDURE — 99214 OFFICE O/P EST MOD 30 MIN: CPT | Performed by: NURSE PRACTITIONER

## 2019-09-05 PROCEDURE — 25010000002 PACLITAXEL PER 30 MG: Performed by: NURSE PRACTITIONER

## 2019-09-05 PROCEDURE — 25010000002 DEXAMETHASONE SODIUM PHOSPHATE 100 MG/10ML SOLUTION: Performed by: INTERNAL MEDICINE

## 2019-09-05 PROCEDURE — 96417 CHEMO IV INFUS EACH ADDL SEQ: CPT | Performed by: NURSE PRACTITIONER

## 2019-09-05 PROCEDURE — 96413 CHEMO IV INFUSION 1 HR: CPT | Performed by: NURSE PRACTITIONER

## 2019-09-05 PROCEDURE — 80053 COMPREHEN METABOLIC PANEL: CPT

## 2019-09-05 RX ORDER — FAMOTIDINE 10 MG/ML
20 INJECTION, SOLUTION INTRAVENOUS ONCE
Status: COMPLETED | OUTPATIENT
Start: 2019-09-05 | End: 2019-09-05

## 2019-09-05 RX ORDER — SODIUM CHLORIDE 9 MG/ML
250 INJECTION, SOLUTION INTRAVENOUS ONCE
Status: COMPLETED | OUTPATIENT
Start: 2019-09-05 | End: 2019-09-05

## 2019-09-05 RX ORDER — HYDROCODONE BITARTRATE AND ACETAMINOPHEN 5; 325 MG/1; MG/1
1 TABLET ORAL EVERY 6 HOURS PRN
Qty: 30 TABLET | Refills: 0 | Status: SHIPPED | OUTPATIENT
Start: 2019-09-05 | End: 2019-10-10 | Stop reason: SDUPTHER

## 2019-09-05 RX ADMIN — PACLITAXEL 170 MG: 6 INJECTION, SOLUTION INTRAVENOUS at 11:50

## 2019-09-05 RX ADMIN — DIPHENHYDRAMINE HYDROCHLORIDE 25 MG: 50 INJECTION, SOLUTION INTRAMUSCULAR; INTRAVENOUS at 10:27

## 2019-09-05 RX ADMIN — SODIUM CHLORIDE 250 ML: 9 INJECTION, SOLUTION INTRAVENOUS at 09:56

## 2019-09-05 RX ADMIN — FAMOTIDINE 20 MG: 10 INJECTION, SOLUTION INTRAVENOUS at 10:07

## 2019-09-05 RX ADMIN — TRASTUZUMAB 200 MG: 150 INJECTION, POWDER, LYOPHILIZED, FOR SOLUTION INTRAVENOUS at 11:02

## 2019-09-05 RX ADMIN — DEXAMETHASONE SODIUM PHOSPHATE 12 MG: 10 INJECTION, SOLUTION INTRAMUSCULAR; INTRAVENOUS at 10:08

## 2019-09-05 NOTE — PROGRESS NOTES
Michelle at Scheduling came to me stating pt was taking with Chantal about her pain medication and possibly getting it cheaper.    Pt is on Norco and an rx was sent in to her local [facility] today.    I researched through [employer] and for a Qty of 120-her cost with the discount coupon is $23.50     For a Qty of 30-her cost is $11.08    I printed both coupons out for pt and gave them to her. She states her copay is $89 with insurance.

## 2019-09-06 ENCOUNTER — TELEPHONE (OUTPATIENT)
Dept: ONCOLOGY | Facility: HOSPITAL | Age: 64
End: 2019-09-06

## 2019-09-06 RX ORDER — ALPRAZOLAM 0.25 MG/1
TABLET ORAL
Qty: 60 TABLET | Refills: 0 | Status: CANCELLED | OUTPATIENT
Start: 2019-09-06

## 2019-09-06 RX ORDER — ALPRAZOLAM 0.25 MG/1
0.5 TABLET ORAL 2 TIMES DAILY PRN
Qty: 60 TABLET | Refills: 0 | OUTPATIENT
Start: 2019-09-06 | End: 2019-10-10 | Stop reason: SDUPTHER

## 2019-09-06 NOTE — TELEPHONE ENCOUNTER
----- Message from Maria Eugenia Guillaume sent at 9/6/2019 12:50 PM EDT -----  Contact: [phone]  Pt asking if nurse can call her pharmacy so she can get her Norco script discount. They would not accept just taking it to the pharmacy.

## 2019-09-06 NOTE — TELEPHONE ENCOUNTER
Pt calling with question about Norco and her discount card.  [facility] on [address] called and prescription is ready and the total was $ 20.40.  Pt informed of   Same.  Pt v/u.

## 2019-09-11 ENCOUNTER — HOSPITAL ENCOUNTER (OUTPATIENT)
Dept: SLEEP MEDICINE | Facility: HOSPITAL | Age: 64
End: 2019-09-11

## 2019-09-12 ENCOUNTER — INFUSION (OUTPATIENT)
Dept: ONCOLOGY | Facility: HOSPITAL | Age: 64
End: 2019-09-12

## 2019-09-12 VITALS
SYSTOLIC BLOOD PRESSURE: 153 MMHG | HEART RATE: 49 BPM | TEMPERATURE: 98.6 F | OXYGEN SATURATION: 94 % | BODY MASS INDEX: 31.48 KG/M2 | WEIGHT: 216.2 LBS | DIASTOLIC BLOOD PRESSURE: 80 MMHG

## 2019-09-12 DIAGNOSIS — Z17.1 MALIGNANT NEOPLASM OF UPPER-INNER QUADRANT OF LEFT BREAST IN FEMALE, ESTROGEN RECEPTOR NEGATIVE (HCC): Primary | ICD-10-CM

## 2019-09-12 DIAGNOSIS — Z51.11 ENCOUNTER FOR ANTINEOPLASTIC CHEMOTHERAPY AND IMMUNOTHERAPY: Primary | ICD-10-CM

## 2019-09-12 DIAGNOSIS — Z51.12 ENCOUNTER FOR ANTINEOPLASTIC CHEMOTHERAPY AND IMMUNOTHERAPY: Primary | ICD-10-CM

## 2019-09-12 DIAGNOSIS — C50.212 MALIGNANT NEOPLASM OF UPPER-INNER QUADRANT OF LEFT BREAST IN FEMALE, ESTROGEN RECEPTOR NEGATIVE (HCC): Primary | ICD-10-CM

## 2019-09-12 LAB
ALBUMIN SERPL-MCNC: 4 G/DL (ref 3.5–5.2)
ALBUMIN/GLOB SERPL: 1.5 G/DL (ref 1.1–2.4)
ALP SERPL-CCNC: 111 U/L (ref 38–116)
ALT SERPL W P-5'-P-CCNC: 17 U/L (ref 0–33)
ANION GAP SERPL CALCULATED.3IONS-SCNC: 10.2 MMOL/L (ref 5–15)
AST SERPL-CCNC: 19 U/L (ref 0–32)
BASOPHILS # BLD AUTO: 0.03 10*3/MM3 (ref 0–0.2)
BASOPHILS NFR BLD AUTO: 0.8 % (ref 0–1.5)
BILIRUB SERPL-MCNC: 0.4 MG/DL (ref 0.2–1.2)
BUN BLD-MCNC: 8 MG/DL (ref 6–20)
BUN/CREAT SERPL: 10.1 (ref 7.3–30)
CALCIUM SPEC-SCNC: 9 MG/DL (ref 8.5–10.2)
CHLORIDE SERPL-SCNC: 102 MMOL/L (ref 98–107)
CO2 SERPL-SCNC: 26.8 MMOL/L (ref 22–29)
CREAT BLD-MCNC: 0.79 MG/DL (ref 0.6–1.1)
DEPRECATED RDW RBC AUTO: 43.6 FL (ref 37–54)
EOSINOPHIL # BLD AUTO: 0.13 10*3/MM3 (ref 0–0.4)
EOSINOPHIL NFR BLD AUTO: 3.3 % (ref 0.3–6.2)
ERYTHROCYTE [DISTWIDTH] IN BLOOD BY AUTOMATED COUNT: 12.6 % (ref 12.3–15.4)
GFR SERPL CREATININE-BSD FRML MDRD: 73 ML/MIN/1.73
GLOBULIN UR ELPH-MCNC: 2.6 GM/DL (ref 1.8–3.5)
GLUCOSE BLD-MCNC: 81 MG/DL (ref 74–124)
HCT VFR BLD AUTO: 35.4 % (ref 34–46.6)
HGB BLD-MCNC: 11.6 G/DL (ref 12–15.9)
IMM GRANULOCYTES # BLD AUTO: 0.02 10*3/MM3 (ref 0–0.05)
IMM GRANULOCYTES NFR BLD AUTO: 0.5 % (ref 0–0.5)
LYMPHOCYTES # BLD AUTO: 1.84 10*3/MM3 (ref 0.7–3.1)
LYMPHOCYTES NFR BLD AUTO: 46.2 % (ref 19.6–45.3)
MAGNESIUM SERPL-MCNC: 1.8 MG/DL (ref 1.8–2.5)
MCH RBC QN AUTO: 31.1 PG (ref 26.6–33)
MCHC RBC AUTO-ENTMCNC: 32.8 G/DL (ref 31.5–35.7)
MCV RBC AUTO: 94.9 FL (ref 79–97)
MONOCYTES # BLD AUTO: 0.31 10*3/MM3 (ref 0.1–0.9)
MONOCYTES NFR BLD AUTO: 7.8 % (ref 5–12)
NEUTROPHILS # BLD AUTO: 1.65 10*3/MM3 (ref 1.7–7)
NEUTROPHILS NFR BLD AUTO: 41.4 % (ref 42.7–76)
NRBC BLD AUTO-RTO: 0 /100 WBC (ref 0–0.2)
PLATELET # BLD AUTO: 205 10*3/MM3 (ref 140–450)
PMV BLD AUTO: 9.1 FL (ref 6–12)
POTASSIUM BLD-SCNC: 4.3 MMOL/L (ref 3.5–4.7)
PROT SERPL-MCNC: 6.6 G/DL (ref 6.3–8)
RBC # BLD AUTO: 3.73 10*6/MM3 (ref 3.77–5.28)
SODIUM BLD-SCNC: 139 MMOL/L (ref 134–145)
WBC NRBC COR # BLD: 3.98 10*3/MM3 (ref 3.4–10.8)

## 2019-09-12 PROCEDURE — 80053 COMPREHEN METABOLIC PANEL: CPT | Performed by: INTERNAL MEDICINE

## 2019-09-12 PROCEDURE — 25010000002 DIPHENHYDRAMINE PER 50 MG: Performed by: INTERNAL MEDICINE

## 2019-09-12 PROCEDURE — 96413 CHEMO IV INFUSION 1 HR: CPT | Performed by: INTERNAL MEDICINE

## 2019-09-12 PROCEDURE — 96417 CHEMO IV INFUS EACH ADDL SEQ: CPT | Performed by: INTERNAL MEDICINE

## 2019-09-12 PROCEDURE — 85025 COMPLETE CBC W/AUTO DIFF WBC: CPT | Performed by: INTERNAL MEDICINE

## 2019-09-12 PROCEDURE — 25010000002 DEXAMETHASONE SODIUM PHOSPHATE 100 MG/10ML SOLUTION: Performed by: INTERNAL MEDICINE

## 2019-09-12 PROCEDURE — 83735 ASSAY OF MAGNESIUM: CPT | Performed by: INTERNAL MEDICINE

## 2019-09-12 PROCEDURE — 96375 TX/PRO/DX INJ NEW DRUG ADDON: CPT | Performed by: INTERNAL MEDICINE

## 2019-09-12 PROCEDURE — 25010000002 TRASTUZUMAB PER 10 MG: Performed by: NURSE PRACTITIONER

## 2019-09-12 PROCEDURE — 25010000002 PACLITAXEL PER 30 MG: Performed by: INTERNAL MEDICINE

## 2019-09-12 RX ORDER — SODIUM CHLORIDE 9 MG/ML
250 INJECTION, SOLUTION INTRAVENOUS ONCE
Status: COMPLETED | OUTPATIENT
Start: 2019-09-12 | End: 2019-09-12

## 2019-09-12 RX ORDER — FAMOTIDINE 10 MG/ML
20 INJECTION, SOLUTION INTRAVENOUS ONCE
Status: COMPLETED | OUTPATIENT
Start: 2019-09-12 | End: 2019-09-12

## 2019-09-12 RX ADMIN — FAMOTIDINE 20 MG: 10 INJECTION INTRAVENOUS at 08:54

## 2019-09-12 RX ADMIN — SODIUM CHLORIDE 250 ML: 9 INJECTION, SOLUTION INTRAVENOUS at 08:54

## 2019-09-12 RX ADMIN — DEXAMETHASONE SODIUM PHOSPHATE 12 MG: 10 INJECTION, SOLUTION INTRAMUSCULAR; INTRAVENOUS at 08:55

## 2019-09-12 RX ADMIN — TRASTUZUMAB 200 MG: 150 INJECTION, POWDER, LYOPHILIZED, FOR SOLUTION INTRAVENOUS at 09:49

## 2019-09-12 RX ADMIN — DIPHENHYDRAMINE HYDROCHLORIDE 25 MG: 50 INJECTION, SOLUTION INTRAMUSCULAR; INTRAVENOUS at 09:14

## 2019-09-12 RX ADMIN — PACLITAXEL 170 MG: 6 INJECTION, SOLUTION INTRAVENOUS at 10:21

## 2019-09-12 NOTE — PROGRESS NOTES
Patient c/o some nausea and diarrhea the last 2-3 days and has been experiencing some muscle cramping/tightness. CMP and Mg added to today's labs. Pt reports diarrhea improved w/ use of Imodium but states she still hasn't gotten nausea medication that was discussed, pharmacy never notified that it was available. Chart review indicates Zofran was sent to patient's pharmacy 8/13. Advised patient to check w/ her pharmacy and call us w/ any issues, she v/u.    Pt will also be due for q6 week Echo, message sent to informatics RNs to place order and to scheduling to arrange appt.

## 2019-09-16 ENCOUNTER — HOSPITAL ENCOUNTER (OUTPATIENT)
Dept: CARDIOLOGY | Facility: HOSPITAL | Age: 64
Discharge: HOME | End: 2019-09-16
Admitting: INTERNAL MEDICINE

## 2019-09-16 VITALS
BODY MASS INDEX: 31.99 KG/M2 | SYSTOLIC BLOOD PRESSURE: 118 MMHG | HEART RATE: 59 BPM | HEIGHT: 69 IN | DIASTOLIC BLOOD PRESSURE: 62 MMHG | WEIGHT: 216 LBS

## 2019-09-16 DIAGNOSIS — Z51.11 ENCOUNTER FOR ANTINEOPLASTIC CHEMOTHERAPY AND IMMUNOTHERAPY: ICD-10-CM

## 2019-09-16 DIAGNOSIS — Z51.12 ENCOUNTER FOR ANTINEOPLASTIC CHEMOTHERAPY AND IMMUNOTHERAPY: ICD-10-CM

## 2019-09-16 DIAGNOSIS — Z17.1 MALIGNANT NEOPLASM OF UPPER-INNER QUADRANT OF LEFT BREAST IN FEMALE, ESTROGEN RECEPTOR NEGATIVE (HCC): ICD-10-CM

## 2019-09-16 DIAGNOSIS — C50.212 MALIGNANT NEOPLASM OF UPPER-INNER QUADRANT OF LEFT BREAST IN FEMALE, ESTROGEN RECEPTOR NEGATIVE (HCC): ICD-10-CM

## 2019-09-16 LAB
AORTIC ROOT ANNULUS: 2.4 CM
ASCENDING AORTA: 3.5 CM
BH CV ECHO MEAS - ACS: 2.1 CM
BH CV ECHO MEAS - AO MAX PG (FULL): 5.6 MMHG
BH CV ECHO MEAS - AO MAX PG: 12.3 MMHG
BH CV ECHO MEAS - AO MEAN PG (FULL): 2.7 MMHG
BH CV ECHO MEAS - AO MEAN PG: 6.2 MMHG
BH CV ECHO MEAS - AO V2 MAX: 175.1 CM/SEC
BH CV ECHO MEAS - AO V2 MEAN: 114.5 CM/SEC
BH CV ECHO MEAS - AO V2 VTI: 35.1 CM
BH CV ECHO MEAS - AVA(I,A): 2.2 CM^2
BH CV ECHO MEAS - AVA(I,D): 2.2 CM^2
BH CV ECHO MEAS - AVA(V,A): 2.3 CM^2
BH CV ECHO MEAS - AVA(V,D): 2.3 CM^2
BH CV ECHO MEAS - BSA(HAYCOCK): 2.2 M^2
BH CV ECHO MEAS - BSA: 2.1 M^2
BH CV ECHO MEAS - BZI_BMI: 31.9 KILOGRAMS/M^2
BH CV ECHO MEAS - BZI_METRIC_HEIGHT: 175.3 CM
BH CV ECHO MEAS - BZI_METRIC_WEIGHT: 98 KG
BH CV ECHO MEAS - EDV(TEICH): 99.7 ML
BH CV ECHO MEAS - EF(CUBED): 59.8 %
BH CV ECHO MEAS - EF(MOD-BP): 55 %
BH CV ECHO MEAS - EF(TEICH): 51.4 %
BH CV ECHO MEAS - ESV(TEICH): 48.5 ML
BH CV ECHO MEAS - FS: 26.2 %
BH CV ECHO MEAS - IVS/LVPW: 0.96
BH CV ECHO MEAS - IVSD: 1.1 CM
BH CV ECHO MEAS - LAT PEAK E' VEL: 7 CM/SEC
BH CV ECHO MEAS - LV MASS(C)D: 196.3 GRAMS
BH CV ECHO MEAS - LV MASS(C)DI: 92 GRAMS/M^2
BH CV ECHO MEAS - LV MAX PG: 6.7 MMHG
BH CV ECHO MEAS - LV MEAN PG: 3.5 MMHG
BH CV ECHO MEAS - LV V1 MAX: 129 CM/SEC
BH CV ECHO MEAS - LV V1 MEAN: 86.4 CM/SEC
BH CV ECHO MEAS - LV V1 VTI: 24.6 CM
BH CV ECHO MEAS - LVIDD: 4.6 CM
BH CV ECHO MEAS - LVIDS: 3.4 CM
BH CV ECHO MEAS - LVOT AREA (M): 3.1 CM^2
BH CV ECHO MEAS - LVOT AREA: 3.2 CM^2
BH CV ECHO MEAS - LVOT DIAM: 2 CM
BH CV ECHO MEAS - LVPWD: 1.2 CM
BH CV ECHO MEAS - MED PEAK E' VEL: 6 CM/SEC
BH CV ECHO MEAS - MR MAX PG: 45 MMHG
BH CV ECHO MEAS - MR MAX VEL: 335.3 CM/SEC
BH CV ECHO MEAS - MV A DUR: 0.18 SEC
BH CV ECHO MEAS - MV A MAX VEL: 78.6 CM/SEC
BH CV ECHO MEAS - MV DEC SLOPE: 190.4 CM/SEC^2
BH CV ECHO MEAS - MV DEC TIME: 0.31 SEC
BH CV ECHO MEAS - MV E MAX VEL: 58.2 CM/SEC
BH CV ECHO MEAS - MV E/A: 0.74
BH CV ECHO MEAS - MV MAX PG: 2.9 MMHG
BH CV ECHO MEAS - MV MEAN PG: 1 MMHG
BH CV ECHO MEAS - MV P1/2T MAX VEL: 61.1 CM/SEC
BH CV ECHO MEAS - MV P1/2T: 94 MSEC
BH CV ECHO MEAS - MV V2 MAX: 85.5 CM/SEC
BH CV ECHO MEAS - MV V2 MEAN: 48 CM/SEC
BH CV ECHO MEAS - MV V2 VTI: 30.4 CM
BH CV ECHO MEAS - MVA P1/2T LCG: 3.6 CM^2
BH CV ECHO MEAS - MVA(P1/2T): 2.3 CM^2
BH CV ECHO MEAS - MVA(VTI): 2.6 CM^2
BH CV ECHO MEAS - PA ACC TIME: 0.09 SEC
BH CV ECHO MEAS - PA MAX PG (FULL): 0.94 MMHG
BH CV ECHO MEAS - PA MAX PG: 3.4 MMHG
BH CV ECHO MEAS - PA PR(ACCEL): 39.4 MMHG
BH CV ECHO MEAS - PA V2 MAX: 92.3 CM/SEC
BH CV ECHO MEAS - PULM A REVS DUR: 0.14 SEC
BH CV ECHO MEAS - PULM A REVS VEL: 28.5 CM/SEC
BH CV ECHO MEAS - PULM DIAS VEL: 34 CM/SEC
BH CV ECHO MEAS - PULM S/D: 0.88
BH CV ECHO MEAS - PULM SYS VEL: 29.8 CM/SEC
BH CV ECHO MEAS - PVA(V,A): 4.5 CM^2
BH CV ECHO MEAS - PVA(V,D): 4.5 CM^2
BH CV ECHO MEAS - QP/QS: 1.3
BH CV ECHO MEAS - RAP SYSTOLE: 3 MMHG
BH CV ECHO MEAS - RV MAX PG: 2.5 MMHG
BH CV ECHO MEAS - RV MEAN PG: 1.4 MMHG
BH CV ECHO MEAS - RV V1 MAX: 78.6 CM/SEC
BH CV ECHO MEAS - RV V1 MEAN: 57.4 CM/SEC
BH CV ECHO MEAS - RV V1 VTI: 18.6 CM
BH CV ECHO MEAS - RVOT AREA: 5.3 CM^2
BH CV ECHO MEAS - RVOT DIAM: 2.6 CM
BH CV ECHO MEAS - RVSP: 16 MMHG
BH CV ECHO MEAS - SI(CUBED): 28.1 ML/M^2
BH CV ECHO MEAS - SI(LVOT): 36.7 ML/M^2
BH CV ECHO MEAS - SI(TEICH): 24 ML/M^2
BH CV ECHO MEAS - SUP REN AO DIAM: 2.1 CM
BH CV ECHO MEAS - SV(CUBED): 60 ML
BH CV ECHO MEAS - SV(LVOT): 78.3 ML
BH CV ECHO MEAS - SV(RVOT): 98.1 ML
BH CV ECHO MEAS - SV(TEICH): 51.2 ML
BH CV ECHO MEAS - TAPSE (>1.6): 1.9 CM2
BH CV ECHO MEAS - TR MAX VEL: 180.5 CM/SEC
BH CV ECHO MEASUREMENTS AVERAGE E/E' RATIO: 8.95
BH CV XLRA - RV BASE: 2.2 CM
BH CV XLRA - TDI S': 12 CM/SEC
LEFT ATRIUM VOLUME INDEX: 16 ML/M2
LV EF 2D ECHO EST: 55 %
MAXIMAL PREDICTED HEART RATE: 156 BPM
SINUS: 3.1 CM
STJ: 3 CM
STRESS TARGET HR: 133 BPM

## 2019-09-16 PROCEDURE — 25010000002 PERFLUTREN (DEFINITY) 8.476 MG IN SODIUM CHLORIDE 0.9 % 10 ML INJECTION: Performed by: INTERNAL MEDICINE

## 2019-09-16 PROCEDURE — 0399T ADULT TRANSTHORACIC ECHO COMPLETE W/ CONT IF NECESSARY PER PROTOCOL: CPT | Performed by: INTERNAL MEDICINE

## 2019-09-16 PROCEDURE — 93306 TTE W/DOPPLER COMPLETE: CPT | Performed by: INTERNAL MEDICINE

## 2019-09-16 PROCEDURE — 93306 TTE W/DOPPLER COMPLETE: CPT

## 2019-09-16 PROCEDURE — 0399T HC MYOCARDL STRAIN IMAG QUAN ASSMT PER SESS: CPT

## 2019-09-16 RX ADMIN — PERFLUTREN 2 ML: 6.52 INJECTION, SUSPENSION INTRAVENOUS at 14:45

## 2019-09-18 NOTE — PROGRESS NOTES
Subjective     REASON FOR CONSULTATION:   1. Left breast cancer T2N0 3.6 cm grade 3 ER MT negative HER-2 positive infiltrating ductal carcinoma post excisional biopsy, followed by mastectomy and axillary dissection                               REQUESTING PHYSICIAN: Jase Khan DO    History of Present Illness patient is a 64-year-old female with a large ER MT negative HER-2 positive breast cancer surgically excised here to receive adjuvant treatment.  Initially because of the large size and comorbidities we were thinking of weekly Taxol Herceptin perjeta followed by Adriamycin Cytoxan however she had cardiac evaluation with echo with a borderline ejection fraction and had a stress test which showed no ischemia but again the ejection fraction of 50 to 52% and based on her shortness of breath with exertion and symptomatology, it was felt best to proceed instead with just weekly Taxol Herceptin perjeta followed by a year of Herceptin perjeta. Concern for the anthracycline possibly contributing to cardiotoxicity in a patient with borderline cardiac function.    She is followed by Dr. Dudley, cardiology, who gave clearance for the patient to proceed with chemotherapy.  repeat echo 6 weeks from last actually showed improvement in the cardiologist thought there may have been some technical issues with her first echocardiogram.    She returns back today, due for cycle2 day 1 of Taxol/Herceptin perjeta.  She did have 2 to 3 days of fairly significant diarrhea but by taking Imodium 6 to 8 pills a day she is having this under control.    She has some reflux symptoms in the morning which makes her nauseous and I suggested she take 1 Prilosec every day till the chemo was over    She has no neuropathy but is not brought the cooling gloves and I recommended this to her and gave her the literature supporting this in the website    She continues to have some  pain in her right chest related to previous surgery and Mediport placement.  She is trying at this point to take just one Norco daily.  She is requesting a refill of this, hopeful to wean herself off with this next prescription.  I cautioned her about getting addicted to the Percocet and she told me she took it for 14 years and weaned herself off of it and she is sure she can do it again but I am very reluctant to keep prescribing her Percocet    Otherwise she denies further concerns today.    Past Medical History:   Diagnosis Date   • Adjustment disorder    • Allergic rhinitis    • Amenorrhea    • Anxiety    • Breast cancer (CMS/[facility]) 04/18/2019    Left breast mammary carcinoma   • Bruit    • Carpal tunnel syndrome    • Carrier of tuberculosis    • Chronic pain    • De Quervain's tenosynovitis    • Degenerative cervical disc    • Depression    • Dyslipidemia    • Eustachian tube dysfunction    • History of UTI    • Hyperlipidemia    • Hypertension    • Impaired fasting glucose    • Lumbar degenerative disc disease    • Numbness and tingling     LEFT LEG   • OAB (overactive bladder)    • Scapulothoracic syndrome    • Sciatica         Past Surgical History:   Procedure Laterality Date   • APPENDECTOMY N/A    • BREAST BIOPSY Left 2019    Left breast ultrasound guided cyst aspiration, 9:00 position-Dr. Gerry Taylor, [facility]   • BREAST LUMPECTOMY Left 5/2/2019    Procedure: Left BREAST LUMPECTOMY;  Surgeon: Jase Evans MD;  Location: [facility] OR;  Service: General   • LUMBAR EPIDURAL INJECTION N/A    • MASTECTOMY Left 6/21/2019    Procedure: Left BREAST MASTECTOMY;  Surgeon: Jase Evans MD;  Location: [facility] OR;  Service: General   • TUBAL ABDOMINAL LIGATION Bilateral    • VAGINAL DELIVERY      x3   • VENOUS ACCESS DEVICE (PORT) INSERTION Right 8/27/2019    Procedure: INSERTION VENOUS ACCESS DEVICE;  Surgeon: Jase Evans MD;  Location: [facility] OR;  Service: General      ONC  HISTORY;  patient is a 64-year-old retired  with hypertension hypercholesterolemia and degenerative arthritis who felt a mass in her left breast in March.  She showed it to her family doctor and underwent imaging at  [facility] on 3/13/2019 which Showed a 3 x 3.2 cm mass at 9:00 which on ultrasound showed a thick-walled benign-appearing 2.8 x 2.5 cm cyst which was felt to not be suspicious .because of persistence of symptoms she was reevaluated on 2019 and underwent aspiration and core biopsy because there was a significant soft tissue component to the mass at that time this was aspirated and the final report  showed fine-needle aspiration suspicious for malignant cells favor mammary carcinoma  Patient was referred to Dr. Kee who took her for an excisional biopsy on 2019 with the final report showing a 4 mass grade 3x3.6cm with tumor extending to one margin and DCIS also 0.2 mm from one margin.  The tumor was ER IA negative HER-2 3+.  The patient was then taken for surgery on 2019 at which time she had a completion mastectomy and axillary node biopsy with the findings of residual high-grade DCIS with clear margins and an incidental intraductal papilloma and 17- lymph nodes making this a T2N0 tumor    She has done well postoperatively and is here to discuss adjuvant treatment    She is  3 para 3 menarche  at age 15 and menopause in her early 40s when she had a hysterectomy for heavy menstrual bleeding.  She took hormone replacement for 10 years and stopped 10 years ago first childbirth was at age 25 she did not breast-feed  Family history is positive for mother who had uterine cancer at age 60 and  of it at age 65 she has a brother who  of liver cancer related to drinking there is no other malignancy in the family that she is aware of    She is a significant smoker for the last 48 years but does not drink     Patient and her  were very taken to back by the suggestion about  chemotherapy and apparently had thought that there was no further treatment needed and I spent almost an hour presenting the data concerning the extreme effectiveness of HER-2 directed therapy and this situation with a high risk of recurrence without adjuvant treatment and she finally was convinced to do the treatment    We discussed smoking cessation but at this point she is so nervous and agitated with the prospect of chemotherapy that we will hold off on this until she is group home through the chemotherapy and rediscuss it    I discussed the case also with . Chilango will place a port and we will plan to start chemotherapy in 2 weeks.    Patient initiating therapy with Taxol/Herceptin/Perjeta 8/29/19.    Current Outpatient Medications on File Prior to Visit   Medication Sig Dispense Refill   • ALPRAZolam (XANAX) 0.25 MG tablet Take 2 tablets by mouth 2 (Two) Times a Day As Needed for Anxiety for up to 60 doses. 60 tablet 0   • buPROPion XL (WELLBUTRIN XL) 150 MG 24 hr tablet Take 1 tablet by mouth Daily. 30 tablet 3   • busPIRone (BUSPAR) 10 MG tablet Take 10 mg by mouth 2 (Two) Times a Day.     • citalopram (CeleXA) 40 MG tablet Take 1 tablet by mouth Daily. (Patient taking differently: Take 40 mg by mouth Every Night.) 30 tablet 5   • HYDROcodone-acetaminophen (NORCO) 5-325 MG per tablet Take 1 tablet by mouth Every 6 (Six) Hours As Needed for Moderate Pain  or Severe Pain . 30 tablet 0   • loratadine (CLARITIN) 10 MG tablet Take 10 mg by mouth Daily.     • metoprolol succinate XL (TOPROL-XL) 25 MG 24 hr tablet Take 25 mg by mouth Daily.     • montelukast (SINGULAIR) 10 MG tablet Take 1 tablet by mouth Every Night. (Patient taking differently: Take 10 mg by mouth Daily.) 30 tablet 11   • Multiple Vitamins-Minerals (CENTRUM SILVER PO) Take 1 tablet by mouth Daily. womens vitamin     • vitamin B-12 (CYANOCOBALAMIN) 100 MCG tablet Take 100 mcg by mouth Daily.     • [DISCONTINUED] ondansetron ODT (ZOFRAN-ODT) 8 MG  disintegrating tablet Take 1 tablet by mouth Every 8 (Eight) Hours As Needed for Nausea or Vomiting. 30 tablet 2     No current facility-administered medications on file prior to visit.         ALLERGIES:    Allergies   Allergen Reactions   • Gabapentin Hallucinations   • Effexor [Venlafaxine] Itching   • Naproxen Itching     Pt reports severe vaginal itching    • Zyrtec [Cetirizine] Other (See Comments)     Non-effecious   • Ibuprofen Other (See Comments)     Burns while urinating  Can take low dose Ibuprofen   • Penicillins Rash   • Sulfa Antibiotics Rash        Social History     Socioeconomic History   • Marital status:      Spouse name: Pawan   • Number of children: 3   • Years of education: High school   • Highest education level: Not on file   Occupational History     Employer: RETIRED   Tobacco Use   • Smoking status: Current Every Day Smoker     Packs/day: 0.25     Years: 48.00     Pack years: 12.00     Types: Cigarettes   • Smokeless tobacco: Never Used   • Tobacco comment: Trying to quit.    Substance and Sexual Activity   • Alcohol use: No   • Drug use: No   • Sexual activity: Yes     Partners: Male     Birth control/protection: Post-menopausal        Family History   Problem Relation Age of Onset   • Ovarian cancer Mother    • Endometrial cancer Mother    • COPD Father    • Stroke Brother    • Malig Hyperthermia Neg Hx         Review of Systems   Constitutional: Positive for fatigue (same 9/19/19). Negative for diaphoresis.   HENT: Negative.    Eyes: Negative.    Respiratory: Positive for cough (just as night little better 9/19/19).    Cardiovascular: Negative for chest pain.   Gastrointestinal: Positive for diarrhea (same 9/19/19 not as loose) and nausea (9/19/19).   Endocrine: Negative.    Genitourinary: Positive for difficulty urinating (burning sensastion 9/19/19).   Musculoskeletal: Positive for neck pain (same feeling tight 9/19/19). Negative for back pain.   Neurological: Positive for  "numbness ( left underarm from surgery little better 9/19/19).   Hematological: Negative.    Psychiatric/Behavioral: Negative.         Objective     Vitals:    09/19/19 0834   BP: 147/72   Pulse: 75   Resp: 16   Temp: 98.4 °F (36.9 °C)   SpO2: 99%   Weight: 99.6 kg (219 lb 9.6 oz)   Height: 176.3 cm (69.41\")   PainSc: 0-No pain     Current Status 9/19/2019   ECOG score 0       Physical Exam    GENERAL:  Well-developed, well-nourished in no acute distress.   SKIN:  Warm, dry without rashes, purpura or petechiae.  EYES:  Pupils equal, round and reactive to light.  EOMs intact.  Conjunctivae normal.  EARS:  Hearing intact.  NOSE:  Septum midline.  No excoriations or nasal discharge.  MOUTH:  Tongue is well-papillated; no stomatitis or ulcers.  Lips normal.  THROAT:  Oropharynx without lesions or exudates.  NECK:  Supple with good range of motion; no thyromegaly or masses, no JVD.  LYMPHATICS:  No cervical, supraclavicular, axillary or inguinal adenopathy.  CHEST:  Lungs clear to auscultation. Good airflow.  Mediport right chest wall benign.  BREASTS: Right breast is benign; left chest wall shows a well-healed mastectomy scar  CARDIAC:  Regular rate and rhythm without murmurs, rubs or gallops. Normal S1,S2.  ABDOMEN:  Soft, nontender with no hepatosplenomegaly or masses.  EXTREMITIES:  No clubbing, cyanosis or edema.  NEUROLOGICAL:  Cranial Nerves II-XII grossly intact.  No focal neurological deficits.  PSYCHIATRIC:  Normal affect and mood.        RECENT LABS:  Results from last 7 days   Lab Units 09/19/19  0733   WBC 10*3/mm3 3.97   NEUTROS ABS 10*3/mm3 1.38*   HEMOGLOBIN g/dL 11.4*   HEMATOCRIT % 33.9*   PLATELETS 10*3/mm3 229     Results from last 7 days   Lab Units 09/19/19  0740   SODIUM mmol/L 141   POTASSIUM mmol/L 3.8   CHLORIDE mmol/L 106   CO2 mmol/L 25.0   BUN mg/dL 8   CREATININE mg/dL 0.73   CALCIUM mg/dL 8.9   ALBUMIN g/dL 3.70   BILIRUBIN mg/dL 0.3   ALK PHOS U/L 99   ALT (SGPT) U/L 17   AST (SGOT) U/L 16 "   GLUCOSE mg/dL 77         RADIOGRAPHIC DATA:    US Breat Left Limited    1. Left Breast Lumpectomy (47 Grams):  A. Invasive poorly differentiated mammary carcinoma of no special type (ductal carcinoma)  with apocrine features.  1. Invasive carcinoma measures up to 40 x 36 x 27 mm (measured grossly).  2. Overall Burkett Grade III (glandular score = 3, nuclear score = 3, mitotic score = 3).  3. No definitive lymphovascular space invasion identified.  B. Invasive carcinoma focally extends to one undesignated peripheral inked margin of excision.  C. Associated ductal carcinoma in situ (DCIS).  1. Ductal carcinoma in situ (DCIS) spans area measuring 40 mm in single greatest  aggregate dimension (estimated from gross and glass slides).  2. DCIS predominantly solid and comedo type with high grade nuclear features.  3. High grade DCIS approximates the closest peripheral inked margin to 0.2 mm.  Page: 1 of 5 Printed: 5/6/2019 1:10 PM  [phone]  Resulting  Tissue Pathology Exam: [id number]   Order: [id number]   Collected:  5/2/2019 12:26 Status:  Edited Result - FINAL   Visible to patient:  No (Not Released) Dx:  Malignant neoplasm of upper-inner hector...   Component    Addendum   HER2 by Immunohistochemistry   Positive (Score 3+)     HER2 by Immunohistochemistry  FDA approved (specify test/vendor): [employer]     Primary Antibody  CB11     Cold Ischemia and Fixation Times   Meet requirements specified in latest version of the ASCO/CAP guidelines         Cold Ischemia Time: 18 minutes  Fixation Time: 8.25 hours  Testing Performed on Block Number(s): 1E  Fixative: Formalin   Addendum electronically signed by Harman Perry MD on 5/6/2019          Final Diagnosis  1. Left Breast, Modified Radical Mastectomy (935 grams): HIGH GRADE DUCTAL CARCINOMA IN-SITU  (DCIS) .  A. Nuclear Grade: High.  B. Architectural Type: Solid and comedo with lobular extension.  1. Size (extent) of DCIS: DCIS multifocal in the lower inner quadrant,  measuring 0.9 cm in maximal  dimension (DCIS present in 3/18 tissue blocks).  C. No LCIS identified.  D. Skin and nipple uninvolved by DCIS.  E. Margins: Uninvolved by DCIS.  1. DCIS comes to within 1.3 cm of the anterior margin of excision (closest margin).  F. Additional findings: Incidental intraductal papilloma, florid ductal hyperplasia and apocrine cysts.  G. Lymph nodes: Sixteen benign lymph nodes (0/16).  H. Hormone receptor status: ER and ID negative, Her2/kali positive by IHC (performed on prior resection  [id number]).  I. Pathologic stage: pT2, N0.  Swm/kds      Regadenoson Stress Test With Myocardial Perfusion SPECT   Interpretation Summary     · Myocardial perfusion imaging indicates a normal myocardial perfusion study with no evidence of ischemia.  · Left ventricular ejection fraction is borderline normal (Calculated EF = 50%).  · Impressions are consistent with a low risk study.      Study Description         Nuclear Perfusion Images Overall image quality is excellent. There are no artifacts present. Raw images reviewed with no abnormalities noted.       Echo 9/16  Interpretation Summary     · Left ventricular systolic function is normal. Calculated EF = 55%. Estimated EF = 55%. Global Longitudinal LV strain = -21.6%. Strain data was reviewed and speckle tracking was considered accurate. The global longitudinal LV strain is -21.6 and normal. Normal left ventricular cavity size and wall thickness noted. All left ventricular wall segments contract normally. Left ventricular diastolic dysfunction is noted (grade I) consistent with impaired relaxation.  · Trace mitral valve regurgitation is present.  · Physiologic tricuspid valve regurgitation is present. Calculated right ventricular systolic pressure from tricuspid regurgitation is 16.0 mmHg. Insufficient TR velocity profile to estimate the right ventricular systolic pressure.           Assessment/Plan   1.  T2N0 grade 3 infiltrating ductal carcinoma left  breast ER [place] negative HER-2 positive post mastectomy and axillary node dissection with clear margins.  · Initiated Taxol/Herceptin/Perjeta today, 8/29/19.  · Plan to do 12 weeks of Taxol Herceptin perjeta without Adriamycin because of her borderline cardiac function    2.  Tobacco abuse and COPD    3.  Abnormal echo with stress test normal but borderline cardiac function at 52%  We will avoid Adriamycin Cytoxan  and treat with Taxol Herceptin perjeta and a year of Herceptin perjeta. Dr. Dudley, cardiology, has given clearance for patient to begin.  Planning repeat echocardiogram 6 weeks from previous.    · Improved EF to 55%    4.  Venous access.  Status post new Mediport placement 8/27/2019.  Working well today.  She has some expected associated pain with this, responsive to Norco.  She does have both pain related to new Mediport and some related to previous mastectomy.  Patient is requesting a small refill of her Norco today.  She is trying to take just 1 a day hopeful to wean herself off soon.  We will give her #30 with no  refill this going forward.  Her pain should resolve as she moves further out from Mediport placement.    5.  Diarrhea related to Perjeta.  Improved with Imodium    Plan:  1.  Proceed with cycle2, day 1 Taxol/herceptin perjeta.  2.  Continue Norco 5/325, taking roughly 1 daily for pain.  No more refills   3.  Utilize Imodium as outlined above.  Add Prilosec for reflux  4.  Echocardiogram will be repeated in 9 weeks  5.  Return in 1+2 week for Taxol/Herceptin.   6.  Return in 23weeks for follow-up with Dr. Jhaveri and cycle 3-day 1 Taxol/Herceptin/Perjeta.     This patient is on drug therapy requiring intensive monitoring for toxicity.  We addressed current concerns of pain and diarrhea as outlined above.

## 2019-09-19 ENCOUNTER — OFFICE VISIT (OUTPATIENT)
Dept: ONCOLOGY | Facility: CLINIC | Age: 64
End: 2019-09-19

## 2019-09-19 ENCOUNTER — INFUSION (OUTPATIENT)
Dept: ONCOLOGY | Facility: HOSPITAL | Age: 64
End: 2019-09-19

## 2019-09-19 VITALS — HEART RATE: 76 BPM | DIASTOLIC BLOOD PRESSURE: 72 MMHG | SYSTOLIC BLOOD PRESSURE: 161 MMHG

## 2019-09-19 VITALS
BODY MASS INDEX: 32.53 KG/M2 | DIASTOLIC BLOOD PRESSURE: 72 MMHG | TEMPERATURE: 98.4 F | SYSTOLIC BLOOD PRESSURE: 147 MMHG | OXYGEN SATURATION: 99 % | HEIGHT: 69 IN | WEIGHT: 219.6 LBS | RESPIRATION RATE: 16 BRPM | HEART RATE: 75 BPM

## 2019-09-19 DIAGNOSIS — Z17.1 MALIGNANT NEOPLASM OF UPPER-INNER QUADRANT OF LEFT BREAST IN FEMALE, ESTROGEN RECEPTOR NEGATIVE (HCC): Primary | ICD-10-CM

## 2019-09-19 DIAGNOSIS — C50.212 MALIGNANT NEOPLASM OF UPPER-INNER QUADRANT OF LEFT BREAST IN FEMALE, ESTROGEN RECEPTOR NEGATIVE (HCC): ICD-10-CM

## 2019-09-19 DIAGNOSIS — Z17.1 MALIGNANT NEOPLASM OF UPPER-INNER QUADRANT OF LEFT BREAST IN FEMALE, ESTROGEN RECEPTOR NEGATIVE (HCC): ICD-10-CM

## 2019-09-19 DIAGNOSIS — C50.212 MALIGNANT NEOPLASM OF UPPER-INNER QUADRANT OF LEFT BREAST IN FEMALE, ESTROGEN RECEPTOR NEGATIVE (HCC): Primary | ICD-10-CM

## 2019-09-19 DIAGNOSIS — Z45.2 ENCOUNTER FOR FITTING AND ADJUSTMENT OF VASCULAR CATHETER: ICD-10-CM

## 2019-09-19 LAB
ALBUMIN SERPL-MCNC: 3.7 G/DL (ref 3.5–5.2)
ALBUMIN/GLOB SERPL: 1.5 G/DL (ref 1.1–2.4)
ALP SERPL-CCNC: 99 U/L (ref 38–116)
ALT SERPL W P-5'-P-CCNC: 17 U/L (ref 0–33)
ANION GAP SERPL CALCULATED.3IONS-SCNC: 10 MMOL/L (ref 5–15)
AST SERPL-CCNC: 16 U/L (ref 0–32)
BASOPHILS # BLD AUTO: 0.03 10*3/MM3 (ref 0–0.2)
BASOPHILS NFR BLD AUTO: 0.8 % (ref 0–1.5)
BILIRUB SERPL-MCNC: 0.3 MG/DL (ref 0.2–1.2)
BUN BLD-MCNC: 8 MG/DL (ref 6–20)
BUN/CREAT SERPL: 11 (ref 7.3–30)
CALCIUM SPEC-SCNC: 8.9 MG/DL (ref 8.5–10.2)
CHLORIDE SERPL-SCNC: 106 MMOL/L (ref 98–107)
CO2 SERPL-SCNC: 25 MMOL/L (ref 22–29)
CREAT BLD-MCNC: 0.73 MG/DL (ref 0.6–1.1)
DEPRECATED RDW RBC AUTO: 43.3 FL (ref 37–54)
EOSINOPHIL # BLD AUTO: 0.08 10*3/MM3 (ref 0–0.4)
EOSINOPHIL NFR BLD AUTO: 2 % (ref 0.3–6.2)
ERYTHROCYTE [DISTWIDTH] IN BLOOD BY AUTOMATED COUNT: 12.7 % (ref 12.3–15.4)
GFR SERPL CREATININE-BSD FRML MDRD: 80 ML/MIN/1.73
GLOBULIN UR ELPH-MCNC: 2.4 GM/DL (ref 1.8–3.5)
GLUCOSE BLD-MCNC: 77 MG/DL (ref 74–124)
HCT VFR BLD AUTO: 33.9 % (ref 34–46.6)
HGB BLD-MCNC: 11.4 G/DL (ref 12–15.9)
IMM GRANULOCYTES # BLD AUTO: 0.02 10*3/MM3 (ref 0–0.05)
IMM GRANULOCYTES NFR BLD AUTO: 0.5 % (ref 0–0.5)
LYMPHOCYTES # BLD AUTO: 2.11 10*3/MM3 (ref 0.7–3.1)
LYMPHOCYTES NFR BLD AUTO: 53.1 % (ref 19.6–45.3)
MCH RBC QN AUTO: 31.2 PG (ref 26.6–33)
MCHC RBC AUTO-ENTMCNC: 33.6 G/DL (ref 31.5–35.7)
MCV RBC AUTO: 92.9 FL (ref 79–97)
MONOCYTES # BLD AUTO: 0.35 10*3/MM3 (ref 0.1–0.9)
MONOCYTES NFR BLD AUTO: 8.8 % (ref 5–12)
NEUTROPHILS # BLD AUTO: 1.38 10*3/MM3 (ref 1.7–7)
NEUTROPHILS NFR BLD AUTO: 34.8 % (ref 42.7–76)
NRBC BLD AUTO-RTO: 0 /100 WBC (ref 0–0.2)
PLATELET # BLD AUTO: 229 10*3/MM3 (ref 140–450)
PMV BLD AUTO: 9.5 FL (ref 6–12)
POTASSIUM BLD-SCNC: 3.8 MMOL/L (ref 3.5–4.7)
PROT SERPL-MCNC: 6.1 G/DL (ref 6.3–8)
RBC # BLD AUTO: 3.65 10*6/MM3 (ref 3.77–5.28)
SODIUM BLD-SCNC: 141 MMOL/L (ref 134–145)
WBC NRBC COR # BLD: 3.97 10*3/MM3 (ref 3.4–10.8)

## 2019-09-19 PROCEDURE — 96375 TX/PRO/DX INJ NEW DRUG ADDON: CPT | Performed by: INTERNAL MEDICINE

## 2019-09-19 PROCEDURE — 25010000002 DIPHENHYDRAMINE PER 50 MG: Performed by: INTERNAL MEDICINE

## 2019-09-19 PROCEDURE — 25010000002 PACLITAXEL PER 30 MG: Performed by: INTERNAL MEDICINE

## 2019-09-19 PROCEDURE — 25010000002 PERTUZUMAB 420 MG/14ML SOLUTION 420 MG VIAL: Performed by: INTERNAL MEDICINE

## 2019-09-19 PROCEDURE — 96413 CHEMO IV INFUSION 1 HR: CPT | Performed by: INTERNAL MEDICINE

## 2019-09-19 PROCEDURE — 99215 OFFICE O/P EST HI 40 MIN: CPT | Performed by: INTERNAL MEDICINE

## 2019-09-19 PROCEDURE — 85025 COMPLETE CBC W/AUTO DIFF WBC: CPT

## 2019-09-19 PROCEDURE — 96417 CHEMO IV INFUS EACH ADDL SEQ: CPT | Performed by: INTERNAL MEDICINE

## 2019-09-19 PROCEDURE — 25010000002 TRASTUZUMAB PER 10 MG: Performed by: INTERNAL MEDICINE

## 2019-09-19 PROCEDURE — 80053 COMPREHEN METABOLIC PANEL: CPT

## 2019-09-19 PROCEDURE — 25010000002 DEXAMETHASONE SODIUM PHOSPHATE 100 MG/10ML SOLUTION: Performed by: INTERNAL MEDICINE

## 2019-09-19 RX ORDER — DIPHENHYDRAMINE HYDROCHLORIDE 50 MG/ML
50 INJECTION INTRAMUSCULAR; INTRAVENOUS AS NEEDED
Status: CANCELLED | OUTPATIENT
Start: 2019-09-26

## 2019-09-19 RX ORDER — SODIUM CHLORIDE 9 MG/ML
250 INJECTION, SOLUTION INTRAVENOUS ONCE
Status: CANCELLED | OUTPATIENT
Start: 2019-09-26

## 2019-09-19 RX ORDER — ONDANSETRON 8 MG/1
8 TABLET, ORALLY DISINTEGRATING ORAL EVERY 8 HOURS PRN
Qty: 30 TABLET | Refills: 2 | Status: SHIPPED | OUTPATIENT
Start: 2019-09-19 | End: 2020-07-23

## 2019-09-19 RX ORDER — FAMOTIDINE 10 MG/ML
20 INJECTION, SOLUTION INTRAVENOUS ONCE
Status: CANCELLED | OUTPATIENT
Start: 2019-10-03

## 2019-09-19 RX ORDER — DIPHENHYDRAMINE HYDROCHLORIDE 50 MG/ML
50 INJECTION INTRAMUSCULAR; INTRAVENOUS AS NEEDED
Status: CANCELLED | OUTPATIENT
Start: 2019-10-03

## 2019-09-19 RX ORDER — FAMOTIDINE 10 MG/ML
20 INJECTION, SOLUTION INTRAVENOUS ONCE
Status: COMPLETED | OUTPATIENT
Start: 2019-09-19 | End: 2019-09-19

## 2019-09-19 RX ORDER — SODIUM CHLORIDE 0.9 % (FLUSH) 0.9 %
10 SYRINGE (ML) INJECTION AS NEEDED
Status: DISCONTINUED | OUTPATIENT
Start: 2019-09-19 | End: 2019-09-19 | Stop reason: HOSPADM

## 2019-09-19 RX ORDER — LORATADINE 10 MG/1
10 TABLET ORAL DAILY
COMMUNITY
End: 2020-01-23

## 2019-09-19 RX ORDER — FAMOTIDINE 10 MG/ML
20 INJECTION, SOLUTION INTRAVENOUS ONCE
Status: CANCELLED | OUTPATIENT
Start: 2019-09-26

## 2019-09-19 RX ORDER — SODIUM CHLORIDE 9 MG/ML
250 INJECTION, SOLUTION INTRAVENOUS ONCE
Status: CANCELLED | OUTPATIENT
Start: 2019-09-19

## 2019-09-19 RX ORDER — FAMOTIDINE 10 MG/ML
20 INJECTION, SOLUTION INTRAVENOUS AS NEEDED
Status: CANCELLED | OUTPATIENT
Start: 2019-09-19

## 2019-09-19 RX ORDER — SODIUM CHLORIDE 0.9 % (FLUSH) 0.9 %
10 SYRINGE (ML) INJECTION AS NEEDED
Status: CANCELLED | OUTPATIENT
Start: 2019-09-19

## 2019-09-19 RX ORDER — FAMOTIDINE 10 MG/ML
20 INJECTION, SOLUTION INTRAVENOUS AS NEEDED
Status: CANCELLED | OUTPATIENT
Start: 2019-10-03

## 2019-09-19 RX ORDER — FAMOTIDINE 10 MG/ML
20 INJECTION, SOLUTION INTRAVENOUS AS NEEDED
Status: CANCELLED | OUTPATIENT
Start: 2019-09-26

## 2019-09-19 RX ORDER — FAMOTIDINE 10 MG/ML
20 INJECTION, SOLUTION INTRAVENOUS ONCE
Status: CANCELLED | OUTPATIENT
Start: 2019-09-19

## 2019-09-19 RX ORDER — DIPHENHYDRAMINE HYDROCHLORIDE 50 MG/ML
50 INJECTION INTRAMUSCULAR; INTRAVENOUS AS NEEDED
Status: CANCELLED | OUTPATIENT
Start: 2019-09-19

## 2019-09-19 RX ORDER — SODIUM CHLORIDE 9 MG/ML
250 INJECTION, SOLUTION INTRAVENOUS ONCE
Status: COMPLETED | OUTPATIENT
Start: 2019-09-19 | End: 2019-09-19

## 2019-09-19 RX ORDER — SODIUM CHLORIDE 9 MG/ML
250 INJECTION, SOLUTION INTRAVENOUS ONCE
Status: CANCELLED | OUTPATIENT
Start: 2019-10-03

## 2019-09-19 RX ADMIN — Medication 500 UNITS: at 13:31

## 2019-09-19 RX ADMIN — FAMOTIDINE 20 MG: 10 INJECTION INTRAVENOUS at 09:26

## 2019-09-19 RX ADMIN — PACLITAXEL 170 MG: 6 INJECTION, SOLUTION INTRAVENOUS at 12:32

## 2019-09-19 RX ADMIN — DEXAMETHASONE SODIUM PHOSPHATE 12 MG: 10 INJECTION, SOLUTION INTRAMUSCULAR; INTRAVENOUS at 09:46

## 2019-09-19 RX ADMIN — SODIUM CHLORIDE, PRESERVATIVE FREE 10 ML: 5 INJECTION INTRAVENOUS at 13:31

## 2019-09-19 RX ADMIN — PERTUZUMAB 420 MG: 30 INJECTION, SOLUTION, CONCENTRATE INTRAVENOUS at 10:15

## 2019-09-19 RX ADMIN — TRASTUZUMAB 200 MG: 150 INJECTION, POWDER, LYOPHILIZED, FOR SOLUTION INTRAVENOUS at 11:55

## 2019-09-19 RX ADMIN — DIPHENHYDRAMINE HYDROCHLORIDE 25 MG: 50 INJECTION, SOLUTION INTRAMUSCULAR; INTRAVENOUS at 09:28

## 2019-09-19 RX ADMIN — SODIUM CHLORIDE 250 ML: 9 INJECTION, SOLUTION INTRAVENOUS at 09:26

## 2019-09-20 DIAGNOSIS — F41.1 GENERALIZED ANXIETY DISORDER: ICD-10-CM

## 2019-09-20 RX ORDER — BUPROPION HYDROCHLORIDE 150 MG/1
TABLET ORAL
Qty: 30 TABLET | Refills: 2 | Status: SHIPPED | OUTPATIENT
Start: 2019-09-20 | End: 2020-01-23

## 2019-09-25 ENCOUNTER — TELEPHONE (OUTPATIENT)
Dept: OTHER | Facility: HOSPITAL | Age: 64
End: 2019-09-25

## 2019-09-25 NOTE — TELEPHONE ENCOUNTER
I attempted to reach Allie and was unable to leave a voicemail. It stated her voicemail was not set up yet. I tried to send an email as well and it returned undeliverable. Will try and follow up at a later time.

## 2019-09-26 ENCOUNTER — INFUSION (OUTPATIENT)
Dept: ONCOLOGY | Facility: HOSPITAL | Age: 64
End: 2019-09-26

## 2019-09-26 VITALS
BODY MASS INDEX: 31.96 KG/M2 | TEMPERATURE: 98 F | WEIGHT: 219 LBS | HEART RATE: 57 BPM | SYSTOLIC BLOOD PRESSURE: 157 MMHG | DIASTOLIC BLOOD PRESSURE: 90 MMHG | OXYGEN SATURATION: 95 %

## 2019-09-26 DIAGNOSIS — Z17.1 MALIGNANT NEOPLASM OF UPPER-INNER QUADRANT OF LEFT BREAST IN FEMALE, ESTROGEN RECEPTOR NEGATIVE (HCC): Primary | ICD-10-CM

## 2019-09-26 DIAGNOSIS — C50.212 MALIGNANT NEOPLASM OF UPPER-INNER QUADRANT OF LEFT BREAST IN FEMALE, ESTROGEN RECEPTOR NEGATIVE (HCC): Primary | ICD-10-CM

## 2019-09-26 DIAGNOSIS — Z45.2 ENCOUNTER FOR FITTING AND ADJUSTMENT OF VASCULAR CATHETER: ICD-10-CM

## 2019-09-26 LAB
ALBUMIN SERPL-MCNC: 3.6 G/DL (ref 3.5–5.2)
ALBUMIN/GLOB SERPL: 1.4 G/DL (ref 1.1–2.4)
ALP SERPL-CCNC: 94 U/L (ref 38–116)
ALT SERPL W P-5'-P-CCNC: 17 U/L (ref 0–33)
ANION GAP SERPL CALCULATED.3IONS-SCNC: 11.5 MMOL/L (ref 5–15)
AST SERPL-CCNC: 15 U/L (ref 0–32)
BACTERIA UR QL AUTO: ABNORMAL /HPF
BASOPHILS # BLD AUTO: 0.04 10*3/MM3 (ref 0–0.2)
BASOPHILS NFR BLD AUTO: 0.9 % (ref 0–1.5)
BILIRUB SERPL-MCNC: 0.2 MG/DL (ref 0.2–1.2)
BILIRUB UR QL STRIP: NEGATIVE
BUN BLD-MCNC: 9 MG/DL (ref 6–20)
BUN/CREAT SERPL: 12.9 (ref 7.3–30)
CALCIUM SPEC-SCNC: 8.6 MG/DL (ref 8.5–10.2)
CHLORIDE SERPL-SCNC: 106 MMOL/L (ref 98–107)
CLARITY UR: CLEAR
CO2 SERPL-SCNC: 25.5 MMOL/L (ref 22–29)
COLOR UR: YELLOW
CREAT BLD-MCNC: 0.7 MG/DL (ref 0.6–1.1)
DEPRECATED RDW RBC AUTO: 44.7 FL (ref 37–54)
EOSINOPHIL # BLD AUTO: 0.07 10*3/MM3 (ref 0–0.4)
EOSINOPHIL NFR BLD AUTO: 1.6 % (ref 0.3–6.2)
ERYTHROCYTE [DISTWIDTH] IN BLOOD BY AUTOMATED COUNT: 13.2 % (ref 12.3–15.4)
GFR SERPL CREATININE-BSD FRML MDRD: 84 ML/MIN/1.73
GLOBULIN UR ELPH-MCNC: 2.5 GM/DL (ref 1.8–3.5)
GLUCOSE BLD-MCNC: 90 MG/DL (ref 74–124)
GLUCOSE UR STRIP-MCNC: NEGATIVE MG/DL
HCT VFR BLD AUTO: 32.6 % (ref 34–46.6)
HGB BLD-MCNC: 10.7 G/DL (ref 12–15.9)
HGB UR QL STRIP.AUTO: NEGATIVE
HYALINE CASTS UR QL AUTO: ABNORMAL /LPF
IMM GRANULOCYTES # BLD AUTO: 0.04 10*3/MM3 (ref 0–0.05)
IMM GRANULOCYTES NFR BLD AUTO: 0.9 % (ref 0–0.5)
KETONES UR QL STRIP: NEGATIVE
LEUKOCYTE ESTERASE UR QL STRIP.AUTO: ABNORMAL
LYMPHOCYTES # BLD AUTO: 1.74 10*3/MM3 (ref 0.7–3.1)
LYMPHOCYTES NFR BLD AUTO: 40.5 % (ref 19.6–45.3)
MCH RBC QN AUTO: 31.1 PG (ref 26.6–33)
MCHC RBC AUTO-ENTMCNC: 32.8 G/DL (ref 31.5–35.7)
MCV RBC AUTO: 94.8 FL (ref 79–97)
MONOCYTES # BLD AUTO: 0.25 10*3/MM3 (ref 0.1–0.9)
MONOCYTES NFR BLD AUTO: 5.8 % (ref 5–12)
NEUTROPHILS # BLD AUTO: 2.16 10*3/MM3 (ref 1.7–7)
NEUTROPHILS NFR BLD AUTO: 50.3 % (ref 42.7–76)
NITRITE UR QL STRIP: NEGATIVE
NRBC BLD AUTO-RTO: 0 /100 WBC (ref 0–0.2)
PH UR STRIP.AUTO: 6 [PH] (ref 4.5–8)
PLATELET # BLD AUTO: 218 10*3/MM3 (ref 140–450)
PMV BLD AUTO: 9.4 FL (ref 6–12)
POTASSIUM BLD-SCNC: 3.9 MMOL/L (ref 3.5–4.7)
PROT SERPL-MCNC: 6.1 G/DL (ref 6.3–8)
PROT UR QL STRIP: NEGATIVE
RBC # BLD AUTO: 3.44 10*6/MM3 (ref 3.77–5.28)
RBC # UR: ABNORMAL /HPF
REF LAB TEST METHOD: ABNORMAL
SODIUM BLD-SCNC: 143 MMOL/L (ref 134–145)
SP GR UR STRIP: <=1.005 (ref 1–1.03)
SQUAMOUS #/AREA URNS HPF: ABNORMAL /HPF
UROBILINOGEN UR QL STRIP: ABNORMAL
WBC NRBC COR # BLD: 4.3 10*3/MM3 (ref 3.4–10.8)
WBC UR QL AUTO: ABNORMAL /HPF

## 2019-09-26 PROCEDURE — 81001 URINALYSIS AUTO W/SCOPE: CPT | Performed by: INTERNAL MEDICINE

## 2019-09-26 PROCEDURE — 80053 COMPREHEN METABOLIC PANEL: CPT | Performed by: INTERNAL MEDICINE

## 2019-09-26 PROCEDURE — 25010000002 DEXAMETHASONE SODIUM PHOSPHATE 100 MG/10ML SOLUTION: Performed by: INTERNAL MEDICINE

## 2019-09-26 PROCEDURE — 25010000002 PACLITAXEL PER 30 MG: Performed by: INTERNAL MEDICINE

## 2019-09-26 PROCEDURE — 87086 URINE CULTURE/COLONY COUNT: CPT | Performed by: NURSE PRACTITIONER

## 2019-09-26 PROCEDURE — 25010000002 DIPHENHYDRAMINE PER 50 MG: Performed by: INTERNAL MEDICINE

## 2019-09-26 PROCEDURE — 25010000002 TRASTUZUMAB PER 10 MG: Performed by: INTERNAL MEDICINE

## 2019-09-26 PROCEDURE — 96417 CHEMO IV INFUS EACH ADDL SEQ: CPT | Performed by: INTERNAL MEDICINE

## 2019-09-26 PROCEDURE — 85025 COMPLETE CBC W/AUTO DIFF WBC: CPT | Performed by: INTERNAL MEDICINE

## 2019-09-26 PROCEDURE — 96375 TX/PRO/DX INJ NEW DRUG ADDON: CPT | Performed by: INTERNAL MEDICINE

## 2019-09-26 PROCEDURE — 96413 CHEMO IV INFUSION 1 HR: CPT | Performed by: INTERNAL MEDICINE

## 2019-09-26 RX ORDER — NITROFURANTOIN 25; 75 MG/1; MG/1
100 CAPSULE ORAL 2 TIMES DAILY
Qty: 10 CAPSULE | Refills: 0 | Status: SHIPPED | OUTPATIENT
Start: 2019-09-26 | End: 2019-10-10

## 2019-09-26 RX ORDER — SODIUM CHLORIDE 9 MG/ML
250 INJECTION, SOLUTION INTRAVENOUS ONCE
Status: COMPLETED | OUTPATIENT
Start: 2019-09-26 | End: 2019-09-26

## 2019-09-26 RX ORDER — FAMOTIDINE 10 MG/ML
20 INJECTION, SOLUTION INTRAVENOUS ONCE
Status: COMPLETED | OUTPATIENT
Start: 2019-09-26 | End: 2019-09-26

## 2019-09-26 RX ORDER — SODIUM CHLORIDE 0.9 % (FLUSH) 0.9 %
10 SYRINGE (ML) INJECTION AS NEEDED
Status: CANCELLED | OUTPATIENT
Start: 2019-09-26

## 2019-09-26 RX ADMIN — PACLITAXEL 170 MG: 6 INJECTION, SOLUTION INTRAVENOUS at 09:54

## 2019-09-26 RX ADMIN — TRASTUZUMAB 200 MG: 150 INJECTION, POWDER, LYOPHILIZED, FOR SOLUTION INTRAVENOUS at 09:15

## 2019-09-26 RX ADMIN — SODIUM CHLORIDE 250 ML: 9 INJECTION, SOLUTION INTRAVENOUS at 08:35

## 2019-09-26 RX ADMIN — FAMOTIDINE 20 MG: 10 INJECTION INTRAVENOUS at 08:35

## 2019-09-26 RX ADMIN — DEXAMETHASONE SODIUM PHOSPHATE 12 MG: 10 INJECTION, SOLUTION INTRAMUSCULAR; INTRAVENOUS at 08:37

## 2019-09-26 RX ADMIN — DIPHENHYDRAMINE HYDROCHLORIDE 25 MG: 50 INJECTION, SOLUTION INTRAMUSCULAR; INTRAVENOUS at 08:56

## 2019-09-26 RX ADMIN — Medication 500 UNITS: at 10:54

## 2019-09-26 NOTE — PROGRESS NOTES
Pt states when she saw Dr. Jhaveri last week she c/o burning with urination. Reports still having burning, reported to Maria Eugenia ROSE, order for UA and culture placed.     Reviewed urine results with Maria Eugenia ROSE. Order for macrobid 100mg BID sent to [facility] pharmacy

## 2019-09-28 LAB — BACTERIA SPEC AEROBE CULT: ABNORMAL

## 2019-10-01 ENCOUNTER — HOSPITAL ENCOUNTER (OUTPATIENT)
Dept: SLEEP MEDICINE | Facility: HOSPITAL | Age: 64
Discharge: HOME | End: 2019-10-01
Admitting: INTERNAL MEDICINE

## 2019-10-01 PROCEDURE — 95806 SLEEP STUDY UNATT&RESP EFFT: CPT | Performed by: INTERNAL MEDICINE

## 2019-10-01 PROCEDURE — 95806 SLEEP STUDY UNATT&RESP EFFT: CPT

## 2019-10-03 ENCOUNTER — INFUSION (OUTPATIENT)
Dept: ONCOLOGY | Facility: HOSPITAL | Age: 64
End: 2019-10-03

## 2019-10-03 VITALS
BODY MASS INDEX: 31.44 KG/M2 | SYSTOLIC BLOOD PRESSURE: 154 MMHG | HEART RATE: 58 BPM | OXYGEN SATURATION: 96 % | DIASTOLIC BLOOD PRESSURE: 75 MMHG | WEIGHT: 215.4 LBS | RESPIRATION RATE: 16 BRPM | TEMPERATURE: 97.8 F

## 2019-10-03 DIAGNOSIS — C50.212 MALIGNANT NEOPLASM OF UPPER-INNER QUADRANT OF LEFT BREAST IN FEMALE, ESTROGEN RECEPTOR NEGATIVE (HCC): Primary | ICD-10-CM

## 2019-10-03 DIAGNOSIS — Z17.1 MALIGNANT NEOPLASM OF UPPER-INNER QUADRANT OF LEFT BREAST IN FEMALE, ESTROGEN RECEPTOR NEGATIVE (HCC): Primary | ICD-10-CM

## 2019-10-03 DIAGNOSIS — Z45.2 ENCOUNTER FOR FITTING AND ADJUSTMENT OF VASCULAR CATHETER: ICD-10-CM

## 2019-10-03 LAB
BASOPHILS # BLD AUTO: 0.04 10*3/MM3 (ref 0–0.2)
BASOPHILS NFR BLD AUTO: 0.8 % (ref 0–1.5)
BILIRUB UR QL STRIP: NEGATIVE
CLARITY UR: CLEAR
COLOR UR: YELLOW
DEPRECATED RDW RBC AUTO: 45.1 FL (ref 37–54)
EOSINOPHIL # BLD AUTO: 0.09 10*3/MM3 (ref 0–0.4)
EOSINOPHIL NFR BLD AUTO: 1.9 % (ref 0.3–6.2)
ERYTHROCYTE [DISTWIDTH] IN BLOOD BY AUTOMATED COUNT: 13.4 % (ref 12.3–15.4)
GLUCOSE UR STRIP-MCNC: NEGATIVE MG/DL
HCT VFR BLD AUTO: 32.6 % (ref 34–46.6)
HGB BLD-MCNC: 10.9 G/DL (ref 12–15.9)
HGB UR QL STRIP.AUTO: NEGATIVE
IMM GRANULOCYTES # BLD AUTO: 0.07 10*3/MM3 (ref 0–0.05)
IMM GRANULOCYTES NFR BLD AUTO: 1.5 % (ref 0–0.5)
KETONES UR QL STRIP: NEGATIVE
LEUKOCYTE ESTERASE UR QL STRIP.AUTO: NEGATIVE
LYMPHOCYTES # BLD AUTO: 1.98 10*3/MM3 (ref 0.7–3.1)
LYMPHOCYTES NFR BLD AUTO: 41.7 % (ref 19.6–45.3)
MCH RBC QN AUTO: 31.7 PG (ref 26.6–33)
MCHC RBC AUTO-ENTMCNC: 33.4 G/DL (ref 31.5–35.7)
MCV RBC AUTO: 94.8 FL (ref 79–97)
MONOCYTES # BLD AUTO: 0.37 10*3/MM3 (ref 0.1–0.9)
MONOCYTES NFR BLD AUTO: 7.8 % (ref 5–12)
NEUTROPHILS # BLD AUTO: 2.2 10*3/MM3 (ref 1.7–7)
NEUTROPHILS NFR BLD AUTO: 46.3 % (ref 42.7–76)
NITRITE UR QL STRIP: NEGATIVE
NRBC BLD AUTO-RTO: 0.6 /100 WBC (ref 0–0.2)
PH UR STRIP.AUTO: 5.5 [PH] (ref 4.5–8)
PLATELET # BLD AUTO: 251 10*3/MM3 (ref 140–450)
PMV BLD AUTO: 9.4 FL (ref 6–12)
PROT UR QL STRIP: ABNORMAL
RBC # BLD AUTO: 3.44 10*6/MM3 (ref 3.77–5.28)
SP GR UR STRIP: 1.01 (ref 1–1.03)
UROBILINOGEN UR QL STRIP: ABNORMAL
WBC NRBC COR # BLD: 4.75 10*3/MM3 (ref 3.4–10.8)

## 2019-10-03 PROCEDURE — 87086 URINE CULTURE/COLONY COUNT: CPT | Performed by: INTERNAL MEDICINE

## 2019-10-03 PROCEDURE — 25010000002 PACLITAXEL PER 30 MG: Performed by: INTERNAL MEDICINE

## 2019-10-03 PROCEDURE — 96417 CHEMO IV INFUS EACH ADDL SEQ: CPT

## 2019-10-03 PROCEDURE — 85025 COMPLETE CBC W/AUTO DIFF WBC: CPT

## 2019-10-03 PROCEDURE — 25010000002 DEXAMETHASONE SODIUM PHOSPHATE 100 MG/10ML SOLUTION: Performed by: INTERNAL MEDICINE

## 2019-10-03 PROCEDURE — 96413 CHEMO IV INFUSION 1 HR: CPT

## 2019-10-03 PROCEDURE — 25010000002 DIPHENHYDRAMINE PER 50 MG: Performed by: INTERNAL MEDICINE

## 2019-10-03 PROCEDURE — 25010000002 TRASTUZUMAB PER 10 MG: Performed by: INTERNAL MEDICINE

## 2019-10-03 PROCEDURE — 81003 URINALYSIS AUTO W/O SCOPE: CPT

## 2019-10-03 PROCEDURE — 96375 TX/PRO/DX INJ NEW DRUG ADDON: CPT

## 2019-10-03 RX ORDER — FAMOTIDINE 10 MG/ML
20 INJECTION, SOLUTION INTRAVENOUS ONCE
Status: COMPLETED | OUTPATIENT
Start: 2019-10-03 | End: 2019-10-03

## 2019-10-03 RX ORDER — SODIUM CHLORIDE 9 MG/ML
250 INJECTION, SOLUTION INTRAVENOUS ONCE
Status: COMPLETED | OUTPATIENT
Start: 2019-10-03 | End: 2019-10-03

## 2019-10-03 RX ORDER — SODIUM CHLORIDE 0.9 % (FLUSH) 0.9 %
10 SYRINGE (ML) INJECTION AS NEEDED
Status: CANCELLED | OUTPATIENT
Start: 2019-10-03

## 2019-10-03 RX ORDER — PHENAZOPYRIDINE HYDROCHLORIDE 100 MG/1
100 TABLET, FILM COATED ORAL 3 TIMES DAILY PRN
Qty: 30 TABLET | Refills: 0 | Status: SHIPPED | OUTPATIENT
Start: 2019-10-03 | End: 2020-01-23

## 2019-10-03 RX ADMIN — DEXAMETHASONE SODIUM PHOSPHATE 12 MG: 10 INJECTION, SOLUTION INTRAMUSCULAR; INTRAVENOUS at 13:49

## 2019-10-03 RX ADMIN — SODIUM CHLORIDE, PRESERVATIVE FREE 500 UNITS: 5 INJECTION INTRAVENOUS at 16:03

## 2019-10-03 RX ADMIN — FAMOTIDINE 20 MG: 10 INJECTION INTRAVENOUS at 13:47

## 2019-10-03 RX ADMIN — DIPHENHYDRAMINE HYDROCHLORIDE 25 MG: 50 INJECTION, SOLUTION INTRAMUSCULAR; INTRAVENOUS at 14:07

## 2019-10-03 RX ADMIN — PACLITAXEL 170 MG: 6 INJECTION, SOLUTION INTRAVENOUS at 15:00

## 2019-10-03 RX ADMIN — TRASTUZUMAB 200 MG: 150 INJECTION, POWDER, LYOPHILIZED, FOR SOLUTION INTRAVENOUS at 14:26

## 2019-10-03 RX ADMIN — SODIUM CHLORIDE 250 ML: 9 INJECTION, SOLUTION INTRAVENOUS at 13:46

## 2019-10-03 NOTE — PROGRESS NOTES
Pt here today for taxol/herceptin. She states she has completed macrobid except one dose (she was written for macrobid BID for 5 days last week). She states she is still having symptoms of UTI. She c/o burning/itching and pressure with urination. She also reports worsening itching and red spots on her arms and legs (she states she informed Dr. Jhaveri of this 2 weeks ago.    Reviewed symptoms with Dr. Jhaveri, repeat UA and culture.     Reviewed results with Dr. Jhaveri, push fluids, order for pyridium sent to pharmacy

## 2019-10-04 LAB — BACTERIA SPEC AEROBE CULT: NO GROWTH

## 2019-10-08 DIAGNOSIS — C50.212 MALIGNANT NEOPLASM OF UPPER-INNER QUADRANT OF LEFT BREAST IN FEMALE, ESTROGEN RECEPTOR NEGATIVE (HCC): ICD-10-CM

## 2019-10-08 DIAGNOSIS — Z17.1 MALIGNANT NEOPLASM OF UPPER-INNER QUADRANT OF LEFT BREAST IN FEMALE, ESTROGEN RECEPTOR NEGATIVE (HCC): ICD-10-CM

## 2019-10-10 ENCOUNTER — INFUSION (OUTPATIENT)
Dept: ONCOLOGY | Facility: HOSPITAL | Age: 64
End: 2019-10-10

## 2019-10-10 ENCOUNTER — OFFICE VISIT (OUTPATIENT)
Dept: ONCOLOGY | Facility: CLINIC | Age: 64
End: 2019-10-10

## 2019-10-10 VITALS
OXYGEN SATURATION: 95 % | BODY MASS INDEX: 32.38 KG/M2 | WEIGHT: 218.6 LBS | SYSTOLIC BLOOD PRESSURE: 150 MMHG | RESPIRATION RATE: 16 BRPM | DIASTOLIC BLOOD PRESSURE: 77 MMHG | HEART RATE: 64 BPM | TEMPERATURE: 98.7 F | HEIGHT: 69 IN

## 2019-10-10 VITALS — DIASTOLIC BLOOD PRESSURE: 80 MMHG | HEART RATE: 75 BPM | SYSTOLIC BLOOD PRESSURE: 154 MMHG

## 2019-10-10 DIAGNOSIS — C50.212 MALIGNANT NEOPLASM OF UPPER-INNER QUADRANT OF LEFT BREAST IN FEMALE, ESTROGEN RECEPTOR NEGATIVE (HCC): Primary | ICD-10-CM

## 2019-10-10 DIAGNOSIS — C50.212 MALIGNANT NEOPLASM OF UPPER-INNER QUADRANT OF LEFT BREAST IN FEMALE, ESTROGEN RECEPTOR NEGATIVE (HCC): ICD-10-CM

## 2019-10-10 DIAGNOSIS — Z45.2 ENCOUNTER FOR FITTING AND ADJUSTMENT OF VASCULAR CATHETER: ICD-10-CM

## 2019-10-10 DIAGNOSIS — Z17.1 MALIGNANT NEOPLASM OF UPPER-INNER QUADRANT OF LEFT BREAST IN FEMALE, ESTROGEN RECEPTOR NEGATIVE (HCC): ICD-10-CM

## 2019-10-10 DIAGNOSIS — G89.4 CHRONIC PAIN SYNDROME: Primary | ICD-10-CM

## 2019-10-10 DIAGNOSIS — Z17.1 MALIGNANT NEOPLASM OF UPPER-INNER QUADRANT OF LEFT BREAST IN FEMALE, ESTROGEN RECEPTOR NEGATIVE (HCC): Primary | ICD-10-CM

## 2019-10-10 LAB
ALBUMIN SERPL-MCNC: 3.7 G/DL (ref 3.5–5.2)
ALBUMIN/GLOB SERPL: 1.4 G/DL (ref 1.1–2.4)
ALP SERPL-CCNC: 105 U/L (ref 38–116)
ALT SERPL W P-5'-P-CCNC: 15 U/L (ref 0–33)
ANION GAP SERPL CALCULATED.3IONS-SCNC: 10.6 MMOL/L (ref 5–15)
AST SERPL-CCNC: 17 U/L (ref 0–32)
BASOPHILS # BLD AUTO: 0.04 10*3/MM3 (ref 0–0.2)
BASOPHILS NFR BLD AUTO: 1 % (ref 0–1.5)
BILIRUB SERPL-MCNC: 0.3 MG/DL (ref 0.2–1.2)
BUN BLD-MCNC: 7 MG/DL (ref 6–20)
BUN/CREAT SERPL: 9.3 (ref 7.3–30)
CALCIUM SPEC-SCNC: 8.9 MG/DL (ref 8.5–10.2)
CHLORIDE SERPL-SCNC: 105 MMOL/L (ref 98–107)
CO2 SERPL-SCNC: 25.4 MMOL/L (ref 22–29)
CREAT BLD-MCNC: 0.75 MG/DL (ref 0.6–1.1)
DEPRECATED RDW RBC AUTO: 46.6 FL (ref 37–54)
EOSINOPHIL # BLD AUTO: 0.11 10*3/MM3 (ref 0–0.4)
EOSINOPHIL NFR BLD AUTO: 2.6 % (ref 0.3–6.2)
ERYTHROCYTE [DISTWIDTH] IN BLOOD BY AUTOMATED COUNT: 13.8 % (ref 12.3–15.4)
GFR SERPL CREATININE-BSD FRML MDRD: 78 ML/MIN/1.73
GLOBULIN UR ELPH-MCNC: 2.7 GM/DL (ref 1.8–3.5)
GLUCOSE BLD-MCNC: 86 MG/DL (ref 74–124)
HCT VFR BLD AUTO: 31.3 % (ref 34–46.6)
HGB BLD-MCNC: 10.2 G/DL (ref 12–15.9)
IMM GRANULOCYTES # BLD AUTO: 0.04 10*3/MM3 (ref 0–0.05)
IMM GRANULOCYTES NFR BLD AUTO: 1 % (ref 0–0.5)
LYMPHOCYTES # BLD AUTO: 1.63 10*3/MM3 (ref 0.7–3.1)
LYMPHOCYTES NFR BLD AUTO: 39.1 % (ref 19.6–45.3)
MCH RBC QN AUTO: 31.2 PG (ref 26.6–33)
MCHC RBC AUTO-ENTMCNC: 32.6 G/DL (ref 31.5–35.7)
MCV RBC AUTO: 95.7 FL (ref 79–97)
MONOCYTES # BLD AUTO: 0.38 10*3/MM3 (ref 0.1–0.9)
MONOCYTES NFR BLD AUTO: 9.1 % (ref 5–12)
NEUTROPHILS # BLD AUTO: 1.97 10*3/MM3 (ref 1.7–7)
NEUTROPHILS NFR BLD AUTO: 47.2 % (ref 42.7–76)
NRBC BLD AUTO-RTO: 0 /100 WBC (ref 0–0.2)
PLATELET # BLD AUTO: 244 10*3/MM3 (ref 140–450)
PMV BLD AUTO: 9.5 FL (ref 6–12)
POTASSIUM BLD-SCNC: 4 MMOL/L (ref 3.5–4.7)
PROT SERPL-MCNC: 6.4 G/DL (ref 6.3–8)
RBC # BLD AUTO: 3.27 10*6/MM3 (ref 3.77–5.28)
SODIUM BLD-SCNC: 141 MMOL/L (ref 134–145)
WBC NRBC COR # BLD: 4.17 10*3/MM3 (ref 3.4–10.8)

## 2019-10-10 PROCEDURE — 25010000002 DIPHENHYDRAMINE PER 50 MG: Performed by: INTERNAL MEDICINE

## 2019-10-10 PROCEDURE — 25010000002 TRASTUZUMAB PER 10 MG: Performed by: INTERNAL MEDICINE

## 2019-10-10 PROCEDURE — 96375 TX/PRO/DX INJ NEW DRUG ADDON: CPT | Performed by: INTERNAL MEDICINE

## 2019-10-10 PROCEDURE — 99215 OFFICE O/P EST HI 40 MIN: CPT | Performed by: INTERNAL MEDICINE

## 2019-10-10 PROCEDURE — 96413 CHEMO IV INFUSION 1 HR: CPT | Performed by: INTERNAL MEDICINE

## 2019-10-10 PROCEDURE — 25010000002 PERTUZUMAB 420 MG/14ML SOLUTION 420 MG VIAL: Performed by: INTERNAL MEDICINE

## 2019-10-10 PROCEDURE — 25010000002 PACLITAXEL PER 30 MG: Performed by: INTERNAL MEDICINE

## 2019-10-10 PROCEDURE — 25010000002 DEXAMETHASONE SODIUM PHOSPHATE 100 MG/10ML SOLUTION: Performed by: INTERNAL MEDICINE

## 2019-10-10 PROCEDURE — 96417 CHEMO IV INFUS EACH ADDL SEQ: CPT | Performed by: INTERNAL MEDICINE

## 2019-10-10 PROCEDURE — 80053 COMPREHEN METABOLIC PANEL: CPT

## 2019-10-10 PROCEDURE — 85025 COMPLETE CBC W/AUTO DIFF WBC: CPT

## 2019-10-10 RX ORDER — HYDROCODONE BITARTRATE AND ACETAMINOPHEN 5; 325 MG/1; MG/1
1 TABLET ORAL EVERY 6 HOURS PRN
Qty: 30 TABLET | Refills: 0 | Status: SHIPPED | OUTPATIENT
Start: 2019-10-10 | End: 2019-11-07 | Stop reason: SDUPTHER

## 2019-10-10 RX ORDER — DIPHENHYDRAMINE HYDROCHLORIDE 50 MG/ML
50 INJECTION INTRAMUSCULAR; INTRAVENOUS AS NEEDED
Status: CANCELLED | OUTPATIENT
Start: 2019-10-10

## 2019-10-10 RX ORDER — FAMOTIDINE 10 MG/ML
20 INJECTION, SOLUTION INTRAVENOUS ONCE
Status: CANCELLED | OUTPATIENT
Start: 2019-10-24

## 2019-10-10 RX ORDER — DIPHENHYDRAMINE HYDROCHLORIDE 50 MG/ML
50 INJECTION INTRAMUSCULAR; INTRAVENOUS AS NEEDED
Status: CANCELLED | OUTPATIENT
Start: 2019-10-24

## 2019-10-10 RX ORDER — FAMOTIDINE 10 MG/ML
20 INJECTION, SOLUTION INTRAVENOUS AS NEEDED
Status: CANCELLED | OUTPATIENT
Start: 2019-10-24

## 2019-10-10 RX ORDER — FAMOTIDINE 10 MG/ML
20 INJECTION, SOLUTION INTRAVENOUS ONCE
Status: CANCELLED | OUTPATIENT
Start: 2019-10-10

## 2019-10-10 RX ORDER — SODIUM CHLORIDE 9 MG/ML
250 INJECTION, SOLUTION INTRAVENOUS ONCE
Status: CANCELLED | OUTPATIENT
Start: 2019-10-10

## 2019-10-10 RX ORDER — FAMOTIDINE 10 MG/ML
20 INJECTION, SOLUTION INTRAVENOUS ONCE
Status: COMPLETED | OUTPATIENT
Start: 2019-10-10 | End: 2019-10-10

## 2019-10-10 RX ORDER — FAMOTIDINE 10 MG/ML
20 INJECTION, SOLUTION INTRAVENOUS AS NEEDED
Status: CANCELLED | OUTPATIENT
Start: 2019-10-10

## 2019-10-10 RX ORDER — FAMOTIDINE 10 MG/ML
20 INJECTION, SOLUTION INTRAVENOUS ONCE
Status: CANCELLED | OUTPATIENT
Start: 2019-10-17

## 2019-10-10 RX ORDER — SODIUM CHLORIDE 9 MG/ML
250 INJECTION, SOLUTION INTRAVENOUS ONCE
Status: CANCELLED | OUTPATIENT
Start: 2019-10-24

## 2019-10-10 RX ORDER — ALPRAZOLAM 0.25 MG/1
0.5 TABLET ORAL 2 TIMES DAILY PRN
Qty: 60 TABLET | Refills: 0 | OUTPATIENT
Start: 2019-10-10 | End: 2019-10-17 | Stop reason: SDUPTHER

## 2019-10-10 RX ORDER — DIPHENHYDRAMINE HYDROCHLORIDE 50 MG/ML
50 INJECTION INTRAMUSCULAR; INTRAVENOUS AS NEEDED
Status: CANCELLED | OUTPATIENT
Start: 2019-10-17

## 2019-10-10 RX ORDER — FAMOTIDINE 10 MG/ML
20 INJECTION, SOLUTION INTRAVENOUS AS NEEDED
Status: CANCELLED | OUTPATIENT
Start: 2019-10-17

## 2019-10-10 RX ORDER — SODIUM CHLORIDE 9 MG/ML
250 INJECTION, SOLUTION INTRAVENOUS ONCE
Status: COMPLETED | OUTPATIENT
Start: 2019-10-10 | End: 2019-10-10

## 2019-10-10 RX ORDER — SODIUM CHLORIDE 9 MG/ML
250 INJECTION, SOLUTION INTRAVENOUS ONCE
Status: CANCELLED | OUTPATIENT
Start: 2019-10-17

## 2019-10-10 RX ADMIN — FAMOTIDINE 20 MG: 10 INJECTION INTRAVENOUS at 09:03

## 2019-10-10 RX ADMIN — PACLITAXEL 170 MG: 6 INJECTION, SOLUTION INTRAVENOUS at 11:25

## 2019-10-10 RX ADMIN — PERTUZUMAB 420 MG: 30 INJECTION, SOLUTION, CONCENTRATE INTRAVENOUS at 09:43

## 2019-10-10 RX ADMIN — TRASTUZUMAB 200 MG: 150 INJECTION, POWDER, LYOPHILIZED, FOR SOLUTION INTRAVENOUS at 10:46

## 2019-10-10 RX ADMIN — DIPHENHYDRAMINE HYDROCHLORIDE 25 MG: 50 INJECTION, SOLUTION INTRAMUSCULAR; INTRAVENOUS at 09:03

## 2019-10-10 RX ADMIN — DEXAMETHASONE SODIUM PHOSPHATE 12 MG: 10 INJECTION, SOLUTION INTRAMUSCULAR; INTRAVENOUS at 09:23

## 2019-10-10 RX ADMIN — SODIUM CHLORIDE 250 ML: 9 INJECTION, SOLUTION INTRAVENOUS at 09:02

## 2019-10-10 NOTE — PROGRESS NOTES
Subjective     REASON FOR CONSULTATION:   1. Left breast cancer T2N0 3.6 cm grade 3 ER MS negative HER-2 positive infiltrating ductal carcinoma post excisional biopsy, followed by mastectomy and axillary dissection                               REQUESTING PHYSICIAN: Jase Khan DO    History of Present Illness patient is a 64-year-old female with a large ER MS negative HER-2 positive breast cancer surgically excised here to receive adjuvant treatment.  Initially because of the large size and comorbidities we were thinking of weekly Taxol Herceptin perjeta followed by Adriamycin Cytoxan however she had cardiac evaluation with echo with a borderline ejection fraction and had a stress test which showed no ischemia but again the ejection fraction of 50 to 52% and based on her shortness of breath with exertion and symptomatology, it was felt best to proceed instead with just weekly Taxol Herceptin perjeta followed by a year of Herceptin perjeta. Concern for the anthracycline possibly contributing to cardiotoxicity in a patient with borderline cardiac function.    She is followed by Dr. Dudley, cardiology, who gave clearance for the patient to proceed with chemotherapy.  repeat echo 6 weeks from last actually showed improvement in the cardiologist thought there may have been some technical issues with her first echocardiogram.    She returns back today, due for cycle3 day 1 of Taxol/Herceptin perjeta her diarrhea is-clearly controlled with the Imodium and in fact she became constipated because she took too much of this but she is got this under control and feels good  She has some reflux symptoms in the morning which makes her nauseous and I suggested she take 1 Prilosec every day till the chemo was over and this is improved    She has no neuropathy but is not brought the cooling gloves and I recommended this to her and gave her the literature  supporting this in the website  She has had a itchy rash on her arms and legs which is not too bothersome and I have to suggested a steroid cream and some Benadryl and we will keep an eye on the rash- she has no shortness of breath    She continues to have some pain in her right chest related to previous surgery and Mediport placement.  She is trying at this point to take just one Norco daily.  She is going to to wean herself off with this next prescription.  I cautioned her about getting addicted to the Percocet and she told me she took it for 14 years and weaned herself off of it and she is sure she can do it again but I am very reluctant to keep prescribing her Percocet    Otherwise she denies further concerns today.    Past Medical History:   Diagnosis Date   • Adjustment disorder    • Allergic rhinitis    • Amenorrhea    • Anxiety    • Breast cancer (CMS/[facility]) 04/18/2019    Left breast mammary carcinoma   • Bruit    • Carpal tunnel syndrome    • Carrier of tuberculosis    • Chronic pain    • De Quervain's tenosynovitis    • Degenerative cervical disc    • Depression    • Dyslipidemia    • Eustachian tube dysfunction    • History of UTI    • Hyperlipidemia    • Hypertension    • Impaired fasting glucose    • Lumbar degenerative disc disease    • Numbness and tingling     LEFT LEG   • OAB (overactive bladder)    • Scapulothoracic syndrome    • Sciatica         Past Surgical History:   Procedure Laterality Date   • APPENDECTOMY N/A    • BREAST BIOPSY Left 2019    Left breast ultrasound guided cyst aspiration, 9:00 position-Dr. Gerry Taylor, [facility]   • BREAST LUMPECTOMY Left 5/2/2019    Procedure: Left BREAST LUMPECTOMY;  Surgeon: Jase Evans MD;  Location: [facility] OR;  Service: General   • LUMBAR EPIDURAL INJECTION N/A    • MASTECTOMY Left 6/21/2019    Procedure: Left BREAST MASTECTOMY;  Surgeon: Jase Evans MD;  Location: [facility] OR;  Service: General   • TUBAL ABDOMINAL LIGATION  Bilateral    • VAGINAL DELIVERY      x3   • VENOUS ACCESS DEVICE (PORT) INSERTION Right 2019    Procedure: INSERTION VENOUS ACCESS DEVICE;  Surgeon: Jase Evans MD;  Location: [facility];  Service: General      ONC HISTORY;  patient is a 64-year-old retired  with hypertension hypercholesterolemia and degenerative arthritis who felt a mass in her left breast in March.  She showed it to her family doctor and underwent imaging at  [facility] on 3/13/2019 which Showed a 3 x 3.2 cm mass at 9:00 which on ultrasound showed a thick-walled benign-appearing 2.8 x 2.5 cm cyst which was felt to not be suspicious .because of persistence of symptoms she was reevaluated on 2019 and underwent aspiration and core biopsy because there was a significant soft tissue component to the mass at that time this was aspirated and the final report  showed fine-needle aspiration suspicious for malignant cells favor mammary carcinoma  Patient was referred to Dr. Kee who took her for an excisional biopsy on 2019 with the final report showing a 4 mass grade 3x3.6cm with tumor extending to one margin and DCIS also 0.2 mm from one margin.  The tumor was ER KS negative HER-2 3+.  The patient was then taken for surgery on 2019 at which time she had a completion mastectomy and axillary node biopsy with the findings of residual high-grade DCIS with clear margins and an incidental intraductal papilloma and 17- lymph nodes making this a T2N0 tumor    She has done well postoperatively and is here to discuss adjuvant treatment    She is  3 para 3 menarche  at age 15 and menopause in her early 40s when she had a hysterectomy for heavy menstrual bleeding.  She took hormone replacement for 10 years and stopped 10 years ago first childbirth was at age 25 she did not breast-feed  Family history is positive for mother who had uterine cancer at age 60 and  of it at age 65 she has a brother who  of liver cancer  related to drinking there is no other malignancy in the family that she is aware of    She is a significant smoker for the last 48 years but does not drink     Patient and her  were very taken to back by the suggestion about chemotherapy and apparently had thought that there was no further treatment needed and I spent almost an hour presenting the data concerning the extreme effectiveness of HER-2 directed therapy and this situation with a high risk of recurrence without adjuvant treatment and she finally was convinced to do the treatment    We discussed smoking cessation but at this point she is so nervous and agitated with the prospect of chemotherapy that we will hold off on this until she is jail through the chemotherapy and rediscuss it    I discussed the case also with Dr. Kee will place a port and we will plan to start chemotherapy in 2 weeks.    Patient initiating therapy with Taxol/Herceptin/Perjeta 8/29/19.    Current Outpatient Medications on File Prior to Visit   Medication Sig Dispense Refill   • buPROPion XL (WELLBUTRIN XL) 150 MG 24 hr tablet TAKE 1 TABLET BY MOUTH ONE TIME A DAY  30 tablet 2   • busPIRone (BUSPAR) 10 MG tablet Take 10 mg by mouth 2 (Two) Times a Day.     • citalopram (CeleXA) 40 MG tablet Take 1 tablet by mouth Daily. (Patient taking differently: Take 40 mg by mouth Every Night.) 30 tablet 5   • loratadine (CLARITIN) 10 MG tablet Take 10 mg by mouth Daily.     • metoprolol succinate XL (TOPROL-XL) 25 MG 24 hr tablet Take 25 mg by mouth Daily.     • montelukast (SINGULAIR) 10 MG tablet Take 1 tablet by mouth Every Night. (Patient taking differently: Take 10 mg by mouth Daily.) 30 tablet 11   • Multiple Vitamins-Minerals (CENTRUM SILVER PO) Take 1 tablet by mouth Daily. womens vitamin     • phenazopyridine (PYRIDIUM) 100 MG tablet Take 1 tablet by mouth 3 (Three) Times a Day As Needed for bladder spasms. 30 tablet 0   • vitamin B-12 (CYANOCOBALAMIN) 100 MCG tablet Take  100 mcg by mouth Daily.     • [DISCONTINUED] ALPRAZolam (XANAX) 0.25 MG tablet Take 2 tablets by mouth 2 (Two) Times a Day As Needed for Anxiety for up to 60 doses. 60 tablet 0   • [DISCONTINUED] HYDROcodone-acetaminophen (NORCO) 5-325 MG per tablet Take 1 tablet by mouth Every 6 (Six) Hours As Needed for Moderate Pain  or Severe Pain . 30 tablet 0   • ondansetron ODT (ZOFRAN-ODT) 8 MG disintegrating tablet Take 1 tablet by mouth Every 8 (Eight) Hours As Needed for Nausea or Vomiting. 30 tablet 2   • [DISCONTINUED] nitrofurantoin, macrocrystal-monohydrate, (MACROBID) 100 MG capsule Take 1 capsule by mouth 2 (Two) Times a Day. 10 capsule 0     No current facility-administered medications on file prior to visit.         ALLERGIES:    Allergies   Allergen Reactions   • Gabapentin Hallucinations   • Effexor [Venlafaxine] Itching   • Naproxen Itching     Pt reports severe vaginal itching    • Zyrtec [Cetirizine] Other (See Comments)     Non-effecious   • Ibuprofen Other (See Comments)     Burns while urinating  Can take low dose Ibuprofen   • Penicillins Rash   • Sulfa Antibiotics Rash        Social History     Socioeconomic History   • Marital status:      Spouse name: Pawan   • Number of children: 3   • Years of education: High school   • Highest education level: Not on file   Occupational History     Employer: RETIRED   Tobacco Use   • Smoking status: Current Every Day Smoker     Packs/day: 0.25     Years: 48.00     Pack years: 12.00     Types: Cigarettes   • Smokeless tobacco: Never Used   • Tobacco comment: Trying to quit.    Substance and Sexual Activity   • Alcohol use: No   • Drug use: No   • Sexual activity: Yes     Partners: Male     Birth control/protection: Post-menopausal        Family History   Problem Relation Age of Onset   • Ovarian cancer Mother    • Endometrial cancer Mother    • COPD Father    • Stroke Brother    • Malig Hyperthermia Neg Hx         Review of Systems   Constitutional: Positive  "for fatigue (same 10/10/19). Negative for diaphoresis.   HENT: Positive for congestion (nasal and chest congestion 10/10/19) and sinus pain (left side of face 10/10/19).    Eyes: Negative.    Respiratory: Positive for cough (just as night little better 10/10/19).    Cardiovascular: Negative for chest pain.   Gastrointestinal: Positive for diarrhea (better with meds 10/10/19). Negative for nausea (stable 10/10/19).   Endocrine: Negative.    Genitourinary: Negative for difficulty urinating (better 10/10/19).   Musculoskeletal: Positive for back pain (low back muscle spasm 10/10/19), gait problem (right shoulder 10/10/19) and neck pain (same feeling tight 10/10/19).   Skin: Positive for rash (dry skin 10/10/19).   Neurological: Positive for numbness ( left underarm from surgery little better 10/10/19).   Hematological: Negative.    Psychiatric/Behavioral: Negative.         Objective     Vitals:    10/10/19 0739   BP: 150/77   Pulse: 64   Resp: 16   Temp: 98.7 °F (37.1 °C)   SpO2: 95%   Weight: 99.2 kg (218 lb 9.6 oz)   Height: 176.3 cm (69.41\")   PainSc:   7   PainLoc: Shoulder  Comment: right shoulder     Current Status 10/10/2019   ECOG score 0       Physical Exam   Pulmonary/Chest:           GENERAL:  Well-developed, well-nourished in no acute distress.   SKIN:  Warm, dry with maculopapular rash on her arms and legs,no  purpura or petechiae.  EYES:  Pupils equal, round and reactive to light.  EOMs intact.  Conjunctivae normal.  EARS:  Hearing intact.  NOSE:  Septum midline.  No excoriations or nasal discharge.  MOUTH:  Tongue is well-papillated; no stomatitis or ulcers.  Lips normal.  THROAT:  Oropharynx without lesions or exudates.  NECK:  Supple with good range of motion; no thyromegaly or masses, no JVD.  LYMPHATICS:  No cervical, supraclavicular, axillary or inguinal adenopathy.  CHEST:  Lungs clear to auscultation. Good airflow.  Mediport right chest wall benign.  BREASTS: Right breast is benign; left chest " wall shows a well-healed mastectomy scar with nodularity laterally  CARDIAC:  Regular rate and rhythm without murmurs, rubs or gallops. Normal S1,S2.  ABDOMEN:  Soft, nontender with no hepatosplenomegaly or masses.  EXTREMITIES:  No clubbing, cyanosis or edema.  NEUROLOGICAL:  Cranial Nerves II-XII grossly intact.  No focal neurological deficits.  PSYCHIATRIC:  Normal affect and mood.        RECENT LABS:  Results from last 7 days   Lab Units 10/10/19  0731 10/03/19  1311   WBC 10*3/mm3 4.17 4.75   NEUTROS ABS 10*3/mm3 1.97 2.20   HEMOGLOBIN g/dL 10.2* 10.9*   HEMATOCRIT % 31.3* 32.6*   PLATELETS 10*3/mm3 244 251     Results from last 7 days   Lab Units 10/10/19  0731   SODIUM mmol/L 141   POTASSIUM mmol/L 4.0   CHLORIDE mmol/L 105   CO2 mmol/L 25.4   BUN mg/dL 7   CREATININE mg/dL 0.75   CALCIUM mg/dL 8.9   ALBUMIN g/dL 3.70   BILIRUBIN mg/dL 0.3   ALK PHOS U/L 105   ALT (SGPT) U/L 15   AST (SGOT) U/L 17   GLUCOSE mg/dL 86         RADIOGRAPHIC DATA:    US Breat Left Limited    1. Left Breast Lumpectomy (47 Grams):  A. Invasive poorly differentiated mammary carcinoma of no special type (ductal carcinoma)  with apocrine features.  1. Invasive carcinoma measures up to 40 x 36 x 27 mm (measured grossly).  2. Overall Syl Grade III (glandular score = 3, nuclear score = 3, mitotic score = 3).  3. No definitive lymphovascular space invasion identified.  B. Invasive carcinoma focally extends to one undesignated peripheral inked margin of excision.  C. Associated ductal carcinoma in situ (DCIS).  1. Ductal carcinoma in situ (DCIS) spans area measuring 40 mm in single greatest  aggregate dimension (estimated from gross and glass slides).  2. DCIS predominantly solid and comedo type with high grade nuclear features.  3. High grade DCIS approximates the closest peripheral inked margin to 0.2 mm.  Page: 1 of 5 Printed: 5/6/2019 1:10 PM  [phone]  Resulting  Tissue Pathology Exam: [id number]   Order: [id number]    Collected:  5/2/2019 12:26 Status:  Edited Result - FINAL   Visible to patient:  No (Not Released) Dx:  Malignant neoplasm of upper-inner hector...   Component    Addendum   HER2 by Immunohistochemistry   Positive (Score 3+)     HER2 by Immunohistochemistry  FDA approved (specify test/vendor): [employer]     Primary Antibody  CB11     Cold Ischemia and Fixation Times   Meet requirements specified in latest version of the ASCO/CAP guidelines         Cold Ischemia Time: 18 minutes  Fixation Time: 8.25 hours  Testing Performed on Block Number(s): 1E  Fixative: Formalin   Addendum electronically signed by Harman Perry MD on 5/6/2019          Final Diagnosis  1. Left Breast, Modified Radical Mastectomy (935 grams): HIGH GRADE DUCTAL CARCINOMA IN-SITU  (DCIS) .  A. Nuclear Grade: High.  B. Architectural Type: Solid and comedo with lobular extension.  1. Size (extent) of DCIS: DCIS multifocal in the lower inner quadrant, measuring 0.9 cm in maximal  dimension (DCIS present in 3/18 tissue blocks).  C. No LCIS identified.  D. Skin and nipple uninvolved by DCIS.  E. Margins: Uninvolved by DCIS.  1. DCIS comes to within 1.3 cm of the anterior margin of excision (closest margin).  F. Additional findings: Incidental intraductal papilloma, florid ductal hyperplasia and apocrine cysts.  G. Lymph nodes: Sixteen benign lymph nodes (0/16).  H. Hormone receptor status: ER and FL negative, Her2/kali positive by IHC (performed on prior resection  [id number]).  I. Pathologic stage: pT2, N0.  Swm/kds      Regadenoson Stress Test With Myocardial Perfusion SPECT   Interpretation Summary     · Myocardial perfusion imaging indicates a normal myocardial perfusion study with no evidence of ischemia.  · Left ventricular ejection fraction is borderline normal (Calculated EF = 50%).  · Impressions are consistent with a low risk study.      Study Description         Nuclear Perfusion Images Overall image quality is excellent. There are no artifacts  present. Raw images reviewed with no abnormalities noted.       Echo 9/16  Interpretation Summary     · Left ventricular systolic function is normal. Calculated EF = 55%. Estimated EF = 55%. Global Longitudinal LV strain = -21.6%. Strain data was reviewed and speckle tracking was considered accurate. The global longitudinal LV strain is -21.6 and normal. Normal left ventricular cavity size and wall thickness noted. All left ventricular wall segments contract normally. Left ventricular diastolic dysfunction is noted (grade I) consistent with impaired relaxation.  · Trace mitral valve regurgitation is present.  · Physiologic tricuspid valve regurgitation is present. Calculated right ventricular systolic pressure from tricuspid regurgitation is 16.0 mmHg. Insufficient TR velocity profile to estimate the right ventricular systolic pressure.           Assessment/Plan   1.  T2N0 grade 3 infiltrating ductal carcinoma left breast ER MO negative HER-2 positive post mastectomy and axillary node dissection with clear margins.  · Initiated Taxol/Herceptin/Perjeta today, 8/29/19.  · Plan to do 12 weeks of Taxol Herceptin perjeta without Adriamycin because of her borderline cardiac function    2.  Tobacco abuse and COPD    3.  Abnormal echo with stress test normal but borderline cardiac function at 52%  We will avoid Adriamycin Cytoxan  and treat with Taxol Herceptin perjeta and a year of Herceptin perjeta. Dr. Dudley, cardiology, has given clearance for patient to begin.  Planning repeat echocardiogram 6 weeks from previous.    · Improved EF to 55%    4.  Venous access.  Status post new Mediport placement 8/27/2019.  Working well today.  She has some expected associated pain with this, responsive to Norco.  She does have both pain related to new Mediport and some related to previous mastectomy.    · No more refills of Percocet planned    5.  Diarrhea related to Perjeta.  Improved with Imodium    6.  Skin rash on her arms and legs  likely from Taxol versus perjeta continue to watch and use steroid cream    Plan:  1.  Proceed with cycle 3, day 1 Taxol/herceptin perjeta.  2.  Continue Norco 5/325, taking roughly 1 daily for pain.  No more refills   3.  Utilize Imodium as outlined above.  Continue Prilosec add steroid cream for rash  4.  Echocardiogram will be repeated in 6 weeks  5.  Return in 1+2 week for Taxol/Herceptin.   6.  Return in 3weeks for follow-up with Dr. Jhaveri and cycle 4-day 1 Taxol/Herceptin/Perjeta.     This patient is on drug therapy requiring intensive monitoring for toxicity.  We addressed current concerns of pain and diarrhea as outlined above.

## 2019-10-17 ENCOUNTER — DOCUMENTATION (OUTPATIENT)
Dept: ONCOLOGY | Facility: CLINIC | Age: 64
End: 2019-10-17

## 2019-10-17 ENCOUNTER — INFUSION (OUTPATIENT)
Dept: ONCOLOGY | Facility: HOSPITAL | Age: 64
End: 2019-10-17

## 2019-10-17 VITALS
WEIGHT: 217 LBS | BODY MASS INDEX: 31.67 KG/M2 | DIASTOLIC BLOOD PRESSURE: 75 MMHG | SYSTOLIC BLOOD PRESSURE: 130 MMHG | OXYGEN SATURATION: 94 % | HEART RATE: 61 BPM | TEMPERATURE: 98.3 F

## 2019-10-17 DIAGNOSIS — Z17.1 MALIGNANT NEOPLASM OF UPPER-INNER QUADRANT OF LEFT BREAST IN FEMALE, ESTROGEN RECEPTOR NEGATIVE (HCC): Primary | ICD-10-CM

## 2019-10-17 DIAGNOSIS — C50.212 MALIGNANT NEOPLASM OF UPPER-INNER QUADRANT OF LEFT BREAST IN FEMALE, ESTROGEN RECEPTOR NEGATIVE (HCC): Primary | ICD-10-CM

## 2019-10-17 LAB
BASOPHILS # BLD AUTO: 0.03 10*3/MM3 (ref 0–0.2)
BASOPHILS NFR BLD AUTO: 0.7 % (ref 0–1.5)
DEPRECATED RDW RBC AUTO: 49.1 FL (ref 37–54)
EOSINOPHIL # BLD AUTO: 0.09 10*3/MM3 (ref 0–0.4)
EOSINOPHIL NFR BLD AUTO: 2 % (ref 0.3–6.2)
ERYTHROCYTE [DISTWIDTH] IN BLOOD BY AUTOMATED COUNT: 14.3 % (ref 12.3–15.4)
HCT VFR BLD AUTO: 32.5 % (ref 34–46.6)
HGB BLD-MCNC: 10.6 G/DL (ref 12–15.9)
IMM GRANULOCYTES # BLD AUTO: 0.04 10*3/MM3 (ref 0–0.05)
IMM GRANULOCYTES NFR BLD AUTO: 0.9 % (ref 0–0.5)
LYMPHOCYTES # BLD AUTO: 1.31 10*3/MM3 (ref 0.7–3.1)
LYMPHOCYTES NFR BLD AUTO: 28.9 % (ref 19.6–45.3)
MCH RBC QN AUTO: 31.2 PG (ref 26.6–33)
MCHC RBC AUTO-ENTMCNC: 32.6 G/DL (ref 31.5–35.7)
MCV RBC AUTO: 95.6 FL (ref 79–97)
MONOCYTES # BLD AUTO: 0.36 10*3/MM3 (ref 0.1–0.9)
MONOCYTES NFR BLD AUTO: 7.9 % (ref 5–12)
NEUTROPHILS # BLD AUTO: 2.71 10*3/MM3 (ref 1.7–7)
NEUTROPHILS NFR BLD AUTO: 59.6 % (ref 42.7–76)
NRBC BLD AUTO-RTO: 0.4 /100 WBC (ref 0–0.2)
PLATELET # BLD AUTO: 235 10*3/MM3 (ref 140–450)
PMV BLD AUTO: 8.9 FL (ref 6–12)
RBC # BLD AUTO: 3.4 10*6/MM3 (ref 3.77–5.28)
WBC NRBC COR # BLD: 4.54 10*3/MM3 (ref 3.4–10.8)

## 2019-10-17 PROCEDURE — 96417 CHEMO IV INFUS EACH ADDL SEQ: CPT | Performed by: INTERNAL MEDICINE

## 2019-10-17 PROCEDURE — 25010000002 PACLITAXEL PER 30 MG: Performed by: INTERNAL MEDICINE

## 2019-10-17 PROCEDURE — 25010000002 DEXAMETHASONE SODIUM PHOSPHATE 100 MG/10ML SOLUTION: Performed by: INTERNAL MEDICINE

## 2019-10-17 PROCEDURE — 96375 TX/PRO/DX INJ NEW DRUG ADDON: CPT | Performed by: INTERNAL MEDICINE

## 2019-10-17 PROCEDURE — 85025 COMPLETE CBC W/AUTO DIFF WBC: CPT | Performed by: INTERNAL MEDICINE

## 2019-10-17 PROCEDURE — 25010000002 DIPHENHYDRAMINE PER 50 MG: Performed by: INTERNAL MEDICINE

## 2019-10-17 PROCEDURE — 25010000002 TRASTUZUMAB PER 10 MG: Performed by: INTERNAL MEDICINE

## 2019-10-17 PROCEDURE — 96413 CHEMO IV INFUSION 1 HR: CPT | Performed by: INTERNAL MEDICINE

## 2019-10-17 RX ORDER — SODIUM CHLORIDE 9 MG/ML
250 INJECTION, SOLUTION INTRAVENOUS ONCE
Status: COMPLETED | OUTPATIENT
Start: 2019-10-17 | End: 2019-10-17

## 2019-10-17 RX ORDER — FAMOTIDINE 10 MG/ML
20 INJECTION, SOLUTION INTRAVENOUS ONCE
Status: COMPLETED | OUTPATIENT
Start: 2019-10-17 | End: 2019-10-17

## 2019-10-17 RX ORDER — ALPRAZOLAM 0.25 MG/1
0.5 TABLET ORAL 2 TIMES DAILY PRN
Qty: 60 TABLET | Refills: 0 | OUTPATIENT
Start: 2019-10-17 | End: 2019-10-29 | Stop reason: SDUPTHER

## 2019-10-17 RX ADMIN — FAMOTIDINE 20 MG: 10 INJECTION INTRAVENOUS at 09:14

## 2019-10-17 RX ADMIN — SODIUM CHLORIDE 250 ML: 9 INJECTION, SOLUTION INTRAVENOUS at 09:14

## 2019-10-17 RX ADMIN — TRASTUZUMAB 200 MG: 150 INJECTION, POWDER, LYOPHILIZED, FOR SOLUTION INTRAVENOUS at 09:50

## 2019-10-17 RX ADMIN — DEXAMETHASONE SODIUM PHOSPHATE 12 MG: 10 INJECTION, SOLUTION INTRAMUSCULAR; INTRAVENOUS at 09:31

## 2019-10-17 RX ADMIN — DIPHENHYDRAMINE HYDROCHLORIDE 25 MG: 50 INJECTION, SOLUTION INTRAMUSCULAR; INTRAVENOUS at 09:14

## 2019-10-17 RX ADMIN — PACLITAXEL 170 MG: 6 INJECTION, SOLUTION INTRAVENOUS at 10:28

## 2019-10-17 NOTE — PROGRESS NOTES
Pt asking for refill of xanax and lortab.  At last MD visit with Dr Jhaveri (10/10) refills sent for both medications, pt was not aware of this.  While in chemo infusion pt called her pharmacy, they only had refill of Lortab.  Called and spoke with pharmacist Keeley, Xanax authorization prescription given 0.25mg 2 tabs BID PRN anxiety #60

## 2019-10-17 NOTE — PROGRESS NOTES
Call rec from Lakeshia Lewis RN in Infusion-she states pt cannot afford the Ondansetron OOP cost. I found a discount card which the cost is $21.67. I printed this and took it to pt. She states this cost is affordable. I gave her my direct phone number if she has any questions.

## 2019-10-19 ENCOUNTER — TELEPHONE (OUTPATIENT)
Dept: CARDIOLOGY | Facility: CLINIC | Age: 64
End: 2019-10-19

## 2019-10-21 NOTE — TELEPHONE ENCOUNTER
Attempted to call patient. VM has not been set up yet. Will try again to call with results.    Chantal Wood RN  [place] [facility] [place]

## 2019-10-22 NOTE — TELEPHONE ENCOUNTER
Attempted to call patient for a second time. VM has still not been set up yet. Will try again to call with results.     Chantal Wood RN  Triage [facility] [place]

## 2019-10-24 ENCOUNTER — INFUSION (OUTPATIENT)
Dept: ONCOLOGY | Facility: HOSPITAL | Age: 64
End: 2019-10-24

## 2019-10-24 VITALS
WEIGHT: 218 LBS | BODY MASS INDEX: 31.81 KG/M2 | TEMPERATURE: 97.8 F | OXYGEN SATURATION: 93 % | HEART RATE: 57 BPM | SYSTOLIC BLOOD PRESSURE: 126 MMHG | DIASTOLIC BLOOD PRESSURE: 65 MMHG

## 2019-10-24 DIAGNOSIS — Z17.1 MALIGNANT NEOPLASM OF UPPER-INNER QUADRANT OF LEFT BREAST IN FEMALE, ESTROGEN RECEPTOR NEGATIVE (HCC): Primary | ICD-10-CM

## 2019-10-24 DIAGNOSIS — C50.212 MALIGNANT NEOPLASM OF UPPER-INNER QUADRANT OF LEFT BREAST IN FEMALE, ESTROGEN RECEPTOR NEGATIVE (HCC): Primary | ICD-10-CM

## 2019-10-24 LAB
ALBUMIN SERPL-MCNC: 3.7 G/DL (ref 3.5–5.2)
ALBUMIN/GLOB SERPL: 1.4 G/DL (ref 1.1–2.4)
ALP SERPL-CCNC: 94 U/L (ref 38–116)
ALT SERPL W P-5'-P-CCNC: 16 U/L (ref 0–33)
ANION GAP SERPL CALCULATED.3IONS-SCNC: 11.5 MMOL/L (ref 5–15)
AST SERPL-CCNC: 20 U/L (ref 0–32)
BASOPHILS # BLD AUTO: 0.03 10*3/MM3 (ref 0–0.2)
BASOPHILS NFR BLD AUTO: 0.8 % (ref 0–1.5)
BILIRUB SERPL-MCNC: 0.3 MG/DL (ref 0.2–1.2)
BUN BLD-MCNC: 7 MG/DL (ref 6–20)
BUN/CREAT SERPL: 9.6 (ref 7.3–30)
CALCIUM SPEC-SCNC: 9 MG/DL (ref 8.5–10.2)
CHLORIDE SERPL-SCNC: 105 MMOL/L (ref 98–107)
CO2 SERPL-SCNC: 25.5 MMOL/L (ref 22–29)
CREAT BLD-MCNC: 0.73 MG/DL (ref 0.6–1.1)
DEPRECATED RDW RBC AUTO: 51.8 FL (ref 37–54)
EOSINOPHIL # BLD AUTO: 0.07 10*3/MM3 (ref 0–0.4)
EOSINOPHIL NFR BLD AUTO: 2 % (ref 0.3–6.2)
ERYTHROCYTE [DISTWIDTH] IN BLOOD BY AUTOMATED COUNT: 14.7 % (ref 12.3–15.4)
GFR SERPL CREATININE-BSD FRML MDRD: 80 ML/MIN/1.73
GLOBULIN UR ELPH-MCNC: 2.6 GM/DL (ref 1.8–3.5)
GLUCOSE BLD-MCNC: 120 MG/DL (ref 74–124)
HCT VFR BLD AUTO: 30.5 % (ref 34–46.6)
HGB BLD-MCNC: 10.1 G/DL (ref 12–15.9)
IMM GRANULOCYTES # BLD AUTO: 0.06 10*3/MM3 (ref 0–0.05)
IMM GRANULOCYTES NFR BLD AUTO: 1.7 % (ref 0–0.5)
LYMPHOCYTES # BLD AUTO: 1.58 10*3/MM3 (ref 0.7–3.1)
LYMPHOCYTES NFR BLD AUTO: 44.4 % (ref 19.6–45.3)
MCH RBC QN AUTO: 32.3 PG (ref 26.6–33)
MCHC RBC AUTO-ENTMCNC: 33.1 G/DL (ref 31.5–35.7)
MCV RBC AUTO: 97.4 FL (ref 79–97)
MONOCYTES # BLD AUTO: 0.35 10*3/MM3 (ref 0.1–0.9)
MONOCYTES NFR BLD AUTO: 9.8 % (ref 5–12)
NEUTROPHILS # BLD AUTO: 1.47 10*3/MM3 (ref 1.7–7)
NEUTROPHILS NFR BLD AUTO: 41.3 % (ref 42.7–76)
NRBC BLD AUTO-RTO: 0 /100 WBC (ref 0–0.2)
PLATELET # BLD AUTO: 208 10*3/MM3 (ref 140–450)
PMV BLD AUTO: 8.8 FL (ref 6–12)
POTASSIUM BLD-SCNC: 3.7 MMOL/L (ref 3.5–4.7)
PROT SERPL-MCNC: 6.3 G/DL (ref 6.3–8)
RBC # BLD AUTO: 3.13 10*6/MM3 (ref 3.77–5.28)
SODIUM BLD-SCNC: 142 MMOL/L (ref 134–145)
WBC NRBC COR # BLD: 3.56 10*3/MM3 (ref 3.4–10.8)

## 2019-10-24 PROCEDURE — 96413 CHEMO IV INFUSION 1 HR: CPT | Performed by: INTERNAL MEDICINE

## 2019-10-24 PROCEDURE — 25010000002 PACLITAXEL PER 30 MG: Performed by: INTERNAL MEDICINE

## 2019-10-24 PROCEDURE — 25010000002 DIPHENHYDRAMINE PER 50 MG: Performed by: INTERNAL MEDICINE

## 2019-10-24 PROCEDURE — 25010000002 DEXAMETHASONE SODIUM PHOSPHATE 100 MG/10ML SOLUTION: Performed by: INTERNAL MEDICINE

## 2019-10-24 PROCEDURE — 25010000002 TRASTUZUMAB PER 10 MG: Performed by: INTERNAL MEDICINE

## 2019-10-24 PROCEDURE — 96417 CHEMO IV INFUS EACH ADDL SEQ: CPT | Performed by: INTERNAL MEDICINE

## 2019-10-24 PROCEDURE — 85025 COMPLETE CBC W/AUTO DIFF WBC: CPT | Performed by: INTERNAL MEDICINE

## 2019-10-24 PROCEDURE — 96375 TX/PRO/DX INJ NEW DRUG ADDON: CPT | Performed by: INTERNAL MEDICINE

## 2019-10-24 PROCEDURE — 80053 COMPREHEN METABOLIC PANEL: CPT | Performed by: INTERNAL MEDICINE

## 2019-10-24 RX ORDER — SODIUM CHLORIDE 9 MG/ML
250 INJECTION, SOLUTION INTRAVENOUS ONCE
Status: COMPLETED | OUTPATIENT
Start: 2019-10-24 | End: 2019-10-24

## 2019-10-24 RX ORDER — FAMOTIDINE 10 MG/ML
20 INJECTION, SOLUTION INTRAVENOUS ONCE
Status: COMPLETED | OUTPATIENT
Start: 2019-10-24 | End: 2019-10-24

## 2019-10-24 RX ADMIN — DEXAMETHASONE SODIUM PHOSPHATE 12 MG: 10 INJECTION, SOLUTION INTRAMUSCULAR; INTRAVENOUS at 09:50

## 2019-10-24 RX ADMIN — PACLITAXEL 170 MG: 6 INJECTION, SOLUTION INTRAVENOUS at 10:38

## 2019-10-24 RX ADMIN — SODIUM CHLORIDE 250 ML: 9 INJECTION, SOLUTION INTRAVENOUS at 09:32

## 2019-10-24 RX ADMIN — FAMOTIDINE 20 MG: 10 INJECTION INTRAVENOUS at 09:29

## 2019-10-24 RX ADMIN — DIPHENHYDRAMINE HYDROCHLORIDE 25 MG: 50 INJECTION, SOLUTION INTRAMUSCULAR; INTRAVENOUS at 09:32

## 2019-10-24 RX ADMIN — TRASTUZUMAB 200 MG: 150 INJECTION, POWDER, LYOPHILIZED, FOR SOLUTION INTRAVENOUS at 12:18

## 2019-10-24 NOTE — PROGRESS NOTES
Pt appt schedule printed and given to pt and .  Pt has appt with cardiologist on 11/11/19 for another ECHO.  This will be at 8 weeks.  Pt will discuss with Dr. Jhaveri during next visit if this needs to be moved up.

## 2019-10-29 DIAGNOSIS — Z17.1 MALIGNANT NEOPLASM OF UPPER-INNER QUADRANT OF LEFT BREAST IN FEMALE, ESTROGEN RECEPTOR NEGATIVE (HCC): ICD-10-CM

## 2019-10-29 DIAGNOSIS — C50.212 MALIGNANT NEOPLASM OF UPPER-INNER QUADRANT OF LEFT BREAST IN FEMALE, ESTROGEN RECEPTOR NEGATIVE (HCC): ICD-10-CM

## 2019-10-29 RX ORDER — ALPRAZOLAM 0.25 MG/1
TABLET ORAL
Qty: 60 TABLET | Refills: 0 | Status: SHIPPED | OUTPATIENT
Start: 2019-10-29 | End: 2020-01-13

## 2019-10-30 NOTE — PROGRESS NOTES
Subjective     REASON FOR CONSULTATION:   1. Left breast cancer T2N0 3.6 cm grade 3 ER WI negative HER-2 positive infiltrating ductal carcinoma post excisional biopsy, followed by mastectomy and axillary dissection                               REQUESTING PHYSICIAN: Jase Khan DO    History of Present Illness patient is a 64-year-old female with a large ER WI negative HER-2 positive breast cancer surgically excised here to receive adjuvant treatment.  Initially because of the large size and comorbidities we were thinking of weekly Taxol Herceptin perjeta followed by Adriamycin Cytoxan however she had cardiac evaluation with echo with a borderline ejection fraction and had a stress test which showed no ischemia but again the ejection fraction of 50 to 52% and based on her shortness of breath with exertion and symptomatology, it was felt best to proceed instead with just weekly Taxol Herceptin perjeta followed by a year of Herceptin perjeta. Concern for the anthracycline possibly contributing to cardiotoxicity in a patient with borderline cardiac function.  She is here for her ninth dose of chemo and is actually doing very well overall.  Her diarrhea is controlled with Lomotil the rash is almost certainly from the perjeta because she does not get it on the weeks where she has just Taxol.    Chest pain from her port  is gone  Shortness of breath is not a big issue and she is due for another echo before her next dose of Perjeta    She has no neuropathy from the Taxol  Otherwise she denies further concerns today.    Past Medical History:   Diagnosis Date   • Adjustment disorder    • Allergic rhinitis    • Amenorrhea    • Anxiety    • Breast cancer (CMS/[facility]) 04/18/2019    Left breast mammary carcinoma   • Bruit    • Carpal tunnel syndrome    • Carrier of tuberculosis    • Chronic pain    • De Quervain's tenosynovitis    • Degenerative cervical disc     • Depression    • Dyslipidemia    • Eustachian tube dysfunction    • History of UTI    • Hyperlipidemia    • Hypertension    • Impaired fasting glucose    • Lumbar degenerative disc disease    • Numbness and tingling     LEFT LEG   • OAB (overactive bladder)    • Scapulothoracic syndrome    • Sciatica         Past Surgical History:   Procedure Laterality Date   • APPENDECTOMY N/A    • BREAST BIOPSY Left 2019    Left breast ultrasound guided cyst aspiration, 9:00 position-Dr. Gerry Taylor, [facility]   • BREAST LUMPECTOMY Left 5/2/2019    Procedure: Left BREAST LUMPECTOMY;  Surgeon: Jase Evans MD;  Location: [facility] OR;  Service: General   • LUMBAR EPIDURAL INJECTION N/A    • MASTECTOMY Left 6/21/2019    Procedure: Left BREAST MASTECTOMY;  Surgeon: Jaes Evans MD;  Location: [facility] OR;  Service: General   • TUBAL ABDOMINAL LIGATION Bilateral    • VAGINAL DELIVERY      x3   • VENOUS ACCESS DEVICE (PORT) INSERTION Right 8/27/2019    Procedure: INSERTION VENOUS ACCESS DEVICE;  Surgeon: Jase Evans MD;  Location: [facility] OR;  Service: General      ONC HISTORY;  patient is a 64-year-old retired  with hypertension hypercholesterolemia and degenerative arthritis who felt a mass in her left breast in March.  She showed it to her family doctor and underwent imaging at [facility] on 3/13/2019 which Showed a 3 x 3.2 cm mass at 9:00 which on ultrasound showed a thick-walled benign-appearing 2.8 x 2.5 cm cyst which was felt to not be suspicious .because of persistence of symptoms she was reevaluated on 4/16/2019 and underwent aspiration and core biopsy because there was a significant soft tissue component to the mass at that time this was aspirated and the final report  showed fine-needle aspiration suspicious for malignant cells favor mammary carcinoma  Patient was referred to Dr. Kee who took her for an excisional biopsy on 5/2/2019 with the final report showing a 4 mass grade  3x3.6cm with tumor extending to one margin and DCIS also 0.2 mm from one margin.  The tumor was ER ND negative HER-2 3+.  The patient was then taken for surgery on 2019 at which time she had a completion mastectomy and axillary node biopsy with the findings of residual high-grade DCIS with clear margins and an incidental intraductal papilloma and 17- lymph nodes making this a T2N0 tumor    She has done well postoperatively and is here to discuss adjuvant treatment    She is  3 para 3 menarche  at age 15 and menopause in her early 40s when she had a hysterectomy for heavy menstrual bleeding.  She took hormone replacement for 10 years and stopped 10 years ago first childbirth was at age 25 she did not breast-feed  Family history is positive for mother who had uterine cancer at age 60 and  of it at age 65 she has a brother who  of liver cancer related to drinking there is no other malignancy in the family that she is aware of    She is a significant smoker for the last 48 years but does not drink     Patient and her  were very taken to back by the suggestion about chemotherapy and apparently had thought that there was no further treatment needed and I spent almost an hour presenting the data concerning the extreme effectiveness of HER-2 directed therapy and this situation with a high risk of recurrence without adjuvant treatment and she finally was convinced to do the treatment    We discussed smoking cessation but at this point she is so nervous and agitated with the prospect of chemotherapy that we will hold off on this until she is [facility] through the chemotherapy and rediscuss it    I discussed the case also with Dr. Kee will place a port and we will plan to start chemotherapy in 2 weeks.    Patient initiating therapy with Taxol/Herceptin/Perjeta 19.    She is followed by Dr. Dudley, cardiology, who gave clearance for the patient to proceed with chemotherapy.  repeat echo 6  weeks from last actually showed improvement in the cardiologist thought there may have been some technical issues with her first echocardiogram.    She returns back today, due for cycle3 day 1 of Taxol/Herceptin perjeta her diarrhea is-clearly controlled with the Imodium and in fact she became constipated because she took too much of this but she is got this under control and feels good  She has some reflux symptoms in the morning which makes her nauseous and I suggested she take 1 Prilosec every day till the chemo was over and this is improved    She has no neuropathy but is not brought the cooling gloves and I recommended this to her and gave her the literature supporting this in the website  She has had a itchy rash on her arms and legs which is not too bothersome and I have to suggested a steroid cream and some Benadryl and we will keep an eye on the rash- she has no shortness of breath        Current Outpatient Medications on File Prior to Visit   Medication Sig Dispense Refill   • ALPRAZolam (XANAX) 0.25 MG tablet TAKE 2 TABLETS BY MOUTH TWO TIMES A DAY AS NEEDED FOR ANXIETY  60 tablet 0   • buPROPion XL (WELLBUTRIN XL) 150 MG 24 hr tablet TAKE 1 TABLET BY MOUTH ONE TIME A DAY  30 tablet 2   • busPIRone (BUSPAR) 10 MG tablet Take 10 mg by mouth 2 (Two) Times a Day.     • citalopram (CeleXA) 40 MG tablet Take 1 tablet by mouth Daily. (Patient taking differently: Take 40 mg by mouth Every Night.) 30 tablet 5   • diphenhydrAMINE (BENADRYL) 25 mg capsule Take 25 mg by mouth Every 6 (Six) Hours As Needed for Itching.     • HYDROcodone-acetaminophen (NORCO) 5-325 MG per tablet Take 1 tablet by mouth Every 6 (Six) Hours As Needed for Moderate Pain  or Severe Pain  for up to 30 days. 30 tablet 0   • metoprolol succinate XL (TOPROL-XL) 25 MG 24 hr tablet Take 25 mg by mouth Daily.     • montelukast (SINGULAIR) 10 MG tablet Take 1 tablet by mouth Every Night. (Patient taking differently: Take 10 mg by mouth Daily.) 30  tablet 11   • Multiple Vitamins-Minerals (CENTRUM SILVER PO) Take 1 tablet by mouth Daily. womens vitamin     • omeprazole (priLOSEC) 20 MG capsule Take 20 mg by mouth Daily.     • phenazopyridine (PYRIDIUM) 100 MG tablet Take 1 tablet by mouth 3 (Three) Times a Day As Needed for bladder spasms. 30 tablet 0   • vitamin B-12 (CYANOCOBALAMIN) 100 MCG tablet Take 100 mcg by mouth Daily.     • loratadine (CLARITIN) 10 MG tablet Take 10 mg by mouth Daily.     • ondansetron ODT (ZOFRAN-ODT) 8 MG disintegrating tablet Take 1 tablet by mouth Every 8 (Eight) Hours As Needed for Nausea or Vomiting. 30 tablet 2     No current facility-administered medications on file prior to visit.         ALLERGIES:    Allergies   Allergen Reactions   • Gabapentin Hallucinations   • Effexor [Venlafaxine] Itching   • Naproxen Itching     Pt reports severe vaginal itching    • Zyrtec [Cetirizine] Other (See Comments)     Non-effecious   • Ibuprofen Other (See Comments)     Burns while urinating  Can take low dose Ibuprofen   • Penicillins Rash   • Sulfa Antibiotics Rash        Social History     Socioeconomic History   • Marital status:      Spouse name: Pawan   • Number of children: 3   • Years of education: High school   • Highest education level: Not on file   Occupational History     Employer: RETIRED   Tobacco Use   • Smoking status: Current Every Day Smoker     Packs/day: 0.25     Years: 48.00     Pack years: 12.00     Types: Cigarettes   • Smokeless tobacco: Never Used   • Tobacco comment: Trying to quit.    Substance and Sexual Activity   • Alcohol use: No   • Drug use: No   • Sexual activity: Yes     Partners: Male     Birth control/protection: Post-menopausal        Family History   Problem Relation Age of Onset   • Ovarian cancer Mother    • Endometrial cancer Mother    • COPD Father    • Stroke Brother    • Malig Hyperthermia Neg Hx         Review of Systems   Constitutional: Positive for fatigue (better 10/31/19). Negative  "for diaphoresis.   HENT: Positive for sinus pain (left side of face same 10/31/19). Negative for congestion (better 10/31/19).    Eyes: Negative.    Respiratory: Positive for cough (just as night little better 10/31/19).    Cardiovascular: Negative for chest pain.   Gastrointestinal: Positive for diarrhea (better with meds 10/31/19). Negative for nausea (stable 10/10/19).   Endocrine: Negative.    Genitourinary: Negative for difficulty urinating (better 10/10/19).   Musculoskeletal: Positive for back pain (low back muscle spasm better 10/31/19), gait problem (right shoulder better  10/31/19) and neck pain (same feeling tight 10/31/19).   Skin: Positive for rash (dry skin 10/10/19).   Neurological: Positive for numbness ( left underarm from surgery same 10/31/19).   Hematological: Negative.    Psychiatric/Behavioral: Negative.         Objective     Vitals:    10/31/19 0853   BP: 149/58   Pulse: 72   Resp: 16   Temp: 98 °F (36.7 °C)   SpO2: 97%   Weight: 98.6 kg (217 lb 6.4 oz)   Height: 176.3 cm (69.41\")   PainSc:   4   PainLoc: Generalized  Comment: left side of neck     Current Status 10/31/2019   ECOG score 0       Physical Exam   Pulmonary/Chest:           GENERAL:  Well-developed, well-nourished in no acute distress.   SKIN:  Warm, dry fading maculopapular rash on her arms and legs,no  purpura or petechiae.  EYES:  Pupils equal, round and reactive to light.  EOMs intact.  Conjunctivae normal.  EARS:  Hearing intact.  NOSE:  Septum midline.  No excoriations or nasal discharge.  MOUTH:  Tongue is well-papillated; no stomatitis or ulcers.  Lips normal.  THROAT:  Oropharynx without lesions or exudates.  NECK:  Supple with good range of motion; no thyromegaly or masses, no JVD.  LYMPHATICS:  No cervical, supraclavicular, axillary or inguinal adenopathy.  CHEST:  Lungs clear to auscultation. Good airflow.  Mediport right chest wall benign.  BREASTS: Right breast is benign; left chest wall shows a well-healed " mastectomy scar with nodularity laterally  CARDIAC:  Regular rate and rhythm without murmurs, rubs or gallops. Normal S1,S2.  ABDOMEN:  Soft, nontender with no hepatosplenomegaly or masses.  EXTREMITIES:  No clubbing, cyanosis or edema.  NEUROLOGICAL:  Cranial Nerves II-XII grossly intact.  No focal neurological deficits.  PSYCHIATRIC:  Normal affect and mood.        RECENT LABS:  Results from last 7 days   Lab Units 10/31/19  0848   WBC 10*3/mm3 3.74   NEUTROS ABS 10*3/mm3 1.74   HEMOGLOBIN g/dL 10.6*   HEMATOCRIT % 32.9*   PLATELETS 10*3/mm3 236     Results from last 7 days   Lab Units 10/31/19  0853   SODIUM mmol/L 140   POTASSIUM mmol/L 4.0   CHLORIDE mmol/L 104   CO2 mmol/L 25.2   BUN mg/dL 7   CREATININE mg/dL 0.68   CALCIUM mg/dL 9.2   ALBUMIN g/dL 3.70   BILIRUBIN mg/dL 0.3   ALK PHOS U/L 95   ALT (SGPT) U/L 20   AST (SGOT) U/L 21   GLUCOSE mg/dL 85         RADIOGRAPHIC DATA:    US Breat Left Limited    1. Left Breast Lumpectomy (47 Grams):  A. Invasive poorly differentiated mammary carcinoma of no special type (ductal carcinoma)  with apocrine features.  1. Invasive carcinoma measures up to 40 x 36 x 27 mm (measured grossly).  2. Overall Syl Grade III (glandular score = 3, nuclear score = 3, mitotic score = 3).  3. No definitive lymphovascular space invasion identified.  B. Invasive carcinoma focally extends to one undesignated peripheral inked margin of excision.  C. Associated ductal carcinoma in situ (DCIS).  1. Ductal carcinoma in situ (DCIS) spans area measuring 40 mm in single greatest  aggregate dimension (estimated from gross and glass slides).  2. DCIS predominantly solid and comedo type with high grade nuclear features.  3. High grade DCIS approximates the closest peripheral inked margin to 0.2 mm.  Page: 1 of 5 Printed: 5/6/2019 1:10 PM  [phone]  Resulting  Tissue Pathology Exam: [id number]   Order: [id number]   Collected:  5/2/2019 12:26 Status:  Edited Result - FINAL   Visible to  patient:  No (Not Released) Dx:  Malignant neoplasm of upper-inner hector...   Component    Addendum   HER2 by Immunohistochemistry   Positive (Score 3+)     HER2 by Immunohistochemistry  FDA approved (specify test/vendor): [employer]     Primary Antibody  [id number]     Cold Ischemia and Fixation Times   Meet requirements specified in latest version of the ASCO/CAP guidelines         Cold Ischemia Time: 18 minutes  Fixation Time: 8.25 hours  Testing Performed on Block Number(s): 1E  Fixative: Formalin   Addendum electronically signed by Harman Perry MD on 5/6/2019          Final Diagnosis  1. Left Breast, Modified Radical Mastectomy (935 grams): HIGH GRADE DUCTAL CARCINOMA IN-SITU  (DCIS) .  A. Nuclear Grade: High.  B. Architectural Type: Solid and comedo with lobular extension.  1. Size (extent) of DCIS: DCIS multifocal in the lower inner quadrant, measuring 0.9 cm in maximal  dimension (DCIS present in 3/18 tissue blocks).  C. No LCIS identified.  D. Skin and nipple uninvolved by DCIS.  E. Margins: Uninvolved by DCIS.  1. DCIS comes to within 1.3 cm of the anterior margin of excision (closest margin).  F. Additional findings: Incidental intraductal papilloma, florid ductal hyperplasia and apocrine cysts.  G. Lymph nodes: Sixteen benign lymph nodes (0/16).  H. Hormone receptor status: ER and CO negative, Her2/kali positive by IHC (performed on prior resection  [id number]).  I. Pathologic stage: pT2, N0.  Swm/kds      Regadenoson Stress Test With Myocardial Perfusion SPECT   Interpretation Summary     · Myocardial perfusion imaging indicates a normal myocardial perfusion study with no evidence of ischemia.  · Left ventricular ejection fraction is borderline normal (Calculated EF = 50%).  · Impressions are consistent with a low risk study.      Study Description         Nuclear Perfusion Images Overall image quality is excellent. There are no artifacts present. Raw images reviewed with no abnormalities noted.       Echo  9/16  Interpretation Summary     · Left ventricular systolic function is normal. Calculated EF = 55%. Estimated EF = 55%. Global Longitudinal LV strain = -21.6%. Strain data was reviewed and speckle tracking was considered accurate. The global longitudinal LV strain is -21.6 and normal. Normal left ventricular cavity size and wall thickness noted. All left ventricular wall segments contract normally. Left ventricular diastolic dysfunction is noted (grade I) consistent with impaired relaxation.  · Trace mitral valve regurgitation is present.  · Physiologic tricuspid valve regurgitation is present. Calculated right ventricular systolic pressure from tricuspid regurgitation is 16.0 mmHg. Insufficient TR velocity profile to estimate the right ventricular systolic pressure.           Assessment/Plan   1.  T2N0 grade 3 infiltrating ductal carcinoma left breast ER NM negative HER-2 positive post mastectomy and axillary node dissection with clear margins.  · Initiated Taxol/Herceptin/Perjeta today, 8/29/19.  · Plan to do 12 weeks of Taxol Herceptin perjeta without Adriamycin because of her borderline cardiac function    2.  Tobacco abuse and COPD    3.  Abnormal echo with stress test normal but borderline cardiac function at 52%  We will avoid Adriamycin Cytoxan  and treat with Taxol Herceptin perjeta and a year of Herceptin perjeta. Dr. Dudley, cardiology, has given clearance for patient to begin.  Planning repeat echocardiogram 6 weeks from previous.    · Improved EF to 55%    4.  Venous access.  Status post new Mediport placement 8/27/2019.  Working well today.  .    · No more refills of Percocet planned    5.  Diarrhea related to Perjeta.  Improved with Imodium    6.  Skin rash on her arms and legs likely from  perjeta continue to watch and use steroid cream    Plan:  1.  Proceed with cycle 4, day 1 Taxol/herceptin perjeta.  2.   Utilize Imodium as outlined above.  Continue Prilosec add steroid cream for rash  3.   Echocardiogram will be repeated in 2.5 weeks  5.  Return in 1+2 week for Taxol/Herceptin.   6.  Return in 3weeks for follow-up with Dr. Jhaveri and Yanira/Perjeta.  Without Perjeta and we will set her up for radiation at that time    This patient is on drug therapy requiring intensive monitoring for toxicity.  We addressed current concerns of pain and diarrhea as outlined above.

## 2019-10-31 ENCOUNTER — INFUSION (OUTPATIENT)
Dept: ONCOLOGY | Facility: HOSPITAL | Age: 64
End: 2019-10-31

## 2019-10-31 ENCOUNTER — TELEPHONE (OUTPATIENT)
Dept: SLEEP MEDICINE | Facility: HOSPITAL | Age: 64
End: 2019-10-31

## 2019-10-31 ENCOUNTER — OFFICE VISIT (OUTPATIENT)
Dept: ONCOLOGY | Facility: CLINIC | Age: 64
End: 2019-10-31

## 2019-10-31 VITALS
HEIGHT: 69 IN | DIASTOLIC BLOOD PRESSURE: 58 MMHG | HEART RATE: 72 BPM | TEMPERATURE: 98 F | WEIGHT: 217.4 LBS | SYSTOLIC BLOOD PRESSURE: 149 MMHG | RESPIRATION RATE: 16 BRPM | OXYGEN SATURATION: 97 % | BODY MASS INDEX: 32.2 KG/M2

## 2019-10-31 VITALS — HEART RATE: 62 BPM | DIASTOLIC BLOOD PRESSURE: 73 MMHG | SYSTOLIC BLOOD PRESSURE: 121 MMHG

## 2019-10-31 DIAGNOSIS — Z45.2 ENCOUNTER FOR FITTING AND ADJUSTMENT OF VASCULAR CATHETER: ICD-10-CM

## 2019-10-31 DIAGNOSIS — Z17.1 MALIGNANT NEOPLASM OF UPPER-INNER QUADRANT OF LEFT BREAST IN FEMALE, ESTROGEN RECEPTOR NEGATIVE (HCC): Primary | ICD-10-CM

## 2019-10-31 DIAGNOSIS — C50.212 MALIGNANT NEOPLASM OF UPPER-INNER QUADRANT OF LEFT BREAST IN FEMALE, ESTROGEN RECEPTOR NEGATIVE (HCC): ICD-10-CM

## 2019-10-31 DIAGNOSIS — Z17.1 MALIGNANT NEOPLASM OF UPPER-INNER QUADRANT OF LEFT BREAST IN FEMALE, ESTROGEN RECEPTOR NEGATIVE (HCC): ICD-10-CM

## 2019-10-31 DIAGNOSIS — C50.212 MALIGNANT NEOPLASM OF UPPER-INNER QUADRANT OF LEFT BREAST IN FEMALE, ESTROGEN RECEPTOR NEGATIVE (HCC): Primary | ICD-10-CM

## 2019-10-31 PROBLEM — Z79.899 ENCOUNTER FOR LONG-TERM (CURRENT) USE OF HIGH-RISK MEDICATION: Status: ACTIVE | Noted: 2019-09-05

## 2019-10-31 LAB
ALBUMIN SERPL-MCNC: 3.7 G/DL (ref 3.5–5.2)
ALBUMIN/GLOB SERPL: 1.4 G/DL (ref 1.1–2.4)
ALP SERPL-CCNC: 95 U/L (ref 38–116)
ALT SERPL W P-5'-P-CCNC: 20 U/L (ref 0–33)
ANION GAP SERPL CALCULATED.3IONS-SCNC: 10.8 MMOL/L (ref 5–15)
AST SERPL-CCNC: 21 U/L (ref 0–32)
BASOPHILS # BLD AUTO: 0.04 10*3/MM3 (ref 0–0.2)
BASOPHILS NFR BLD AUTO: 1.1 % (ref 0–1.5)
BILIRUB SERPL-MCNC: 0.3 MG/DL (ref 0.2–1.2)
BUN BLD-MCNC: 7 MG/DL (ref 6–20)
BUN/CREAT SERPL: 10.3 (ref 7.3–30)
CALCIUM SPEC-SCNC: 9.2 MG/DL (ref 8.5–10.2)
CHLORIDE SERPL-SCNC: 104 MMOL/L (ref 98–107)
CO2 SERPL-SCNC: 25.2 MMOL/L (ref 22–29)
CREAT BLD-MCNC: 0.68 MG/DL (ref 0.6–1.1)
DEPRECATED RDW RBC AUTO: 53.1 FL (ref 37–54)
EOSINOPHIL # BLD AUTO: 0.11 10*3/MM3 (ref 0–0.4)
EOSINOPHIL NFR BLD AUTO: 2.9 % (ref 0.3–6.2)
ERYTHROCYTE [DISTWIDTH] IN BLOOD BY AUTOMATED COUNT: 14.9 % (ref 12.3–15.4)
GFR SERPL CREATININE-BSD FRML MDRD: 87 ML/MIN/1.73
GLOBULIN UR ELPH-MCNC: 2.7 GM/DL (ref 1.8–3.5)
GLUCOSE BLD-MCNC: 85 MG/DL (ref 74–124)
HCT VFR BLD AUTO: 32.9 % (ref 34–46.6)
HGB BLD-MCNC: 10.6 G/DL (ref 12–15.9)
IMM GRANULOCYTES # BLD AUTO: 0.03 10*3/MM3 (ref 0–0.05)
IMM GRANULOCYTES NFR BLD AUTO: 0.8 % (ref 0–0.5)
LYMPHOCYTES # BLD AUTO: 1.5 10*3/MM3 (ref 0.7–3.1)
LYMPHOCYTES NFR BLD AUTO: 40.1 % (ref 19.6–45.3)
MCH RBC QN AUTO: 31.6 PG (ref 26.6–33)
MCHC RBC AUTO-ENTMCNC: 32.2 G/DL (ref 31.5–35.7)
MCV RBC AUTO: 98.2 FL (ref 79–97)
MONOCYTES # BLD AUTO: 0.32 10*3/MM3 (ref 0.1–0.9)
MONOCYTES NFR BLD AUTO: 8.6 % (ref 5–12)
NEUTROPHILS # BLD AUTO: 1.74 10*3/MM3 (ref 1.7–7)
NEUTROPHILS NFR BLD AUTO: 46.5 % (ref 42.7–76)
NRBC BLD AUTO-RTO: 0 /100 WBC (ref 0–0.2)
PLATELET # BLD AUTO: 236 10*3/MM3 (ref 140–450)
PMV BLD AUTO: 9 FL (ref 6–12)
POTASSIUM BLD-SCNC: 4 MMOL/L (ref 3.5–4.7)
PROT SERPL-MCNC: 6.4 G/DL (ref 6.3–8)
RBC # BLD AUTO: 3.35 10*6/MM3 (ref 3.77–5.28)
SODIUM BLD-SCNC: 140 MMOL/L (ref 134–145)
WBC NRBC COR # BLD: 3.74 10*3/MM3 (ref 3.4–10.8)

## 2019-10-31 PROCEDURE — 25010000002 PERTUZUMAB 420 MG/14ML SOLUTION 420 MG VIAL: Performed by: INTERNAL MEDICINE

## 2019-10-31 PROCEDURE — 80053 COMPREHEN METABOLIC PANEL: CPT

## 2019-10-31 PROCEDURE — 25010000002 DEXAMETHASONE SODIUM PHOSPHATE 100 MG/10ML SOLUTION: Performed by: INTERNAL MEDICINE

## 2019-10-31 PROCEDURE — 25010000002 PACLITAXEL PER 30 MG: Performed by: INTERNAL MEDICINE

## 2019-10-31 PROCEDURE — 96413 CHEMO IV INFUSION 1 HR: CPT | Performed by: INTERNAL MEDICINE

## 2019-10-31 PROCEDURE — 25010000002 DIPHENHYDRAMINE PER 50 MG: Performed by: INTERNAL MEDICINE

## 2019-10-31 PROCEDURE — 99214 OFFICE O/P EST MOD 30 MIN: CPT | Performed by: INTERNAL MEDICINE

## 2019-10-31 PROCEDURE — 25010000002 TRASTUZUMAB PER 10 MG: Performed by: INTERNAL MEDICINE

## 2019-10-31 PROCEDURE — 96375 TX/PRO/DX INJ NEW DRUG ADDON: CPT | Performed by: INTERNAL MEDICINE

## 2019-10-31 PROCEDURE — 96417 CHEMO IV INFUS EACH ADDL SEQ: CPT | Performed by: INTERNAL MEDICINE

## 2019-10-31 PROCEDURE — 85025 COMPLETE CBC W/AUTO DIFF WBC: CPT

## 2019-10-31 RX ORDER — FAMOTIDINE 10 MG/ML
20 INJECTION, SOLUTION INTRAVENOUS ONCE
Status: COMPLETED | OUTPATIENT
Start: 2019-10-31 | End: 2019-10-31

## 2019-10-31 RX ORDER — FAMOTIDINE 10 MG/ML
20 INJECTION, SOLUTION INTRAVENOUS ONCE
Status: CANCELLED | OUTPATIENT
Start: 2019-11-14

## 2019-10-31 RX ORDER — DIPHENHYDRAMINE HYDROCHLORIDE 50 MG/ML
50 INJECTION INTRAMUSCULAR; INTRAVENOUS AS NEEDED
Status: CANCELLED | OUTPATIENT
Start: 2019-11-14

## 2019-10-31 RX ORDER — FAMOTIDINE 10 MG/ML
20 INJECTION, SOLUTION INTRAVENOUS ONCE
Status: CANCELLED | OUTPATIENT
Start: 2019-11-07

## 2019-10-31 RX ORDER — SODIUM CHLORIDE 9 MG/ML
250 INJECTION, SOLUTION INTRAVENOUS ONCE
Status: COMPLETED | OUTPATIENT
Start: 2019-10-31 | End: 2019-10-31

## 2019-10-31 RX ORDER — DIPHENHYDRAMINE HCL 25 MG
25 CAPSULE ORAL EVERY 6 HOURS PRN
COMMUNITY
End: 2020-01-23

## 2019-10-31 RX ORDER — DIPHENHYDRAMINE HYDROCHLORIDE 50 MG/ML
50 INJECTION INTRAMUSCULAR; INTRAVENOUS AS NEEDED
Status: CANCELLED | OUTPATIENT
Start: 2019-11-07

## 2019-10-31 RX ORDER — OMEPRAZOLE 20 MG/1
20 CAPSULE, DELAYED RELEASE ORAL DAILY
COMMUNITY
End: 2020-07-23

## 2019-10-31 RX ORDER — FAMOTIDINE 10 MG/ML
20 INJECTION, SOLUTION INTRAVENOUS AS NEEDED
Status: CANCELLED | OUTPATIENT
Start: 2019-11-14

## 2019-10-31 RX ORDER — SODIUM CHLORIDE 9 MG/ML
250 INJECTION, SOLUTION INTRAVENOUS ONCE
Status: CANCELLED | OUTPATIENT
Start: 2019-11-07

## 2019-10-31 RX ORDER — FAMOTIDINE 10 MG/ML
20 INJECTION, SOLUTION INTRAVENOUS AS NEEDED
Status: CANCELLED | OUTPATIENT
Start: 2019-10-31

## 2019-10-31 RX ORDER — FAMOTIDINE 10 MG/ML
20 INJECTION, SOLUTION INTRAVENOUS AS NEEDED
Status: CANCELLED | OUTPATIENT
Start: 2019-11-07

## 2019-10-31 RX ORDER — SODIUM CHLORIDE 9 MG/ML
250 INJECTION, SOLUTION INTRAVENOUS ONCE
Status: CANCELLED | OUTPATIENT
Start: 2019-11-14

## 2019-10-31 RX ORDER — DIPHENHYDRAMINE HYDROCHLORIDE 50 MG/ML
50 INJECTION INTRAMUSCULAR; INTRAVENOUS AS NEEDED
Status: CANCELLED | OUTPATIENT
Start: 2019-10-31

## 2019-10-31 RX ADMIN — DEXAMETHASONE SODIUM PHOSPHATE 12 MG: 10 INJECTION, SOLUTION INTRAMUSCULAR; INTRAVENOUS at 10:09

## 2019-10-31 RX ADMIN — FAMOTIDINE 20 MG: 10 INJECTION INTRAVENOUS at 09:50

## 2019-10-31 RX ADMIN — SODIUM CHLORIDE 250 ML: 9 INJECTION, SOLUTION INTRAVENOUS at 09:49

## 2019-10-31 RX ADMIN — PACLITAXEL 170 MG: 6 INJECTION, SOLUTION INTRAVENOUS at 12:21

## 2019-10-31 RX ADMIN — DIPHENHYDRAMINE HYDROCHLORIDE 25 MG: 50 INJECTION INTRAMUSCULAR; INTRAVENOUS at 09:52

## 2019-10-31 RX ADMIN — TRASTUZUMAB 200 MG: 150 INJECTION, POWDER, LYOPHILIZED, FOR SOLUTION INTRAVENOUS at 11:45

## 2019-10-31 RX ADMIN — PERTUZUMAB 420 MG: 30 INJECTION, SOLUTION, CONCENTRATE INTRAVENOUS at 10:27

## 2019-11-07 ENCOUNTER — INFUSION (OUTPATIENT)
Dept: ONCOLOGY | Facility: HOSPITAL | Age: 64
End: 2019-11-07

## 2019-11-07 VITALS
TEMPERATURE: 97.7 F | DIASTOLIC BLOOD PRESSURE: 76 MMHG | HEART RATE: 68 BPM | WEIGHT: 213.6 LBS | OXYGEN SATURATION: 94 % | SYSTOLIC BLOOD PRESSURE: 123 MMHG | BODY MASS INDEX: 31.17 KG/M2

## 2019-11-07 DIAGNOSIS — Z17.1 MALIGNANT NEOPLASM OF UPPER-INNER QUADRANT OF LEFT BREAST IN FEMALE, ESTROGEN RECEPTOR NEGATIVE (HCC): Primary | ICD-10-CM

## 2019-11-07 DIAGNOSIS — G89.4 CHRONIC PAIN SYNDROME: ICD-10-CM

## 2019-11-07 DIAGNOSIS — Z17.1 MALIGNANT NEOPLASM OF UPPER-INNER QUADRANT OF LEFT BREAST IN FEMALE, ESTROGEN RECEPTOR NEGATIVE (HCC): ICD-10-CM

## 2019-11-07 DIAGNOSIS — Z79.899 ENCOUNTER FOR LONG-TERM (CURRENT) USE OF HIGH-RISK MEDICATION: ICD-10-CM

## 2019-11-07 DIAGNOSIS — C50.212 MALIGNANT NEOPLASM OF UPPER-INNER QUADRANT OF LEFT BREAST IN FEMALE, ESTROGEN RECEPTOR NEGATIVE (HCC): ICD-10-CM

## 2019-11-07 DIAGNOSIS — C50.212 MALIGNANT NEOPLASM OF UPPER-INNER QUADRANT OF LEFT BREAST IN FEMALE, ESTROGEN RECEPTOR NEGATIVE (HCC): Primary | ICD-10-CM

## 2019-11-07 LAB
ALBUMIN SERPL-MCNC: 4.2 G/DL (ref 3.5–5.2)
ALBUMIN/GLOB SERPL: 1.5 G/DL (ref 1.1–2.4)
ALP SERPL-CCNC: 110 U/L (ref 38–116)
ALT SERPL W P-5'-P-CCNC: 24 U/L (ref 0–33)
ANION GAP SERPL CALCULATED.3IONS-SCNC: 13.2 MMOL/L (ref 5–15)
AST SERPL-CCNC: 27 U/L (ref 0–32)
BASOPHILS # BLD AUTO: 0.05 10*3/MM3 (ref 0–0.2)
BASOPHILS NFR BLD AUTO: 0.8 % (ref 0–1.5)
BILIRUB SERPL-MCNC: 0.6 MG/DL (ref 0.2–1.2)
BUN BLD-MCNC: 12 MG/DL (ref 6–20)
BUN/CREAT SERPL: 12.4 (ref 7.3–30)
CALCIUM SPEC-SCNC: 9.9 MG/DL (ref 8.5–10.2)
CHLORIDE SERPL-SCNC: 104 MMOL/L (ref 98–107)
CO2 SERPL-SCNC: 20.8 MMOL/L (ref 22–29)
CREAT BLD-MCNC: 0.97 MG/DL (ref 0.6–1.1)
DEPRECATED RDW RBC AUTO: 54.5 FL (ref 37–54)
EOSINOPHIL # BLD AUTO: 0.15 10*3/MM3 (ref 0–0.4)
EOSINOPHIL NFR BLD AUTO: 2.3 % (ref 0.3–6.2)
ERYTHROCYTE [DISTWIDTH] IN BLOOD BY AUTOMATED COUNT: 15.4 % (ref 12.3–15.4)
GFR SERPL CREATININE-BSD FRML MDRD: 58 ML/MIN/1.73
GLOBULIN UR ELPH-MCNC: 2.8 GM/DL (ref 1.8–3.5)
GLUCOSE BLD-MCNC: 90 MG/DL (ref 74–124)
HCT VFR BLD AUTO: 36.2 % (ref 34–46.6)
HGB BLD-MCNC: 11.8 G/DL (ref 12–15.9)
IMM GRANULOCYTES # BLD AUTO: 0.08 10*3/MM3 (ref 0–0.05)
IMM GRANULOCYTES NFR BLD AUTO: 1.2 % (ref 0–0.5)
LYMPHOCYTES # BLD AUTO: 1.83 10*3/MM3 (ref 0.7–3.1)
LYMPHOCYTES NFR BLD AUTO: 27.6 % (ref 19.6–45.3)
MAGNESIUM SERPL-MCNC: 1.6 MG/DL (ref 1.8–2.5)
MCH RBC QN AUTO: 32.2 PG (ref 26.6–33)
MCHC RBC AUTO-ENTMCNC: 32.6 G/DL (ref 31.5–35.7)
MCV RBC AUTO: 98.6 FL (ref 79–97)
MONOCYTES # BLD AUTO: 0.45 10*3/MM3 (ref 0.1–0.9)
MONOCYTES NFR BLD AUTO: 6.8 % (ref 5–12)
NEUTROPHILS # BLD AUTO: 4.08 10*3/MM3 (ref 1.7–7)
NEUTROPHILS NFR BLD AUTO: 61.3 % (ref 42.7–76)
NRBC BLD AUTO-RTO: 0 /100 WBC (ref 0–0.2)
PLATELET # BLD AUTO: 259 10*3/MM3 (ref 140–450)
PMV BLD AUTO: 8.7 FL (ref 6–12)
POTASSIUM BLD-SCNC: 4 MMOL/L (ref 3.5–4.7)
PROT SERPL-MCNC: 7 G/DL (ref 6.3–8)
RBC # BLD AUTO: 3.67 10*6/MM3 (ref 3.77–5.28)
SODIUM BLD-SCNC: 138 MMOL/L (ref 134–145)
WBC NRBC COR # BLD: 6.64 10*3/MM3 (ref 3.4–10.8)

## 2019-11-07 PROCEDURE — 80053 COMPREHEN METABOLIC PANEL: CPT

## 2019-11-07 PROCEDURE — 96375 TX/PRO/DX INJ NEW DRUG ADDON: CPT

## 2019-11-07 PROCEDURE — 25010000002 DEXAMETHASONE SODIUM PHOSPHATE 100 MG/10ML SOLUTION: Performed by: INTERNAL MEDICINE

## 2019-11-07 PROCEDURE — 25010000002 DIPHENHYDRAMINE PER 50 MG: Performed by: INTERNAL MEDICINE

## 2019-11-07 PROCEDURE — 25010000002 TRASTUZUMAB PER 10 MG: Performed by: INTERNAL MEDICINE

## 2019-11-07 PROCEDURE — 83735 ASSAY OF MAGNESIUM: CPT

## 2019-11-07 PROCEDURE — 96367 TX/PROPH/DG ADDL SEQ IV INF: CPT

## 2019-11-07 PROCEDURE — 25010000002 PACLITAXEL PER 30 MG: Performed by: INTERNAL MEDICINE

## 2019-11-07 PROCEDURE — 96413 CHEMO IV INFUSION 1 HR: CPT

## 2019-11-07 PROCEDURE — 85025 COMPLETE CBC W/AUTO DIFF WBC: CPT

## 2019-11-07 PROCEDURE — 25010000002 MAGNESIUM SULFATE 2 GM/50ML SOLUTION: Performed by: NURSE PRACTITIONER

## 2019-11-07 PROCEDURE — 96417 CHEMO IV INFUS EACH ADDL SEQ: CPT

## 2019-11-07 RX ORDER — SODIUM CHLORIDE 9 MG/ML
250 INJECTION, SOLUTION INTRAVENOUS ONCE
Status: COMPLETED | OUTPATIENT
Start: 2019-11-07 | End: 2019-11-07

## 2019-11-07 RX ORDER — FAMOTIDINE 10 MG/ML
20 INJECTION, SOLUTION INTRAVENOUS ONCE
Status: COMPLETED | OUTPATIENT
Start: 2019-11-07 | End: 2019-11-07

## 2019-11-07 RX ORDER — MAGNESIUM SULFATE HEPTAHYDRATE 40 MG/ML
2 INJECTION, SOLUTION INTRAVENOUS ONCE
Status: COMPLETED | OUTPATIENT
Start: 2019-11-07 | End: 2019-11-07

## 2019-11-07 RX ORDER — HYDROCODONE BITARTRATE AND ACETAMINOPHEN 5; 325 MG/1; MG/1
1 TABLET ORAL EVERY 6 HOURS PRN
Qty: 30 TABLET | Refills: 0 | Status: SHIPPED | OUTPATIENT
Start: 2019-11-07 | End: 2019-11-27 | Stop reason: SDUPTHER

## 2019-11-07 RX ORDER — DIPHENOXYLATE HYDROCHLORIDE AND ATROPINE SULFATE 2.5; .025 MG/1; MG/1
1 TABLET ORAL 4 TIMES DAILY PRN
Qty: 90 TABLET | Refills: 0 | Status: SHIPPED | OUTPATIENT
Start: 2019-11-07 | End: 2021-03-12

## 2019-11-07 RX ORDER — DIPHENOXYLATE HYDROCHLORIDE AND ATROPINE SULFATE 2.5; .025 MG/1; MG/1
1 TABLET ORAL 4 TIMES DAILY PRN
Qty: 60 TABLET | Refills: 0 | Status: SHIPPED | OUTPATIENT
Start: 2019-11-07 | End: 2019-11-07 | Stop reason: SDUPTHER

## 2019-11-07 RX ORDER — SODIUM CHLORIDE 0.9 % (FLUSH) 0.9 %
10 SYRINGE (ML) INJECTION AS NEEDED
Status: CANCELLED | OUTPATIENT
Start: 2019-11-07

## 2019-11-07 RX ADMIN — TRASTUZUMAB 200 MG: 150 INJECTION, POWDER, LYOPHILIZED, FOR SOLUTION INTRAVENOUS at 11:38

## 2019-11-07 RX ADMIN — DIPHENHYDRAMINE HYDROCHLORIDE 25 MG: 50 INJECTION INTRAMUSCULAR; INTRAVENOUS at 11:03

## 2019-11-07 RX ADMIN — Medication 500 UNITS: at 13:20

## 2019-11-07 RX ADMIN — SODIUM CHLORIDE 250 ML: 9 INJECTION, SOLUTION INTRAVENOUS at 09:32

## 2019-11-07 RX ADMIN — DEXAMETHASONE SODIUM PHOSPHATE 12 MG: 10 INJECTION, SOLUTION INTRAMUSCULAR; INTRAVENOUS at 11:19

## 2019-11-07 RX ADMIN — MAGNESIUM SULFATE IN WATER 2 G: 40 INJECTION, SOLUTION INTRAVENOUS at 09:32

## 2019-11-07 RX ADMIN — FAMOTIDINE 20 MG: 10 INJECTION, SOLUTION INTRAVENOUS at 10:59

## 2019-11-07 RX ADMIN — PACLITAXEL 170 MG: 6 INJECTION, SOLUTION INTRAVENOUS at 12:09

## 2019-11-07 NOTE — NURSING NOTE
Pt complaining of diarrhea x 3 days.  States passing water.  Taking up to 5-6 imodium a day.  Lab values noted.    Lab Results   Component Value Date    GLUCOSE 90 11/07/2019    BUN 12 11/07/2019    CREATININE 0.97 11/07/2019    EGFRIFNONA 58 (L) 11/07/2019    EGFRIFAFRI 94 07/14/2017    BCR 12.4 11/07/2019    K 4.0 11/07/2019    CO2 20.8 (L) 11/07/2019    CALCIUM 9.9 11/07/2019    PROTENTOTREF 6.8 07/14/2017    ALBUMIN 4.20 11/07/2019    LABIL2 1.8 07/14/2017    AST 27 11/07/2019    ALT 24 11/07/2019   Mag 1.6.  2 grams of Mag ordered per protocol.  Order received to send in Lomotil po.  Order pending Dr. Jhaveri's approval.  Explained same to patient.  OK to treat per Chantal ALEMAN

## 2019-11-14 ENCOUNTER — INFUSION (OUTPATIENT)
Dept: ONCOLOGY | Facility: HOSPITAL | Age: 64
End: 2019-11-14

## 2019-11-14 VITALS
BODY MASS INDEX: 30.94 KG/M2 | SYSTOLIC BLOOD PRESSURE: 127 MMHG | HEART RATE: 65 BPM | TEMPERATURE: 97.9 F | DIASTOLIC BLOOD PRESSURE: 62 MMHG | WEIGHT: 212 LBS | OXYGEN SATURATION: 96 %

## 2019-11-14 DIAGNOSIS — C50.212 MALIGNANT NEOPLASM OF UPPER-INNER QUADRANT OF LEFT BREAST IN FEMALE, ESTROGEN RECEPTOR NEGATIVE (HCC): Primary | ICD-10-CM

## 2019-11-14 DIAGNOSIS — Z17.1 MALIGNANT NEOPLASM OF UPPER-INNER QUADRANT OF LEFT BREAST IN FEMALE, ESTROGEN RECEPTOR NEGATIVE (HCC): Primary | ICD-10-CM

## 2019-11-14 LAB
ALBUMIN SERPL-MCNC: 3.8 G/DL (ref 3.5–5.2)
ALBUMIN/GLOB SERPL: 1.5 G/DL (ref 1.1–2.4)
ALP SERPL-CCNC: 92 U/L (ref 38–116)
ALT SERPL W P-5'-P-CCNC: 17 U/L (ref 0–33)
ANION GAP SERPL CALCULATED.3IONS-SCNC: 13.8 MMOL/L (ref 5–15)
AST SERPL-CCNC: 19 U/L (ref 0–32)
BACTERIA UR QL AUTO: NEGATIVE /HPF
BASOPHILS # BLD AUTO: 0.03 10*3/MM3 (ref 0–0.2)
BASOPHILS NFR BLD AUTO: 0.6 % (ref 0–1.5)
BILIRUB SERPL-MCNC: 0.3 MG/DL (ref 0.2–1.2)
BILIRUB UR QL STRIP: ABNORMAL
BUN BLD-MCNC: 6 MG/DL (ref 6–20)
BUN/CREAT SERPL: 8.6 (ref 7.3–30)
CALCIUM SPEC-SCNC: 9.6 MG/DL (ref 8.5–10.2)
CHLORIDE SERPL-SCNC: 104 MMOL/L (ref 98–107)
CLARITY UR: ABNORMAL
CO2 SERPL-SCNC: 22.2 MMOL/L (ref 22–29)
COLOR UR: YELLOW
CREAT BLD-MCNC: 0.7 MG/DL (ref 0.6–1.1)
DEPRECATED RDW RBC AUTO: 53.8 FL (ref 37–54)
EOSINOPHIL # BLD AUTO: 0.06 10*3/MM3 (ref 0–0.4)
EOSINOPHIL NFR BLD AUTO: 1.2 % (ref 0.3–6.2)
ERYTHROCYTE [DISTWIDTH] IN BLOOD BY AUTOMATED COUNT: 15.5 % (ref 12.3–15.4)
GFR SERPL CREATININE-BSD FRML MDRD: 84 ML/MIN/1.73
GLOBULIN UR ELPH-MCNC: 2.6 GM/DL (ref 1.8–3.5)
GLUCOSE BLD-MCNC: 86 MG/DL (ref 74–124)
GLUCOSE UR STRIP-MCNC: NEGATIVE MG/DL
HCT VFR BLD AUTO: 30.9 % (ref 34–46.6)
HGB BLD-MCNC: 10.4 G/DL (ref 12–15.9)
HGB UR QL STRIP.AUTO: ABNORMAL
IMM GRANULOCYTES # BLD AUTO: 0.04 10*3/MM3 (ref 0–0.05)
IMM GRANULOCYTES NFR BLD AUTO: 0.8 % (ref 0–0.5)
KETONES UR QL STRIP: ABNORMAL
LEUKOCYTE ESTERASE UR QL STRIP.AUTO: ABNORMAL
LYMPHOCYTES # BLD AUTO: 1.79 10*3/MM3 (ref 0.7–3.1)
LYMPHOCYTES NFR BLD AUTO: 36.3 % (ref 19.6–45.3)
MAGNESIUM SERPL-MCNC: 1.4 MG/DL (ref 1.8–2.5)
MCH RBC QN AUTO: 32.3 PG (ref 26.6–33)
MCHC RBC AUTO-ENTMCNC: 33.7 G/DL (ref 31.5–35.7)
MCV RBC AUTO: 96 FL (ref 79–97)
MONOCYTES # BLD AUTO: 0.52 10*3/MM3 (ref 0.1–0.9)
MONOCYTES NFR BLD AUTO: 10.5 % (ref 5–12)
NEUTROPHILS # BLD AUTO: 2.49 10*3/MM3 (ref 1.7–7)
NEUTROPHILS NFR BLD AUTO: 50.6 % (ref 42.7–76)
NITRITE UR QL STRIP: NEGATIVE
NRBC BLD AUTO-RTO: 0 /100 WBC (ref 0–0.2)
PH UR STRIP.AUTO: 6 [PH] (ref 4.5–8)
PLATELET # BLD AUTO: 263 10*3/MM3 (ref 140–450)
PMV BLD AUTO: 9.5 FL (ref 6–12)
POTASSIUM BLD-SCNC: 2.9 MMOL/L (ref 3.5–4.7)
PROT SERPL-MCNC: 6.4 G/DL (ref 6.3–8)
PROT UR QL STRIP: ABNORMAL
RBC # BLD AUTO: 3.22 10*6/MM3 (ref 3.77–5.28)
RBC # UR: ABNORMAL /HPF
REF LAB TEST METHOD: ABNORMAL
SODIUM BLD-SCNC: 140 MMOL/L (ref 134–145)
SP GR UR STRIP: 1.02 (ref 1–1.03)
SQUAMOUS #/AREA URNS HPF: ABNORMAL /HPF
UROBILINOGEN UR QL STRIP: ABNORMAL
WBC CLUMPS # UR AUTO: ABNORMAL /HPF
WBC NRBC COR # BLD: 4.93 10*3/MM3 (ref 3.4–10.8)
WBC UR QL AUTO: ABNORMAL /HPF

## 2019-11-14 PROCEDURE — 85025 COMPLETE CBC W/AUTO DIFF WBC: CPT | Performed by: INTERNAL MEDICINE

## 2019-11-14 PROCEDURE — 25010000002 MAGNESIUM SULFATE PER 500 MG OF MAGNESIUM: Performed by: INTERNAL MEDICINE

## 2019-11-14 PROCEDURE — 80053 COMPREHEN METABOLIC PANEL: CPT | Performed by: INTERNAL MEDICINE

## 2019-11-14 PROCEDURE — 25010000002 DIPHENHYDRAMINE PER 50 MG: Performed by: INTERNAL MEDICINE

## 2019-11-14 PROCEDURE — 87186 SC STD MICRODIL/AGAR DIL: CPT | Performed by: INTERNAL MEDICINE

## 2019-11-14 PROCEDURE — 81001 URINALYSIS AUTO W/SCOPE: CPT | Performed by: INTERNAL MEDICINE

## 2019-11-14 PROCEDURE — 25010000002 TRASTUZUMAB PER 10 MG: Performed by: INTERNAL MEDICINE

## 2019-11-14 PROCEDURE — 87088 URINE BACTERIA CULTURE: CPT | Performed by: INTERNAL MEDICINE

## 2019-11-14 PROCEDURE — 96366 THER/PROPH/DIAG IV INF ADDON: CPT | Performed by: INTERNAL MEDICINE

## 2019-11-14 PROCEDURE — 25010000002 PACLITAXEL PER 30 MG: Performed by: INTERNAL MEDICINE

## 2019-11-14 PROCEDURE — 96375 TX/PRO/DX INJ NEW DRUG ADDON: CPT | Performed by: INTERNAL MEDICINE

## 2019-11-14 PROCEDURE — 96367 TX/PROPH/DG ADDL SEQ IV INF: CPT | Performed by: INTERNAL MEDICINE

## 2019-11-14 PROCEDURE — 96417 CHEMO IV INFUS EACH ADDL SEQ: CPT | Performed by: INTERNAL MEDICINE

## 2019-11-14 PROCEDURE — 83735 ASSAY OF MAGNESIUM: CPT | Performed by: INTERNAL MEDICINE

## 2019-11-14 PROCEDURE — 96413 CHEMO IV INFUSION 1 HR: CPT | Performed by: INTERNAL MEDICINE

## 2019-11-14 PROCEDURE — 87086 URINE CULTURE/COLONY COUNT: CPT | Performed by: INTERNAL MEDICINE

## 2019-11-14 PROCEDURE — 25010000002 POTASSIUM CHLORIDE PER 2 MEQ OF POTASSIUM: Performed by: INTERNAL MEDICINE

## 2019-11-14 PROCEDURE — 25010000002 DEXAMETHASONE SODIUM PHOSPHATE 100 MG/10ML SOLUTION: Performed by: INTERNAL MEDICINE

## 2019-11-14 RX ORDER — POTASSIUM CHLORIDE 1500 MG/1
20 TABLET, FILM COATED, EXTENDED RELEASE ORAL 2 TIMES DAILY
Qty: 60 TABLET | Refills: 1 | Status: SHIPPED | OUTPATIENT
Start: 2019-11-14 | End: 2021-03-12

## 2019-11-14 RX ORDER — SODIUM CHLORIDE 9 MG/ML
250 INJECTION, SOLUTION INTRAVENOUS ONCE
Status: COMPLETED | OUTPATIENT
Start: 2019-11-14 | End: 2019-11-14

## 2019-11-14 RX ORDER — FAMOTIDINE 10 MG/ML
20 INJECTION, SOLUTION INTRAVENOUS ONCE
Status: COMPLETED | OUTPATIENT
Start: 2019-11-14 | End: 2019-11-14

## 2019-11-14 RX ADMIN — POTASSIUM CHLORIDE: 2 INJECTION, SOLUTION, CONCENTRATE INTRAVENOUS at 13:07

## 2019-11-14 RX ADMIN — DIPHENHYDRAMINE HYDROCHLORIDE 25 MG: 50 INJECTION, SOLUTION INTRAMUSCULAR; INTRAVENOUS at 10:13

## 2019-11-14 RX ADMIN — TRASTUZUMAB 200 MG: 150 INJECTION, POWDER, LYOPHILIZED, FOR SOLUTION INTRAVENOUS at 12:28

## 2019-11-14 RX ADMIN — PACLITAXEL 170 MG: 6 INJECTION, SOLUTION INTRAVENOUS at 11:16

## 2019-11-14 RX ADMIN — FAMOTIDINE 20 MG: 10 INJECTION INTRAVENOUS at 10:10

## 2019-11-14 RX ADMIN — DEXAMETHASONE SODIUM PHOSPHATE 12 MG: 10 INJECTION, SOLUTION INTRAMUSCULAR; INTRAVENOUS at 10:31

## 2019-11-14 RX ADMIN — SODIUM CHLORIDE 250 ML: 9 INJECTION, SOLUTION INTRAVENOUS at 10:10

## 2019-11-14 NOTE — PROGRESS NOTES
Patient did not have bm while here today. Specimen cup provided and instructed to collect stool if she has diarrhea and bring to lab. Patient v/u.

## 2019-11-14 NOTE — PROGRESS NOTES
Patient reports diarrhea for approx. 2 weeks and burning with urination. Was seen in infusion area per Dr. Jhaveri. Orders received. U/A collected, but was unable to give stool spec. Dr. Jhaveri was notified of potassium of 2.9 and mag of 1.4. Orders received to give IV mag and potassium. Prescription for oral potassium sent to patient's pharmacy.

## 2019-11-15 ENCOUNTER — APPOINTMENT (OUTPATIENT)
Dept: CARDIOLOGY | Facility: HOSPITAL | Age: 64
End: 2019-11-15

## 2019-11-16 LAB — BACTERIA SPEC AEROBE CULT: ABNORMAL

## 2019-11-18 ENCOUNTER — TELEPHONE (OUTPATIENT)
Dept: ONCOLOGY | Facility: HOSPITAL | Age: 64
End: 2019-11-18

## 2019-11-18 RX ORDER — NITROFURANTOIN 25; 75 MG/1; MG/1
100 CAPSULE ORAL 2 TIMES DAILY
Qty: 14 CAPSULE | Refills: 0 | Status: SHIPPED | OUTPATIENT
Start: 2019-11-18 | End: 2019-11-25

## 2019-11-18 NOTE — TELEPHONE ENCOUNTER
Received urine culture from lab. Positive for E. Coli. Reviewed with Chantal Smith NP. Per Chantal, escribe Macrobid to pt's pharmacy. Attempted to call pt and . No answer. LVM and escribed Macrobid.

## 2019-11-18 NOTE — PROGRESS NOTES
Subjective     REASON FOR CONSULTATION:   1. Left breast cancer T2N0 3.6 cm grade 3 ER AL negative HER-2 positive infiltrating ductal carcinoma post excisional biopsy, followed by mastectomy and axillary dissection                               REQUESTING PHYSICIAN: Jase Khan DO    History of Present Illness patient is a 64-year-old female with a large 4x.6 .3 cm ER AL negative HER-2 positive breast cancer node negative surgically excised here to receive adjuvant treatment.  Initially because of the large size and comorbidities we were thinking of weekly Taxol Herceptin perjeta followed by Adriamycin Cytoxan however she had cardiac evaluation with echo with a borderline ejection fraction and had a stress test which showed no ischemia but again the ejection fraction of 50 to 52% and based on her shortness of breath with exertion and symptomatology, it was felt best to proceed instead with just weekly Taxol Herceptin perjeta followed by a year of Herceptin perjeta. Concern for the anthracycline possibly contributing to cardiotoxicity in a patient with borderline cardiac function.    She is complete the Taxol is here to start maintenance Herceptin perjeta and had an echo which showed ejection fraction of 57% and we will proceed with treatment.  Since she is hormone negative no hormonal blockade is indicated and no radiation is planned  .  Her diarrhea is controlled with Lomotil the rash is almost certainly from the perjeta because she does not get it on the weeks where she has just Taxol.    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    Past Medical History:   Diagnosis Date   • Adjustment disorder    • Allergic rhinitis    • Amenorrhea    • Anxiety    •  Breast cancer (CMS/[facility]) 04/18/2019    Left breast mammary carcinoma   • Bruit    • Carpal tunnel syndrome    • Carrier of tuberculosis    • Chronic pain    • Daytime somnolence    • De Quervain's tenosynovitis    • Degenerative cervical disc    • Depression    • Dyslipidemia    • Eustachian tube dysfunction    • History of UTI    • Hyperlipidemia    • Hypertension    • Impaired fasting glucose    • Lumbar degenerative disc disease    • Numbness and tingling     LEFT LEG   • OAB (overactive bladder)    • Precordial pain    • Scapulothoracic syndrome    • Sciatica         Past Surgical History:   Procedure Laterality Date   • APPENDECTOMY N/A    • BREAST BIOPSY Left 2019    Left breast ultrasound guided cyst aspiration, 9:00 position-Dr. Gerry Taylor, [facility]   • BREAST LUMPECTOMY Left 5/2/2019    Procedure: Left BREAST LUMPECTOMY;  Surgeon: Jase Evans MD;  Location: [facility];  Service: General   • LUMBAR EPIDURAL INJECTION N/A    • MASTECTOMY Left 6/21/2019    Procedure: Left BREAST MASTECTOMY;  Surgeon: Jase Evans MD;  Location: [facility] OR;  Service: General   • TUBAL ABDOMINAL LIGATION Bilateral    • VAGINAL DELIVERY      x3   • VENOUS ACCESS DEVICE (PORT) INSERTION Right 8/27/2019    Procedure: INSERTION VENOUS ACCESS DEVICE;  Surgeon: Jase Evans MD;  Location: [facility] OR;  Service: General      ONC HISTORY;  patient is a 64-year-old retired  with hypertension hypercholesterolemia and degenerative arthritis who felt a mass in her left breast in March.  She showed it to her family doctor and underwent imaging at [facility] on 3/13/2019 which Showed a 3 x 3.2 cm mass at 9:00 which on ultrasound showed a thick-walled benign-appearing 2.8 x 2.5 cm cyst which was felt to not be suspicious .because of persistence of symptoms she was reevaluated on 4/16/2019 and underwent aspiration and core biopsy because there was a significant soft tissue component to the mass at that time  this was aspirated and the final report  showed fine-needle aspiration suspicious for malignant cells favor mammary carcinoma  Patient was referred to Dr. Kee who took her for an excisional biopsy on 2019 with the final report showing a  mass grade 3 -4x3.6cm with tumor extending to one margin and DCIS also 0.2 mm from one margin.  The tumor was ER [place] negative HER-2 3+.  The patient was then taken for surgery on 2019 at which time she had a completion mastectomy and axillary node biopsy with the findings of residual high-grade DCIS with clear margins and an incidental intraductal papilloma and 17-neg lymph nodes making this a T2N0 tumor    She has done well postoperatively and is here to discuss adjuvant treatment    She is  3 para 3 menarche  at age 15 and menopause in her early 40s when she had a hysterectomy for heavy menstrual bleeding.  She took hormone replacement for 10 years and stopped 10 years ago first childbirth was at age 25 she did not breast-feed  Family history is positive for mother who had uterine cancer at age 60 and  of it at age 65 she has a brother who  of liver cancer related to drinking there is no other malignancy in the family that she is aware of    She is a significant smoker for the last 48 years but does not drink     Patient and her  were very taken to back by the suggestion about chemotherapy and apparently had thought that there was no further treatment needed and I spent almost an hour presenting the data concerning the extreme effectiveness of HER-2 directed therapy and this situation with a high risk of recurrence without adjuvant treatment and she finally was convinced to do the treatment    We discussed smoking cessation but at this point she is so nervous and agitated with the prospect of chemotherapy that we will hold off on this until she is skilled nursing through the chemotherapy and rediscuss it    I discussed the case also with Dr. Kee will  place a port and we will plan to start chemotherapy in 2 weeks.    Patient initiating therapy with Taxol/Herceptin/Perjeta 8/29/19.  9/19  She is followed by Dr. Dudley, cardiology, who gave clearance for the patient to proceed with chemotherapy.  repeat echo 6 weeks from last actually showed improvement in the cardiologist thought there may have been some technical issues with her first echocardiogram.    She returns back today, due for cycle3 day 1 of Taxol/Herceptin perjeta her diarrhea is-clearly controlled with the Imodium and in fact she became constipated because she took too much of this but she is got this under control and feels good  She has some reflux symptoms in the morning which makes her nauseous and I suggested she take 1 Prilosec every day till the chemo was over and this is improved    She has no neuropathy but is not brought the cooling gloves and I recommended this to her and gave her the literature supporting this in the website  She has had a itchy rash on her arms and legs which is not too bothersome and I have to suggested a steroid cream and some Benadryl and we will keep an eye on the rash- she has no shortness of breath        Current Outpatient Medications on File Prior to Visit   Medication Sig Dispense Refill   • ALPRAZolam (XANAX) 0.25 MG tablet TAKE 2 TABLETS BY MOUTH TWO TIMES A DAY AS NEEDED FOR ANXIETY  60 tablet 0   • busPIRone (BUSPAR) 10 MG tablet Take 10 mg by mouth 2 (Two) Times a Day.     • citalopram (CeleXA) 40 MG tablet Take 1 tablet by mouth Daily. (Patient taking differently: Take 40 mg by mouth Every Night.) 30 tablet 5   • diphenhydrAMINE (BENADRYL) 25 mg capsule Take 25 mg by mouth Every 6 (Six) Hours As Needed for Itching.     • diphenoxylate-atropine (LOMOTIL) 2.5-0.025 MG per tablet Take 1 tablet by mouth 4 (Four) Times a Day As Needed for Diarrhea. 90 tablet 0   • magnesium oxide (MAG-OX) 400 MG tablet Take 400 mg by mouth Daily.     • metoprolol succinate XL  (TOPROL-XL) 25 MG 24 hr tablet Take 25 mg by mouth Daily.     • Multiple Vitamins-Minerals (CENTRUM SILVER PO) Take 1 tablet by mouth Daily. womens vitamin     • omeprazole (priLOSEC) 20 MG capsule Take 20 mg by mouth Daily.     • potassium chloride ER (K-TAB) 20 MEQ tablet controlled-release ER tablet Take 1 tablet by mouth 2 (Two) Times a Day. 60 tablet 1   • vitamin B-12 (CYANOCOBALAMIN) 100 MCG tablet Take 100 mcg by mouth Daily.     • buPROPion XL (WELLBUTRIN XL) 150 MG 24 hr tablet TAKE 1 TABLET BY MOUTH ONE TIME A DAY  30 tablet 2   • HYDROcodone-acetaminophen (NORCO) 5-325 MG per tablet Take 1 tablet by mouth Every 6 (Six) Hours As Needed for Moderate Pain  or Severe Pain  for up to 30 days. 30 tablet 0   • loratadine (CLARITIN) 10 MG tablet Take 10 mg by mouth Daily.     • montelukast (SINGULAIR) 10 MG tablet Take 1 tablet by mouth Every Night. (Patient taking differently: Take 10 mg by mouth Daily.) 30 tablet 11   • nitrofurantoin, macrocrystal-monohydrate, (MACROBID) 100 MG capsule Take 1 capsule by mouth 2 (Two) Times a Day for 7 days. 14 capsule 0   • ondansetron ODT (ZOFRAN-ODT) 8 MG disintegrating tablet Take 1 tablet by mouth Every 8 (Eight) Hours As Needed for Nausea or Vomiting. 30 tablet 2   • phenazopyridine (PYRIDIUM) 100 MG tablet Take 1 tablet by mouth 3 (Three) Times a Day As Needed for bladder spasms. 30 tablet 0     No current facility-administered medications on file prior to visit.         ALLERGIES:    Allergies   Allergen Reactions   • Gabapentin Hallucinations   • Effexor [Venlafaxine] Itching   • Naproxen Itching     Pt reports severe vaginal itching    • Zyrtec [Cetirizine] Other (See Comments)     Non-effecious   • Ibuprofen Other (See Comments)     Burns while urinating  Can take low dose Ibuprofen   • Penicillins Rash   • Sulfa Antibiotics Rash        Social History     Socioeconomic History   • Marital status:      Spouse name: Pawan   • Number of children: 3   • Years of  "education: High school   • Highest education level: Not on file   Occupational History     Employer: RETIRED   Tobacco Use   • Smoking status: Current Every Day Smoker     Packs/day: 0.25     Years: 48.00     Pack years: 12.00     Types: Cigarettes   • Smokeless tobacco: Never Used   • Tobacco comment: Trying to quit.    Substance and Sexual Activity   • Alcohol use: No   • Drug use: No   • Sexual activity: Yes     Partners: Male     Birth control/protection: Post-menopausal        Family History   Problem Relation Age of Onset   • Ovarian cancer Mother    • Endometrial cancer Mother    • COPD Father    • Stroke Brother    • Malig Hyperthermia Neg Hx         Review of Systems   Constitutional: Positive for fatigue (better 11/22/19). Negative for diaphoresis.   HENT: Positive for sinus pain (same 11/22/19). Negative for congestion (better 10/31/19).    Eyes: Positive for visual disturbance (right eye twitching 11/22/19).   Respiratory: Positive for cough (just as night little better 11/22/19).    Cardiovascular: Negative for chest pain.   Gastrointestinal: Positive for diarrhea (better with meds 11/22/19). Negative for nausea (stable 10/10/19).   Endocrine: Negative.    Genitourinary: Negative for difficulty urinating (cant hold bladder 11/22/19).   Musculoskeletal: Positive for back pain (low back muscle spasm same 11/22/19), gait problem (right shoulder better 11/22/19) and neck pain (feeling tight bruise 11/22/19).   Skin: Positive for rash (dry skin 10/10/19).   Neurological: Positive for numbness ( left underarm from surgery same 11/22/19).   Hematological: Negative.    Psychiatric/Behavioral: Negative.         Objective     Vitals:    11/22/19 0906   BP: 127/67   Pulse: 88   Resp: 16   Temp: 98.1 °F (36.7 °C)   SpO2: 93%   Weight: 98.2 kg (216 lb 9.6 oz)   Height: 176.3 cm (69.41\")   PainSc:   7   PainLoc: Generalized  Comment: back and shoulders     Current Status 11/22/2019   ECOG score 0       Physical Exam "   Pulmonary/Chest:           GENERAL:  Well-developed, well-nourished in no acute distress.   SKIN:  Warm, dry fading maculopapular rash on her arms and legs,no  purpura or petechiae.  EYES:  Pupils equal, round and reactive to light.  EOMs intact.  Conjunctivae normal.  EARS:  Hearing intact.  NOSE:  Septum midline.  No excoriations or nasal discharge.  MOUTH:  Tongue is well-papillated; no stomatitis or ulcers.  Lips normal.  THROAT:  Oropharynx without lesions or exudates.  NECK:  Supple with good range of motion; no thyromegaly or masses, no JVD.  LYMPHATICS:  No cervical, supraclavicular, axillary or inguinal adenopathy.  CHEST:  Lungs clear to auscultation. Good airflow.  Mediport right chest wall benign.  BREASTS: Right breast is benign; left chest wall shows a well-healed mastectomy scar with nodularity laterally  CARDIAC:  Regular rate and rhythm without murmurs, rubs or gallops. Normal S1,S2.  ABDOMEN:  Soft, nontender with no hepatosplenomegaly or masses.  EXTREMITIES:  No clubbing, cyanosis or edema.  NEUROLOGICAL:  Cranial Nerves II-XII grossly intact.  No focal neurological deficits.  PSYCHIATRIC:  Normal affect and mood.        RECENT LABS:  Results from last 7 days   Lab Units 11/22/19  0927   WBC 10*3/mm3 6.11   NEUTROS ABS 10*3/mm3 3.54   HEMOGLOBIN g/dL 9.9*   HEMATOCRIT % 30.0*   PLATELETS 10*3/mm3 252     Results from last 7 days   Lab Units 11/22/19  0927   SODIUM mmol/L 138   POTASSIUM mmol/L 3.2*   CHLORIDE mmol/L 99   CO2 mmol/L 27.6   BUN mg/dL 5*   CREATININE mg/dL 0.63   CALCIUM mg/dL 9.1   ALBUMIN g/dL 3.60   BILIRUBIN mg/dL 0.5   ALK PHOS U/L 90   ALT (SGPT) U/L 12   AST (SGOT) U/L 18   GLUCOSE mg/dL 101   MAGNESIUM mg/dL 1.5*         RADIOGRAPHIC DATA:    US Breat Left Limited    1. Left Breast Lumpectomy (47 Grams):  A. Invasive poorly differentiated mammary carcinoma of no special type (ductal carcinoma)  with apocrine features.  1. Invasive carcinoma measures up to 40 x 36 x 27 mm  (measured grossly).  2. Overall [place] Grade III (glandular score = 3, nuclear score = 3, mitotic score = 3).  3. No definitive lymphovascular space invasion identified.  B. Invasive carcinoma focally extends to one undesignated peripheral inked margin of excision.  C. Associated ductal carcinoma in situ (DCIS).  1. Ductal carcinoma in situ (DCIS) spans area measuring 40 mm in single greatest  aggregate dimension (estimated from gross and glass slides).  2. DCIS predominantly solid and comedo type with high grade nuclear features.  3. High grade DCIS approximates the closest peripheral inked margin to 0.2 mm.  Page: 1 of 5 Printed: 5/6/2019 1:10 PM  [phone]  Resulting  Tissue Pathology Exam: [id number]   Order: [id number]   Collected:  5/2/2019 12:26 Status:  Edited Result - FINAL   Visible to patient:  No (Not Released) Dx:  Malignant neoplasm of upper-inner hector...   Component    Addendum   HER2 by Immunohistochemistry   Positive (Score 3+)     HER2 by Immunohistochemistry  FDA approved (specify test/vendor): [employer]     Primary Antibody  [id number]     Cold Ischemia and Fixation Times   Meet requirements specified in latest version of the ASCO/CAP guidelines         Cold Ischemia Time: 18 minutes  Fixation Time: 8.25 hours  Testing Performed on Block Number(s): 1E  Fixative: Formalin   Addendum electronically signed by Harman Perry MD on 5/6/2019          Final Diagnosis  1. Left Breast, Modified Radical Mastectomy (935 grams): HIGH GRADE DUCTAL CARCINOMA IN-SITU  (DCIS) .  A. Nuclear Grade: High.  B. Architectural Type: Solid and comedo with lobular extension.  1. Size (extent) of DCIS: DCIS multifocal in the lower inner quadrant, measuring 0.9 cm in maximal  dimension (DCIS present in 3/18 tissue blocks).  C. No LCIS identified.  D. Skin and nipple uninvolved by DCIS.  E. Margins: Uninvolved by DCIS.  1. DCIS comes to within 1.3 cm of the anterior margin of excision (closest margin).  F. Additional  findings: Incidental intraductal papilloma, florid ductal hyperplasia and apocrine cysts.  G. Lymph nodes: Sixteen benign lymph nodes (0/16).  H. Hormone receptor status: ER and ID negative, Her2/kali positive by IHC (performed on prior resection  [id number]).  I. Pathologic stage: pT2, N0.  [facility]      Regadenoson Stress Test With Myocardial Perfusion SPECT   Interpretation Summary     · Myocardial perfusion imaging indicates a normal myocardial perfusion study with no evidence of ischemia.  · Left ventricular ejection fraction is borderline normal (Calculated EF = 50%).  · Impressions are consistent with a low risk study.      Study Description         Nuclear Perfusion Images Overall image quality is excellent. There are no artifacts present. Raw images reviewed with no abnormalities noted.       Echo 9/16  Interpretation Summary     · Left ventricular systolic function is normal. Calculated EF = 55%. Estimated EF = 55%. Global Longitudinal LV strain = -21.6%. Strain data was reviewed and speckle tracking was considered accurate. The global longitudinal LV strain is -21.6 and normal. Normal left ventricular cavity size and wall thickness noted. All left ventricular wall segments contract normally. Left ventricular diastolic dysfunction is noted (grade I) consistent with impaired relaxation.  · Trace mitral valve regurgitation is present.  · Physiologic tricuspid valve regurgitation is present. Calculated right ventricular systolic pressure from tricuspid regurgitation is 16.0 mmHg. Insufficient TR velocity profile to estimate the right ventricular systolic pressure.     Interpretation Summary     · Left ventricular systolic function is normal. Calculated EF = 57%. Estimated EF was in agreement with the calculated EF. Estimated EF = 57%. Global Longitudinal LV strain = -21%. Strain data was reviewed and speckle tracking was considered accurate. The global longitudinal LV strain is normal.  · Normal left ventricular  cavity size noted. All left ventricular wall segments contract normally. Left ventricular wall thickness is consistent with mild concentric hypertrophy. Left ventricular diastolic dysfunction is noted (grade I) consistent with impaired relaxation.  · Mild tricuspid valve regurgitation is present. Estimated right ventricular systolic pressure from tricuspid regurgitation is normal (<35 mmHg). Calculated right ventricular systolic pressure from tricuspid regurgitation is 30 mmHg.              Assessment/Plan   1.  T2N0 grade 3 infiltrating ductal carcinoma left breast ER MD negative HER-2 positive post mastectomy and axillary node dissection with clear margins.  · Initiated Taxol/Herceptin/Perjeta today, 8/29/19.  · Plan to do 12 weeks of Taxol Herceptin perjeta without Adriamycin because of her borderline cardiac function    2.  Tobacco abuse and COPD    3.  Abnormal echo with stress test normal but borderline cardiac function at 52%  We will avoid Adriamycin Cytoxan  and treat with Taxol Herceptin perjeta and a year of Herceptin perjeta. Dr. Dudley, cardiology, has given clearance for patient to begin.  Planning repeat echocardiogram 6 weeks from previous.    · Improved EF to 55%    4.  Venous access.  Status post new Mediport placement 8/27/2019.  Working well today.  .    · No more refills of Percocet planned    5.  Diarrhea related to Perjeta.  Improved with Imodium    6.  Skin rash on her arms and legs likely from  perjeta continue to watch and use steroid cream    7.  Low magnesium due to diarrhea    Plan:  1.  Proceed with maintenance/herceptin perjeta.  2.   Utilize Imodium as outlined above.  Continue Prilosec add steroid cream for rash  3.  Magnesium IV today  5.  Return in 3 /6 week for perjeta Herceptin  6.  Return in 9 weeks for follow-up with Dr. Jhaveri and Herceptin/Perjeta.      This patient is on drug therapy requiring intensive monitoring for toxicity.  We addressed current concerns of pain and  diarrhea as outlined above.

## 2019-11-19 ENCOUNTER — TELEPHONE (OUTPATIENT)
Dept: ONCOLOGY | Facility: HOSPITAL | Age: 64
End: 2019-11-19

## 2019-11-19 NOTE — TELEPHONE ENCOUNTER
----- Message from Maria Eugenia Guillaume sent at 11/18/2019  4:29 PM EST -----  Contact: [phone]  Pt says ok to send Macrobid  to [facility] [address].      Chantal ROSE sent Mac[facility] to [facility] [address]. Attempted to call the pt, mailbox is not set up, unable to leave VM

## 2019-11-20 ENCOUNTER — HOSPITAL ENCOUNTER (OUTPATIENT)
Dept: CARDIOLOGY | Facility: HOSPITAL | Age: 64
Discharge: HOME | End: 2019-11-20
Admitting: INTERNAL MEDICINE

## 2019-11-20 VITALS
OXYGEN SATURATION: 98 % | HEART RATE: 73 BPM | BODY MASS INDEX: 31.4 KG/M2 | DIASTOLIC BLOOD PRESSURE: 70 MMHG | WEIGHT: 212 LBS | HEIGHT: 69 IN | SYSTOLIC BLOOD PRESSURE: 122 MMHG

## 2019-11-20 DIAGNOSIS — Z45.2 ENCOUNTER FOR FITTING AND ADJUSTMENT OF VASCULAR CATHETER: ICD-10-CM

## 2019-11-20 DIAGNOSIS — Z17.1 MALIGNANT NEOPLASM OF UPPER-INNER QUADRANT OF LEFT BREAST IN FEMALE, ESTROGEN RECEPTOR NEGATIVE (HCC): ICD-10-CM

## 2019-11-20 DIAGNOSIS — C50.212 MALIGNANT NEOPLASM OF UPPER-INNER QUADRANT OF LEFT BREAST IN FEMALE, ESTROGEN RECEPTOR NEGATIVE (HCC): ICD-10-CM

## 2019-11-20 LAB
AORTIC ARCH: 3 CM
AORTIC ROOT ANNULUS: 2 CM
ASCENDING AORTA: 3.2 CM
BH CV ECHO MEAS - ACS: 1.9 CM
BH CV ECHO MEAS - AO MAX PG (FULL): 4 MMHG
BH CV ECHO MEAS - AO MAX PG: 8.9 MMHG
BH CV ECHO MEAS - AO MEAN PG (FULL): 3 MMHG
BH CV ECHO MEAS - AO MEAN PG: 5.1 MMHG
BH CV ECHO MEAS - AO ROOT AREA (BSA CORRECTED): 1.5
BH CV ECHO MEAS - AO ROOT AREA: 7.8 CM^2
BH CV ECHO MEAS - AO ROOT DIAM: 3.2 CM
BH CV ECHO MEAS - AO V2 MAX: 148.8 CM/SEC
BH CV ECHO MEAS - AO V2 MEAN: 108.4 CM/SEC
BH CV ECHO MEAS - AO V2 VTI: 32.8 CM
BH CV ECHO MEAS - ASC AORTA: 3.2 CM
BH CV ECHO MEAS - AVA(I,A): 2.7 CM^2
BH CV ECHO MEAS - AVA(I,D): 2.7 CM^2
BH CV ECHO MEAS - AVA(V,A): 2.9 CM^2
BH CV ECHO MEAS - AVA(V,D): 2.9 CM^2
BH CV ECHO MEAS - BSA(HAYCOCK): 2.2 M^2
BH CV ECHO MEAS - BSA: 2.1 M^2
BH CV ECHO MEAS - BZI_BMI: 31.3 KILOGRAMS/M^2
BH CV ECHO MEAS - BZI_METRIC_HEIGHT: 175.3 CM
BH CV ECHO MEAS - BZI_METRIC_WEIGHT: 96.2 KG
BH CV ECHO MEAS - EDV(MOD-SP2): 102 ML
BH CV ECHO MEAS - EDV(MOD-SP4): 124 ML
BH CV ECHO MEAS - EDV(TEICH): 130.1 ML
BH CV ECHO MEAS - EF(CUBED): 65.2 %
BH CV ECHO MEAS - EF(MOD-BP): 57 %
BH CV ECHO MEAS - EF(MOD-SP2): 57.8 %
BH CV ECHO MEAS - EF(MOD-SP4): 56.5 %
BH CV ECHO MEAS - EF(TEICH): 56.4 %
BH CV ECHO MEAS - ESV(MOD-SP2): 43 ML
BH CV ECHO MEAS - ESV(MOD-SP4): 54 ML
BH CV ECHO MEAS - ESV(TEICH): 56.7 ML
BH CV ECHO MEAS - FS: 29.7 %
BH CV ECHO MEAS - IVS/LVPW: 0.88
BH CV ECHO MEAS - IVSD: 1.2 CM
BH CV ECHO MEAS - LAT PEAK E' VEL: 7 CM/SEC
BH CV ECHO MEAS - LV DIASTOLIC VOL/BSA (35-75): 58.6 ML/M^2
BH CV ECHO MEAS - LV MASS(C)D: 265.9 GRAMS
BH CV ECHO MEAS - LV MASS(C)DI: 125.6 GRAMS/M^2
BH CV ECHO MEAS - LV MAX PG: 4.8 MMHG
BH CV ECHO MEAS - LV MEAN PG: 2.1 MMHG
BH CV ECHO MEAS - LV SYSTOLIC VOL/BSA (12-30): 25.5 ML/M^2
BH CV ECHO MEAS - LV V1 MAX: 109.6 CM/SEC
BH CV ECHO MEAS - LV V1 MEAN: 63.7 CM/SEC
BH CV ECHO MEAS - LV V1 VTI: 22.2 CM
BH CV ECHO MEAS - LVIDD: 5.2 CM
BH CV ECHO MEAS - LVIDS: 3.7 CM
BH CV ECHO MEAS - LVLD AP2: 7 CM
BH CV ECHO MEAS - LVLD AP4: 8.2 CM
BH CV ECHO MEAS - LVLS AP2: 6.4 CM
BH CV ECHO MEAS - LVLS AP4: 7.1 CM
BH CV ECHO MEAS - LVOT AREA (M): 3.8 CM^2
BH CV ECHO MEAS - LVOT AREA: 3.9 CM^2
BH CV ECHO MEAS - LVOT DIAM: 2.2 CM
BH CV ECHO MEAS - LVPWD: 1.3 CM
BH CV ECHO MEAS - MED PEAK E' VEL: 5 CM/SEC
BH CV ECHO MEAS - MV A DUR: 0.11 SEC
BH CV ECHO MEAS - MV A MAX VEL: 89.2 CM/SEC
BH CV ECHO MEAS - MV DEC SLOPE: 386.3 CM/SEC^2
BH CV ECHO MEAS - MV DEC TIME: 0.21 SEC
BH CV ECHO MEAS - MV E MAX VEL: 68.9 CM/SEC
BH CV ECHO MEAS - MV E/A: 0.77
BH CV ECHO MEAS - MV MAX PG: 3.7 MMHG
BH CV ECHO MEAS - MV MEAN PG: 1.6 MMHG
BH CV ECHO MEAS - MV P1/2T MAX VEL: 101.8 CM/SEC
BH CV ECHO MEAS - MV P1/2T: 77.2 MSEC
BH CV ECHO MEAS - MV V2 MAX: 96 CM/SEC
BH CV ECHO MEAS - MV V2 MEAN: 58.9 CM/SEC
BH CV ECHO MEAS - MV V2 VTI: 39 CM
BH CV ECHO MEAS - MVA P1/2T LCG: 2.2 CM^2
BH CV ECHO MEAS - MVA(P1/2T): 2.8 CM^2
BH CV ECHO MEAS - MVA(VTI): 2.2 CM^2
BH CV ECHO MEAS - PA ACC TIME: 0.11 SEC
BH CV ECHO MEAS - PA MAX PG (FULL): 12.3 MMHG
BH CV ECHO MEAS - PA MAX PG: 14.6 MMHG
BH CV ECHO MEAS - PA PR(ACCEL): 31.5 MMHG
BH CV ECHO MEAS - PA V2 MAX: 191.1 CM/SEC
BH CV ECHO MEAS - PULM A REVS DUR: 0.1 SEC
BH CV ECHO MEAS - PULM A REVS VEL: 30.4 CM/SEC
BH CV ECHO MEAS - PULM DIAS VEL: 36 CM/SEC
BH CV ECHO MEAS - PULM S/D: 1.5
BH CV ECHO MEAS - PULM SYS VEL: 53.1 CM/SEC
BH CV ECHO MEAS - PVA(V,A): 1.6 CM^2
BH CV ECHO MEAS - PVA(V,D): 1.6 CM^2
BH CV ECHO MEAS - QP/QS: 0.96
BH CV ECHO MEAS - RAP SYSTOLE: 3 MMHG
BH CV ECHO MEAS - RV MAX PG: 2.4 MMHG
BH CV ECHO MEAS - RV MEAN PG: 1.2 MMHG
BH CV ECHO MEAS - RV V1 MAX: 76.7 CM/SEC
BH CV ECHO MEAS - RV V1 MEAN: 51.2 CM/SEC
BH CV ECHO MEAS - RV V1 VTI: 21.1 CM
BH CV ECHO MEAS - RVOT AREA: 4 CM^2
BH CV ECHO MEAS - RVOT DIAM: 2.3 CM
BH CV ECHO MEAS - RVSP: 30 MMHG
BH CV ECHO MEAS - SI(AO): 121.4 ML/M^2
BH CV ECHO MEAS - SI(CUBED): 43.6 ML/M^2
BH CV ECHO MEAS - SI(LVOT): 41.3 ML/M^2
BH CV ECHO MEAS - SI(MOD-SP2): 27.9 ML/M^2
BH CV ECHO MEAS - SI(MOD-SP4): 33.1 ML/M^2
BH CV ECHO MEAS - SI(TEICH): 34.6 ML/M^2
BH CV ECHO MEAS - SUP REN AO DIAM: 2.5 CM
BH CV ECHO MEAS - SV(AO): 257 ML
BH CV ECHO MEAS - SV(CUBED): 92.2 ML
BH CV ECHO MEAS - SV(LVOT): 87.5 ML
BH CV ECHO MEAS - SV(MOD-SP2): 59 ML
BH CV ECHO MEAS - SV(MOD-SP4): 70 ML
BH CV ECHO MEAS - SV(RVOT): 84.1 ML
BH CV ECHO MEAS - SV(TEICH): 73.3 ML
BH CV ECHO MEAS - TAPSE (>1.6): 2.6 CM2
BH CV ECHO MEAS - TR MAX VEL: 254.1 CM/SEC
BH CV ECHO MEASUREMENTS AVERAGE E/E' RATIO: 11.48
BH CV XLRA - RV BASE: 3 CM
BH CV XLRA - RV LENGTH: 7.4 CM
BH CV XLRA - RV MID: 3.4 CM
BH CV XLRA - TDI S': 15 CM/SEC
LEFT ATRIUM VOLUME INDEX: 32 ML/M2
LV EF 2D ECHO EST: 57 %
MAXIMAL PREDICTED HEART RATE: 156 BPM
SINUS: 2.9 CM
STJ: 2.9 CM
STRESS TARGET HR: 133 BPM

## 2019-11-20 PROCEDURE — 25010000002 PERFLUTREN (DEFINITY) 8.476 MG IN SODIUM CHLORIDE 0.9 % 10 ML INJECTION: Performed by: INTERNAL MEDICINE

## 2019-11-20 PROCEDURE — 0399T ADULT TRANSTHORACIC ECHO COMPLETE W/ CONT IF NECESSARY PER PROTOCOL: CPT | Performed by: INTERNAL MEDICINE

## 2019-11-20 PROCEDURE — 0399T HC MYOCARDL STRAIN IMAG QUAN ASSMT PER SESS: CPT

## 2019-11-20 PROCEDURE — 93306 TTE W/DOPPLER COMPLETE: CPT

## 2019-11-20 PROCEDURE — 93306 TTE W/DOPPLER COMPLETE: CPT | Performed by: INTERNAL MEDICINE

## 2019-11-20 RX ADMIN — PERFLUTREN 2 ML: 6.52 INJECTION, SUSPENSION INTRAVENOUS at 14:23

## 2019-11-21 DIAGNOSIS — C50.212 MALIGNANT NEOPLASM OF UPPER-INNER QUADRANT OF LEFT BREAST IN FEMALE, ESTROGEN RECEPTOR NEGATIVE (HCC): ICD-10-CM

## 2019-11-21 DIAGNOSIS — Z17.1 MALIGNANT NEOPLASM OF UPPER-INNER QUADRANT OF LEFT BREAST IN FEMALE, ESTROGEN RECEPTOR NEGATIVE (HCC): ICD-10-CM

## 2019-11-22 ENCOUNTER — INFUSION (OUTPATIENT)
Dept: ONCOLOGY | Facility: HOSPITAL | Age: 64
End: 2019-11-22

## 2019-11-22 ENCOUNTER — OFFICE VISIT (OUTPATIENT)
Dept: ONCOLOGY | Facility: CLINIC | Age: 64
End: 2019-11-22

## 2019-11-22 VITALS
RESPIRATION RATE: 16 BRPM | DIASTOLIC BLOOD PRESSURE: 67 MMHG | HEART RATE: 88 BPM | WEIGHT: 216.6 LBS | BODY MASS INDEX: 32.08 KG/M2 | TEMPERATURE: 98.1 F | SYSTOLIC BLOOD PRESSURE: 127 MMHG | HEIGHT: 69 IN | OXYGEN SATURATION: 93 %

## 2019-11-22 DIAGNOSIS — Z17.1 MALIGNANT NEOPLASM OF UPPER-INNER QUADRANT OF LEFT BREAST IN FEMALE, ESTROGEN RECEPTOR NEGATIVE (HCC): ICD-10-CM

## 2019-11-22 DIAGNOSIS — Z79.899 ENCOUNTER FOR LONG-TERM (CURRENT) USE OF HIGH-RISK MEDICATION: Primary | ICD-10-CM

## 2019-11-22 DIAGNOSIS — C50.212 MALIGNANT NEOPLASM OF UPPER-INNER QUADRANT OF LEFT BREAST IN FEMALE, ESTROGEN RECEPTOR NEGATIVE (HCC): ICD-10-CM

## 2019-11-22 DIAGNOSIS — C50.212 MALIGNANT NEOPLASM OF UPPER-INNER QUADRANT OF LEFT BREAST IN FEMALE, ESTROGEN RECEPTOR NEGATIVE (HCC): Primary | ICD-10-CM

## 2019-11-22 DIAGNOSIS — Z17.1 MALIGNANT NEOPLASM OF UPPER-INNER QUADRANT OF LEFT BREAST IN FEMALE, ESTROGEN RECEPTOR NEGATIVE (HCC): Primary | ICD-10-CM

## 2019-11-22 LAB
ALBUMIN SERPL-MCNC: 3.6 G/DL (ref 3.5–5.2)
ALBUMIN/GLOB SERPL: 1.2 G/DL (ref 1.1–2.4)
ALP SERPL-CCNC: 90 U/L (ref 38–116)
ALT SERPL W P-5'-P-CCNC: 12 U/L (ref 0–33)
ANION GAP SERPL CALCULATED.3IONS-SCNC: 11.4 MMOL/L (ref 5–15)
AST SERPL-CCNC: 18 U/L (ref 0–32)
BASOPHILS # BLD AUTO: 0.05 10*3/MM3 (ref 0–0.2)
BASOPHILS NFR BLD AUTO: 0.8 % (ref 0–1.5)
BILIRUB SERPL-MCNC: 0.5 MG/DL (ref 0.2–1.2)
BUN BLD-MCNC: 5 MG/DL (ref 6–20)
BUN/CREAT SERPL: 7.9 (ref 7.3–30)
CALCIUM SPEC-SCNC: 9.1 MG/DL (ref 8.5–10.2)
CHLORIDE SERPL-SCNC: 99 MMOL/L (ref 98–107)
CO2 SERPL-SCNC: 27.6 MMOL/L (ref 22–29)
CREAT BLD-MCNC: 0.63 MG/DL (ref 0.6–1.1)
DEPRECATED RDW RBC AUTO: 55.5 FL (ref 37–54)
EOSINOPHIL # BLD AUTO: 0.06 10*3/MM3 (ref 0–0.4)
EOSINOPHIL NFR BLD AUTO: 1 % (ref 0.3–6.2)
ERYTHROCYTE [DISTWIDTH] IN BLOOD BY AUTOMATED COUNT: 15.8 % (ref 12.3–15.4)
GFR SERPL CREATININE-BSD FRML MDRD: 95 ML/MIN/1.73
GLOBULIN UR ELPH-MCNC: 2.9 GM/DL (ref 1.8–3.5)
GLUCOSE BLD-MCNC: 101 MG/DL (ref 74–124)
HCT VFR BLD AUTO: 30 % (ref 34–46.6)
HGB BLD-MCNC: 9.9 G/DL (ref 12–15.9)
IMM GRANULOCYTES # BLD AUTO: 0.05 10*3/MM3 (ref 0–0.05)
IMM GRANULOCYTES NFR BLD AUTO: 0.8 % (ref 0–0.5)
LYMPHOCYTES # BLD AUTO: 1.74 10*3/MM3 (ref 0.7–3.1)
LYMPHOCYTES NFR BLD AUTO: 28.5 % (ref 19.6–45.3)
MAGNESIUM SERPL-MCNC: 1.5 MG/DL (ref 1.8–2.5)
MCH RBC QN AUTO: 32.1 PG (ref 26.6–33)
MCHC RBC AUTO-ENTMCNC: 33 G/DL (ref 31.5–35.7)
MCV RBC AUTO: 97.4 FL (ref 79–97)
MONOCYTES # BLD AUTO: 0.67 10*3/MM3 (ref 0.1–0.9)
MONOCYTES NFR BLD AUTO: 11 % (ref 5–12)
NEUTROPHILS # BLD AUTO: 3.54 10*3/MM3 (ref 1.7–7)
NEUTROPHILS NFR BLD AUTO: 57.9 % (ref 42.7–76)
NRBC BLD AUTO-RTO: 0 /100 WBC (ref 0–0.2)
PLATELET # BLD AUTO: 252 10*3/MM3 (ref 140–450)
PMV BLD AUTO: 9 FL (ref 6–12)
POTASSIUM BLD-SCNC: 3.2 MMOL/L (ref 3.5–4.7)
PROT SERPL-MCNC: 6.5 G/DL (ref 6.3–8)
RBC # BLD AUTO: 3.08 10*6/MM3 (ref 3.77–5.28)
SODIUM BLD-SCNC: 138 MMOL/L (ref 134–145)
WBC NRBC COR # BLD: 6.11 10*3/MM3 (ref 3.4–10.8)

## 2019-11-22 PROCEDURE — 96375 TX/PRO/DX INJ NEW DRUG ADDON: CPT | Performed by: INTERNAL MEDICINE

## 2019-11-22 PROCEDURE — 96417 CHEMO IV INFUS EACH ADDL SEQ: CPT | Performed by: INTERNAL MEDICINE

## 2019-11-22 PROCEDURE — 80053 COMPREHEN METABOLIC PANEL: CPT

## 2019-11-22 PROCEDURE — 96367 TX/PROPH/DG ADDL SEQ IV INF: CPT | Performed by: INTERNAL MEDICINE

## 2019-11-22 PROCEDURE — 85025 COMPLETE CBC W/AUTO DIFF WBC: CPT

## 2019-11-22 PROCEDURE — 25010000002 PERTUZUMAB 420 MG/14ML SOLUTION 420 MG VIAL: Performed by: INTERNAL MEDICINE

## 2019-11-22 PROCEDURE — 25010000002 DIPHENHYDRAMINE PER 50 MG: Performed by: INTERNAL MEDICINE

## 2019-11-22 PROCEDURE — 96366 THER/PROPH/DIAG IV INF ADDON: CPT | Performed by: INTERNAL MEDICINE

## 2019-11-22 PROCEDURE — 25010000002 TRASTUZUMAB PER 10 MG: Performed by: INTERNAL MEDICINE

## 2019-11-22 PROCEDURE — 99215 OFFICE O/P EST HI 40 MIN: CPT | Performed by: INTERNAL MEDICINE

## 2019-11-22 PROCEDURE — 96413 CHEMO IV INFUSION 1 HR: CPT | Performed by: INTERNAL MEDICINE

## 2019-11-22 PROCEDURE — 83735 ASSAY OF MAGNESIUM: CPT

## 2019-11-22 PROCEDURE — 25010000002 MAGNESIUM SULFATE 2 GM/50ML SOLUTION: Performed by: INTERNAL MEDICINE

## 2019-11-22 RX ORDER — FAMOTIDINE 10 MG/ML
20 INJECTION, SOLUTION INTRAVENOUS AS NEEDED
Status: CANCELLED | OUTPATIENT
Start: 2019-11-22

## 2019-11-22 RX ORDER — FAMOTIDINE 10 MG/ML
20 INJECTION, SOLUTION INTRAVENOUS ONCE
Status: CANCELLED | OUTPATIENT
Start: 2019-12-12

## 2019-11-22 RX ORDER — METOPROLOL SUCCINATE 25 MG/1
TABLET, EXTENDED RELEASE ORAL
Qty: 30 TABLET | Refills: 1 | Status: SHIPPED | OUTPATIENT
Start: 2019-11-22 | End: 2019-11-22

## 2019-11-22 RX ORDER — FAMOTIDINE 10 MG/ML
20 INJECTION, SOLUTION INTRAVENOUS AS NEEDED
Status: CANCELLED | OUTPATIENT
Start: 2019-12-19

## 2019-11-22 RX ORDER — FAMOTIDINE 10 MG/ML
20 INJECTION, SOLUTION INTRAVENOUS AS NEEDED
Status: CANCELLED | OUTPATIENT
Start: 2019-12-12

## 2019-11-22 RX ORDER — MAGNESIUM OXIDE 400 MG/1
400 TABLET ORAL DAILY
COMMUNITY
End: 2020-05-21

## 2019-11-22 RX ORDER — FAMOTIDINE 10 MG/ML
20 INJECTION, SOLUTION INTRAVENOUS ONCE
Status: COMPLETED | OUTPATIENT
Start: 2019-11-22 | End: 2019-11-22

## 2019-11-22 RX ORDER — DIPHENHYDRAMINE HYDROCHLORIDE 50 MG/ML
50 INJECTION INTRAMUSCULAR; INTRAVENOUS AS NEEDED
Status: CANCELLED | OUTPATIENT
Start: 2019-12-19

## 2019-11-22 RX ORDER — SODIUM CHLORIDE 9 MG/ML
250 INJECTION, SOLUTION INTRAVENOUS ONCE
Status: COMPLETED | OUTPATIENT
Start: 2019-11-22 | End: 2019-11-22

## 2019-11-22 RX ORDER — SODIUM CHLORIDE 9 MG/ML
250 INJECTION, SOLUTION INTRAVENOUS ONCE
Status: CANCELLED | OUTPATIENT
Start: 2019-12-19

## 2019-11-22 RX ORDER — DIPHENHYDRAMINE HYDROCHLORIDE 50 MG/ML
50 INJECTION INTRAMUSCULAR; INTRAVENOUS AS NEEDED
Status: CANCELLED | OUTPATIENT
Start: 2019-11-22

## 2019-11-22 RX ORDER — SODIUM CHLORIDE 9 MG/ML
250 INJECTION, SOLUTION INTRAVENOUS ONCE
Status: CANCELLED | OUTPATIENT
Start: 2019-12-12

## 2019-11-22 RX ORDER — FAMOTIDINE 10 MG/ML
20 INJECTION, SOLUTION INTRAVENOUS ONCE
Status: CANCELLED | OUTPATIENT
Start: 2019-11-22

## 2019-11-22 RX ORDER — DIPHENHYDRAMINE HYDROCHLORIDE 50 MG/ML
50 INJECTION INTRAMUSCULAR; INTRAVENOUS AS NEEDED
Status: CANCELLED | OUTPATIENT
Start: 2019-12-12

## 2019-11-22 RX ORDER — SODIUM CHLORIDE 9 MG/ML
250 INJECTION, SOLUTION INTRAVENOUS ONCE
Status: CANCELLED | OUTPATIENT
Start: 2019-11-22

## 2019-11-22 RX ORDER — FAMOTIDINE 10 MG/ML
20 INJECTION, SOLUTION INTRAVENOUS ONCE
Status: CANCELLED | OUTPATIENT
Start: 2019-12-19

## 2019-11-22 RX ORDER — MAGNESIUM SULFATE HEPTAHYDRATE 40 MG/ML
2 INJECTION, SOLUTION INTRAVENOUS ONCE
Status: COMPLETED | OUTPATIENT
Start: 2019-11-22 | End: 2019-11-22

## 2019-11-22 RX ADMIN — DIPHENHYDRAMINE HYDROCHLORIDE 25 MG: 50 INJECTION INTRAMUSCULAR; INTRAVENOUS at 10:46

## 2019-11-22 RX ADMIN — FAMOTIDINE 20 MG: 10 INJECTION INTRAVENOUS at 10:46

## 2019-11-22 RX ADMIN — PERTUZUMAB 420 MG: 30 INJECTION, SOLUTION, CONCENTRATE INTRAVENOUS at 11:18

## 2019-11-22 RX ADMIN — TRASTUZUMAB 590 MG: 150 INJECTION, POWDER, LYOPHILIZED, FOR SOLUTION INTRAVENOUS at 12:43

## 2019-11-22 RX ADMIN — SODIUM CHLORIDE 250 ML: 9 INJECTION, SOLUTION INTRAVENOUS at 10:46

## 2019-11-22 RX ADMIN — MAGNESIUM SULFATE IN WATER 2 G: 40 INJECTION, SOLUTION INTRAVENOUS at 11:04

## 2019-11-27 DIAGNOSIS — Z17.1 MALIGNANT NEOPLASM OF UPPER-INNER QUADRANT OF LEFT BREAST IN FEMALE, ESTROGEN RECEPTOR NEGATIVE (HCC): ICD-10-CM

## 2019-11-27 DIAGNOSIS — G89.4 CHRONIC PAIN SYNDROME: ICD-10-CM

## 2019-11-27 DIAGNOSIS — C50.212 MALIGNANT NEOPLASM OF UPPER-INNER QUADRANT OF LEFT BREAST IN FEMALE, ESTROGEN RECEPTOR NEGATIVE (HCC): ICD-10-CM

## 2019-11-27 RX ORDER — HYDROCODONE BITARTRATE AND ACETAMINOPHEN 5; 325 MG/1; MG/1
1 TABLET ORAL EVERY 6 HOURS PRN
Qty: 30 TABLET | Refills: 0 | Status: SHIPPED | OUTPATIENT
Start: 2019-11-27 | End: 2019-12-12 | Stop reason: SDUPTHER

## 2019-11-29 DIAGNOSIS — F32.A DEPRESSION: ICD-10-CM

## 2019-12-02 RX ORDER — CITALOPRAM 40 MG/1
TABLET ORAL
Qty: 30 TABLET | Refills: 4 | Status: SHIPPED | OUTPATIENT
Start: 2019-12-02 | End: 2020-02-23

## 2019-12-12 ENCOUNTER — INFUSION (OUTPATIENT)
Dept: ONCOLOGY | Facility: HOSPITAL | Age: 64
End: 2019-12-12

## 2019-12-12 ENCOUNTER — TELEPHONE (OUTPATIENT)
Dept: OTHER | Facility: HOSPITAL | Age: 64
End: 2019-12-12

## 2019-12-12 VITALS
DIASTOLIC BLOOD PRESSURE: 80 MMHG | TEMPERATURE: 97 F | BODY MASS INDEX: 30.65 KG/M2 | SYSTOLIC BLOOD PRESSURE: 152 MMHG | WEIGHT: 210 LBS | HEART RATE: 68 BPM | OXYGEN SATURATION: 93 %

## 2019-12-12 DIAGNOSIS — C50.212 MALIGNANT NEOPLASM OF UPPER-INNER QUADRANT OF LEFT BREAST IN FEMALE, ESTROGEN RECEPTOR NEGATIVE (HCC): ICD-10-CM

## 2019-12-12 DIAGNOSIS — C50.212 MALIGNANT NEOPLASM OF UPPER-INNER QUADRANT OF LEFT BREAST IN FEMALE, ESTROGEN RECEPTOR NEGATIVE (HCC): Primary | ICD-10-CM

## 2019-12-12 DIAGNOSIS — Z45.2 FITTING AND ADJUSTMENT OF VASCULAR CATHETER: ICD-10-CM

## 2019-12-12 DIAGNOSIS — Z17.1 MALIGNANT NEOPLASM OF UPPER-INNER QUADRANT OF LEFT BREAST IN FEMALE, ESTROGEN RECEPTOR NEGATIVE (HCC): Primary | ICD-10-CM

## 2019-12-12 DIAGNOSIS — G89.4 CHRONIC PAIN SYNDROME: ICD-10-CM

## 2019-12-12 DIAGNOSIS — Z17.1 MALIGNANT NEOPLASM OF UPPER-INNER QUADRANT OF LEFT BREAST IN FEMALE, ESTROGEN RECEPTOR NEGATIVE (HCC): ICD-10-CM

## 2019-12-12 DIAGNOSIS — Z79.899 ENCOUNTER FOR LONG-TERM (CURRENT) USE OF HIGH-RISK MEDICATION: ICD-10-CM

## 2019-12-12 LAB
ALBUMIN SERPL-MCNC: 3.9 G/DL (ref 3.5–5.2)
ALBUMIN/GLOB SERPL: 1.2 G/DL (ref 1.1–2.4)
ALP SERPL-CCNC: 116 U/L (ref 38–116)
ALT SERPL W P-5'-P-CCNC: 11 U/L (ref 0–33)
ANION GAP SERPL CALCULATED.3IONS-SCNC: 12.1 MMOL/L (ref 5–15)
AST SERPL-CCNC: 24 U/L (ref 0–32)
BASOPHILS # BLD AUTO: 0.06 10*3/MM3 (ref 0–0.2)
BASOPHILS NFR BLD AUTO: 0.8 % (ref 0–1.5)
BILIRUB SERPL-MCNC: 0.4 MG/DL (ref 0.2–1.2)
BUN BLD-MCNC: 8 MG/DL (ref 6–20)
BUN/CREAT SERPL: 11.1 (ref 7.3–30)
CALCIUM SPEC-SCNC: 9.4 MG/DL (ref 8.5–10.2)
CHLORIDE SERPL-SCNC: 102 MMOL/L (ref 98–107)
CO2 SERPL-SCNC: 25.9 MMOL/L (ref 22–29)
CREAT BLD-MCNC: 0.72 MG/DL (ref 0.6–1.1)
DEPRECATED RDW RBC AUTO: 51.9 FL (ref 37–54)
EOSINOPHIL # BLD AUTO: 0.24 10*3/MM3 (ref 0–0.4)
EOSINOPHIL NFR BLD AUTO: 3.2 % (ref 0.3–6.2)
ERYTHROCYTE [DISTWIDTH] IN BLOOD BY AUTOMATED COUNT: 14.7 % (ref 12.3–15.4)
GFR SERPL CREATININE-BSD FRML MDRD: 82 ML/MIN/1.73
GLOBULIN UR ELPH-MCNC: 3.2 GM/DL (ref 1.8–3.5)
GLUCOSE BLD-MCNC: 92 MG/DL (ref 74–124)
HCT VFR BLD AUTO: 34.9 % (ref 34–46.6)
HGB BLD-MCNC: 11.3 G/DL (ref 12–15.9)
IMM GRANULOCYTES # BLD AUTO: 0.01 10*3/MM3 (ref 0–0.05)
IMM GRANULOCYTES NFR BLD AUTO: 0.1 % (ref 0–0.5)
LYMPHOCYTES # BLD AUTO: 2.96 10*3/MM3 (ref 0.7–3.1)
LYMPHOCYTES NFR BLD AUTO: 39.7 % (ref 19.6–45.3)
MAGNESIUM SERPL-MCNC: 1.8 MG/DL (ref 1.8–2.5)
MCH RBC QN AUTO: 31 PG (ref 26.6–33)
MCHC RBC AUTO-ENTMCNC: 32.4 G/DL (ref 31.5–35.7)
MCV RBC AUTO: 95.9 FL (ref 79–97)
MONOCYTES # BLD AUTO: 0.51 10*3/MM3 (ref 0.1–0.9)
MONOCYTES NFR BLD AUTO: 6.8 % (ref 5–12)
NEUTROPHILS # BLD AUTO: 3.68 10*3/MM3 (ref 1.7–7)
NEUTROPHILS NFR BLD AUTO: 49.4 % (ref 42.7–76)
NRBC BLD AUTO-RTO: 0 /100 WBC (ref 0–0.2)
PLATELET # BLD AUTO: 286 10*3/MM3 (ref 140–450)
PMV BLD AUTO: 9 FL (ref 6–12)
POTASSIUM BLD-SCNC: 4.1 MMOL/L (ref 3.5–4.7)
PROT SERPL-MCNC: 7.1 G/DL (ref 6.3–8)
RBC # BLD AUTO: 3.64 10*6/MM3 (ref 3.77–5.28)
SODIUM BLD-SCNC: 140 MMOL/L (ref 134–145)
WBC NRBC COR # BLD: 7.46 10*3/MM3 (ref 3.4–10.8)

## 2019-12-12 PROCEDURE — 96375 TX/PRO/DX INJ NEW DRUG ADDON: CPT | Performed by: INTERNAL MEDICINE

## 2019-12-12 PROCEDURE — 83735 ASSAY OF MAGNESIUM: CPT | Performed by: INTERNAL MEDICINE

## 2019-12-12 PROCEDURE — 85025 COMPLETE CBC W/AUTO DIFF WBC: CPT | Performed by: INTERNAL MEDICINE

## 2019-12-12 PROCEDURE — 25010000002 TRASTUZUMAB PER 10 MG: Performed by: INTERNAL MEDICINE

## 2019-12-12 PROCEDURE — 80053 COMPREHEN METABOLIC PANEL: CPT | Performed by: INTERNAL MEDICINE

## 2019-12-12 PROCEDURE — 96413 CHEMO IV INFUSION 1 HR: CPT | Performed by: INTERNAL MEDICINE

## 2019-12-12 PROCEDURE — 25010000002 DIPHENHYDRAMINE PER 50 MG: Performed by: INTERNAL MEDICINE

## 2019-12-12 RX ORDER — FAMOTIDINE 10 MG/ML
20 INJECTION, SOLUTION INTRAVENOUS ONCE
Status: COMPLETED | OUTPATIENT
Start: 2019-12-12 | End: 2019-12-12

## 2019-12-12 RX ORDER — SODIUM CHLORIDE 0.9 % (FLUSH) 0.9 %
10 SYRINGE (ML) INJECTION AS NEEDED
Status: DISCONTINUED | OUTPATIENT
Start: 2019-12-12 | End: 2019-12-12 | Stop reason: HOSPADM

## 2019-12-12 RX ORDER — HYDROCODONE BITARTRATE AND ACETAMINOPHEN 5; 325 MG/1; MG/1
1 TABLET ORAL EVERY 6 HOURS PRN
Qty: 30 TABLET | Refills: 0 | Status: SHIPPED | OUTPATIENT
Start: 2019-12-12 | End: 2020-01-02 | Stop reason: SDUPTHER

## 2019-12-12 RX ORDER — SODIUM CHLORIDE 9 MG/ML
250 INJECTION, SOLUTION INTRAVENOUS ONCE
Status: COMPLETED | OUTPATIENT
Start: 2019-12-12 | End: 2019-12-12

## 2019-12-12 RX ORDER — SODIUM CHLORIDE 0.9 % (FLUSH) 0.9 %
10 SYRINGE (ML) INJECTION AS NEEDED
Status: CANCELLED | OUTPATIENT
Start: 2019-12-12

## 2019-12-12 RX ADMIN — TRASTUZUMAB 200 MG: 150 INJECTION, POWDER, LYOPHILIZED, FOR SOLUTION INTRAVENOUS at 13:22

## 2019-12-12 RX ADMIN — SODIUM CHLORIDE 250 ML: 9 INJECTION, SOLUTION INTRAVENOUS at 12:47

## 2019-12-12 RX ADMIN — FAMOTIDINE 20 MG: 10 INJECTION INTRAVENOUS at 12:47

## 2019-12-12 RX ADMIN — DIPHENHYDRAMINE HYDROCHLORIDE 25 MG: 50 INJECTION INTRAMUSCULAR; INTRAVENOUS at 12:47

## 2019-12-12 RX ADMIN — SODIUM CHLORIDE, PRESERVATIVE FREE 10 ML: 5 INJECTION INTRAVENOUS at 14:01

## 2019-12-12 RX ADMIN — Medication 500 UNITS: at 14:02

## 2019-12-31 DIAGNOSIS — C50.212 MALIGNANT NEOPLASM OF UPPER-INNER QUADRANT OF LEFT BREAST IN FEMALE, ESTROGEN RECEPTOR NEGATIVE (HCC): ICD-10-CM

## 2019-12-31 DIAGNOSIS — Z17.1 MALIGNANT NEOPLASM OF UPPER-INNER QUADRANT OF LEFT BREAST IN FEMALE, ESTROGEN RECEPTOR NEGATIVE (HCC): ICD-10-CM

## 2020-01-02 ENCOUNTER — INFUSION (OUTPATIENT)
Dept: ONCOLOGY | Facility: HOSPITAL | Age: 65
End: 2020-01-02

## 2020-01-02 VITALS
DIASTOLIC BLOOD PRESSURE: 80 MMHG | SYSTOLIC BLOOD PRESSURE: 152 MMHG | BODY MASS INDEX: 30.21 KG/M2 | WEIGHT: 207 LBS | TEMPERATURE: 98 F | OXYGEN SATURATION: 94 % | HEART RATE: 65 BPM

## 2020-01-02 DIAGNOSIS — C50.212 MALIGNANT NEOPLASM OF UPPER-INNER QUADRANT OF LEFT BREAST IN FEMALE, ESTROGEN RECEPTOR NEGATIVE (HCC): ICD-10-CM

## 2020-01-02 DIAGNOSIS — C50.212 MALIGNANT NEOPLASM OF UPPER-INNER QUADRANT OF LEFT BREAST IN FEMALE, ESTROGEN RECEPTOR NEGATIVE (HCC): Primary | ICD-10-CM

## 2020-01-02 DIAGNOSIS — Z17.1 MALIGNANT NEOPLASM OF UPPER-INNER QUADRANT OF LEFT BREAST IN FEMALE, ESTROGEN RECEPTOR NEGATIVE (HCC): Primary | ICD-10-CM

## 2020-01-02 DIAGNOSIS — Z17.1 MALIGNANT NEOPLASM OF UPPER-INNER QUADRANT OF LEFT BREAST IN FEMALE, ESTROGEN RECEPTOR NEGATIVE (HCC): ICD-10-CM

## 2020-01-02 DIAGNOSIS — Z79.899 ENCOUNTER FOR LONG-TERM (CURRENT) USE OF HIGH-RISK MEDICATION: Primary | ICD-10-CM

## 2020-01-02 DIAGNOSIS — Z79.899 ENCOUNTER FOR LONG-TERM (CURRENT) USE OF HIGH-RISK MEDICATION: ICD-10-CM

## 2020-01-02 DIAGNOSIS — G89.4 CHRONIC PAIN SYNDROME: ICD-10-CM

## 2020-01-02 DIAGNOSIS — Z45.2 FITTING AND ADJUSTMENT OF VASCULAR CATHETER: ICD-10-CM

## 2020-01-02 LAB
ALBUMIN SERPL-MCNC: 3.9 G/DL (ref 3.5–5.2)
ALBUMIN/GLOB SERPL: 1.2 G/DL (ref 1.1–2.4)
ALP SERPL-CCNC: 111 U/L (ref 38–116)
ALT SERPL W P-5'-P-CCNC: 10 U/L (ref 0–33)
ANION GAP SERPL CALCULATED.3IONS-SCNC: 10.6 MMOL/L (ref 5–15)
AST SERPL-CCNC: 17 U/L (ref 0–32)
BASOPHILS # BLD AUTO: 0.04 10*3/MM3 (ref 0–0.2)
BASOPHILS NFR BLD AUTO: 0.8 % (ref 0–1.5)
BILIRUB SERPL-MCNC: 0.3 MG/DL (ref 0.2–1.2)
BUN BLD-MCNC: 7 MG/DL (ref 6–20)
BUN/CREAT SERPL: 10.3 (ref 7.3–30)
CALCIUM SPEC-SCNC: 9.4 MG/DL (ref 8.5–10.2)
CHLORIDE SERPL-SCNC: 104 MMOL/L (ref 98–107)
CO2 SERPL-SCNC: 26.4 MMOL/L (ref 22–29)
CREAT BLD-MCNC: 0.68 MG/DL (ref 0.6–1.1)
DEPRECATED RDW RBC AUTO: 48.5 FL (ref 37–54)
EOSINOPHIL # BLD AUTO: 0.19 10*3/MM3 (ref 0–0.4)
EOSINOPHIL NFR BLD AUTO: 3.9 % (ref 0.3–6.2)
ERYTHROCYTE [DISTWIDTH] IN BLOOD BY AUTOMATED COUNT: 13.8 % (ref 12.3–15.4)
GFR SERPL CREATININE-BSD FRML MDRD: 87 ML/MIN/1.73
GLOBULIN UR ELPH-MCNC: 3.3 GM/DL (ref 1.8–3.5)
GLUCOSE BLD-MCNC: 81 MG/DL (ref 74–124)
HCT VFR BLD AUTO: 36.5 % (ref 34–46.6)
HGB BLD-MCNC: 11.5 G/DL (ref 12–15.9)
IMM GRANULOCYTES # BLD AUTO: 0.01 10*3/MM3 (ref 0–0.05)
IMM GRANULOCYTES NFR BLD AUTO: 0.2 % (ref 0–0.5)
LYMPHOCYTES # BLD AUTO: 1.8 10*3/MM3 (ref 0.7–3.1)
LYMPHOCYTES NFR BLD AUTO: 37 % (ref 19.6–45.3)
MAGNESIUM SERPL-MCNC: 1.9 MG/DL (ref 1.8–2.5)
MCH RBC QN AUTO: 29.9 PG (ref 26.6–33)
MCHC RBC AUTO-ENTMCNC: 31.5 G/DL (ref 31.5–35.7)
MCV RBC AUTO: 95.1 FL (ref 79–97)
MONOCYTES # BLD AUTO: 0.36 10*3/MM3 (ref 0.1–0.9)
MONOCYTES NFR BLD AUTO: 7.4 % (ref 5–12)
NEUTROPHILS # BLD AUTO: 2.46 10*3/MM3 (ref 1.7–7)
NEUTROPHILS NFR BLD AUTO: 50.7 % (ref 42.7–76)
NRBC BLD AUTO-RTO: 0 /100 WBC (ref 0–0.2)
PLATELET # BLD AUTO: 282 10*3/MM3 (ref 140–450)
PMV BLD AUTO: 8.9 FL (ref 6–12)
POTASSIUM BLD-SCNC: 4.1 MMOL/L (ref 3.5–4.7)
PROT SERPL-MCNC: 7.2 G/DL (ref 6.3–8)
RBC # BLD AUTO: 3.84 10*6/MM3 (ref 3.77–5.28)
SODIUM BLD-SCNC: 141 MMOL/L (ref 134–145)
WBC NRBC COR # BLD: 4.86 10*3/MM3 (ref 3.4–10.8)

## 2020-01-02 PROCEDURE — 83735 ASSAY OF MAGNESIUM: CPT | Performed by: NURSE PRACTITIONER

## 2020-01-02 PROCEDURE — 25010000002 DIPHENHYDRAMINE PER 50 MG: Performed by: NURSE PRACTITIONER

## 2020-01-02 PROCEDURE — 85025 COMPLETE CBC W/AUTO DIFF WBC: CPT | Performed by: NURSE PRACTITIONER

## 2020-01-02 PROCEDURE — 96375 TX/PRO/DX INJ NEW DRUG ADDON: CPT | Performed by: NURSE PRACTITIONER

## 2020-01-02 PROCEDURE — 25010000002 TRASTUZUMAB PER 10 MG: Performed by: NURSE PRACTITIONER

## 2020-01-02 PROCEDURE — 25010000002 PERTUZUMAB 420 MG/14ML SOLUTION 420 MG VIAL: Performed by: NURSE PRACTITIONER

## 2020-01-02 PROCEDURE — 80053 COMPREHEN METABOLIC PANEL: CPT | Performed by: NURSE PRACTITIONER

## 2020-01-02 PROCEDURE — 96417 CHEMO IV INFUS EACH ADDL SEQ: CPT | Performed by: NURSE PRACTITIONER

## 2020-01-02 PROCEDURE — 96413 CHEMO IV INFUSION 1 HR: CPT | Performed by: NURSE PRACTITIONER

## 2020-01-02 RX ORDER — SODIUM CHLORIDE 0.9 % (FLUSH) 0.9 %
10 SYRINGE (ML) INJECTION AS NEEDED
Status: CANCELLED | OUTPATIENT
Start: 2020-01-02

## 2020-01-02 RX ORDER — DIPHENHYDRAMINE HYDROCHLORIDE 50 MG/ML
50 INJECTION INTRAMUSCULAR; INTRAVENOUS AS NEEDED
Status: CANCELLED | OUTPATIENT
Start: 2020-01-02

## 2020-01-02 RX ORDER — SODIUM CHLORIDE 9 MG/ML
250 INJECTION, SOLUTION INTRAVENOUS ONCE
Status: COMPLETED | OUTPATIENT
Start: 2020-01-02 | End: 2020-01-02

## 2020-01-02 RX ORDER — HYDROCODONE BITARTRATE AND ACETAMINOPHEN 5; 325 MG/1; MG/1
1 TABLET ORAL EVERY 6 HOURS PRN
Qty: 30 TABLET | Refills: 0 | Status: SHIPPED | OUTPATIENT
Start: 2020-01-02 | End: 2020-01-17

## 2020-01-02 RX ORDER — FAMOTIDINE 10 MG/ML
20 INJECTION, SOLUTION INTRAVENOUS AS NEEDED
Status: CANCELLED | OUTPATIENT
Start: 2020-01-02

## 2020-01-02 RX ORDER — HEPARIN SODIUM (PORCINE) LOCK FLUSH IV SOLN 100 UNIT/ML 100 UNIT/ML
500 SOLUTION INTRAVENOUS AS NEEDED
Status: CANCELLED | OUTPATIENT
Start: 2020-01-02

## 2020-01-02 RX ORDER — SODIUM CHLORIDE 0.9 % (FLUSH) 0.9 %
10 SYRINGE (ML) INJECTION AS NEEDED
Status: DISCONTINUED | OUTPATIENT
Start: 2020-01-02 | End: 2020-01-02 | Stop reason: HOSPADM

## 2020-01-02 RX ORDER — FAMOTIDINE 10 MG/ML
20 INJECTION, SOLUTION INTRAVENOUS ONCE
Status: COMPLETED | OUTPATIENT
Start: 2020-01-02 | End: 2020-01-02

## 2020-01-02 RX ADMIN — SODIUM CHLORIDE 250 ML: 9 INJECTION, SOLUTION INTRAVENOUS at 09:07

## 2020-01-02 RX ADMIN — TRASTUZUMAB 590 MG: 150 INJECTION, POWDER, LYOPHILIZED, FOR SOLUTION INTRAVENOUS at 10:52

## 2020-01-02 RX ADMIN — PERTUZUMAB 420 MG: 30 INJECTION, SOLUTION, CONCENTRATE INTRAVENOUS at 09:51

## 2020-01-02 RX ADMIN — DIPHENHYDRAMINE HYDROCHLORIDE 25 MG: 50 INJECTION INTRAMUSCULAR; INTRAVENOUS at 09:07

## 2020-01-02 RX ADMIN — FAMOTIDINE 20 MG: 10 INJECTION INTRAVENOUS at 09:07

## 2020-01-02 RX ADMIN — Medication 500 UNITS: at 11:31

## 2020-01-02 RX ADMIN — SODIUM CHLORIDE, PRESERVATIVE FREE 10 ML: 5 INJECTION INTRAVENOUS at 11:31

## 2020-01-02 NOTE — PROGRESS NOTES
Patient complaining of nerve pain in feet area. Spoke to Dr. Jhaveri. Unable to take Gabapentin due to allergy. Discussed with patient. Patient VU. Patient scheduling for echo. Treatment as planned today per Dr. Jhaveri. Robert routed to Dr. Jhaveri for refill.

## 2020-01-10 ENCOUNTER — APPOINTMENT (OUTPATIENT)
Dept: CARDIOLOGY | Facility: HOSPITAL | Age: 65
End: 2020-01-10

## 2020-01-10 DIAGNOSIS — C50.212 MALIGNANT NEOPLASM OF UPPER-INNER QUADRANT OF LEFT BREAST IN FEMALE, ESTROGEN RECEPTOR NEGATIVE (HCC): Primary | ICD-10-CM

## 2020-01-10 DIAGNOSIS — Z17.1 MALIGNANT NEOPLASM OF UPPER-INNER QUADRANT OF LEFT BREAST IN FEMALE, ESTROGEN RECEPTOR NEGATIVE (HCC): Primary | ICD-10-CM

## 2020-01-13 RX ORDER — ALPRAZOLAM 0.25 MG/1
TABLET ORAL
Qty: 60 TABLET | Refills: 0 | OUTPATIENT
Start: 2020-01-13 | End: 2020-02-20

## 2020-01-17 DIAGNOSIS — G89.4 CHRONIC PAIN SYNDROME: ICD-10-CM

## 2020-01-17 DIAGNOSIS — Z17.1 MALIGNANT NEOPLASM OF UPPER-INNER QUADRANT OF LEFT BREAST IN FEMALE, ESTROGEN RECEPTOR NEGATIVE (HCC): ICD-10-CM

## 2020-01-17 DIAGNOSIS — C50.212 MALIGNANT NEOPLASM OF UPPER-INNER QUADRANT OF LEFT BREAST IN FEMALE, ESTROGEN RECEPTOR NEGATIVE (HCC): ICD-10-CM

## 2020-01-17 RX ORDER — HYDROCODONE BITARTRATE AND ACETAMINOPHEN 5; 325 MG/1; MG/1
TABLET ORAL
Qty: 30 TABLET | Refills: 0 | Status: SHIPPED | OUTPATIENT
Start: 2020-01-17 | End: 2020-02-13

## 2020-01-21 ENCOUNTER — HOSPITAL ENCOUNTER (OUTPATIENT)
Dept: CARDIOLOGY | Facility: HOSPITAL | Age: 65
Discharge: HOME | End: 2020-01-21
Admitting: INTERNAL MEDICINE

## 2020-01-21 VITALS
WEIGHT: 207 LBS | SYSTOLIC BLOOD PRESSURE: 140 MMHG | OXYGEN SATURATION: 95 % | HEIGHT: 69 IN | BODY MASS INDEX: 30.66 KG/M2 | DIASTOLIC BLOOD PRESSURE: 70 MMHG | HEART RATE: 61 BPM

## 2020-01-21 DIAGNOSIS — Z79.899 ENCOUNTER FOR LONG-TERM (CURRENT) USE OF HIGH-RISK MEDICATION: ICD-10-CM

## 2020-01-21 PROCEDURE — 93356 MYOCRD STRAIN IMG SPCKL TRCK: CPT | Performed by: INTERNAL MEDICINE

## 2020-01-21 PROCEDURE — 25010000002 PERFLUTREN (DEFINITY) 8.476 MG IN SODIUM CHLORIDE 0.9 % 10 ML INJECTION: Performed by: INTERNAL MEDICINE

## 2020-01-21 PROCEDURE — 93306 TTE W/DOPPLER COMPLETE: CPT | Performed by: INTERNAL MEDICINE

## 2020-01-21 PROCEDURE — 93356 MYOCRD STRAIN IMG SPCKL TRCK: CPT

## 2020-01-21 PROCEDURE — 93306 TTE W/DOPPLER COMPLETE: CPT

## 2020-01-21 RX ADMIN — PERFLUTREN 1.5 ML: 6.52 INJECTION, SUSPENSION INTRAVENOUS at 15:45

## 2020-01-22 LAB
AORTIC ARCH: 2.3 CM
AORTIC ROOT ANNULUS: 2.1 CM
ASCENDING AORTA: 2.7 CM
BH CV ECHO MEAS - ACS: 2.4 CM
BH CV ECHO MEAS - AO MAX PG (FULL): 2.3 MMHG
BH CV ECHO MEAS - AO MAX PG: 5.3 MMHG
BH CV ECHO MEAS - AO MEAN PG (FULL): 1.8 MMHG
BH CV ECHO MEAS - AO MEAN PG: 3.1 MMHG
BH CV ECHO MEAS - AO ROOT AREA (BSA CORRECTED): 1.5
BH CV ECHO MEAS - AO ROOT AREA: 7.9 CM^2
BH CV ECHO MEAS - AO ROOT DIAM: 3.2 CM
BH CV ECHO MEAS - AO V2 MAX: 114.6 CM/SEC
BH CV ECHO MEAS - AO V2 MEAN: 82.6 CM/SEC
BH CV ECHO MEAS - AO V2 VTI: 25.9 CM
BH CV ECHO MEAS - ASC AORTA: 2.7 CM
BH CV ECHO MEAS - AVA(I,A): 2.4 CM^2
BH CV ECHO MEAS - AVA(I,D): 2.4 CM^2
BH CV ECHO MEAS - AVA(V,A): 2.7 CM^2
BH CV ECHO MEAS - AVA(V,D): 2.7 CM^2
BH CV ECHO MEAS - BSA(HAYCOCK): 2.2 M^2
BH CV ECHO MEAS - BSA: 2.1 M^2
BH CV ECHO MEAS - BZI_BMI: 30.6 KILOGRAMS/M^2
BH CV ECHO MEAS - BZI_METRIC_HEIGHT: 175.3 CM
BH CV ECHO MEAS - BZI_METRIC_WEIGHT: 93.9 KG
BH CV ECHO MEAS - EDV(CUBED): 182.7 ML
BH CV ECHO MEAS - EDV(MOD-SP2): 78 ML
BH CV ECHO MEAS - EDV(MOD-SP4): 116 ML
BH CV ECHO MEAS - EDV(TEICH): 158.4 ML
BH CV ECHO MEAS - EF(CUBED): 66.1 %
BH CV ECHO MEAS - EF(MOD-BP): 57 %
BH CV ECHO MEAS - EF(MOD-SP2): 55.1 %
BH CV ECHO MEAS - EF(MOD-SP4): 56.9 %
BH CV ECHO MEAS - EF(TEICH): 56.9 %
BH CV ECHO MEAS - ESV(CUBED): 62 ML
BH CV ECHO MEAS - ESV(MOD-SP2): 35 ML
BH CV ECHO MEAS - ESV(MOD-SP4): 50 ML
BH CV ECHO MEAS - ESV(TEICH): 68.3 ML
BH CV ECHO MEAS - FS: 30.2 %
BH CV ECHO MEAS - IVS/LVPW: 1.1
BH CV ECHO MEAS - IVSD: 1.2 CM
BH CV ECHO MEAS - LAT PEAK E' VEL: 4 CM/SEC
BH CV ECHO MEAS - LV DIASTOLIC VOL/BSA (35-75): 55.3 ML/M^2
BH CV ECHO MEAS - LV MASS(C)D: 264 GRAMS
BH CV ECHO MEAS - LV MASS(C)DI: 126 GRAMS/M^2
BH CV ECHO MEAS - LV MAX PG: 2.9 MMHG
BH CV ECHO MEAS - LV MEAN PG: 1.4 MMHG
BH CV ECHO MEAS - LV SYSTOLIC VOL/BSA (12-30): 23.9 ML/M^2
BH CV ECHO MEAS - LV V1 MAX: 85.3 CM/SEC
BH CV ECHO MEAS - LV V1 MEAN: 54 CM/SEC
BH CV ECHO MEAS - LV V1 VTI: 16.9 CM
BH CV ECHO MEAS - LVIDD: 5.7 CM
BH CV ECHO MEAS - LVIDS: 4 CM
BH CV ECHO MEAS - LVLD AP2: 6.7 CM
BH CV ECHO MEAS - LVLD AP4: 7.7 CM
BH CV ECHO MEAS - LVLS AP2: 6.1 CM
BH CV ECHO MEAS - LVLS AP4: 7.1 CM
BH CV ECHO MEAS - LVOT AREA (M): 3.8 CM^2
BH CV ECHO MEAS - LVOT AREA: 3.6 CM^2
BH CV ECHO MEAS - LVOT DIAM: 2.2 CM
BH CV ECHO MEAS - LVPWD: 1.1 CM
BH CV ECHO MEAS - MED PEAK E' VEL: 4 CM/SEC
BH CV ECHO MEAS - MV A DUR: 0.12 SEC
BH CV ECHO MEAS - MV A MAX VEL: 87 CM/SEC
BH CV ECHO MEAS - MV DEC SLOPE: 261.4 CM/SEC^2
BH CV ECHO MEAS - MV DEC TIME: 0.37 SEC
BH CV ECHO MEAS - MV E MAX VEL: 66.7 CM/SEC
BH CV ECHO MEAS - MV E/A: 0.77
BH CV ECHO MEAS - MV MAX PG: 3.3 MMHG
BH CV ECHO MEAS - MV MEAN PG: 1.1 MMHG
BH CV ECHO MEAS - MV P1/2T MAX VEL: 79.1 CM/SEC
BH CV ECHO MEAS - MV P1/2T: 88.6 MSEC
BH CV ECHO MEAS - MV V2 MAX: 90.3 CM/SEC
BH CV ECHO MEAS - MV V2 MEAN: 48.8 CM/SEC
BH CV ECHO MEAS - MV V2 VTI: 39.9 CM
BH CV ECHO MEAS - MVA P1/2T LCG: 2.8 CM^2
BH CV ECHO MEAS - MVA(P1/2T): 2.5 CM^2
BH CV ECHO MEAS - MVA(VTI): 1.5 CM^2
BH CV ECHO MEAS - PA ACC TIME: 0.08 SEC
BH CV ECHO MEAS - PA MAX PG (FULL): 2.4 MMHG
BH CV ECHO MEAS - PA MAX PG: 4.7 MMHG
BH CV ECHO MEAS - PA PR(ACCEL): 41 MMHG
BH CV ECHO MEAS - PA V2 MAX: 108.6 CM/SEC
BH CV ECHO MEAS - PULM A REVS DUR: 0.11 SEC
BH CV ECHO MEAS - PULM A REVS VEL: 25.2 CM/SEC
BH CV ECHO MEAS - PULM DIAS VEL: 32.4 CM/SEC
BH CV ECHO MEAS - PULM S/D: 1
BH CV ECHO MEAS - PULM SYS VEL: 33.3 CM/SEC
BH CV ECHO MEAS - PVA(V,A): 2.3 CM^2
BH CV ECHO MEAS - PVA(V,D): 2.3 CM^2
BH CV ECHO MEAS - QP/QS: 1.1
BH CV ECHO MEAS - RAP SYSTOLE: 3 MMHG
BH CV ECHO MEAS - RV MAX PG: 2.3 MMHG
BH CV ECHO MEAS - RV MEAN PG: 1.4 MMHG
BH CV ECHO MEAS - RV V1 MAX: 76.6 CM/SEC
BH CV ECHO MEAS - RV V1 MEAN: 57.1 CM/SEC
BH CV ECHO MEAS - RV V1 VTI: 21.1 CM
BH CV ECHO MEAS - RVOT AREA: 3.2 CM^2
BH CV ECHO MEAS - RVOT DIAM: 2 CM
BH CV ECHO MEAS - RVSP: 21 MMHG
BH CV ECHO MEAS - SI(AO): 97.7 ML/M^2
BH CV ECHO MEAS - SI(CUBED): 57.6 ML/M^2
BH CV ECHO MEAS - SI(LVOT): 29.4 ML/M^2
BH CV ECHO MEAS - SI(MOD-SP2): 20.5 ML/M^2
BH CV ECHO MEAS - SI(MOD-SP4): 31.5 ML/M^2
BH CV ECHO MEAS - SI(TEICH): 43 ML/M^2
BH CV ECHO MEAS - SUP REN AO DIAM: 2.5 CM
BH CV ECHO MEAS - SV(AO): 204.8 ML
BH CV ECHO MEAS - SV(CUBED): 120.7 ML
BH CV ECHO MEAS - SV(LVOT): 61.6 ML
BH CV ECHO MEAS - SV(MOD-SP2): 43 ML
BH CV ECHO MEAS - SV(MOD-SP4): 66 ML
BH CV ECHO MEAS - SV(RVOT): 67.5 ML
BH CV ECHO MEAS - SV(TEICH): 90.1 ML
BH CV ECHO MEAS - TAPSE (>1.6): 2.1 CM2
BH CV ECHO MEAS - TR MAX VEL: 211.8 CM/SEC
BH CV ECHO MEASUREMENTS AVERAGE E/E' RATIO: 16.68
BH CV XLRA - RV BASE: 2.8 CM
BH CV XLRA - RV LENGTH: 8.1 CM
BH CV XLRA - RV MID: 3.6 CM
BH CV XLRA - TDI S': 11 CM/SEC
LEFT ATRIUM VOLUME INDEX: 26 ML/M2
LV EF 2D ECHO EST: 57 %
MAXIMAL PREDICTED HEART RATE: 156 BPM
SINUS: 3 CM
STJ: 2.7 CM
STRESS TARGET HR: 133 BPM

## 2020-01-22 NOTE — PROGRESS NOTES
Subjective     REASON FOR CONSULTATION:   1. Left breast cancer T2N0 3.6 cm grade 3 ER WI negative HER-2 positive infiltrating ductal carcinoma post excisional biopsy, followed by mastectomy and axillary dissection-he has not Taxol Herceptin Perjeta for 12 weeks followed by Perjeta Herceptin for 1 year planne                               REQUESTING PHYSICIAN: Jase Khan,     History of Present Illness patient is a 64-year-old female with a large 4x.6 .3 cm ER WI negative HER-2 positive breast cancer node negative surgically excised receiving adjuvant treatment.      Initially because of the large size and comorbidities we were thinking of weekly Taxol Herceptin perjeta followed by Adriamycin Cytoxan however she had cardiac evaluation with echo with a borderline ejection fraction and had a stress test which showed no ischemia but again the ejection fraction of 50 to 52% and based on her shortness of breath with exertion and symptomatology, it was felt best to proceed instead with just weekly Taxol Herceptin perjeta followed by a year of Herceptin perjeta. Concern for the anthracycline possibly contributing to cardiotoxicity in a patient with borderline cardiac function.    She is tolerating Herceptin Perjeta without any problems.  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    Past Medical History:   Diagnosis Date   • Adjustment disorder    • Allergic rhinitis    • Amenorrhea    • Anxiety    • Breast cancer (CMS/[facility]) 04/18/2019    Left breast mammary carcinoma   • Bruit    •  Carpal tunnel syndrome    • Carrier of tuberculosis    • Chronic pain    • Daytime somnolence    • De Quervain's tenosynovitis    • Degenerative cervical disc    • Depression    • Dyslipidemia    • Eustachian tube dysfunction    • History of UTI    • Hyperlipidemia    • Hypertension    • Impaired fasting glucose    • Lumbar degenerative disc disease    • Numbness and tingling     LEFT LEG   • OAB (overactive bladder)    • Precordial pain    • Scapulothoracic syndrome    • Sciatica         Past Surgical History:   Procedure Laterality Date   • APPENDECTOMY N/A    • BREAST BIOPSY Left 2019    Left breast ultrasound guided cyst aspiration, 9:00 position-Dr. Gerry Taylor, [facility]   • BREAST LUMPECTOMY Left 5/2/2019    Procedure: Left BREAST LUMPECTOMY;  Surgeon: Jase Evans MD;  Location: [facility] OR;  Service: General   • LUMBAR EPIDURAL INJECTION N/A    • MASTECTOMY Left 6/21/2019    Procedure: Left BREAST MASTECTOMY;  Surgeon: Jase Evans MD;  Location: [facility] OR;  Service: General   • TUBAL ABDOMINAL LIGATION Bilateral    • VAGINAL DELIVERY      x3   • VENOUS ACCESS DEVICE (PORT) INSERTION Right 8/27/2019    Procedure: INSERTION VENOUS ACCESS DEVICE;  Surgeon: Jase Evans MD;  Location: [facility] OR;  Service: General      ONC HISTORY;  patient is a 64-year-old retired  with hypertension hypercholesterolemia and degenerative arthritis who felt a mass in her left breast in March.  She showed it to her family doctor and underwent imaging at [facility] on 3/13/2019 which Showed a 3 x 3.2 cm mass at 9:00 which on ultrasound showed a thick-walled benign-appearing 2.8 x 2.5 cm cyst which was felt to not be suspicious .because of persistence of symptoms she was reevaluated on 4/16/2019 and underwent aspiration and core biopsy because there was a significant soft tissue component to the mass at that time this was aspirated and the final report  showed fine-needle aspiration suspicious  for malignant cells favor mammary carcinoma  Patient was referred to Dr. Kee who took her for an excisional biopsy on 2019 with the final report showing a  mass grade 3 -4x3.6cm with tumor extending to one margin and DCIS also 0.2 mm from one margin.  The tumor was ER FL negative HER-2 3+.  The patient was then taken for surgery on 2019 at which time she had a completion mastectomy and axillary node biopsy with the findings of residual high-grade DCIS with clear margins and an incidental intraductal papilloma and 17-neg lymph nodes making this a T2N0 tumor    She has done well postoperatively and is here to discuss adjuvant treatment    She is  3 para 3 menarche  at age 15 and menopause in her early 40s when she had a hysterectomy for heavy menstrual bleeding.  She took hormone replacement for 10 years and stopped 10 years ago first childbirth was at age 25 she did not breast-feed  Family history is positive for mother who had uterine cancer at age 60 and  of it at age 65 she has a brother who  of liver cancer related to drinking there is no other malignancy in the family that she is aware of    She is a significant smoker for the last 48 years but does not drink     Patient and her  were very taken to back by the suggestion about chemotherapy and apparently had thought that there was no further treatment needed and I spent almost an hour presenting the data concerning the extreme effectiveness of HER-2 directed therapy and this situation with a high risk of recurrence without adjuvant treatment and she finally was convinced to do the treatment    We discussed smoking cessation but at this point she is so nervous and agitated with the prospect of chemotherapy that we will hold off on this until she is skilled nursing through the chemotherapy and rediscuss it    I discussed the case also with Dr. Kee will place a port and we will plan to start chemotherapy in 2 weeks.    Patient  initiating therapy with Taxol/Herceptin/Perjeta 8/29/19.  9/19  She is followed by Dr. Dudley, cardiology, who gave clearance for the patient to proceed with chemotherapy.  repeat echo 6 weeks from last actually showed improvement in the cardiologist thought there may have been some technical issues with her first echocardiogram.    She returns back today, due for cycle3 day 1 of Taxol/Herceptin perjeta her diarrhea is-clearly controlled with the Imodium and in fact she became constipated because she took too much of this but she is got this under control and feels good  She has some reflux symptoms in the morning which makes her nauseous and I suggested she take 1 Prilosec every day till the chemo was over and this is improved    She has no neuropathy but is not brought the cooling gloves and I recommended this to her and gave her the literature supporting this in the website  She has had a itchy rash on her arms and legs which is not too bothersome and I have to suggested a steroid cream and some Benadryl and we will keep an eye on the rash- she has no shortness of breath        Current Outpatient Medications on File Prior to Visit   Medication Sig Dispense Refill   • ALPRAZolam (XANAX) 0.25 MG tablet TAKE 2 TABLETS BY MOUTH TWO TIMES A DAY AS NEEDED FOR ANXIETY  60 tablet 0   • buPROPion XL (WELLBUTRIN XL) 150 MG 24 hr tablet TAKE 1 TABLET BY MOUTH ONE TIME A DAY  30 tablet 2   • busPIRone (BUSPAR) 10 MG tablet Take 10 mg by mouth 2 (Two) Times a Day.     • citalopram (CeleXA) 40 MG tablet TAKE 1 TABLET BY MOUTH ONE TIME A DAY  30 tablet 4   • diphenhydrAMINE (BENADRYL) 25 mg capsule Take 25 mg by mouth Every 6 (Six) Hours As Needed for Itching.     • diphenoxylate-atropine (LOMOTIL) 2.5-0.025 MG per tablet Take 1 tablet by mouth 4 (Four) Times a Day As Needed for Diarrhea. 90 tablet 0   • HYDROcodone-acetaminophen (NORCO) 5-325 MG per tablet TAKE 1 TABLET BY MOUTH EVERY SIX HOURS AS NEEDED FOR moderate to severe  PAIN 30 tablet 0   • loratadine (CLARITIN) 10 MG tablet Take 10 mg by mouth Daily.     • magnesium oxide (MAG-OX) 400 MG tablet Take 400 mg by mouth Daily.     • metoprolol succinate XL (TOPROL-XL) 25 MG 24 hr tablet Take 25 mg by mouth Daily.     • montelukast (SINGULAIR) 10 MG tablet Take 1 tablet by mouth Every Night. (Patient taking differently: Take 10 mg by mouth Daily.) 30 tablet 11   • Multiple Vitamins-Minerals (CENTRUM SILVER PO) Take 1 tablet by mouth Daily. womens vitamin     • omeprazole (priLOSEC) 20 MG capsule Take 20 mg by mouth Daily.     • ondansetron ODT (ZOFRAN-ODT) 8 MG disintegrating tablet Take 1 tablet by mouth Every 8 (Eight) Hours As Needed for Nausea or Vomiting. 30 tablet 2   • phenazopyridine (PYRIDIUM) 100 MG tablet Take 1 tablet by mouth 3 (Three) Times a Day As Needed for bladder spasms. 30 tablet 0   • potassium chloride ER (K-TAB) 20 MEQ tablet controlled-release ER tablet Take 1 tablet by mouth 2 (Two) Times a Day. 60 tablet 1   • vitamin B-12 (CYANOCOBALAMIN) 100 MCG tablet Take 100 mcg by mouth Daily.       Current Facility-Administered Medications on File Prior to Visit   Medication Dose Route Frequency Provider Last Rate Last Dose   • [COMPLETED] perflutren (DEFINITY) 8.476 mg in sodium chloride 0.9 % 10 mL injection  1.5 mL Intravenous Once Elaine Jhaveri MD   1.5 mL at 01/21/20 0578        ALLERGIES:    Allergies   Allergen Reactions   • Gabapentin Hallucinations   • Effexor [Venlafaxine] Itching   • Naproxen Itching     Pt reports severe vaginal itching    • Zyrtec [Cetirizine] Other (See Comments)     Non-effecious   • Ibuprofen Other (See Comments)     Burns while urinating  Can take low dose Ibuprofen   • Penicillins Rash   • Sulfa Antibiotics Rash        Social History     Socioeconomic History   • Marital status:      Spouse name: Pawan   • Number of children: 3   • Years of education: High school   • Highest education level: Not on file   Occupational History      Employer: RETIRED   Tobacco Use   • Smoking status: Current Every Day Smoker     Packs/day: 0.25     Years: 48.00     Pack years: 12.00     Types: Cigarettes   • Smokeless tobacco: Never Used   • Tobacco comment: Trying to quit.    Substance and Sexual Activity   • Alcohol use: No   • Drug use: No   • Sexual activity: Yes     Partners: Male     Birth control/protection: Post-menopausal        Family History   Problem Relation Age of Onset   • Ovarian cancer Mother    • Endometrial cancer Mother    • COPD Father    • Stroke Brother    • Malig Hyperthermia Neg Hx         Review of Systems   Constitutional: Positive for fatigue (better 1/23/20). Negative for diaphoresis.   HENT: Positive for sinus pain (same 1/23/2020). Negative for congestion (better 10/31/19).    Eyes: Negative for visual disturbance (right eye twitching better 1/23/2020).   Respiratory: Positive for cough (just as night little better 1/23/2020).    Cardiovascular: Negative for chest pain.   Gastrointestinal: Positive for diarrhea (better with meds  stable 1/23/2020). Negative for nausea (stable 10/10/19).   Endocrine: Negative.    Genitourinary: Negative for difficulty urinating (cant hold bladder 11/22/19).   Musculoskeletal: Positive for back pain (low back muscle spasm same 1/23/2020), gait problem (right shoulder same 1/23/2020) and neck pain (neck into shoulder 1/23/20).   Skin: Positive for rash (dry skin better with lotion 1/23/2020).   Neurological: Positive for numbness ( left underarm from surgery same 1/23/2020 cold and numb feet and toes 1/23/2020).   Hematological: Negative.    Psychiatric/Behavioral: Negative.         Objective     There were no vitals filed for this visit.  Current Status 1/2/2020   ECOG score 0       Physical Exam   Pulmonary/Chest:           GENERAL:  Well-developed, well-nourished in no acute distress.   SKIN:  Warm, dry fading maculopapular rash on her arms and legs,no  purpura or petechiae.  EYES:  Pupils equal,  round and reactive to light.  EOMs intact.  Conjunctivae normal.  EARS:  Hearing intact.  NOSE:  Septum midline.  No excoriations or nasal discharge.  MOUTH:  Tongue is well-papillated; no stomatitis or ulcers.  Lips normal.  THROAT:  Oropharynx without lesions or exudates.  NECK:  Supple with good range of motion; no thyromegaly or masses, no JVD.  LYMPHATICS:  No cervical, supraclavicular, axillary or inguinal adenopathy.  CHEST:  Lungs clear to auscultation. Good airflow.  Mediport right chest wall benign.  BREASTS: Right breast is benign; left chest wall shows a well-healed mastectomy scar with nodularity laterally  CARDIAC:  Regular rate and rhythm without murmurs, rubs or gallops. Normal S1,S2.  ABDOMEN:  Soft, nontender with no hepatosplenomegaly or masses.  EXTREMITIES:  No clubbing, cyanosis or edema.  NEUROLOGICAL:  Cranial Nerves II-XII grossly intact.  No focal neurological deficits.  PSYCHIATRIC:  Normal affect and mood.        RECENT LABS:              RADIOGRAPHIC DATA:    US Breat Left Limited    1. Left Breast Lumpectomy (47 Grams):  A. Invasive poorly differentiated mammary carcinoma of no special type (ductal carcinoma)  with apocrine features.  1. Invasive carcinoma measures up to 40 x 36 x 27 mm (measured grossly).  2. Overall Syl Grade III (glandular score = 3, nuclear score = 3, mitotic score = 3).  3. No definitive lymphovascular space invasion identified.  B. Invasive carcinoma focally extends to one undesignated peripheral inked margin of excision.  C. Associated ductal carcinoma in situ (DCIS).  1. Ductal carcinoma in situ (DCIS) spans area measuring 40 mm in single greatest  aggregate dimension (estimated from gross and glass slides).  2. DCIS predominantly solid and comedo type with high grade nuclear features.  3. High grade DCIS approximates the closest peripheral inked margin to 0.2 mm.  Page: 1 of 5 Printed: 5/6/2019 1:10 PM  [phone]  Resulting  Tissue Pathology Exam:  [id number]   Order: [id number]   Collected:  5/2/2019 12:26 Status:  Edited Result - FINAL   Visible to patient:  No (Not Released) Dx:  Malignant neoplasm of upper-inner hector...   Component    Addendum   HER2 by Immunohistochemistry   Positive (Score 3+)     HER2 by Immunohistochemistry  FDA approved (specify test/vendor): [employer]     Primary Antibody  CB11     Cold Ischemia and Fixation Times   Meet requirements specified in latest version of the ASCO/CAP guidelines         Cold Ischemia Time: 18 minutes  Fixation Time: 8.25 hours  Testing Performed on Block Number(s): 1E  Fixative: Formalin   Addendum electronically signed by Harman Perry MD on 5/6/2019          Final Diagnosis  1. Left Breast, Modified Radical Mastectomy (935 grams): HIGH GRADE DUCTAL CARCINOMA IN-SITU  (DCIS) .  A. Nuclear Grade: High.  B. Architectural Type: Solid and comedo with lobular extension.  1. Size (extent) of DCIS: DCIS multifocal in the lower inner quadrant, measuring 0.9 cm in maximal  dimension (DCIS present in 3/18 tissue blocks).  C. No LCIS identified.  D. Skin and nipple uninvolved by DCIS.  E. Margins: Uninvolved by DCIS.  1. DCIS comes to within 1.3 cm of the anterior margin of excision (closest margin).  F. Additional findings: Incidental intraductal papilloma, florid ductal hyperplasia and apocrine cysts.  G. Lymph nodes: Sixteen benign lymph nodes (0/16).  H. Hormone receptor status: ER and GA negative, Her2/kali positive by IHC (performed on prior resection  [id number]).  I. Pathologic stage: pT2, N0.  Sw/kds      Regadenoson Stress Test With Myocardial Perfusion SPECT   Interpretation Summary     · Myocardial perfusion imaging indicates a normal myocardial perfusion study with no evidence of ischemia.  · Left ventricular ejection fraction is borderline normal (Calculated EF = 50%).  · Impressions are consistent with a low risk study.      Study Description         Nuclear Perfusion Images Overall image quality is  excellent. There are no artifacts present. Raw images reviewed with no abnormalities noted.       Echo 9/16  Interpretation Summary     · Left ventricular systolic function is normal. Calculated EF = 55%. Estimated EF = 55%. Global Longitudinal LV strain = -21.6%. Strain data was reviewed and speckle tracking was considered accurate. The global longitudinal LV strain is -21.6 and normal. Normal left ventricular cavity size and wall thickness noted. All left ventricular wall segments contract normally. Left ventricular diastolic dysfunction is noted (grade I) consistent with impaired relaxation.  · Trace mitral valve regurgitation is present.  · Physiologic tricuspid valve regurgitation is present. Calculated right ventricular systolic pressure from tricuspid regurgitation is 16.0 mmHg. Insufficient TR velocity profile to estimate the right ventricular systolic pressure.     Interpretation Summary     · Left ventricular systolic function is normal. Calculated EF = 57%. Estimated EF was in agreement with the calculated EF. Estimated EF = 57%. Global Longitudinal LV strain = -21%. Strain data was reviewed and speckle tracking was considered accurate. The global longitudinal LV strain is normal.  · Normal left ventricular cavity size noted. All left ventricular wall segments contract normally. Left ventricular wall thickness is consistent with mild concentric hypertrophy. Left ventricular diastolic dysfunction is noted (grade I) consistent with impaired relaxation.  · Mild tricuspid valve regurgitation is present. Estimated right ventricular systolic pressure from tricuspid regurgitation is normal (<35 mmHg). Calculated right ventricular systolic pressure from tricuspid regurgitation is 30 mmHg.        Interpretation Summary     · Left ventricular systolic function is normal. Calculated EF = 57%. Estimated EF was in agreement with the calculated EF. Estimated EF = 57%. Global Longitudinal LV strain = -20.1%. All left  ventricular wall segments contract normally. The left ventricular cavity is mildly dilated. Left ventricular wall thickness is consistent with moderate concentric hypertrophy. Left ventricular diastolic dysfunction is noted (grade I a w/high LAP) consistent with impaired relaxation.  · Left atrial cavity size is mildly dilated.  · Mild mitral valve regurgitation is present.  · Trace tricuspid valve regurgitation is present. Estimated right ventricular systolic pressure from tricuspid regurgitation is normal (<35 mmHg). Calculated right ventricular systolic pressure from tricuspid regurgitation is 21 mmHg.  · There is a small (<1cm) pericardial effusion. There is no evidence of cardiac tamponade.      Electronically signed by Keara Dudley MD on 1/22/20 at 0900 EST        Assessment/Plan   1.  T2N0 grade 3 infiltrating ductal carcinoma left breast ER WA negative HER-2 positive post mastectomy and axillary node dissection with clear margins.  · Initiated Taxol/Herceptin/Perjeta today, 8/29/19.  · Plan to do 12 weeks of Taxol Herceptin perjeta without Adriamycin because of her borderline cardiac function followed by a year of Perjeta Herceptin    2.  Tobacco abuse and COPD    3.  Abnormal echo with stress test normal but borderline cardiac function at 52%  We will avoid Adriamycin Cytoxan  and treat with Taxol Herceptin perjeta and a year of Herceptin perjeta. Dr. Dudley, cardiology, has given clearance for patient to begin.  Planning repeat echocardiogram 6 weeks from previous.    · Improved EF to 55%    4.  Venous access.  Status post new Mediport placement 8/27/2019.  Working well today.  .    · No more refills of Percocet planned    5.  Diarrhea related to Perjeta.  Improved with Imodium    6.  Skin rash on her arms and legs likely from  perjeta continue to watch and use steroid cream-stable    7.  Low magnesium due to diarrhea-diarrhea resolved    Plan:  1.  Proceed with maintenance/herceptin perjeta.  2.   Utilize  Imodium as outlined above.  Continue Prilosec add steroid cream for rash  3.   Return in 3 /6 week for perbritta Herceptin  4.  Return in 9 weeks for follow-up with Dr. Jhaveri and Yanira/Andrea.      This patient is on drug therapy requiring intensive monitoring for toxicity.  We addressed current concerns of pain and diarrhea as outlined above.

## 2020-01-23 ENCOUNTER — INFUSION (OUTPATIENT)
Dept: ONCOLOGY | Facility: HOSPITAL | Age: 65
End: 2020-01-23

## 2020-01-23 ENCOUNTER — OFFICE VISIT (OUTPATIENT)
Dept: ONCOLOGY | Facility: CLINIC | Age: 65
End: 2020-01-23

## 2020-01-23 VITALS
SYSTOLIC BLOOD PRESSURE: 147 MMHG | RESPIRATION RATE: 16 BRPM | DIASTOLIC BLOOD PRESSURE: 65 MMHG | WEIGHT: 210.9 LBS | HEART RATE: 51 BPM | BODY MASS INDEX: 31.24 KG/M2 | TEMPERATURE: 97.7 F | OXYGEN SATURATION: 96 % | HEIGHT: 69 IN

## 2020-01-23 DIAGNOSIS — Z17.1 MALIGNANT NEOPLASM OF UPPER-INNER QUADRANT OF LEFT BREAST IN FEMALE, ESTROGEN RECEPTOR NEGATIVE (HCC): Primary | ICD-10-CM

## 2020-01-23 DIAGNOSIS — C50.212 MALIGNANT NEOPLASM OF UPPER-INNER QUADRANT OF LEFT BREAST IN FEMALE, ESTROGEN RECEPTOR NEGATIVE (HCC): ICD-10-CM

## 2020-01-23 DIAGNOSIS — Z17.1 MALIGNANT NEOPLASM OF UPPER-INNER QUADRANT OF LEFT BREAST IN FEMALE, ESTROGEN RECEPTOR NEGATIVE (HCC): ICD-10-CM

## 2020-01-23 DIAGNOSIS — C50.212 MALIGNANT NEOPLASM OF UPPER-INNER QUADRANT OF LEFT BREAST IN FEMALE, ESTROGEN RECEPTOR NEGATIVE (HCC): Primary | ICD-10-CM

## 2020-01-23 DIAGNOSIS — Z79.899 ENCOUNTER FOR LONG-TERM (CURRENT) USE OF HIGH-RISK MEDICATION: ICD-10-CM

## 2020-01-23 LAB
ALBUMIN SERPL-MCNC: 3.9 G/DL (ref 3.5–5.2)
ALBUMIN/GLOB SERPL: 1.2 G/DL (ref 1.1–2.4)
ALP SERPL-CCNC: 120 U/L (ref 38–116)
ALT SERPL W P-5'-P-CCNC: 11 U/L (ref 0–33)
ANION GAP SERPL CALCULATED.3IONS-SCNC: 11.1 MMOL/L (ref 5–15)
AST SERPL-CCNC: 20 U/L (ref 0–32)
BASOPHILS # BLD AUTO: 0.04 10*3/MM3 (ref 0–0.2)
BASOPHILS NFR BLD AUTO: 0.7 % (ref 0–1.5)
BILIRUB SERPL-MCNC: 0.2 MG/DL (ref 0.2–1.2)
BUN BLD-MCNC: 9 MG/DL (ref 6–20)
BUN/CREAT SERPL: 13.6 (ref 7.3–30)
CALCIUM SPEC-SCNC: 9.5 MG/DL (ref 8.5–10.2)
CHLORIDE SERPL-SCNC: 104 MMOL/L (ref 98–107)
CO2 SERPL-SCNC: 25.9 MMOL/L (ref 22–29)
CREAT BLD-MCNC: 0.66 MG/DL (ref 0.6–1.1)
DEPRECATED RDW RBC AUTO: 46.4 FL (ref 37–54)
EOSINOPHIL # BLD AUTO: 0.24 10*3/MM3 (ref 0–0.4)
EOSINOPHIL NFR BLD AUTO: 4.3 % (ref 0.3–6.2)
ERYTHROCYTE [DISTWIDTH] IN BLOOD BY AUTOMATED COUNT: 13.6 % (ref 12.3–15.4)
GFR SERPL CREATININE-BSD FRML MDRD: 90 ML/MIN/1.73
GLOBULIN UR ELPH-MCNC: 3.2 GM/DL (ref 1.8–3.5)
GLUCOSE BLD-MCNC: 98 MG/DL (ref 74–124)
HCT VFR BLD AUTO: 38.5 % (ref 34–46.6)
HGB BLD-MCNC: 12.4 G/DL (ref 12–15.9)
IMM GRANULOCYTES # BLD AUTO: 0.01 10*3/MM3 (ref 0–0.05)
IMM GRANULOCYTES NFR BLD AUTO: 0.2 % (ref 0–0.5)
LYMPHOCYTES # BLD AUTO: 2.05 10*3/MM3 (ref 0.7–3.1)
LYMPHOCYTES NFR BLD AUTO: 36.9 % (ref 19.6–45.3)
MAGNESIUM SERPL-MCNC: 1.8 MG/DL (ref 1.8–2.5)
MCH RBC QN AUTO: 30 PG (ref 26.6–33)
MCHC RBC AUTO-ENTMCNC: 32.2 G/DL (ref 31.5–35.7)
MCV RBC AUTO: 93 FL (ref 79–97)
MONOCYTES # BLD AUTO: 0.46 10*3/MM3 (ref 0.1–0.9)
MONOCYTES NFR BLD AUTO: 8.3 % (ref 5–12)
NEUTROPHILS # BLD AUTO: 2.75 10*3/MM3 (ref 1.7–7)
NEUTROPHILS NFR BLD AUTO: 49.6 % (ref 42.7–76)
NRBC BLD AUTO-RTO: 0 /100 WBC (ref 0–0.2)
PLATELET # BLD AUTO: 204 10*3/MM3 (ref 140–450)
PMV BLD AUTO: 9 FL (ref 6–12)
POTASSIUM BLD-SCNC: 4.2 MMOL/L (ref 3.5–4.7)
PROT SERPL-MCNC: 7.1 G/DL (ref 6.3–8)
RBC # BLD AUTO: 4.14 10*6/MM3 (ref 3.77–5.28)
SODIUM BLD-SCNC: 141 MMOL/L (ref 134–145)
WBC NRBC COR # BLD: 5.55 10*3/MM3 (ref 3.4–10.8)

## 2020-01-23 PROCEDURE — 25010000002 DIPHENHYDRAMINE PER 50 MG: Performed by: INTERNAL MEDICINE

## 2020-01-23 PROCEDURE — 96413 CHEMO IV INFUSION 1 HR: CPT | Performed by: INTERNAL MEDICINE

## 2020-01-23 PROCEDURE — 80053 COMPREHEN METABOLIC PANEL: CPT

## 2020-01-23 PROCEDURE — 96375 TX/PRO/DX INJ NEW DRUG ADDON: CPT | Performed by: INTERNAL MEDICINE

## 2020-01-23 PROCEDURE — 25010000002 PERTUZUMAB 420 MG/14ML SOLUTION 420 MG VIAL: Performed by: INTERNAL MEDICINE

## 2020-01-23 PROCEDURE — 96417 CHEMO IV INFUS EACH ADDL SEQ: CPT | Performed by: INTERNAL MEDICINE

## 2020-01-23 PROCEDURE — 99214 OFFICE O/P EST MOD 30 MIN: CPT | Performed by: INTERNAL MEDICINE

## 2020-01-23 PROCEDURE — 25010000002 TRASTUZUMAB PER 10 MG: Performed by: INTERNAL MEDICINE

## 2020-01-23 PROCEDURE — 85025 COMPLETE CBC W/AUTO DIFF WBC: CPT

## 2020-01-23 PROCEDURE — 83735 ASSAY OF MAGNESIUM: CPT

## 2020-01-23 PROCEDURE — 25010000003 HEPARIN LOCK FLUCH PER 10 UNITS: Performed by: INTERNAL MEDICINE

## 2020-01-23 RX ORDER — DIPHENHYDRAMINE HYDROCHLORIDE 50 MG/ML
50 INJECTION INTRAMUSCULAR; INTRAVENOUS AS NEEDED
Status: CANCELLED | OUTPATIENT
Start: 2020-01-23

## 2020-01-23 RX ORDER — FAMOTIDINE 10 MG/ML
20 INJECTION, SOLUTION INTRAVENOUS ONCE
Status: COMPLETED | OUTPATIENT
Start: 2020-01-23 | End: 2020-01-23

## 2020-01-23 RX ORDER — LORATADINE 10 MG/1
10 TABLET ORAL DAILY
COMMUNITY
End: 2020-05-21

## 2020-01-23 RX ORDER — SODIUM CHLORIDE 9 MG/ML
250 INJECTION, SOLUTION INTRAVENOUS ONCE
Status: CANCELLED | OUTPATIENT
Start: 2020-01-23

## 2020-01-23 RX ORDER — FAMOTIDINE 10 MG/ML
20 INJECTION, SOLUTION INTRAVENOUS ONCE
Status: CANCELLED | OUTPATIENT
Start: 2020-01-23

## 2020-01-23 RX ORDER — FAMOTIDINE 10 MG/ML
20 INJECTION, SOLUTION INTRAVENOUS AS NEEDED
Status: CANCELLED | OUTPATIENT
Start: 2020-01-23

## 2020-01-23 RX ORDER — HEPARIN SODIUM (PORCINE) LOCK FLUSH IV SOLN 100 UNIT/ML 100 UNIT/ML
500 SOLUTION INTRAVENOUS AS NEEDED
Status: CANCELLED | OUTPATIENT
Start: 2020-01-23

## 2020-01-23 RX ORDER — HEPARIN SODIUM (PORCINE) LOCK FLUSH IV SOLN 100 UNIT/ML 100 UNIT/ML
500 SOLUTION INTRAVENOUS AS NEEDED
Status: DISCONTINUED | OUTPATIENT
Start: 2020-01-23 | End: 2020-01-23 | Stop reason: HOSPADM

## 2020-01-23 RX ORDER — SODIUM CHLORIDE 0.9 % (FLUSH) 0.9 %
10 SYRINGE (ML) INJECTION AS NEEDED
Status: CANCELLED | OUTPATIENT
Start: 2020-01-23

## 2020-01-23 RX ORDER — MOMETASONE FUROATE 50 UG/1
2 SPRAY, METERED NASAL DAILY
COMMUNITY
End: 2022-04-15 | Stop reason: SDUPTHER

## 2020-01-23 RX ORDER — SODIUM CHLORIDE 9 MG/ML
250 INJECTION, SOLUTION INTRAVENOUS ONCE
Status: COMPLETED | OUTPATIENT
Start: 2020-01-23 | End: 2020-01-23

## 2020-01-23 RX ADMIN — PERTUZUMAB 420 MG: 30 INJECTION, SOLUTION, CONCENTRATE INTRAVENOUS at 10:16

## 2020-01-23 RX ADMIN — TRASTUZUMAB 590 MG: 150 INJECTION, POWDER, LYOPHILIZED, FOR SOLUTION INTRAVENOUS at 11:21

## 2020-01-23 RX ADMIN — Medication 500 UNITS: at 11:56

## 2020-01-23 RX ADMIN — FAMOTIDINE 20 MG: 10 INJECTION INTRAVENOUS at 09:55

## 2020-01-23 RX ADMIN — SODIUM CHLORIDE 250 ML: 9 INJECTION, SOLUTION INTRAVENOUS at 09:55

## 2020-01-23 RX ADMIN — DIPHENHYDRAMINE HYDROCHLORIDE 25 MG: 50 INJECTION, SOLUTION INTRAMUSCULAR; INTRAVENOUS at 09:57

## 2020-02-03 DIAGNOSIS — G89.4 CHRONIC PAIN SYNDROME: ICD-10-CM

## 2020-02-03 DIAGNOSIS — Z17.1 MALIGNANT NEOPLASM OF UPPER-INNER QUADRANT OF LEFT BREAST IN FEMALE, ESTROGEN RECEPTOR NEGATIVE (HCC): ICD-10-CM

## 2020-02-03 DIAGNOSIS — C50.212 MALIGNANT NEOPLASM OF UPPER-INNER QUADRANT OF LEFT BREAST IN FEMALE, ESTROGEN RECEPTOR NEGATIVE (HCC): ICD-10-CM

## 2020-02-03 RX ORDER — HYDROCODONE BITARTRATE AND ACETAMINOPHEN 5; 325 MG/1; MG/1
TABLET ORAL
Qty: 30 TABLET | Refills: 0 | OUTPATIENT
Start: 2020-02-03

## 2020-02-04 ENCOUNTER — TELEPHONE (OUTPATIENT)
Dept: ONCOLOGY | Facility: CLINIC | Age: 65
End: 2020-02-04

## 2020-02-04 RX ORDER — METOPROLOL SUCCINATE 25 MG/1
TABLET, EXTENDED RELEASE ORAL
Qty: 30 TABLET | Refills: 0 | Status: SHIPPED | OUTPATIENT
Start: 2020-02-04 | End: 2020-04-13

## 2020-02-12 RX ORDER — SODIUM CHLORIDE 9 MG/ML
250 INJECTION, SOLUTION INTRAVENOUS ONCE
Status: CANCELLED | OUTPATIENT
Start: 2020-02-13

## 2020-02-12 RX ORDER — DIPHENHYDRAMINE HYDROCHLORIDE 50 MG/ML
50 INJECTION INTRAMUSCULAR; INTRAVENOUS AS NEEDED
Status: CANCELLED | OUTPATIENT
Start: 2020-02-13

## 2020-02-12 RX ORDER — FAMOTIDINE 10 MG/ML
20 INJECTION, SOLUTION INTRAVENOUS ONCE
Status: CANCELLED | OUTPATIENT
Start: 2020-02-13

## 2020-02-12 RX ORDER — FAMOTIDINE 10 MG/ML
20 INJECTION, SOLUTION INTRAVENOUS AS NEEDED
Status: CANCELLED | OUTPATIENT
Start: 2020-02-13

## 2020-02-13 ENCOUNTER — INFUSION (OUTPATIENT)
Dept: ONCOLOGY | Facility: HOSPITAL | Age: 65
End: 2020-02-13

## 2020-02-13 VITALS
TEMPERATURE: 97.8 F | WEIGHT: 209.2 LBS | SYSTOLIC BLOOD PRESSURE: 176 MMHG | OXYGEN SATURATION: 98 % | RESPIRATION RATE: 16 BRPM | DIASTOLIC BLOOD PRESSURE: 85 MMHG | BODY MASS INDEX: 30.53 KG/M2 | HEART RATE: 55 BPM

## 2020-02-13 DIAGNOSIS — Z17.1 MALIGNANT NEOPLASM OF UPPER-INNER QUADRANT OF LEFT BREAST IN FEMALE, ESTROGEN RECEPTOR NEGATIVE (HCC): Primary | ICD-10-CM

## 2020-02-13 DIAGNOSIS — C50.212 MALIGNANT NEOPLASM OF UPPER-INNER QUADRANT OF LEFT BREAST IN FEMALE, ESTROGEN RECEPTOR NEGATIVE (HCC): Primary | ICD-10-CM

## 2020-02-13 DIAGNOSIS — Z79.899 ENCOUNTER FOR LONG-TERM (CURRENT) USE OF HIGH-RISK MEDICATION: ICD-10-CM

## 2020-02-13 LAB
ALBUMIN SERPL-MCNC: 4.4 G/DL (ref 3.5–5.2)
ALBUMIN/GLOB SERPL: 1.5 G/DL (ref 1.1–2.4)
ALP SERPL-CCNC: 129 U/L (ref 38–116)
ALT SERPL W P-5'-P-CCNC: 12 U/L (ref 0–33)
ANION GAP SERPL CALCULATED.3IONS-SCNC: 12.7 MMOL/L (ref 5–15)
AST SERPL-CCNC: 20 U/L (ref 0–32)
BASOPHILS # BLD AUTO: 0.05 10*3/MM3 (ref 0–0.2)
BASOPHILS NFR BLD AUTO: 0.8 % (ref 0–1.5)
BILIRUB SERPL-MCNC: 0.4 MG/DL (ref 0.2–1.2)
BUN BLD-MCNC: 9 MG/DL (ref 6–20)
BUN/CREAT SERPL: 11.8 (ref 7.3–30)
CALCIUM SPEC-SCNC: 9.8 MG/DL (ref 8.5–10.2)
CHLORIDE SERPL-SCNC: 101 MMOL/L (ref 98–107)
CO2 SERPL-SCNC: 26.3 MMOL/L (ref 22–29)
CREAT BLD-MCNC: 0.76 MG/DL (ref 0.6–1.1)
DEPRECATED RDW RBC AUTO: 44.1 FL (ref 37–54)
EOSINOPHIL # BLD AUTO: 0.18 10*3/MM3 (ref 0–0.4)
EOSINOPHIL NFR BLD AUTO: 3 % (ref 0.3–6.2)
ERYTHROCYTE [DISTWIDTH] IN BLOOD BY AUTOMATED COUNT: 13.3 % (ref 12.3–15.4)
GFR SERPL CREATININE-BSD FRML MDRD: 77 ML/MIN/1.73
GLOBULIN UR ELPH-MCNC: 2.9 GM/DL (ref 1.8–3.5)
GLUCOSE BLD-MCNC: 83 MG/DL (ref 74–124)
HCT VFR BLD AUTO: 39.2 % (ref 34–46.6)
HGB BLD-MCNC: 12.8 G/DL (ref 12–15.9)
IMM GRANULOCYTES # BLD AUTO: 0.01 10*3/MM3 (ref 0–0.05)
IMM GRANULOCYTES NFR BLD AUTO: 0.2 % (ref 0–0.5)
LYMPHOCYTES # BLD AUTO: 2.42 10*3/MM3 (ref 0.7–3.1)
LYMPHOCYTES NFR BLD AUTO: 40.7 % (ref 19.6–45.3)
MAGNESIUM SERPL-MCNC: 1.8 MG/DL (ref 1.8–2.5)
MCH RBC QN AUTO: 29.6 PG (ref 26.6–33)
MCHC RBC AUTO-ENTMCNC: 32.7 G/DL (ref 31.5–35.7)
MCV RBC AUTO: 90.5 FL (ref 79–97)
MONOCYTES # BLD AUTO: 0.46 10*3/MM3 (ref 0.1–0.9)
MONOCYTES NFR BLD AUTO: 7.7 % (ref 5–12)
NEUTROPHILS # BLD AUTO: 2.82 10*3/MM3 (ref 1.7–7)
NEUTROPHILS NFR BLD AUTO: 47.6 % (ref 42.7–76)
NRBC BLD AUTO-RTO: 0 /100 WBC (ref 0–0.2)
PLATELET # BLD AUTO: 201 10*3/MM3 (ref 140–450)
PMV BLD AUTO: 9 FL (ref 6–12)
POTASSIUM BLD-SCNC: 4.3 MMOL/L (ref 3.5–4.7)
PROT SERPL-MCNC: 7.3 G/DL (ref 6.3–8)
RBC # BLD AUTO: 4.33 10*6/MM3 (ref 3.77–5.28)
SODIUM BLD-SCNC: 140 MMOL/L (ref 134–145)
WBC NRBC COR # BLD: 5.94 10*3/MM3 (ref 3.4–10.8)

## 2020-02-13 PROCEDURE — 25010000002 TRASTUZUMAB PER 10 MG: Performed by: NURSE PRACTITIONER

## 2020-02-13 PROCEDURE — 85025 COMPLETE CBC W/AUTO DIFF WBC: CPT

## 2020-02-13 PROCEDURE — 25010000003 HEPARIN LOCK FLUCH PER 10 UNITS: Performed by: INTERNAL MEDICINE

## 2020-02-13 PROCEDURE — 83735 ASSAY OF MAGNESIUM: CPT

## 2020-02-13 PROCEDURE — 25010000002 DIPHENHYDRAMINE PER 50 MG: Performed by: NURSE PRACTITIONER

## 2020-02-13 PROCEDURE — 96417 CHEMO IV INFUS EACH ADDL SEQ: CPT

## 2020-02-13 PROCEDURE — 25010000002 PERTUZUMAB 420 MG/14ML SOLUTION 420 MG VIAL: Performed by: NURSE PRACTITIONER

## 2020-02-13 PROCEDURE — 96367 TX/PROPH/DG ADDL SEQ IV INF: CPT

## 2020-02-13 PROCEDURE — 36415 COLL VENOUS BLD VENIPUNCTURE: CPT

## 2020-02-13 PROCEDURE — 96375 TX/PRO/DX INJ NEW DRUG ADDON: CPT

## 2020-02-13 PROCEDURE — 80053 COMPREHEN METABOLIC PANEL: CPT

## 2020-02-13 PROCEDURE — 96413 CHEMO IV INFUSION 1 HR: CPT

## 2020-02-13 RX ORDER — FAMOTIDINE 10 MG/ML
20 INJECTION, SOLUTION INTRAVENOUS ONCE
Status: COMPLETED | OUTPATIENT
Start: 2020-02-13 | End: 2020-02-13

## 2020-02-13 RX ORDER — SODIUM CHLORIDE 0.9 % (FLUSH) 0.9 %
10 SYRINGE (ML) INJECTION AS NEEDED
Status: DISCONTINUED | OUTPATIENT
Start: 2020-02-13 | End: 2020-02-13 | Stop reason: HOSPADM

## 2020-02-13 RX ORDER — HEPARIN SODIUM (PORCINE) LOCK FLUSH IV SOLN 100 UNIT/ML 100 UNIT/ML
500 SOLUTION INTRAVENOUS AS NEEDED
Status: CANCELLED | OUTPATIENT
Start: 2020-02-13

## 2020-02-13 RX ORDER — SODIUM CHLORIDE 9 MG/ML
250 INJECTION, SOLUTION INTRAVENOUS ONCE
Status: COMPLETED | OUTPATIENT
Start: 2020-02-13 | End: 2020-02-13

## 2020-02-13 RX ORDER — SODIUM CHLORIDE 0.9 % (FLUSH) 0.9 %
10 SYRINGE (ML) INJECTION AS NEEDED
Status: CANCELLED | OUTPATIENT
Start: 2020-02-13

## 2020-02-13 RX ORDER — HEPARIN SODIUM (PORCINE) LOCK FLUSH IV SOLN 100 UNIT/ML 100 UNIT/ML
500 SOLUTION INTRAVENOUS AS NEEDED
Status: DISCONTINUED | OUTPATIENT
Start: 2020-02-13 | End: 2020-02-13 | Stop reason: HOSPADM

## 2020-02-13 RX ADMIN — TRASTUZUMAB 590 MG: 150 INJECTION, POWDER, LYOPHILIZED, FOR SOLUTION INTRAVENOUS at 14:55

## 2020-02-13 RX ADMIN — FAMOTIDINE 20 MG: 10 INJECTION INTRAVENOUS at 13:44

## 2020-02-13 RX ADMIN — SODIUM CHLORIDE, PRESERVATIVE FREE 500 UNITS: 5 INJECTION INTRAVENOUS at 15:49

## 2020-02-13 RX ADMIN — SODIUM CHLORIDE 250 ML: 9 INJECTION, SOLUTION INTRAVENOUS at 13:20

## 2020-02-13 RX ADMIN — Medication 10 ML: at 15:47

## 2020-02-13 RX ADMIN — PERTUZUMAB 420 MG: 30 INJECTION, SOLUTION, CONCENTRATE INTRAVENOUS at 13:50

## 2020-02-13 RX ADMIN — DIPHENHYDRAMINE HYDROCHLORIDE 25 MG: 50 INJECTION, SOLUTION INTRAMUSCULAR; INTRAVENOUS at 13:26

## 2020-02-20 ENCOUNTER — OFFICE VISIT (OUTPATIENT)
Dept: FAMILY MEDICINE CLINIC | Facility: CLINIC | Age: 65
End: 2020-02-20

## 2020-02-20 VITALS
HEART RATE: 57 BPM | DIASTOLIC BLOOD PRESSURE: 88 MMHG | TEMPERATURE: 97.7 F | WEIGHT: 209 LBS | SYSTOLIC BLOOD PRESSURE: 148 MMHG | BODY MASS INDEX: 30.96 KG/M2 | HEIGHT: 69 IN | OXYGEN SATURATION: 99 %

## 2020-02-20 DIAGNOSIS — M54.6 ACUTE BILATERAL THORACIC BACK PAIN: Primary | ICD-10-CM

## 2020-02-20 DIAGNOSIS — H66.002 NON-RECURRENT ACUTE SUPPURATIVE OTITIS MEDIA OF LEFT EAR WITHOUT SPONTANEOUS RUPTURE OF TYMPANIC MEMBRANE: ICD-10-CM

## 2020-02-20 DIAGNOSIS — F32.9 REACTIVE DEPRESSION: ICD-10-CM

## 2020-02-20 PROCEDURE — 72072 X-RAY EXAM THORAC SPINE 3VWS: CPT | Performed by: NURSE PRACTITIONER

## 2020-02-20 PROCEDURE — 99214 OFFICE O/P EST MOD 30 MIN: CPT | Performed by: NURSE PRACTITIONER

## 2020-02-20 RX ORDER — TIZANIDINE HYDROCHLORIDE 4 MG/1
4 CAPSULE, GELATIN COATED ORAL 3 TIMES DAILY
Qty: 90 CAPSULE | Refills: 0 | Status: SHIPPED | OUTPATIENT
Start: 2020-02-20 | End: 2020-08-17

## 2020-02-20 RX ORDER — HYDROCODONE BITARTRATE AND ACETAMINOPHEN 7.5; 325 MG/1; MG/1
2 TABLET ORAL EVERY 6 HOURS PRN
Qty: 24 TABLET | Refills: 0 | Status: SHIPPED | OUTPATIENT
Start: 2020-02-20 | End: 2020-02-20 | Stop reason: SDUPTHER

## 2020-02-20 RX ORDER — DOXYCYCLINE HYCLATE 100 MG/1
100 CAPSULE ORAL 2 TIMES DAILY
Qty: 30 CAPSULE | Refills: 0 | Status: SHIPPED | OUTPATIENT
Start: 2020-02-20 | End: 2020-03-16 | Stop reason: SDUPTHER

## 2020-02-20 RX ORDER — HYDROCODONE BITARTRATE AND ACETAMINOPHEN 7.5; 325 MG/1; MG/1
2 TABLET ORAL EVERY 6 HOURS PRN
Qty: 24 TABLET | Refills: 0 | Status: SHIPPED | OUTPATIENT
Start: 2020-02-20 | End: 2020-03-03 | Stop reason: SDUPTHER

## 2020-02-20 NOTE — PROGRESS NOTES
"Subjective   Allie FUAD Temple is a 64 y.o. female who presents c/o spasms in back. Also with sinus pressure, drainage x 2 weeks.     History of Present Illness   3 weeks left of breast cancer treatment. Does have PN from chemo. Having to take potassium and magnesium replacement. Does not want to have to go back on lortab. Has tried biofreeze icy hot. Pain thoracic spine. Pain started to thoracic area 3-4 days ago. Worse if twist too far. Gets ringing in ears with AD and HTN meds together. Wonders if ringing in ears is sinus related. This occurring for a week or so.  DJ with type B flu and strep.  The following portions of the patient's history were reviewed and updated as appropriate: allergies, current medications, past family history, past medical history, past social history, past surgical history and problem list.    Review of Systems   Constitutional: Positive for activity change and fatigue. Negative for fever.   HENT: Positive for congestion, ear pain, postnasal drip and sinus pressure. Negative for sore throat.    Eyes: Negative.    Respiratory: Positive for cough.    Cardiovascular: Negative.    Gastrointestinal: Negative.    Endocrine: Negative.    Musculoskeletal: Positive for back pain (severe).   Allergic/Immunologic: Positive for environmental allergies.   Neurological: Positive for headache.   Hematological: Negative.    Psychiatric/Behavioral: Positive for sleep disturbance, depressed mood and stress. Negative for self-injury and suicidal ideas. The patient is nervous/anxious.      /88   Pulse 57   Temp 97.7 °F (36.5 °C) (Oral)   Ht 176.3 cm (69.41\")   Wt 94.8 kg (209 lb)   SpO2 99%   BMI 30.50 kg/m²     Objective   Physical Exam   Constitutional: She appears well-developed and well-nourished.   HENT:   Right Ear: Tympanic membrane is bulging. A middle ear effusion is present.   Neck: Normal range of motion. Neck supple. No thyromegaly present.   Cardiovascular: Normal rate, regular rhythm " and normal heart sounds.   Pulmonary/Chest: Effort normal and breath sounds normal.   Lymphadenopathy:     She has cervical adenopathy.        Left cervical: Superficial cervical adenopathy present.   Skin: Skin is warm and dry.   Psychiatric: Her speech is normal and behavior is normal. Judgment and thought content normal. Her mood appears anxious.   Nursing note and vitals reviewed.    Assessment/Plan   Problems Addressed this Visit        Other    Depression      Other Visit Diagnoses     Acute bilateral thoracic back pain    -  Primary    Relevant Medications    TiZANidine (ZANAFLEX) 4 MG capsule    HYDROcodone-acetaminophen (NORCO) 7.5-325 MG per tablet    Other Relevant Orders    XR Spine Thoracic 3 View (Completed)    Non-recurrent acute suppurative otitis media of left ear without spontaneous rupture of tympanic membrane        Relevant Medications    doxycycline (VIBRAMYCIN) 100 MG capsule        Depression with anxiety--consider rotating citalopram to sertraline for better anxiety control. Would decrease citalopram to 20 mg x 1 week and D/C. Go on and start sertraline.   Thoracic back pain--at risk for compression fracture due to smoking, frequent steroids, chronic PPI therapy, cancer treatment. Xrays with questionable compression fracture, no comparison. Send to radiology for interpretation. As pain severe, acute, discussed short term lortab treatment, add MR. Treat further pending radiology interpretation.  Sinusitis--start doxycycline and FU if sinuses not settling.     Radiology did not read as compression, but exuberant bone spurs and DISH--recommend FU 1 week--consider PT--though pain described not consistent with the typical low intensity of DISH pain. Consider MRI as well.

## 2020-02-27 ENCOUNTER — OFFICE VISIT (OUTPATIENT)
Dept: FAMILY MEDICINE CLINIC | Facility: CLINIC | Age: 65
End: 2020-02-27

## 2020-02-27 VITALS
OXYGEN SATURATION: 97 % | SYSTOLIC BLOOD PRESSURE: 136 MMHG | HEART RATE: 62 BPM | BODY MASS INDEX: 31.1 KG/M2 | WEIGHT: 210 LBS | HEIGHT: 69 IN | DIASTOLIC BLOOD PRESSURE: 70 MMHG

## 2020-02-27 DIAGNOSIS — R93.7 ABNORMAL X-RAY OF THORACIC SPINE: ICD-10-CM

## 2020-02-27 DIAGNOSIS — C50.912 ADENOCARCINOMA, BREAST, LEFT (HCC): ICD-10-CM

## 2020-02-27 DIAGNOSIS — M54.6 ACUTE BILATERAL THORACIC BACK PAIN: Primary | ICD-10-CM

## 2020-02-27 PROCEDURE — 99214 OFFICE O/P EST MOD 30 MIN: CPT | Performed by: NURSE PRACTITIONER

## 2020-02-27 NOTE — PROGRESS NOTES
"Anam Temple is a 64 y.o. female who presents to us today for a follow up on her back pain.     History of Present Illness   Pain is some better except 1 spot, pain with certain movements, neck and low back has eased up. Pain was very severe for 3-4 days. Flexeril really helped with global tightness and has been taking lortab only when pain very severe. Ice/heat and stillness helps more than medication.   The following portions of the patient's history were reviewed and updated as appropriate: allergies, current medications, past family history, past medical history, past social history, past surgical history and problem list.    Review of Systems   Constitutional: Positive for activity change and fatigue. Negative for chills and fever.   Respiratory: Negative.    Cardiovascular: Negative.    Musculoskeletal: Positive for arthralgias, back pain and myalgias. Negative for joint swelling and neck pain.   Skin: Negative.    Allergic/Immunologic: Negative.    Neurological: Negative.    Hematological: Negative.    Psychiatric/Behavioral: Positive for depressed mood. The patient is nervous/anxious.      /70 (BP Location: Right arm, Patient Position: Sitting, Cuff Size: Large Adult)   Pulse 62   Ht 175.3 cm (69\")   Wt 95.3 kg (210 lb)   SpO2 97%   BMI 31.01 kg/m²     Objective   Physical Exam   Constitutional: She appears well-developed and well-nourished. No distress.   Cardiovascular: Normal rate.   Pulmonary/Chest: Effort normal.   Musculoskeletal:        Cervical back: She exhibits no spasm.        Thoracic back: She exhibits decreased range of motion, bony tenderness (T7 focally tender), pain and spasm. She exhibits no swelling, no edema and normal pulse.        Lumbar back: She exhibits no spasm.        Arms:  Skin: Skin is warm and dry. She is not diaphoretic.   Psychiatric: Her speech is normal and behavior is normal. Judgment and thought content normal. Her mood appears anxious. She " exhibits a depressed mood.   Undergoing chemotherapy until July   Nursing note and vitals reviewed.    Assessment/Plan   Problems Addressed this Visit     None      Visit Diagnoses     Acute bilateral thoracic back pain    -  Primary    Relevant Orders    MRI Thoracic Spine Without Contrast    Adenocarcinoma, breast, left (CMS/HCC)        Relevant Orders    MRI Thoracic Spine Without Contrast    Abnormal x-ray of thoracic spine        Relevant Orders    MRI Thoracic Spine Without Contrast        Thoracic pain--XRAY last week demonstrated DISH, but now very point tender, severe with spasm. A pain very out of proportion with DISH diagnosis, and point tenderness, in light of history of active cancer, recommend MRI. She reports will need a valium to endure the study as claustrophobic. Recommend continue very sparing HC and flexeril as needed up to TID for the pain. Continue to alternate ice and heat.     For anxiety and depression--take 1/2 citalopram daily x 1 week along with 25 mg sertraline daily x 1 week. Then stop citalopram and increase sertraline to 50 mg daily.     JUDY Report:      As part of this patient's treatment plan, I am prescribing controlled substances. The patient has been made aware of appropriate use of such medications, including potential risk of somnolence, limited ability to drive and /or work safely, and potential for dependence or overdose. It has also been made clear that these medications are for use by this patient only, without concomitant use of alcohol or other substances unless prescribed.    JUDY report has been reviewed by: ASH Allison on 02/27/20.  The report was scanned into the patient's chart.    Date of last JUDY:  2/27/2020    History and physical exam exhibit continued safe and appropriate use of controlled substances.

## 2020-03-02 RX ORDER — SODIUM CHLORIDE 9 MG/ML
250 INJECTION, SOLUTION INTRAVENOUS ONCE
Status: CANCELLED | OUTPATIENT
Start: 2020-03-05

## 2020-03-02 RX ORDER — FAMOTIDINE 10 MG/ML
20 INJECTION, SOLUTION INTRAVENOUS AS NEEDED
Status: CANCELLED | OUTPATIENT
Start: 2020-03-05

## 2020-03-02 RX ORDER — FAMOTIDINE 10 MG/ML
20 INJECTION, SOLUTION INTRAVENOUS ONCE
Status: CANCELLED | OUTPATIENT
Start: 2020-03-05

## 2020-03-02 RX ORDER — DIPHENHYDRAMINE HYDROCHLORIDE 50 MG/ML
50 INJECTION INTRAMUSCULAR; INTRAVENOUS AS NEEDED
Status: CANCELLED | OUTPATIENT
Start: 2020-03-05

## 2020-03-03 ENCOUNTER — DOCUMENTATION (OUTPATIENT)
Dept: OTHER | Facility: HOSPITAL | Age: 65
End: 2020-03-03

## 2020-03-03 DIAGNOSIS — M54.6 ACUTE BILATERAL THORACIC BACK PAIN: ICD-10-CM

## 2020-03-03 RX ORDER — HYDROCODONE BITARTRATE AND ACETAMINOPHEN 7.5; 325 MG/1; MG/1
2 TABLET ORAL EVERY 6 HOURS PRN
Qty: 24 TABLET | Refills: 0 | Status: SHIPPED | OUTPATIENT
Start: 2020-03-03 | End: 2020-03-16 | Stop reason: SDUPTHER

## 2020-03-03 NOTE — PROGRESS NOTES
Call placed to patient RE: offer treatment summary and/or survivorship clinic appointment. Left VM. We have been unable to reach patient after 2 attempts/VMs. Survivorship clinic informational handout and contact information mailed to patient.

## 2020-03-05 ENCOUNTER — TELEPHONE (OUTPATIENT)
Dept: FAMILY MEDICINE CLINIC | Facility: CLINIC | Age: 65
End: 2020-03-05

## 2020-03-05 ENCOUNTER — INFUSION (OUTPATIENT)
Dept: ONCOLOGY | Facility: HOSPITAL | Age: 65
End: 2020-03-05

## 2020-03-05 ENCOUNTER — HOSPITAL ENCOUNTER (OUTPATIENT)
Dept: MRI IMAGING | Facility: HOSPITAL | Age: 65
Discharge: HOME | End: 2020-03-05
Admitting: NURSE PRACTITIONER

## 2020-03-05 VITALS
WEIGHT: 210.6 LBS | BODY MASS INDEX: 31.1 KG/M2 | DIASTOLIC BLOOD PRESSURE: 75 MMHG | SYSTOLIC BLOOD PRESSURE: 148 MMHG | HEART RATE: 59 BPM

## 2020-03-05 DIAGNOSIS — Z17.1 MALIGNANT NEOPLASM OF UPPER-INNER QUADRANT OF LEFT BREAST IN FEMALE, ESTROGEN RECEPTOR NEGATIVE (HCC): Primary | ICD-10-CM

## 2020-03-05 DIAGNOSIS — R93.7 ABNORMAL X-RAY OF THORACIC SPINE: ICD-10-CM

## 2020-03-05 DIAGNOSIS — C50.212 MALIGNANT NEOPLASM OF UPPER-INNER QUADRANT OF LEFT BREAST IN FEMALE, ESTROGEN RECEPTOR NEGATIVE (HCC): Primary | ICD-10-CM

## 2020-03-05 DIAGNOSIS — M54.6 ACUTE BILATERAL THORACIC BACK PAIN: ICD-10-CM

## 2020-03-05 DIAGNOSIS — C50.912 ADENOCARCINOMA, BREAST, LEFT (HCC): ICD-10-CM

## 2020-03-05 LAB
ALBUMIN SERPL-MCNC: 4.2 G/DL (ref 3.5–5.2)
ALBUMIN/GLOB SERPL: 1.5 G/DL (ref 1.1–2.4)
ALP SERPL-CCNC: 128 U/L (ref 38–116)
ALT SERPL W P-5'-P-CCNC: 12 U/L (ref 0–33)
ANION GAP SERPL CALCULATED.3IONS-SCNC: 11.2 MMOL/L (ref 5–15)
AST SERPL-CCNC: 20 U/L (ref 0–32)
BASOPHILS # BLD AUTO: 0.04 10*3/MM3 (ref 0–0.2)
BASOPHILS NFR BLD AUTO: 0.7 % (ref 0–1.5)
BILIRUB SERPL-MCNC: 0.3 MG/DL (ref 0.2–1.2)
BUN BLD-MCNC: 8 MG/DL (ref 6–20)
BUN/CREAT SERPL: 9.9 (ref 7.3–30)
CALCIUM SPEC-SCNC: 9.6 MG/DL (ref 8.5–10.2)
CHLORIDE SERPL-SCNC: 101 MMOL/L (ref 98–107)
CO2 SERPL-SCNC: 27.8 MMOL/L (ref 22–29)
CREAT BLD-MCNC: 0.81 MG/DL (ref 0.6–1.1)
DEPRECATED RDW RBC AUTO: 42.9 FL (ref 37–54)
EOSINOPHIL # BLD AUTO: 0.2 10*3/MM3 (ref 0–0.4)
EOSINOPHIL NFR BLD AUTO: 3.6 % (ref 0.3–6.2)
ERYTHROCYTE [DISTWIDTH] IN BLOOD BY AUTOMATED COUNT: 13.2 % (ref 12.3–15.4)
GFR SERPL CREATININE-BSD FRML MDRD: 71 ML/MIN/1.73
GLOBULIN UR ELPH-MCNC: 2.8 GM/DL (ref 1.8–3.5)
GLUCOSE BLD-MCNC: 76 MG/DL (ref 74–124)
HCT VFR BLD AUTO: 38.4 % (ref 34–46.6)
HGB BLD-MCNC: 12.6 G/DL (ref 12–15.9)
IMM GRANULOCYTES # BLD AUTO: 0.01 10*3/MM3 (ref 0–0.05)
IMM GRANULOCYTES NFR BLD AUTO: 0.2 % (ref 0–0.5)
LYMPHOCYTES # BLD AUTO: 2.92 10*3/MM3 (ref 0.7–3.1)
LYMPHOCYTES NFR BLD AUTO: 53.2 % (ref 19.6–45.3)
MAGNESIUM SERPL-MCNC: 1.8 MG/DL (ref 1.8–2.5)
MCH RBC QN AUTO: 29.2 PG (ref 26.6–33)
MCHC RBC AUTO-ENTMCNC: 32.8 G/DL (ref 31.5–35.7)
MCV RBC AUTO: 88.9 FL (ref 79–97)
MONOCYTES # BLD AUTO: 0.37 10*3/MM3 (ref 0.1–0.9)
MONOCYTES NFR BLD AUTO: 6.7 % (ref 5–12)
NEUTROPHILS # BLD AUTO: 1.95 10*3/MM3 (ref 1.7–7)
NEUTROPHILS NFR BLD AUTO: 35.6 % (ref 42.7–76)
NRBC BLD AUTO-RTO: 0 /100 WBC (ref 0–0.2)
PLATELET # BLD AUTO: 213 10*3/MM3 (ref 140–450)
PMV BLD AUTO: 9.8 FL (ref 6–12)
POTASSIUM BLD-SCNC: 4.3 MMOL/L (ref 3.5–4.7)
PROT SERPL-MCNC: 7 G/DL (ref 6.3–8)
RBC # BLD AUTO: 4.32 10*6/MM3 (ref 3.77–5.28)
SODIUM BLD-SCNC: 140 MMOL/L (ref 134–145)
WBC NRBC COR # BLD: 5.49 10*3/MM3 (ref 3.4–10.8)

## 2020-03-05 PROCEDURE — 96417 CHEMO IV INFUS EACH ADDL SEQ: CPT

## 2020-03-05 PROCEDURE — 25010000002 PERTUZUMAB 420 MG/14ML SOLUTION 420 MG VIAL: Performed by: NURSE PRACTITIONER

## 2020-03-05 PROCEDURE — 96375 TX/PRO/DX INJ NEW DRUG ADDON: CPT

## 2020-03-05 PROCEDURE — 96413 CHEMO IV INFUSION 1 HR: CPT

## 2020-03-05 PROCEDURE — 25010000002 TRASTUZUMAB PER 10 MG: Performed by: NURSE PRACTITIONER

## 2020-03-05 PROCEDURE — 80053 COMPREHEN METABOLIC PANEL: CPT

## 2020-03-05 PROCEDURE — 72157 MRI CHEST SPINE W/O & W/DYE: CPT

## 2020-03-05 PROCEDURE — 0 GADOBENATE DIMEGLUMINE 529 MG/ML SOLUTION: Performed by: NURSE PRACTITIONER

## 2020-03-05 PROCEDURE — 83735 ASSAY OF MAGNESIUM: CPT

## 2020-03-05 PROCEDURE — A9577 INJ MULTIHANCE: HCPCS | Performed by: NURSE PRACTITIONER

## 2020-03-05 PROCEDURE — 25010000002 DIPHENHYDRAMINE PER 50 MG: Performed by: NURSE PRACTITIONER

## 2020-03-05 PROCEDURE — 85025 COMPLETE CBC W/AUTO DIFF WBC: CPT

## 2020-03-05 RX ORDER — FAMOTIDINE 10 MG/ML
20 INJECTION, SOLUTION INTRAVENOUS ONCE
Status: COMPLETED | OUTPATIENT
Start: 2020-03-05 | End: 2020-03-05

## 2020-03-05 RX ORDER — SODIUM CHLORIDE 9 MG/ML
250 INJECTION, SOLUTION INTRAVENOUS ONCE
Status: COMPLETED | OUTPATIENT
Start: 2020-03-05 | End: 2020-03-05

## 2020-03-05 RX ADMIN — PERTUZUMAB 420 MG: 30 INJECTION, SOLUTION, CONCENTRATE INTRAVENOUS at 14:01

## 2020-03-05 RX ADMIN — FAMOTIDINE 20 MG: 10 INJECTION INTRAVENOUS at 13:40

## 2020-03-05 RX ADMIN — TRASTUZUMAB 590 MG: 150 INJECTION, POWDER, LYOPHILIZED, FOR SOLUTION INTRAVENOUS at 15:04

## 2020-03-05 RX ADMIN — SODIUM CHLORIDE 250 ML: 9 INJECTION, SOLUTION INTRAVENOUS at 13:40

## 2020-03-05 RX ADMIN — GADOBENATE DIMEGLUMINE 19 ML: 529 INJECTION, SOLUTION INTRAVENOUS at 17:24

## 2020-03-05 RX ADMIN — DIPHENHYDRAMINE HYDROCHLORIDE 25 MG: 50 INJECTION, SOLUTION INTRAMUSCULAR; INTRAVENOUS at 13:42

## 2020-03-05 NOTE — TELEPHONE ENCOUNTER
Imaging center will be updating MRI order in system to read with and without contrast and will need a signature on the order. pts appt is 5pm today.

## 2020-03-13 DIAGNOSIS — M54.6 ACUTE BILATERAL THORACIC BACK PAIN: Primary | ICD-10-CM

## 2020-03-13 DIAGNOSIS — M51.34 BULGING OF THORACIC INTERVERTEBRAL DISC: ICD-10-CM

## 2020-03-16 DIAGNOSIS — H66.002 NON-RECURRENT ACUTE SUPPURATIVE OTITIS MEDIA OF LEFT EAR WITHOUT SPONTANEOUS RUPTURE OF TYMPANIC MEMBRANE: ICD-10-CM

## 2020-03-16 DIAGNOSIS — M54.6 ACUTE BILATERAL THORACIC BACK PAIN: ICD-10-CM

## 2020-03-16 RX ORDER — DOXYCYCLINE HYCLATE 100 MG/1
100 CAPSULE ORAL 2 TIMES DAILY
Qty: 30 CAPSULE | Refills: 0 | Status: SHIPPED | OUTPATIENT
Start: 2020-03-16 | End: 2020-05-21

## 2020-03-16 RX ORDER — HYDROCODONE BITARTRATE AND ACETAMINOPHEN 7.5; 325 MG/1; MG/1
2 TABLET ORAL EVERY 6 HOURS PRN
Qty: 24 TABLET | Refills: 0 | Status: SHIPPED | OUTPATIENT
Start: 2020-03-16 | End: 2020-03-30 | Stop reason: SDUPTHER

## 2020-03-16 NOTE — TELEPHONE ENCOUNTER
Patient says she wants a refill on doxycycline and hydrocodone. She feels like her sinuses are doing better but still with a lot of illness in the home and she wants to have it just incase. Hydrocodone last filled on 3/3/20, please advise and will que up.

## 2020-03-17 ENCOUNTER — TELEPHONE (OUTPATIENT)
Dept: ONCOLOGY | Facility: CLINIC | Age: 65
End: 2020-03-17

## 2020-03-17 NOTE — TELEPHONE ENCOUNTER
Pt called requesting to speak with a nurse regarding 3/26/20 appts.    Pt wants nurses opinion on whether or not she should come in or not due to COVID - 19.    Please call pt in regards to this at [phone]

## 2020-03-19 DIAGNOSIS — F32.9 REACTIVE DEPRESSION: ICD-10-CM

## 2020-03-25 DIAGNOSIS — H66.002 NON-RECURRENT ACUTE SUPPURATIVE OTITIS MEDIA OF LEFT EAR WITHOUT SPONTANEOUS RUPTURE OF TYMPANIC MEMBRANE: ICD-10-CM

## 2020-03-25 RX ORDER — DOXYCYCLINE HYCLATE 100 MG/1
CAPSULE ORAL
Qty: 30 CAPSULE | Refills: 0 | OUTPATIENT
Start: 2020-03-25

## 2020-03-25 NOTE — PROGRESS NOTES
Subjective     REASON FOR CONSULTATION:   1. Left breast cancer T2N0 3.6 cm grade 3 ER TN negative HER-2 positive infiltrating ductal carcinoma post excisional biopsy, followed by mastectomy and axillary dissection-he has not Taxol Herceptin Perjeta for 12 weeks followed by Perjeta Herceptin for 1 year planned                               REQUESTING PHYSICIAN: Jase Khan DO    History of Present Illness patient is a 64-year-old female with a large 4x.6 .3 cm ER TN negative HER-2 positive breast cancer node negative surgically excised receiving adjuvant treatment.      Initially because of the large size and comorbidities we were thinking of weekly Taxol Herceptin perjeta followed by Adriamycin Cytoxan however she had cardiac evaluation with echo with a borderline ejection fraction and had a stress test which showed no ischemia but again the ejection fraction of 50 to 52% and based on her shortness of breath with exertion and symptomatology, it was felt best to proceed instead with just weekly Taxol Herceptin perjeta followed by a year of Herceptin perjeta. Concern for the anthracycline possibly contributing to cardiotoxicity in a patient with borderline cardiac function.    She is tolerating Herceptin Perjeta without any problems.  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    Past Medical History:   Diagnosis Date   • Adjustment disorder    • Allergic rhinitis    • Amenorrhea    • Anxiety    • Breast cancer (CMS/HCC) 04/18/2019    Left breast mammary carcinoma   • Bruit    • Carpal tunnel syndrome    • Carrier of tuberculosis    • Chronic pain    • Daytime somnolence    • De Quervain's tenosynovitis    • Degenerative cervical disc    • Depression    • Dyslipidemia    • Eustachian tube dysfunction    • History of UTI    • Hyperlipidemia    • Hypertension    • Impaired fasting glucose    • Lumbar degenerative disc disease    • Numbness and tingling     LEFT LEG   • OAB (overactive bladder)    • Precordial pain    • Scapulothoracic syndrome    • Sciatica         Past Surgical History:   Procedure Laterality Date   • APPENDECTOMY N/A    • BREAST BIOPSY Left 2019    Left breast ultrasound guided cyst aspiration, 9:00 position-Dr. Gerry Taylor, [facility]   • BREAST LUMPECTOMY Left 5/2/2019    Procedure: Left BREAST LUMPECTOMY;  Surgeon: Jase Evans MD;  Location: [facility] OR;  Service: General   • LUMBAR EPIDURAL INJECTION N/A    • MASTECTOMY Left 6/21/2019    Procedure: Left BREAST MASTECTOMY;  Surgeon: Jase Evans MD;  Location: [facility] OR;  Service: General   • TUBAL ABDOMINAL LIGATION Bilateral    • VAGINAL DELIVERY      x3   • VENOUS ACCESS DEVICE (PORT) INSERTION Right 8/27/2019    Procedure: INSERTION VENOUS ACCESS DEVICE;  Surgeon: Jase Evans MD;  Location: [facility] OR;  Service: General      ONC HISTORY;  patient is a 64-year-old retired  with hypertension hypercholesterolemia and degenerative arthritis who felt a mass in her left breast in March.  She showed it to her family doctor and underwent imaging at [facility] on 3/13/2019 which Showed a 3 x 3.2 cm mass at 9:00 which on ultrasound showed a thick-walled benign-appearing 2.8 x 2.5 cm cyst which was felt to not be suspicious  .because of persistence of symptoms she was reevaluated on 2019 and underwent aspiration and core biopsy because there was a significant soft tissue component to the mass at that time this was aspirated and the final report  showed fine-needle aspiration suspicious for malignant cells favor mammary carcinoma  Patient was referred to Dr. Kee who took her for an excisional biopsy on 2019 with the final report showing a  mass grade 3 -4x3.6cm with tumor extending to one margin and DCIS also 0.2 mm from one margin.  The tumor was ER DE negative HER-2 3+.  The patient was then taken for surgery on 2019 at which time she had a completion mastectomy and axillary node biopsy with the findings of residual high-grade DCIS with clear margins and an incidental intraductal papilloma and 17-neg lymph nodes making this a T2N0 tumor    She has done well postoperatively and is here to discuss adjuvant treatment    She is  3 para 3 menarche  at age 15 and menopause in her early 40s when she had a hysterectomy for heavy menstrual bleeding.  She took hormone replacement for 10 years and stopped 10 years ago first childbirth was at age 25 she did not breast-feed  Family history is positive for mother who had uterine cancer at age 60 and  of it at age 65 she has a brother who  of liver cancer related to drinking there is no other malignancy in the family that she is aware of    She is a significant smoker for the last 48 years but does not drink     Patient and her  were very taken to back by the suggestion about chemotherapy and apparently had thought that there was no further treatment needed and I spent almost an hour presenting the data concerning the extreme effectiveness of HER-2 directed therapy and this situation with a high risk of recurrence without adjuvant treatment and she finally was convinced to do the treatment    We discussed smoking cessation but at this point she is so nervous  and agitated with the prospect of chemotherapy that we will hold off on this until she is long term through the chemotherapy and rediscuss it    I discussed the case also with Dr. Kee will place a port and we will plan to start chemotherapy in 2 weeks.    Patient initiating therapy with Taxol/Herceptin/Perjeta 8/29/19.  9/19  She is followed by Dr. Dudley, cardiology, who gave clearance for the patient to proceed with chemotherapy.  repeat echo 6 weeks from last actually showed improvement in the cardiologist thought there may have been some technical issues with her first echocardiogram.    She returns back today, due for cycle3 day 1 of Taxol/Herceptin perjeta her diarrhea is-clearly controlled with the Imodium and in fact she became constipated because she took too much of this but she is got this under control and feels good  She has some reflux symptoms in the morning which makes her nauseous and I suggested she take 1 Prilosec every day till the chemo was over and this is improved    She has no neuropathy but is not brought the cooling gloves and I recommended this to her and gave her the literature supporting this in the website  She has had a itchy rash on her arms and legs which is not too bothersome and I have to suggested a steroid cream and some Benadryl and we will keep an eye on the rash- she has no shortness of breath        Current Outpatient Medications on File Prior to Visit   Medication Sig Dispense Refill   • diphenoxylate-atropine (LOMOTIL) 2.5-0.025 MG per tablet Take 1 tablet by mouth 4 (Four) Times a Day As Needed for Diarrhea. 90 tablet 0   • doxycycline (VIBRAMYCIN) 100 MG capsule Take 1 capsule by mouth 2 (Two) Times a Day. 30 capsule 0   • HYDROcodone-acetaminophen (NORCO) 7.5-325 MG per tablet Take 2 tablets by mouth Every 6 (Six) Hours As Needed for Moderate Pain . 24 tablet 0   • loratadine (ALLERGY RELIEF) 10 MG tablet Take 10 mg by mouth Daily.     • magnesium oxide (MAG-OX) 400 MG  tablet Take 400 mg by mouth Daily.     • metoprolol succinate XL (TOPROL-XL) 25 MG 24 hr tablet TAKE 1 TABLET BY MOUTH ONE TIME A DAY  30 tablet 0   • mometasone (NASONEX) 50 MCG/ACT nasal spray 2 sprays into the nostril(s) as directed by provider Daily.     • Multiple Vitamins-Minerals (CENTRUM SILVER PO) Take 1 tablet by mouth Daily. womens vitamin     • omeprazole (priLOSEC) 20 MG capsule Take 20 mg by mouth Daily.     • ondansetron ODT (ZOFRAN-ODT) 8 MG disintegrating tablet Take 1 tablet by mouth Every 8 (Eight) Hours As Needed for Nausea or Vomiting. 30 tablet 2   • potassium chloride ER (K-TAB) 20 MEQ tablet controlled-release ER tablet Take 1 tablet by mouth 2 (Two) Times a Day. 60 tablet 1   • sertraline (ZOLOFT) 50 MG tablet TAKE 1 TABLET BY MOUTH ONE TIME A DAY  30 tablet 1   • TiZANidine (ZANAFLEX) 4 MG capsule Take 1 capsule by mouth 3 (Three) Times a Day. 90 capsule 0   • vitamin B-12 (CYANOCOBALAMIN) 100 MCG tablet Take 100 mcg by mouth Daily.       No current facility-administered medications on file prior to visit.         ALLERGIES:    Allergies   Allergen Reactions   • Gabapentin Hallucinations   • Effexor [Venlafaxine] Itching   • Naproxen Itching     Pt reports severe vaginal itching    • Zyrtec [Cetirizine] Itching     Non-effecious   • Ibuprofen Unknown - Low Severity     Burns while urinating  Can take low dose Ibuprofen   • Penicillins Rash   • Sulfa Antibiotics Rash        Social History     Socioeconomic History   • Marital status:      Spouse name: Pawan   • Number of children: 3   • Years of education: High school   • Highest education level: Not on file   Occupational History     Employer: RETIRED   Tobacco Use   • Smoking status: Current Every Day Smoker     Packs/day: 0.25     Years: 48.00     Pack years: 12.00     Types: Cigarettes   • Smokeless tobacco: Never Used   • Tobacco comment: Trying to quit.    Substance and Sexual Activity   • Alcohol use: No   • Drug use: No   •  "Sexual activity: Yes     Partners: Male     Birth control/protection: Post-menopausal        Family History   Problem Relation Age of Onset   • Ovarian cancer Mother    • Endometrial cancer Mother    • COPD Father    • Stroke Brother    • Malig Hyperthermia Neg Hx         Review of Systems   Constitutional: Positive for fatigue (better 3/26/2020). Negative for diaphoresis.   HENT: Positive for sinus pain (bad on left side 3/26/2020). Negative for congestion (better 10/31/19).    Eyes: Negative for visual disturbance (right eye twitching better 1/23/2020).   Respiratory: Positive for cough (just as night little better 3/26/2020).    Cardiovascular: Negative for chest pain.   Gastrointestinal: Positive for diarrhea (better with meds  stable 3/26/2020). Negative for nausea (stable 10/10/19).   Endocrine: Negative.    Genitourinary: Negative for difficulty urinating (cant hold bladder 11/22/19).   Musculoskeletal: Positive for back pain (low back muscle spasm worse 3/26/2020), gait problem (right shoulder same 3/26/2020) and neck pain (neck into shoulder better 3/26/2020).   Skin: Negative for rash (resolved 3/26/2020).   Neurological: Positive for numbness ( left underarm from surgery same 3/26/2020 cold and numb feet and toes 3/26/2020).   Hematological: Negative.    Psychiatric/Behavioral: Negative.         Objective     Vitals:    03/26/20 0806   BP: 163/81   Pulse: 61   Resp: 16   Temp: 98.4 °F (36.9 °C)   SpO2: 94%   Weight: 97 kg (213 lb 12.8 oz)   Height: 176.3 cm (69.41\")   PainSc:   7   PainLoc: Back     Current Status 3/26/2020   ECOG score 0       Physical Exam   Pulmonary/Chest:           GENERAL:  Well-developed, well-nourished in no acute distress.   SKIN:  Warm, dry fading maculopapular rash on her arms and legs,no  purpura or petechiae.  EYES:  Pupils equal, round and reactive to light.  EOMs intact.  Conjunctivae normal.  EARS:  Hearing intact.  NOSE:  Septum midline.  No excoriations or nasal " discharge.  MOUTH:  Tongue is well-papillated; no stomatitis or ulcers.  Lips normal.  THROAT:  Oropharynx without lesions or exudates.  NECK:  Supple with good range of motion; no thyromegaly or masses, no JVD.  LYMPHATICS:  No cervical, supraclavicular, axillary or inguinal adenopathy.  CHEST:  Lungs clear to auscultation. Good airflow.  Mediport right chest wall benign.  BREASTS: Right breast is benign; left chest wall shows a well-healed mastectomy scar with nodularity laterally no change as of 3/26/2020  CARDIAC:  Regular rate and rhythm without murmurs, rubs or gallops. Normal S1,S2.  ABDOMEN:  Soft, nontender with no hepatosplenomegaly or masses.  EXTREMITIES:  No clubbing, cyanosis or edema.  NEUROLOGICAL:  Cranial Nerves II-XII grossly intact.  No focal neurological deficits.  PSYCHIATRIC:  Normal affect and mood.        RECENT LABS:  Results from last 7 days   Lab Units 03/26/20  0813   WBC 10*3/mm3 6.30   NEUTROS ABS 10*3/mm3 2.68   HEMOGLOBIN g/dL 12.9   HEMATOCRIT % 39.2   PLATELETS 10*3/mm3 210             RADIOGRAPHIC DATA:    US Breat Left Limited    1. Left Breast Lumpectomy (47 Grams):  A. Invasive poorly differentiated mammary carcinoma of no special type (ductal carcinoma)  with apocrine features.  1. Invasive carcinoma measures up to 40 x 36 x 27 mm (measured grossly).  2. Overall Berlin Grade III (glandular score = 3, nuclear score = 3, mitotic score = 3).  3. No definitive lymphovascular space invasion identified.  B. Invasive carcinoma focally extends to one undesignated peripheral inked margin of excision.  C. Associated ductal carcinoma in situ (DCIS).  1. Ductal carcinoma in situ (DCIS) spans area measuring 40 mm in single greatest  aggregate dimension (estimated from gross and glass slides).  2. DCIS predominantly solid and comedo type with high grade nuclear features.  3. High grade DCIS approximates the closest peripheral inked margin to 0.2 mm.  Page: 1 of 5 Printed: 5/6/2019 1:10  PM  [phone]  Resulting  Tissue Pathology Exam: [id number]   Order: [id number]   Collected:  5/2/2019 12:26 Status:  Edited Result - FINAL   Visible to patient:  No (Not Released) Dx:  Malignant neoplasm of upper-inner hector...   Component    Addendum   HER2 by Immunohistochemistry   Positive (Score 3+)     HER2 by Immunohistochemistry  FDA approved (specify test/vendor): [employer]     Primary Antibody  [id number]     Cold Ischemia and Fixation Times   Meet requirements specified in latest version of the ASCO/CAP guidelines         Cold Ischemia Time: 18 minutes  Fixation Time: 8.25 hours  Testing Performed on Block Number(s): 1E  Fixative: Formalin   Addendum electronically signed by Harman Perry MD on 5/6/2019          Final Diagnosis  1. Left Breast, Modified Radical Mastectomy (935 grams): HIGH GRADE DUCTAL CARCINOMA IN-SITU  (DCIS) .  A. Nuclear Grade: High.  B. Architectural Type: Solid and comedo with lobular extension.  1. Size (extent) of DCIS: DCIS multifocal in the lower inner quadrant, measuring 0.9 cm in maximal  dimension (DCIS present in 3/18 tissue blocks).  C. No LCIS identified.  D. Skin and nipple uninvolved by DCIS.  E. Margins: Uninvolved by DCIS.  1. DCIS comes to within 1.3 cm of the anterior margin of excision (closest margin).  F. Additional findings: Incidental intraductal papilloma, florid ductal hyperplasia and apocrine cysts.  G. Lymph nodes: Sixteen benign lymph nodes (0/16).  H. Hormone receptor status: ER and KS negative, Her2/kali positive by IHC (performed on prior resection  [id number]).  I. Pathologic stage: pT2, N0.  Swm/kds      Regadenoson Stress Test With Myocardial Perfusion SPECT   Interpretation Summary     · Myocardial perfusion imaging indicates a normal myocardial perfusion study with no evidence of ischemia.  · Left ventricular ejection fraction is borderline normal (Calculated EF = 50%).  · Impressions are consistent with a low risk study.      Study Description          Nuclear Perfusion Images Overall image quality is excellent. There are no artifacts present. Raw images reviewed with no abnormalities noted.       Echo 9/16  Interpretation Summary     · Left ventricular systolic function is normal. Calculated EF = 55%. Estimated EF = 55%. Global Longitudinal LV strain = -21.6%. Strain data was reviewed and speckle tracking was considered accurate. The global longitudinal LV strain is -21.6 and normal. Normal left ventricular cavity size and wall thickness noted. All left ventricular wall segments contract normally. Left ventricular diastolic dysfunction is noted (grade I) consistent with impaired relaxation.  · Trace mitral valve regurgitation is present.  · Physiologic tricuspid valve regurgitation is present. Calculated right ventricular systolic pressure from tricuspid regurgitation is 16.0 mmHg. Insufficient TR velocity profile to estimate the right ventricular systolic pressure.     Interpretation Summary     · Left ventricular systolic function is normal. Calculated EF = 57%. Estimated EF was in agreement with the calculated EF. Estimated EF = 57%. Global Longitudinal LV strain = -21%. Strain data was reviewed and speckle tracking was considered accurate. The global longitudinal LV strain is normal.  · Normal left ventricular cavity size noted. All left ventricular wall segments contract normally. Left ventricular wall thickness is consistent with mild concentric hypertrophy. Left ventricular diastolic dysfunction is noted (grade I) consistent with impaired relaxation.  · Mild tricuspid valve regurgitation is present. Estimated right ventricular systolic pressure from tricuspid regurgitation is normal (<35 mmHg). Calculated right ventricular systolic pressure from tricuspid regurgitation is 30 mmHg.        Interpretation Summary     · Left ventricular systolic function is normal. Calculated EF = 57%. Estimated EF was in agreement with the calculated EF. Estimated EF = 57%.  Global Longitudinal LV strain = -20.1%. All left ventricular wall segments contract normally. The left ventricular cavity is mildly dilated. Left ventricular wall thickness is consistent with moderate concentric hypertrophy. Left ventricular diastolic dysfunction is noted (grade I a w/high LAP) consistent with impaired relaxation.  · Left atrial cavity size is mildly dilated.  · Mild mitral valve regurgitation is present.  · Trace tricuspid valve regurgitation is present. Estimated right ventricular systolic pressure from tricuspid regurgitation is normal (<35 mmHg). Calculated right ventricular systolic pressure from tricuspid regurgitation is 21 mmHg.  · There is a small (<1cm) pericardial effusion. There is no evidence of cardiac tamponade.      Electronically signed by Keara Dudley MD on 1/22/20 at 0900 EST    MRI OF THE THORACIC SPINE WITH AND WITHOUT CONTRAST 03/05/2020  IMPRESSION:  1. There is a left posterior lateral disc bulge or tiny disc protrusion  that results in minimal if any narrowing of the left side of the canal  at C5-6 and there is a right paracentral disc herniation at T6-7 that  abuts and mildly flattens the right ventral surface of the cord mildly  narrowing the right side of the canal at T6-7. There is a small right  paracentral disc bulge or protrusion only minimally narrowing the canal  at T7-8. At T11-12 there is a left posterior lateral disc osteophyte  complex minimally narrowing the left side of the canal. No additional  canal or foraminal narrowing is seen in the thoracic spine.  2. There is a right paracentral posterior lateral disc herniation at  T12-L1 with an inferiorly migrated extruded disc fragment that maximally  measures up to 13 x 6 x 10 mm in medial lateral, anterior posterior and  craniocaudal dimension and mild to moderately narrows the right side of  the canal at the level of the superior body and endplate of L1, it abuts  the ventral aspect of the right L1 nerve root in  the superior portion of  the right lateral recess of L1.  3. There are some right anterior lateral marginal bridging osteophytes  from T8 to T11 with some focal bone marrow edema and enhancement in the  right anterior aspect of the T9 vertebra that is likely degenerative  marrow edema. The remainder of the thoracic spine MRI is normal with no  evidence of metastatic disease of the thoracic spine.     Assessment/Plan   1.  T2N0 grade 3 infiltrating ductal carcinoma left breast ER UT negative HER-2 positive post mastectomy and axillary node dissection with clear margins.  · Initiated Taxol/Herceptin/Perjeta today, 8/29/19.  · Plan to do 12 weeks of Taxol Herceptin perjeta without Adriamycin because of her borderline cardiac function followed by a year of Perjeta Herceptin    2.  Tobacco abuse and COPD    3.  Abnormal echo with stress test normal but borderline cardiac function at 52%  We will avoid Adriamycin Cytoxan  and treat with Taxol Herceptin perjeta and a year of Herceptin perjeta. Dr. Dudley, cardiology, has given clearance for patient to begin.  Planning repeat echocardiogram 6 weeks from previous.    · Improved EF to 55%  · EF 57% in 1/21/2020    4.  Venous access.  Status post new Mediport placement 8/27/2019.  Working well today.  .    · No more refills of Percocet planned    5.  Diarrhea related to Perjeta.  Improved with Imodium    6.  Skin rash on her arms and legs likely from  perjeta continue to watch and use steroid cream-stable    7.  Low magnesium due to diarrhea-diarrhea resolved    8.  Back and shoulder and neck pain MRI of thoracic spine shows multiple disc protrusions with no evidence of metastatic disease will refer to neurosurgery    Plan:  1.  Proceed with maintenance/herceptin perjeta.  Every 3 weeks until the end of July  2.   Utilize Imodium as outlined above.  Continue Prilosec add steroid cream for rash  3.   Return in 3 /6 week for perjeta Herceptin  4.  Return in 9 weeks for follow-up  with Dr. Jhaveri and Hercmelani/Perjeta.   5.  Echocardiogram mid April 6.  Refer to neurosurgery nonurgent    This patient is on drug therapy requiring intensive monitoring for toxicity.  We addressed current concerns of pain and diarrhea as outlined above.

## 2020-03-26 ENCOUNTER — OFFICE VISIT (OUTPATIENT)
Dept: ONCOLOGY | Facility: CLINIC | Age: 65
End: 2020-03-26

## 2020-03-26 ENCOUNTER — INFUSION (OUTPATIENT)
Dept: ONCOLOGY | Facility: HOSPITAL | Age: 65
End: 2020-03-26

## 2020-03-26 VITALS
DIASTOLIC BLOOD PRESSURE: 81 MMHG | HEART RATE: 61 BPM | BODY MASS INDEX: 31.67 KG/M2 | TEMPERATURE: 98.4 F | WEIGHT: 213.8 LBS | SYSTOLIC BLOOD PRESSURE: 163 MMHG | OXYGEN SATURATION: 94 % | HEIGHT: 69 IN | RESPIRATION RATE: 16 BRPM

## 2020-03-26 DIAGNOSIS — Z17.1 MALIGNANT NEOPLASM OF UPPER-INNER QUADRANT OF LEFT BREAST IN FEMALE, ESTROGEN RECEPTOR NEGATIVE (HCC): Primary | ICD-10-CM

## 2020-03-26 DIAGNOSIS — G89.4 CHRONIC PAIN SYNDROME: ICD-10-CM

## 2020-03-26 DIAGNOSIS — C50.212 MALIGNANT NEOPLASM OF UPPER-INNER QUADRANT OF LEFT BREAST IN FEMALE, ESTROGEN RECEPTOR NEGATIVE (HCC): Primary | ICD-10-CM

## 2020-03-26 DIAGNOSIS — C50.212 MALIGNANT NEOPLASM OF UPPER-INNER QUADRANT OF LEFT BREAST IN FEMALE, ESTROGEN RECEPTOR NEGATIVE (HCC): ICD-10-CM

## 2020-03-26 DIAGNOSIS — Z17.1 MALIGNANT NEOPLASM OF UPPER-INNER QUADRANT OF LEFT BREAST IN FEMALE, ESTROGEN RECEPTOR NEGATIVE (HCC): ICD-10-CM

## 2020-03-26 DIAGNOSIS — M51.36 DEGENERATION OF INTERVERTEBRAL DISC OF LUMBAR REGION: ICD-10-CM

## 2020-03-26 DIAGNOSIS — Z79.899 ENCOUNTER FOR LONG-TERM (CURRENT) USE OF HIGH-RISK MEDICATION: ICD-10-CM

## 2020-03-26 LAB
ALBUMIN SERPL-MCNC: 4.2 G/DL (ref 3.5–5.2)
ALBUMIN/GLOB SERPL: 1.5 G/DL (ref 1.1–2.4)
ALP SERPL-CCNC: 128 U/L (ref 38–116)
ALT SERPL W P-5'-P-CCNC: 17 U/L (ref 0–33)
ANION GAP SERPL CALCULATED.3IONS-SCNC: 13.2 MMOL/L (ref 5–15)
AST SERPL-CCNC: 23 U/L (ref 0–32)
BASOPHILS # BLD AUTO: 0.06 10*3/MM3 (ref 0–0.2)
BASOPHILS NFR BLD AUTO: 1 % (ref 0–1.5)
BILIRUB SERPL-MCNC: 0.3 MG/DL (ref 0.2–1.2)
BUN BLD-MCNC: 12 MG/DL (ref 6–20)
BUN/CREAT SERPL: 15.6 (ref 7.3–30)
CALCIUM SPEC-SCNC: 9.5 MG/DL (ref 8.5–10.2)
CHLORIDE SERPL-SCNC: 102 MMOL/L (ref 98–107)
CO2 SERPL-SCNC: 24.8 MMOL/L (ref 22–29)
CREAT BLD-MCNC: 0.77 MG/DL (ref 0.6–1.1)
DEPRECATED RDW RBC AUTO: 43.4 FL (ref 37–54)
EOSINOPHIL # BLD AUTO: 0.25 10*3/MM3 (ref 0–0.4)
EOSINOPHIL NFR BLD AUTO: 4 % (ref 0.3–6.2)
ERYTHROCYTE [DISTWIDTH] IN BLOOD BY AUTOMATED COUNT: 13.3 % (ref 12.3–15.4)
GFR SERPL CREATININE-BSD FRML MDRD: 75 ML/MIN/1.73
GLOBULIN UR ELPH-MCNC: 2.8 GM/DL (ref 1.8–3.5)
GLUCOSE BLD-MCNC: 81 MG/DL (ref 74–124)
HCT VFR BLD AUTO: 39.2 % (ref 34–46.6)
HGB BLD-MCNC: 12.9 G/DL (ref 12–15.9)
IMM GRANULOCYTES # BLD AUTO: 0.01 10*3/MM3 (ref 0–0.05)
IMM GRANULOCYTES NFR BLD AUTO: 0.2 % (ref 0–0.5)
LYMPHOCYTES # BLD AUTO: 2.75 10*3/MM3 (ref 0.7–3.1)
LYMPHOCYTES NFR BLD AUTO: 43.7 % (ref 19.6–45.3)
MAGNESIUM SERPL-MCNC: 1.8 MG/DL (ref 1.8–2.5)
MCH RBC QN AUTO: 29.1 PG (ref 26.6–33)
MCHC RBC AUTO-ENTMCNC: 32.9 G/DL (ref 31.5–35.7)
MCV RBC AUTO: 88.3 FL (ref 79–97)
MONOCYTES # BLD AUTO: 0.55 10*3/MM3 (ref 0.1–0.9)
MONOCYTES NFR BLD AUTO: 8.7 % (ref 5–12)
NEUTROPHILS # BLD AUTO: 2.68 10*3/MM3 (ref 1.7–7)
NEUTROPHILS NFR BLD AUTO: 42.4 % (ref 42.7–76)
NRBC BLD AUTO-RTO: 0 /100 WBC (ref 0–0.2)
PLATELET # BLD AUTO: 210 10*3/MM3 (ref 140–450)
PMV BLD AUTO: 9.2 FL (ref 6–12)
POTASSIUM BLD-SCNC: 4.1 MMOL/L (ref 3.5–4.7)
PROT SERPL-MCNC: 7 G/DL (ref 6.3–8)
RBC # BLD AUTO: 4.44 10*6/MM3 (ref 3.77–5.28)
SODIUM BLD-SCNC: 140 MMOL/L (ref 134–145)
WBC NRBC COR # BLD: 6.3 10*3/MM3 (ref 3.4–10.8)

## 2020-03-26 PROCEDURE — 99214 OFFICE O/P EST MOD 30 MIN: CPT | Performed by: INTERNAL MEDICINE

## 2020-03-26 PROCEDURE — 25010000002 TRASTUZUMAB PER 10 MG: Performed by: INTERNAL MEDICINE

## 2020-03-26 PROCEDURE — 83735 ASSAY OF MAGNESIUM: CPT

## 2020-03-26 PROCEDURE — 96375 TX/PRO/DX INJ NEW DRUG ADDON: CPT

## 2020-03-26 PROCEDURE — 96417 CHEMO IV INFUS EACH ADDL SEQ: CPT

## 2020-03-26 PROCEDURE — 25010000003 HEPARIN LOCK FLUCH PER 10 UNITS: Performed by: INTERNAL MEDICINE

## 2020-03-26 PROCEDURE — 25010000002 PERTUZUMAB 420 MG/14ML SOLUTION 420 MG VIAL: Performed by: INTERNAL MEDICINE

## 2020-03-26 PROCEDURE — 85025 COMPLETE CBC W/AUTO DIFF WBC: CPT

## 2020-03-26 PROCEDURE — 25010000002 DIPHENHYDRAMINE PER 50 MG: Performed by: INTERNAL MEDICINE

## 2020-03-26 PROCEDURE — 96413 CHEMO IV INFUSION 1 HR: CPT

## 2020-03-26 PROCEDURE — 80053 COMPREHEN METABOLIC PANEL: CPT

## 2020-03-26 RX ORDER — HEPARIN SODIUM (PORCINE) LOCK FLUSH IV SOLN 100 UNIT/ML 100 UNIT/ML
500 SOLUTION INTRAVENOUS AS NEEDED
Status: DISCONTINUED | OUTPATIENT
Start: 2020-03-26 | End: 2020-03-26 | Stop reason: HOSPADM

## 2020-03-26 RX ORDER — SODIUM CHLORIDE 9 MG/ML
250 INJECTION, SOLUTION INTRAVENOUS ONCE
Status: CANCELLED | OUTPATIENT
Start: 2020-03-26

## 2020-03-26 RX ORDER — SODIUM CHLORIDE 9 MG/ML
250 INJECTION, SOLUTION INTRAVENOUS ONCE
Status: COMPLETED | OUTPATIENT
Start: 2020-03-26 | End: 2020-03-26

## 2020-03-26 RX ORDER — FAMOTIDINE 10 MG/ML
20 INJECTION, SOLUTION INTRAVENOUS AS NEEDED
Status: CANCELLED | OUTPATIENT
Start: 2020-03-26

## 2020-03-26 RX ORDER — SODIUM CHLORIDE 0.9 % (FLUSH) 0.9 %
10 SYRINGE (ML) INJECTION AS NEEDED
Status: DISCONTINUED | OUTPATIENT
Start: 2020-03-26 | End: 2020-03-26 | Stop reason: HOSPADM

## 2020-03-26 RX ORDER — DIPHENHYDRAMINE HYDROCHLORIDE 50 MG/ML
50 INJECTION INTRAMUSCULAR; INTRAVENOUS AS NEEDED
Status: CANCELLED | OUTPATIENT
Start: 2020-03-26

## 2020-03-26 RX ORDER — HEPARIN SODIUM (PORCINE) LOCK FLUSH IV SOLN 100 UNIT/ML 100 UNIT/ML
500 SOLUTION INTRAVENOUS AS NEEDED
Status: CANCELLED | OUTPATIENT
Start: 2020-03-26

## 2020-03-26 RX ORDER — SODIUM CHLORIDE 0.9 % (FLUSH) 0.9 %
10 SYRINGE (ML) INJECTION AS NEEDED
Status: CANCELLED | OUTPATIENT
Start: 2020-03-26

## 2020-03-26 RX ORDER — FAMOTIDINE 10 MG/ML
20 INJECTION, SOLUTION INTRAVENOUS ONCE
Status: COMPLETED | OUTPATIENT
Start: 2020-03-26 | End: 2020-03-26

## 2020-03-26 RX ORDER — FAMOTIDINE 10 MG/ML
20 INJECTION, SOLUTION INTRAVENOUS ONCE
Status: CANCELLED | OUTPATIENT
Start: 2020-03-26

## 2020-03-26 RX ADMIN — Medication 500 UNITS: at 11:07

## 2020-03-26 RX ADMIN — SODIUM CHLORIDE, PRESERVATIVE FREE 10 ML: 5 INJECTION INTRAVENOUS at 11:07

## 2020-03-26 RX ADMIN — PERTUZUMAB 420 MG: 30 INJECTION, SOLUTION, CONCENTRATE INTRAVENOUS at 09:36

## 2020-03-26 RX ADMIN — FAMOTIDINE 20 MG: 10 INJECTION INTRAVENOUS at 09:14

## 2020-03-26 RX ADMIN — DIPHENHYDRAMINE HYDROCHLORIDE 25 MG: 50 INJECTION, SOLUTION INTRAMUSCULAR; INTRAVENOUS at 09:15

## 2020-03-26 RX ADMIN — TRASTUZUMAB 590 MG: 150 INJECTION, POWDER, LYOPHILIZED, FOR SOLUTION INTRAVENOUS at 10:35

## 2020-03-26 RX ADMIN — SODIUM CHLORIDE 250 ML: 9 INJECTION, SOLUTION INTRAVENOUS at 09:13

## 2020-03-30 DIAGNOSIS — M54.6 ACUTE BILATERAL THORACIC BACK PAIN: ICD-10-CM

## 2020-03-30 RX ORDER — HYDROCODONE BITARTRATE AND ACETAMINOPHEN 7.5; 325 MG/1; MG/1
2 TABLET ORAL EVERY 6 HOURS PRN
Qty: 24 TABLET | Refills: 0 | Status: SHIPPED | OUTPATIENT
Start: 2020-03-30 | End: 2020-04-13 | Stop reason: SDUPTHER

## 2020-04-10 RX ORDER — SODIUM CHLORIDE 9 MG/ML
250 INJECTION, SOLUTION INTRAVENOUS ONCE
Status: CANCELLED | OUTPATIENT
Start: 2020-04-16

## 2020-04-10 RX ORDER — FAMOTIDINE 10 MG/ML
20 INJECTION, SOLUTION INTRAVENOUS ONCE
Status: CANCELLED | OUTPATIENT
Start: 2020-04-16

## 2020-04-10 RX ORDER — FAMOTIDINE 10 MG/ML
20 INJECTION, SOLUTION INTRAVENOUS AS NEEDED
Status: CANCELLED | OUTPATIENT
Start: 2020-04-16

## 2020-04-10 RX ORDER — DIPHENHYDRAMINE HYDROCHLORIDE 50 MG/ML
50 INJECTION INTRAMUSCULAR; INTRAVENOUS AS NEEDED
Status: CANCELLED | OUTPATIENT
Start: 2020-04-16

## 2020-04-13 ENCOUNTER — TELEPHONE (OUTPATIENT)
Dept: FAMILY MEDICINE CLINIC | Facility: CLINIC | Age: 65
End: 2020-04-13

## 2020-04-13 DIAGNOSIS — M54.6 ACUTE BILATERAL THORACIC BACK PAIN: ICD-10-CM

## 2020-04-13 RX ORDER — METOPROLOL SUCCINATE 25 MG/1
TABLET, EXTENDED RELEASE ORAL
Qty: 30 TABLET | Refills: 3 | Status: SHIPPED | OUTPATIENT
Start: 2020-04-13 | End: 2020-05-21

## 2020-04-13 RX ORDER — HYDROCODONE BITARTRATE AND ACETAMINOPHEN 7.5; 325 MG/1; MG/1
2 TABLET ORAL EVERY 6 HOURS PRN
Qty: 24 TABLET | Refills: 0 | Status: SHIPPED | OUTPATIENT
Start: 2020-04-13 | End: 2020-04-28 | Stop reason: SDUPTHER

## 2020-04-13 NOTE — TELEPHONE ENCOUNTER
Damián called to make you aware of a coverage change. In the event that this patient will need spinal surgery, a 2nd opinion would need to be obtained thru  [facility]. damián would need to help arrange this. They are calling due to the recent MRI that was ordered of the spine as an FYI.     Keisha   phone# [phone] ext [phone]

## 2020-04-13 NOTE — TELEPHONE ENCOUNTER
Pt called requesting a refill on the selected med. [facility] on [facility] confirmed.  Pt also wanted to let Mireya Wood know that she has been referred and is scheduled with a neurologist on May 21 @ 8:30a.    Pt callback- [phone]

## 2020-04-16 ENCOUNTER — INFUSION (OUTPATIENT)
Dept: ONCOLOGY | Facility: HOSPITAL | Age: 65
End: 2020-04-16

## 2020-04-16 VITALS
TEMPERATURE: 98.1 F | HEART RATE: 71 BPM | BODY MASS INDEX: 31.08 KG/M2 | DIASTOLIC BLOOD PRESSURE: 84 MMHG | WEIGHT: 213 LBS | OXYGEN SATURATION: 93 % | SYSTOLIC BLOOD PRESSURE: 187 MMHG

## 2020-04-16 DIAGNOSIS — Z79.899 ENCOUNTER FOR LONG-TERM (CURRENT) USE OF HIGH-RISK MEDICATION: ICD-10-CM

## 2020-04-16 DIAGNOSIS — Z17.1 MALIGNANT NEOPLASM OF UPPER-INNER QUADRANT OF LEFT BREAST IN FEMALE, ESTROGEN RECEPTOR NEGATIVE (HCC): Primary | ICD-10-CM

## 2020-04-16 DIAGNOSIS — C50.212 MALIGNANT NEOPLASM OF UPPER-INNER QUADRANT OF LEFT BREAST IN FEMALE, ESTROGEN RECEPTOR NEGATIVE (HCC): Primary | ICD-10-CM

## 2020-04-16 LAB
ALBUMIN SERPL-MCNC: 4.2 G/DL (ref 3.5–5.2)
ALBUMIN/GLOB SERPL: 1.4 G/DL (ref 1.1–2.4)
ALP SERPL-CCNC: 124 U/L (ref 38–116)
ALT SERPL W P-5'-P-CCNC: 17 U/L (ref 0–33)
ANION GAP SERPL CALCULATED.3IONS-SCNC: 13.2 MMOL/L (ref 5–15)
AST SERPL-CCNC: 23 U/L (ref 0–32)
BASOPHILS # BLD AUTO: 0.06 10*3/MM3 (ref 0–0.2)
BASOPHILS NFR BLD AUTO: 1.1 % (ref 0–1.5)
BILIRUB SERPL-MCNC: 0.4 MG/DL (ref 0.2–1.2)
BUN BLD-MCNC: 9 MG/DL (ref 6–20)
BUN/CREAT SERPL: 11.4 (ref 7.3–30)
CALCIUM SPEC-SCNC: 9.5 MG/DL (ref 8.5–10.2)
CHLORIDE SERPL-SCNC: 102 MMOL/L (ref 98–107)
CO2 SERPL-SCNC: 25.8 MMOL/L (ref 22–29)
CREAT BLD-MCNC: 0.79 MG/DL (ref 0.6–1.1)
DEPRECATED RDW RBC AUTO: 43.8 FL (ref 37–54)
EOSINOPHIL # BLD AUTO: 0.24 10*3/MM3 (ref 0–0.4)
EOSINOPHIL NFR BLD AUTO: 4.3 % (ref 0.3–6.2)
ERYTHROCYTE [DISTWIDTH] IN BLOOD BY AUTOMATED COUNT: 13.5 % (ref 12.3–15.4)
GFR SERPL CREATININE-BSD FRML MDRD: 73 ML/MIN/1.73
GLOBULIN UR ELPH-MCNC: 3.1 GM/DL (ref 1.8–3.5)
GLUCOSE BLD-MCNC: 73 MG/DL (ref 74–124)
HCT VFR BLD AUTO: 40.6 % (ref 34–46.6)
HGB BLD-MCNC: 13.5 G/DL (ref 12–15.9)
IMM GRANULOCYTES # BLD AUTO: 0.01 10*3/MM3 (ref 0–0.05)
IMM GRANULOCYTES NFR BLD AUTO: 0.2 % (ref 0–0.5)
LYMPHOCYTES # BLD AUTO: 2.44 10*3/MM3 (ref 0.7–3.1)
LYMPHOCYTES NFR BLD AUTO: 44.1 % (ref 19.6–45.3)
MAGNESIUM SERPL-MCNC: 1.6 MG/DL (ref 1.8–2.5)
MCH RBC QN AUTO: 29.7 PG (ref 26.6–33)
MCHC RBC AUTO-ENTMCNC: 33.3 G/DL (ref 31.5–35.7)
MCV RBC AUTO: 89.2 FL (ref 79–97)
MONOCYTES # BLD AUTO: 0.55 10*3/MM3 (ref 0.1–0.9)
MONOCYTES NFR BLD AUTO: 9.9 % (ref 5–12)
NEUTROPHILS # BLD AUTO: 2.23 10*3/MM3 (ref 1.7–7)
NEUTROPHILS NFR BLD AUTO: 40.4 % (ref 42.7–76)
NRBC BLD AUTO-RTO: 0 /100 WBC (ref 0–0.2)
PLATELET # BLD AUTO: 225 10*3/MM3 (ref 140–450)
PMV BLD AUTO: 9.6 FL (ref 6–12)
POTASSIUM BLD-SCNC: 3.9 MMOL/L (ref 3.5–4.7)
PROT SERPL-MCNC: 7.3 G/DL (ref 6.3–8)
RBC # BLD AUTO: 4.55 10*6/MM3 (ref 3.77–5.28)
SODIUM BLD-SCNC: 141 MMOL/L (ref 134–145)
WBC NRBC COR # BLD: 5.53 10*3/MM3 (ref 3.4–10.8)

## 2020-04-16 PROCEDURE — 25010000003 HEPARIN LOCK FLUCH PER 10 UNITS: Performed by: INTERNAL MEDICINE

## 2020-04-16 PROCEDURE — 25010000002 PERTUZUMAB 420 MG/14ML SOLUTION 420 MG VIAL: Performed by: NURSE PRACTITIONER

## 2020-04-16 PROCEDURE — 80053 COMPREHEN METABOLIC PANEL: CPT

## 2020-04-16 PROCEDURE — 96417 CHEMO IV INFUS EACH ADDL SEQ: CPT

## 2020-04-16 PROCEDURE — 83735 ASSAY OF MAGNESIUM: CPT

## 2020-04-16 PROCEDURE — 96375 TX/PRO/DX INJ NEW DRUG ADDON: CPT

## 2020-04-16 PROCEDURE — 25010000002 TRASTUZUMAB PER 10 MG: Performed by: NURSE PRACTITIONER

## 2020-04-16 PROCEDURE — 96413 CHEMO IV INFUSION 1 HR: CPT

## 2020-04-16 PROCEDURE — 96368 THER/DIAG CONCURRENT INF: CPT

## 2020-04-16 PROCEDURE — 85025 COMPLETE CBC W/AUTO DIFF WBC: CPT

## 2020-04-16 PROCEDURE — 96367 TX/PROPH/DG ADDL SEQ IV INF: CPT

## 2020-04-16 PROCEDURE — 25010000002 MAGNESIUM SULFATE 2 GM/50ML SOLUTION: Performed by: INTERNAL MEDICINE

## 2020-04-16 PROCEDURE — 25010000002 DIPHENHYDRAMINE PER 50 MG: Performed by: NURSE PRACTITIONER

## 2020-04-16 RX ORDER — SODIUM CHLORIDE 0.9 % (FLUSH) 0.9 %
10 SYRINGE (ML) INJECTION AS NEEDED
Status: CANCELLED | OUTPATIENT
Start: 2020-04-16

## 2020-04-16 RX ORDER — SODIUM CHLORIDE 9 MG/ML
250 INJECTION, SOLUTION INTRAVENOUS ONCE
Status: COMPLETED | OUTPATIENT
Start: 2020-04-16 | End: 2020-04-16

## 2020-04-16 RX ORDER — HEPARIN SODIUM (PORCINE) LOCK FLUSH IV SOLN 100 UNIT/ML 100 UNIT/ML
500 SOLUTION INTRAVENOUS AS NEEDED
Status: DISCONTINUED | OUTPATIENT
Start: 2020-04-16 | End: 2020-04-16 | Stop reason: HOSPADM

## 2020-04-16 RX ORDER — HEPARIN SODIUM (PORCINE) LOCK FLUSH IV SOLN 100 UNIT/ML 100 UNIT/ML
500 SOLUTION INTRAVENOUS AS NEEDED
Status: CANCELLED | OUTPATIENT
Start: 2020-04-16

## 2020-04-16 RX ORDER — FAMOTIDINE 10 MG/ML
20 INJECTION, SOLUTION INTRAVENOUS ONCE
Status: COMPLETED | OUTPATIENT
Start: 2020-04-16 | End: 2020-04-16

## 2020-04-16 RX ORDER — SODIUM CHLORIDE 0.9 % (FLUSH) 0.9 %
10 SYRINGE (ML) INJECTION AS NEEDED
Status: DISCONTINUED | OUTPATIENT
Start: 2020-04-16 | End: 2020-04-16 | Stop reason: HOSPADM

## 2020-04-16 RX ORDER — MAGNESIUM SULFATE HEPTAHYDRATE 40 MG/ML
2 INJECTION, SOLUTION INTRAVENOUS ONCE
Status: COMPLETED | OUTPATIENT
Start: 2020-04-16 | End: 2020-04-16

## 2020-04-16 RX ADMIN — PERTUZUMAB 420 MG: 30 INJECTION, SOLUTION, CONCENTRATE INTRAVENOUS at 09:28

## 2020-04-16 RX ADMIN — MAGNESIUM SULFATE IN WATER 2 G: 40 INJECTION, SOLUTION INTRAVENOUS at 10:05

## 2020-04-16 RX ADMIN — SODIUM CHLORIDE 250 ML: 9 INJECTION, SOLUTION INTRAVENOUS at 09:08

## 2020-04-16 RX ADMIN — Medication 500 UNITS: at 11:57

## 2020-04-16 RX ADMIN — TRASTUZUMAB 590 MG: 150 INJECTION, POWDER, LYOPHILIZED, FOR SOLUTION INTRAVENOUS at 10:37

## 2020-04-16 RX ADMIN — DIPHENHYDRAMINE HYDROCHLORIDE 25 MG: 50 INJECTION, SOLUTION INTRAMUSCULAR; INTRAVENOUS at 09:09

## 2020-04-16 RX ADMIN — FAMOTIDINE 20 MG: 10 INJECTION INTRAVENOUS at 09:08

## 2020-04-28 DIAGNOSIS — M54.6 ACUTE BILATERAL THORACIC BACK PAIN: ICD-10-CM

## 2020-04-28 RX ORDER — HYDROCODONE BITARTRATE AND ACETAMINOPHEN 7.5; 325 MG/1; MG/1
2 TABLET ORAL EVERY 6 HOURS PRN
Qty: 24 TABLET | Refills: 0 | Status: SHIPPED | OUTPATIENT
Start: 2020-04-28 | End: 2020-05-21 | Stop reason: SDUPTHER

## 2020-04-28 NOTE — TELEPHONE ENCOUNTER
Was to be short term, but treatment on hold secondary to COVID 19, does she have neurosurgery appt yet?

## 2020-05-01 ENCOUNTER — TELEPHONE (OUTPATIENT)
Dept: ONCOLOGY | Facility: CLINIC | Age: 65
End: 2020-05-01

## 2020-05-01 NOTE — TELEPHONE ENCOUNTER
----- Message from Heidi Garner RN sent at 4/30/2020  2:41 PM EDT -----  Looks like she was scheduled for an ECHO on the 26th, I don't think she went.  She needs to have echo before her treatment next Friday.  Can someone check into this and reschedule prior to next Friday.

## 2020-05-04 ENCOUNTER — HOSPITAL ENCOUNTER (OUTPATIENT)
Dept: CARDIOLOGY | Facility: HOSPITAL | Age: 65
Discharge: HOME | End: 2020-05-04
Admitting: INTERNAL MEDICINE

## 2020-05-04 VITALS
BODY MASS INDEX: 31.55 KG/M2 | HEIGHT: 69 IN | HEART RATE: 60 BPM | OXYGEN SATURATION: 96 % | WEIGHT: 213 LBS | DIASTOLIC BLOOD PRESSURE: 78 MMHG | SYSTOLIC BLOOD PRESSURE: 170 MMHG

## 2020-05-04 DIAGNOSIS — Z17.1 MALIGNANT NEOPLASM OF UPPER-INNER QUADRANT OF LEFT BREAST IN FEMALE, ESTROGEN RECEPTOR NEGATIVE (HCC): ICD-10-CM

## 2020-05-04 DIAGNOSIS — C50.212 MALIGNANT NEOPLASM OF UPPER-INNER QUADRANT OF LEFT BREAST IN FEMALE, ESTROGEN RECEPTOR NEGATIVE (HCC): ICD-10-CM

## 2020-05-04 DIAGNOSIS — Z79.899 ENCOUNTER FOR LONG-TERM (CURRENT) USE OF HIGH-RISK MEDICATION: ICD-10-CM

## 2020-05-04 LAB
AORTIC ARCH: 3.2 CM
ASCENDING AORTA: 3 CM
BH CV ECHO MEAS - ACS: 2.2 CM
BH CV ECHO MEAS - AO ARCH DIAM (PROXIMAL TRANS.): 3.2 CM
BH CV ECHO MEAS - AO MAX PG (FULL): 6.9 MMHG
BH CV ECHO MEAS - AO MAX PG: 9.6 MMHG
BH CV ECHO MEAS - AO MEAN PG (FULL): 4 MMHG
BH CV ECHO MEAS - AO MEAN PG: 6 MMHG
BH CV ECHO MEAS - AO ROOT AREA (BSA CORRECTED): 1.6
BH CV ECHO MEAS - AO ROOT AREA: 9.1 CM^2
BH CV ECHO MEAS - AO ROOT DIAM: 3.4 CM
BH CV ECHO MEAS - AO V2 MAX: 155 CM/SEC
BH CV ECHO MEAS - AO V2 MEAN: 117 CM/SEC
BH CV ECHO MEAS - AO V2 VTI: 38.2 CM
BH CV ECHO MEAS - ASC AORTA: 3 CM
BH CV ECHO MEAS - AVA(I,A): 1.8 CM^2
BH CV ECHO MEAS - AVA(I,D): 1.8 CM^2
BH CV ECHO MEAS - AVA(V,A): 1.8 CM^2
BH CV ECHO MEAS - AVA(V,D): 1.8 CM^2
BH CV ECHO MEAS - BSA(HAYCOCK): 2.2 M^2
BH CV ECHO MEAS - BSA: 2.1 M^2
BH CV ECHO MEAS - BZI_BMI: 31.5 KILOGRAMS/M^2
BH CV ECHO MEAS - BZI_METRIC_HEIGHT: 175.3 CM
BH CV ECHO MEAS - BZI_METRIC_WEIGHT: 96.6 KG
BH CV ECHO MEAS - EDV(MOD-SP2): 150 ML
BH CV ECHO MEAS - EDV(MOD-SP4): 164 ML
BH CV ECHO MEAS - EDV(TEICH): 123.8 ML
BH CV ECHO MEAS - EF(CUBED): 79.6 %
BH CV ECHO MEAS - EF(MOD-BP): 58 %
BH CV ECHO MEAS - EF(MOD-SP2): 56.7 %
BH CV ECHO MEAS - EF(MOD-SP4): 58.5 %
BH CV ECHO MEAS - EF(TEICH): 71.7 %
BH CV ECHO MEAS - ESV(MOD-SP2): 65 ML
BH CV ECHO MEAS - ESV(MOD-SP4): 68 ML
BH CV ECHO MEAS - ESV(TEICH): 35 ML
BH CV ECHO MEAS - FS: 41.2 %
BH CV ECHO MEAS - IVS/LVPW: 1
BH CV ECHO MEAS - IVSD: 1.2 CM
BH CV ECHO MEAS - LAT PEAK E' VEL: 6 CM/SEC
BH CV ECHO MEAS - LV DIASTOLIC VOL/BSA (35-75): 77.3 ML/M^2
BH CV ECHO MEAS - LV MASS(C)D: 241.2 GRAMS
BH CV ECHO MEAS - LV MASS(C)DI: 113.7 GRAMS/M^2
BH CV ECHO MEAS - LV MAX PG: 2.7 MMHG
BH CV ECHO MEAS - LV MEAN PG: 2 MMHG
BH CV ECHO MEAS - LV SYSTOLIC VOL/BSA (12-30): 32 ML/M^2
BH CV ECHO MEAS - LV V1 MAX: 82.2 CM/SEC
BH CV ECHO MEAS - LV V1 MEAN: 57.8 CM/SEC
BH CV ECHO MEAS - LV V1 VTI: 20.3 CM
BH CV ECHO MEAS - LVIDD: 5.1 CM
BH CV ECHO MEAS - LVIDS: 3 CM
BH CV ECHO MEAS - LVLD AP2: 7.6 CM
BH CV ECHO MEAS - LVLD AP4: 7.4 CM
BH CV ECHO MEAS - LVLS AP2: 6.6 CM
BH CV ECHO MEAS - LVLS AP4: 6 CM
BH CV ECHO MEAS - LVOT AREA (M): 3.5 CM^2
BH CV ECHO MEAS - LVOT AREA: 3.5 CM^2
BH CV ECHO MEAS - LVOT DIAM: 2.1 CM
BH CV ECHO MEAS - LVPWD: 1.2 CM
BH CV ECHO MEAS - MED PEAK E' VEL: 4 CM/SEC
BH CV ECHO MEAS - MV A DUR: 0.11 SEC
BH CV ECHO MEAS - MV A MAX VEL: 85.3 CM/SEC
BH CV ECHO MEAS - MV DEC SLOPE: 213 CM/SEC^2
BH CV ECHO MEAS - MV DEC TIME: 0.31 SEC
BH CV ECHO MEAS - MV E MAX VEL: 63.8 CM/SEC
BH CV ECHO MEAS - MV E/A: 0.75
BH CV ECHO MEAS - MV MAX PG: 3.2 MMHG
BH CV ECHO MEAS - MV MEAN PG: 1 MMHG
BH CV ECHO MEAS - MV P1/2T MAX VEL: 74.7 CM/SEC
BH CV ECHO MEAS - MV P1/2T: 102.7 MSEC
BH CV ECHO MEAS - MV V2 MAX: 88.9 CM/SEC
BH CV ECHO MEAS - MV V2 MEAN: 49.5 CM/SEC
BH CV ECHO MEAS - MV V2 VTI: 32.2 CM
BH CV ECHO MEAS - MVA P1/2T LCG: 2.9 CM^2
BH CV ECHO MEAS - MVA(P1/2T): 2.1 CM^2
BH CV ECHO MEAS - MVA(VTI): 2.2 CM^2
BH CV ECHO MEAS - PA MAX PG (FULL): 2.4 MMHG
BH CV ECHO MEAS - PA MAX PG: 4.6 MMHG
BH CV ECHO MEAS - PA V2 MAX: 107 CM/SEC
BH CV ECHO MEAS - PI END-D VEL: 103 CM/SEC
BH CV ECHO MEAS - PULM A REVS DUR: 0.1 SEC
BH CV ECHO MEAS - PULM A REVS VEL: 32.4 CM/SEC
BH CV ECHO MEAS - PULM DIAS VEL: 30.5 CM/SEC
BH CV ECHO MEAS - PULM S/D: 1.7
BH CV ECHO MEAS - PULM SYS VEL: 53.3 CM/SEC
BH CV ECHO MEAS - PVA(V,A): 2.6 CM^2
BH CV ECHO MEAS - PVA(V,D): 2.6 CM^2
BH CV ECHO MEAS - QP/QS: 1.1
BH CV ECHO MEAS - RAP SYSTOLE: 3 MMHG
BH CV ECHO MEAS - RV BASE (<4.1) - OBSOLETE: 3.4 CM
BH CV ECHO MEAS - RV LENGTH (<8.5) - OBSOLETE: 7.5 CM
BH CV ECHO MEAS - RV MAX PG: 2.1 MMHG
BH CV ECHO MEAS - RV MEAN PG: 1 MMHG
BH CV ECHO MEAS - RV V1 MAX: 73.1 CM/SEC
BH CV ECHO MEAS - RV V1 MEAN: 55.6 CM/SEC
BH CV ECHO MEAS - RV V1 VTI: 20.9 CM
BH CV ECHO MEAS - RVOT AREA: 3.8 CM^2
BH CV ECHO MEAS - RVOT DIAM: 2.2 CM
BH CV ECHO MEAS - RVSP: 33 MMHG
BH CV ECHO MEAS - SI(AO): 163.5 ML/M^2
BH CV ECHO MEAS - SI(CUBED): 49.8 ML/M^2
BH CV ECHO MEAS - SI(LVOT): 33.1 ML/M^2
BH CV ECHO MEAS - SI(MOD-SP2): 40.1 ML/M^2
BH CV ECHO MEAS - SI(MOD-SP4): 45.2 ML/M^2
BH CV ECHO MEAS - SI(TEICH): 41.9 ML/M^2
BH CV ECHO MEAS - SUP REN AO DIAM: 2.2 CM
BH CV ECHO MEAS - SV(AO): 346.8 ML
BH CV ECHO MEAS - SV(CUBED): 105.7 ML
BH CV ECHO MEAS - SV(LVOT): 70.3 ML
BH CV ECHO MEAS - SV(MOD-SP2): 85 ML
BH CV ECHO MEAS - SV(MOD-SP4): 96 ML
BH CV ECHO MEAS - SV(RVOT): 79.4 ML
BH CV ECHO MEAS - SV(TEICH): 88.8 ML
BH CV ECHO MEAS - TAPSE (>1.6): 2.4 CM2
BH CV ECHO MEAS - TR MAX VEL: 273 CM/SEC
BH CV ECHO MEASUREMENTS AVERAGE E/E' RATIO: 12.76
BH CV XLRA - RV BASE: 3.4 CM
BH CV XLRA - RV LENGTH: 7.4 CM
BH CV XLRA - RV MID: 3.1 CM
BH CV XLRA - TDI S': 12 CM/SEC
LEFT ATRIUM VOLUME INDEX: 43 ML/M2
LEFT ATRIUM VOLUME: 83 CM3
LV EF 2D ECHO EST: 58 %
MAXIMAL PREDICTED HEART RATE: 155 BPM
SINUS: 3 CM
STJ: 2.8 CM
STRESS TARGET HR: 132 BPM

## 2020-05-04 PROCEDURE — 93306 TTE W/DOPPLER COMPLETE: CPT | Performed by: INTERNAL MEDICINE

## 2020-05-04 PROCEDURE — 25010000002 PERFLUTREN (DEFINITY) 8.476 MG IN SODIUM CHLORIDE 0.9 % 10 ML INJECTION: Performed by: INTERNAL MEDICINE

## 2020-05-04 PROCEDURE — 93356 MYOCRD STRAIN IMG SPCKL TRCK: CPT | Performed by: INTERNAL MEDICINE

## 2020-05-04 PROCEDURE — 93356 MYOCRD STRAIN IMG SPCKL TRCK: CPT

## 2020-05-04 PROCEDURE — 93306 TTE W/DOPPLER COMPLETE: CPT

## 2020-05-04 RX ADMIN — PERFLUTREN 1.5 ML: 6.52 INJECTION, SUSPENSION INTRAVENOUS at 10:17

## 2020-05-07 ENCOUNTER — INFUSION (OUTPATIENT)
Dept: ONCOLOGY | Facility: HOSPITAL | Age: 65
End: 2020-05-07

## 2020-05-07 VITALS
SYSTOLIC BLOOD PRESSURE: 182 MMHG | WEIGHT: 215 LBS | OXYGEN SATURATION: 92 % | DIASTOLIC BLOOD PRESSURE: 94 MMHG | TEMPERATURE: 97.9 F | BODY MASS INDEX: 31.75 KG/M2 | HEART RATE: 77 BPM

## 2020-05-07 DIAGNOSIS — Z79.899 ENCOUNTER FOR LONG-TERM (CURRENT) USE OF HIGH-RISK MEDICATION: ICD-10-CM

## 2020-05-07 DIAGNOSIS — Z17.1 MALIGNANT NEOPLASM OF UPPER-INNER QUADRANT OF LEFT BREAST IN FEMALE, ESTROGEN RECEPTOR NEGATIVE (HCC): Primary | ICD-10-CM

## 2020-05-07 DIAGNOSIS — C50.212 MALIGNANT NEOPLASM OF UPPER-INNER QUADRANT OF LEFT BREAST IN FEMALE, ESTROGEN RECEPTOR NEGATIVE (HCC): Primary | ICD-10-CM

## 2020-05-07 LAB
ALBUMIN SERPL-MCNC: 4.1 G/DL (ref 3.5–5.2)
ALBUMIN/GLOB SERPL: 1.5 G/DL (ref 1.1–2.4)
ALP SERPL-CCNC: 125 U/L (ref 38–116)
ALT SERPL W P-5'-P-CCNC: 16 U/L (ref 0–33)
ANION GAP SERPL CALCULATED.3IONS-SCNC: 11.4 MMOL/L (ref 5–15)
AST SERPL-CCNC: 22 U/L (ref 0–32)
BASOPHILS # BLD AUTO: 0.06 10*3/MM3 (ref 0–0.2)
BASOPHILS NFR BLD AUTO: 1.1 % (ref 0–1.5)
BILIRUB SERPL-MCNC: 0.4 MG/DL (ref 0.2–1.2)
BUN BLD-MCNC: 12 MG/DL (ref 6–20)
BUN/CREAT SERPL: 16.4 (ref 7.3–30)
CALCIUM SPEC-SCNC: 9.4 MG/DL (ref 8.5–10.2)
CHLORIDE SERPL-SCNC: 104 MMOL/L (ref 98–107)
CO2 SERPL-SCNC: 25.6 MMOL/L (ref 22–29)
CREAT BLD-MCNC: 0.73 MG/DL (ref 0.6–1.1)
DEPRECATED RDW RBC AUTO: 44.6 FL (ref 37–54)
EOSINOPHIL # BLD AUTO: 0.26 10*3/MM3 (ref 0–0.4)
EOSINOPHIL NFR BLD AUTO: 4.6 % (ref 0.3–6.2)
ERYTHROCYTE [DISTWIDTH] IN BLOOD BY AUTOMATED COUNT: 13.5 % (ref 12.3–15.4)
GFR SERPL CREATININE-BSD FRML MDRD: 80 ML/MIN/1.73
GLOBULIN UR ELPH-MCNC: 2.8 GM/DL (ref 1.8–3.5)
GLUCOSE BLD-MCNC: 92 MG/DL (ref 74–124)
HCT VFR BLD AUTO: 37.9 % (ref 34–46.6)
HGB BLD-MCNC: 12.5 G/DL (ref 12–15.9)
IMM GRANULOCYTES # BLD AUTO: 0.01 10*3/MM3 (ref 0–0.05)
IMM GRANULOCYTES NFR BLD AUTO: 0.2 % (ref 0–0.5)
LYMPHOCYTES # BLD AUTO: 2.68 10*3/MM3 (ref 0.7–3.1)
LYMPHOCYTES NFR BLD AUTO: 47.3 % (ref 19.6–45.3)
MAGNESIUM SERPL-MCNC: 1.8 MG/DL (ref 1.8–2.5)
MCH RBC QN AUTO: 29.8 PG (ref 26.6–33)
MCHC RBC AUTO-ENTMCNC: 33 G/DL (ref 31.5–35.7)
MCV RBC AUTO: 90.2 FL (ref 79–97)
MONOCYTES # BLD AUTO: 0.47 10*3/MM3 (ref 0.1–0.9)
MONOCYTES NFR BLD AUTO: 8.3 % (ref 5–12)
NEUTROPHILS # BLD AUTO: 2.19 10*3/MM3 (ref 1.7–7)
NEUTROPHILS NFR BLD AUTO: 38.5 % (ref 42.7–76)
NRBC BLD AUTO-RTO: 0 /100 WBC (ref 0–0.2)
PLATELET # BLD AUTO: 225 10*3/MM3 (ref 140–450)
PMV BLD AUTO: 9.3 FL (ref 6–12)
POTASSIUM BLD-SCNC: 3.9 MMOL/L (ref 3.5–4.7)
PROT SERPL-MCNC: 6.9 G/DL (ref 6.3–8)
RBC # BLD AUTO: 4.2 10*6/MM3 (ref 3.77–5.28)
SODIUM BLD-SCNC: 141 MMOL/L (ref 134–145)
WBC NRBC COR # BLD: 5.67 10*3/MM3 (ref 3.4–10.8)

## 2020-05-07 PROCEDURE — 25010000002 PERTUZUMAB 420 MG/14ML SOLUTION 420 MG VIAL: Performed by: NURSE PRACTITIONER

## 2020-05-07 PROCEDURE — 96375 TX/PRO/DX INJ NEW DRUG ADDON: CPT

## 2020-05-07 PROCEDURE — 96413 CHEMO IV INFUSION 1 HR: CPT

## 2020-05-07 PROCEDURE — 25010000003 HEPARIN LOCK FLUSH PER 10 UNITS: Performed by: INTERNAL MEDICINE

## 2020-05-07 PROCEDURE — 85025 COMPLETE CBC W/AUTO DIFF WBC: CPT

## 2020-05-07 PROCEDURE — 83735 ASSAY OF MAGNESIUM: CPT

## 2020-05-07 PROCEDURE — 25010000002 TRASTUZUMAB PER 10 MG: Performed by: NURSE PRACTITIONER

## 2020-05-07 PROCEDURE — 25010000002 DIPHENHYDRAMINE PER 50 MG: Performed by: NURSE PRACTITIONER

## 2020-05-07 PROCEDURE — 96417 CHEMO IV INFUS EACH ADDL SEQ: CPT

## 2020-05-07 PROCEDURE — 80053 COMPREHEN METABOLIC PANEL: CPT

## 2020-05-07 RX ORDER — SODIUM CHLORIDE 9 MG/ML
250 INJECTION, SOLUTION INTRAVENOUS ONCE
Status: COMPLETED | OUTPATIENT
Start: 2020-05-07 | End: 2020-05-07

## 2020-05-07 RX ORDER — HEPARIN SODIUM (PORCINE) LOCK FLUSH IV SOLN 100 UNIT/ML 100 UNIT/ML
500 SOLUTION INTRAVENOUS AS NEEDED
Status: CANCELLED | OUTPATIENT
Start: 2020-05-07

## 2020-05-07 RX ORDER — FAMOTIDINE 10 MG/ML
20 INJECTION, SOLUTION INTRAVENOUS ONCE
Status: COMPLETED | OUTPATIENT
Start: 2020-05-07 | End: 2020-05-07

## 2020-05-07 RX ORDER — SODIUM CHLORIDE 0.9 % (FLUSH) 0.9 %
10 SYRINGE (ML) INJECTION AS NEEDED
Status: CANCELLED | OUTPATIENT
Start: 2020-05-07

## 2020-05-07 RX ORDER — FAMOTIDINE 10 MG/ML
20 INJECTION, SOLUTION INTRAVENOUS AS NEEDED
Status: CANCELLED | OUTPATIENT
Start: 2020-05-07

## 2020-05-07 RX ORDER — SODIUM CHLORIDE 0.9 % (FLUSH) 0.9 %
10 SYRINGE (ML) INJECTION AS NEEDED
Status: DISCONTINUED | OUTPATIENT
Start: 2020-05-07 | End: 2020-05-07 | Stop reason: HOSPADM

## 2020-05-07 RX ORDER — DIPHENHYDRAMINE HYDROCHLORIDE 50 MG/ML
50 INJECTION INTRAMUSCULAR; INTRAVENOUS AS NEEDED
Status: CANCELLED | OUTPATIENT
Start: 2020-05-07

## 2020-05-07 RX ORDER — HEPARIN SODIUM (PORCINE) LOCK FLUSH IV SOLN 100 UNIT/ML 100 UNIT/ML
500 SOLUTION INTRAVENOUS AS NEEDED
Status: DISCONTINUED | OUTPATIENT
Start: 2020-05-07 | End: 2020-05-07 | Stop reason: HOSPADM

## 2020-05-07 RX ADMIN — PERTUZUMAB 420 MG: 30 INJECTION, SOLUTION, CONCENTRATE INTRAVENOUS at 09:27

## 2020-05-07 RX ADMIN — FAMOTIDINE 20 MG: 10 INJECTION INTRAVENOUS at 08:54

## 2020-05-07 RX ADMIN — SODIUM CHLORIDE, PRESERVATIVE FREE 10 ML: 5 INJECTION INTRAVENOUS at 11:09

## 2020-05-07 RX ADMIN — TRASTUZUMAB 590 MG: 150 INJECTION, POWDER, LYOPHILIZED, FOR SOLUTION INTRAVENOUS at 10:33

## 2020-05-07 RX ADMIN — SODIUM CHLORIDE 250 ML: 9 INJECTION, SOLUTION INTRAVENOUS at 08:40

## 2020-05-07 RX ADMIN — Medication 500 UNITS: at 11:09

## 2020-05-07 RX ADMIN — DIPHENHYDRAMINE HYDROCHLORIDE 25 MG: 50 INJECTION INTRAMUSCULAR; INTRAVENOUS at 08:56

## 2020-05-12 NOTE — PROGRESS NOTES
Subjective   History of Present Illness: Allie Temple is a 65 y.o. female is being seen for consultation today at the request of Elaine Jhaveri MD for neck and low back pain.  Patient had thoracic MRI and plain films at [facility] 3.5.20.     Today Ms. Temple c/o intermittent neck pain that radiates to right shoulder/arm and down her back on right side. She reports N/T in left leg/foot. She reports issues with balance and states that she has fell a few times. Patient denies having any issues with bowel or bladder control.     Neck Pain    This is a chronic problem. The current episode started more than 1 month ago. The problem occurs intermittently. The problem has been unchanged. The pain is associated with nothing. The pain is at a severity of 3/10. The pain is mild. Associated symptoms include headaches and numbness (N/T left leg/foot). Pertinent negatives include no leg pain, tingling, trouble swallowing, weakness or weight loss.   Back Pain   This is a chronic problem. The current episode started more than 1 month ago. The pain is present in the lumbar spine and thoracic spine. The pain is at a severity of 6/10. The pain is moderate. Associated symptoms include headaches and numbness (N/T left leg/foot). Pertinent negatives include no abdominal pain, bladder incontinence, bowel incontinence, leg pain, pelvic pain, perianal numbness, tingling, weakness or weight loss.       The following portions of the patient's history were reviewed and updated as appropriate: allergies, current medications, past family history, past medical history, past social history, past surgical history and problem list.    Review of Systems   Constitutional: Positive for activity change. Negative for weight loss.   HENT: Negative for trouble swallowing.    Eyes: Negative for visual disturbance.   Respiratory: Negative for apnea.    Cardiovascular: Negative for leg swelling.   Gastrointestinal: Negative for abdominal pain and bowel  "incontinence.   Genitourinary: Negative for bladder incontinence, difficulty urinating, frequency, pelvic pain and urgency.   Musculoskeletal: Positive for back pain, neck pain and neck stiffness (right side).   Neurological: Positive for dizziness, light-headedness, numbness (N/T left leg/foot) and headaches. Negative for tingling and weakness.   Psychiatric/Behavioral: Positive for sleep disturbance. Negative for confusion and decreased concentration.       Objective     Vitals:    05/21/20 0848   BP: 160/96   Pulse: 65   Temp: 96.9 °F (36.1 °C)   Weight: 96.6 kg (213 lb)   Height: 175.3 cm (69\")     Body mass index is 31.45 kg/m².      Physical Exam   Constitutional: She is oriented to person, place, and time. She appears well-developed and well-nourished.   HENT:   Head: Normocephalic and atraumatic.   Right Ear: External ear normal.   Left Ear: External ear normal.   Eyes: Pupils are equal, round, and reactive to light. Conjunctivae and EOM are normal. Right eye exhibits no discharge. Left eye exhibits no discharge.   Neck: Normal range of motion. Neck supple. No tracheal deviation present.   Cardiovascular: Intact distal pulses.   Pulmonary/Chest: Effort normal. No stridor. No respiratory distress.   Musculoskeletal: Normal range of motion. She exhibits no edema, tenderness or deformity.   Neurological: She is alert and oriented to person, place, and time. She has normal strength. She displays no atrophy, no tremor and normal reflexes. No cranial nerve deficit or sensory deficit. She exhibits normal muscle tone. She displays a negative Romberg sign. She displays no seizure activity. Coordination and gait normal.   Reflex Scores:       Tricep reflexes are 1+ on the right side and 1+ on the left side.       Bicep reflexes are 1+ on the right side and 1+ on the left side.       Brachioradialis reflexes are 1+ on the right side and 1+ on the left side.       Patellar reflexes are 1+ on the right side and 1+ on the " left side.       Achilles reflexes are 1+ on the right side and 1+ on the left side.  No long tract signs   Skin: Skin is warm and dry.   Psychiatric: She has a normal mood and affect. Her behavior is normal. Judgment and thought content normal.   Nursing note and vitals reviewed.    Neurologic Exam     Mental Status   Oriented to person, place, and time.     Cranial Nerves     CN III, IV, VI   Pupils are equal, round, and reactive to light.  Extraocular motions are normal.     Motor Exam     Strength   Strength 5/5 throughout.     Gait, Coordination, and Reflexes     Reflexes   Right brachioradialis: 1+  Left brachioradialis: 1+  Right biceps: 1+  Left biceps: 1+  Right triceps: 1+  Left triceps: 1+  Right patellar: 1+  Left patellar: 1+  Right achilles: 1+  Left achilles: 1+          Assessment/Plan   Independent Review of Radiographic Studies:      I did review the thoracic MRI from March 5, 2020.  It shows multilevel disc degeneration as well as a left-sided disc bulge at T5-T6 and a right sided disc bulge at T6-T7.  There is a right sided disc extrusion at T12-L1 causing fairly severe foraminal narrowing on the right.  No significant canal stenosis throughout the thoracic spine.  No evidence of metastases.    Medical Decision Making:    Ms. Temple was referred to us by Dr. Jhaveri for history of neck, mid back and low back pain.  The patient was diagnosed with breast cancer in August 2019.  She underwent a left mastectomy and is currently undergoing chemotherapy.  She states that she has had back pain and lumbar radiculopathy in the past and had an injection many years ago for left leg pain which thankfully helped quite a bit.  She has never had any lumbar radiculopathy in the last 2 years since that injection but does still intermittently have some numbness and tingling occasionally in her left foot and leg.  This has not changed in frequency or severity over the last 2 years.  She has also had some right sided  neck pain intermittently.  Her biggest complaint is right sided mid thoracic pain that began without accident or injury 3 or 4 months ago.  She initially attributed the discomfort to lifting her 2-year-old grandchild but the pain failed to improve and she mentioned it to Dr. Jhaveri who did the thoracic MRI discussed above.  She admits to mild generalized weakness and gait issues that have been present for years.  However she denies any focal weakness or recent change in balance or any incontinence.  No numbness or tingling other than the intermittent numbness and tingling in the left foot and leg.  She has not had any physical therapy or injections but is using Lortab as prescribed by Dr. Jhaveri which has helped.    Her exam does not reveal any focal weakness.  She is hyporeflexive.  No long tract signs or sensory deficits.    I reviewed the thoracic MRI with her and explained that I thankfully do not appreciate anything that looks surgical at this time.  The disc bulging on the right side at T6-T7 correlates with where she is having some radicular nerve pain in that region.  I do think it explains her symptoms and discussed with her that we could initially try some injections.  She is interested in both injections as well as continued pain medication so I will refer her to pain management.  Although the T12-L1 disc herniation/extrusion is causing fairly significant foraminal narrowing she really does not have any discomfort in that location.  I offered to order a cervical and lumbar MRI to follow-up on her more chronic and milder neck and back pain but at this point she would prefer to hold off.  I will go ahead and refer her to pain management and have her follow-up in 2 months.  If the thoracic radiculopathy fails to improve then surgery could potentially be considered but hopefully her symptoms will improve with conservative management and surgery can be avoided.  She will also call in the interim with any  questions or concerns or changes.    Allie was seen today for back pain and neck pain.    Diagnoses and all orders for this visit:    Lumbar degenerative disc disease  -     Ambulatory Referral to Pain Management    DDD (degenerative disc disease), thoracic  -     Ambulatory Referral to Pain Management    Thoracic radiculopathy  -     Ambulatory Referral to Pain Management      Return in about 2 months (around 7/21/2020).

## 2020-05-20 ENCOUNTER — TELEPHONE (OUTPATIENT)
Dept: NEUROSURGERY | Facility: CLINIC | Age: 65
End: 2020-05-20

## 2020-05-20 NOTE — TELEPHONE ENCOUNTER
I called and left message on patient's voicemail regarding needing to do prescreening for her appointment.

## 2020-05-21 ENCOUNTER — OFFICE VISIT (OUTPATIENT)
Dept: NEUROSURGERY | Facility: CLINIC | Age: 65
End: 2020-05-21

## 2020-05-21 VITALS
DIASTOLIC BLOOD PRESSURE: 96 MMHG | BODY MASS INDEX: 31.55 KG/M2 | WEIGHT: 213 LBS | HEART RATE: 65 BPM | HEIGHT: 69 IN | TEMPERATURE: 96.9 F | SYSTOLIC BLOOD PRESSURE: 160 MMHG

## 2020-05-21 DIAGNOSIS — M54.14 THORACIC RADICULOPATHY: ICD-10-CM

## 2020-05-21 DIAGNOSIS — M51.36 LUMBAR DEGENERATIVE DISC DISEASE: Primary | ICD-10-CM

## 2020-05-21 DIAGNOSIS — M54.6 ACUTE BILATERAL THORACIC BACK PAIN: ICD-10-CM

## 2020-05-21 DIAGNOSIS — M51.34 DDD (DEGENERATIVE DISC DISEASE), THORACIC: ICD-10-CM

## 2020-05-21 PROBLEM — M54.12 CERVICAL RADICULOPATHY: Status: ACTIVE | Noted: 2020-05-21

## 2020-05-21 PROCEDURE — 99244 OFF/OP CNSLTJ NEW/EST MOD 40: CPT | Performed by: PHYSICIAN ASSISTANT

## 2020-05-21 RX ORDER — HYDROCODONE BITARTRATE AND ACETAMINOPHEN 7.5; 325 MG/1; MG/1
2 TABLET ORAL EVERY 6 HOURS PRN
Qty: 24 TABLET | Refills: 0 | Status: SHIPPED | OUTPATIENT
Start: 2020-05-21 | End: 2020-07-23

## 2020-05-21 NOTE — TELEPHONE ENCOUNTER
Patient says she went to see neurosurgeon, Nereida Bermudez, today and was advised that she would be starting injections for her back soon. This has to be arranged still and the date of procedure is not yet known. She was told to contact pcp and see about getting a refill on hydrocodone to last her until the injection could be scheduled.   LF 4/29/20  OV 2/27/20  There is no NA or UDS on file.  Luis Enrique updated.

## 2020-05-27 ENCOUNTER — TELEPHONE (OUTPATIENT)
Dept: ONCOLOGY | Facility: CLINIC | Age: 65
End: 2020-05-27

## 2020-05-27 NOTE — PROGRESS NOTES
Subjective     REASON FOR CONSULTATION:   1. Left breast cancer T2N0 3.6 cm grade 3 ER NH negative HER-2 positive infiltrating ductal carcinoma post excisional biopsy, followed by mastectomy and axillary dissection- borderline -EF  Taxol Herceptin Perjeta for 12 weeks followed by Perjeta Herceptin for 1 year planned                               REQUESTING PHYSICIAN: Jase Khan DO    History of Present Illness patient is a 64-year-old female with a large 3.6cm ER NH negative HER-2 positive breast cancer node negative surgically excised receiving adjuvant treatment.      Initially because of the large size and comorbidities we were thinking of weekly Taxol Herceptin perjeta followed by Adriamycin Cytoxan however she had cardiac evaluation with echo with a borderline ejection fraction and had a stress test which showed no ischemia but again the ejection fraction of 50 to 52% and based on her shortness of breath with exertion and symptomatology, it was felt best to proceed instead with just weekly Taxol Herceptin perjeta followed by a year of Herceptin perjeta. Concern for the anthracycline possibly contributing to cardiotoxicity in a patient with borderline cardiac function.    She is tolerating Herceptin Perjeta without any problems.  Her echocardiogram dated 5/4/2020 shows an ejection fraction of 58 which is actually better than it has been before she started.  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    Past Medical History:   Diagnosis Date   • Adjustment disorder    • Allergic rhinitis    • Amenorrhea    • Anxiety    • Breast cancer (CMS/HCC) 04/18/2019    Left breast mammary carcinoma   • Bruit    • Carpal tunnel syndrome    • Carrier of tuberculosis    • Chronic pain    • Daytime somnolence    • De Quervain's tenosynovitis    • Degenerative cervical disc    • Depression    • Dyslipidemia    • Eustachian tube dysfunction    • History of UTI    • Hyperlipidemia    • Hypertension    • Impaired fasting glucose    • Lumbar degenerative disc disease    • Numbness and tingling     LEFT LEG   • OAB (overactive bladder)    • Precordial pain    • Scapulothoracic syndrome    • Sciatica         Past Surgical History:   Procedure Laterality Date   • APPENDECTOMY N/A    • BREAST BIOPSY Left 2019    Left breast ultrasound guided cyst aspiration, 9:00 position-Dr. Gerry Taylor, [facility]   • BREAST LUMPECTOMY Left 5/2/2019    Procedure: Left BREAST LUMPECTOMY;  Surgeon: Jase Evans MD;  Location: [facility] OR;  Service: General   • LUMBAR EPIDURAL INJECTION N/A    • MASTECTOMY Left 6/21/2019    Procedure: Left BREAST MASTECTOMY;  Surgeon: Jase Evans MD;  Location: [facility] OR;  Service: General   • TUBAL ABDOMINAL LIGATION Bilateral    • VAGINAL DELIVERY      x3   • VENOUS ACCESS DEVICE (PORT) INSERTION Right 8/27/2019    Procedure: INSERTION VENOUS ACCESS DEVICE;  Surgeon: Jase Evans MD;  Location: [facility] OR;  Service: General      ONC HISTORY;  patient is a 64-year-old retired  with hypertension hypercholesterolemia and degenerative arthritis who felt a mass in her left breast in March.  She showed it to her family doctor and underwent imaging at [facility] on 3/13/2019 which Showed a 3 x 3.2 cm  mass at 9:00 which on ultrasound showed a thick-walled benign-appearing 2.8 x 2.5 cm cyst which was felt to not be suspicious .because of persistence of symptoms she was reevaluated on 2019 and underwent aspiration and core biopsy because there was a significant soft tissue component to the mass at that time this was aspirated and the final report  showed fine-needle aspiration suspicious for malignant cells favor mammary carcinoma  Patient was referred to Dr. Kee who took her for an excisional biopsy on 2019 with the final report showing a  mass grade 3 -4x3.6cm with tumor extending to one margin and DCIS also 0.2 mm from one margin.  The tumor was ER DC negative HER-2 3+.  The patient was then taken for surgery on 2019 at which time she had a completion mastectomy and axillary node biopsy with the findings of residual high-grade DCIS with clear margins and an incidental intraductal papilloma and 17-neg lymph nodes making this a T2N0 tumor    She has done well postoperatively and is here to discuss adjuvant treatment    She is  3 para 3 menarche  at age 15 and menopause in her early 40s when she had a hysterectomy for heavy menstrual bleeding.  She took hormone replacement for 10 years and stopped 10 years ago first childbirth was at age 25 she did not breast-feed  Family history is positive for mother who had uterine cancer at age 60 and  of it at age 65 she has a brother who  of liver cancer related to drinking there is no other malignancy in the family that she is aware of    She is a significant smoker for the last 48 years but does not drink     Patient and her  were very taken to back by the suggestion about chemotherapy and apparently had thought that there was no further treatment needed and I spent almost an hour presenting the data concerning the extreme effectiveness of HER-2 directed therapy and this situation with a high risk of recurrence without adjuvant  treatment and she finally was convinced to do the treatment    We discussed smoking cessation but at this point she is so nervous and agitated with the prospect of chemotherapy that we will hold off on this until she is snf through the chemotherapy and rediscuss it    I discussed the case also with Dr. Kee will place a port and we will plan to start chemotherapy in 2 weeks.    Patient initiating therapy with Taxol/Herceptin/Perjeta 8/29/19.  9/19  She is followed by Dr. Dudley, cardiology, who gave clearance for the patient to proceed with chemotherapy.  repeat echo 6 weeks from last actually showed improvement in the cardiologist thought there may have been some technical issues with her first echocardiogram.    She returns back today, due for cycle3 day 1 of Taxol/Herceptin perjeta her diarrhea is-clearly controlled with the Imodium and in fact she became constipated because she took too much of this but she is got this under control and feels good  She has some reflux symptoms in the morning which makes her nauseous and I suggested she take 1 Prilosec every day till the chemo was over and this is improved    She has no neuropathy but is not brought the cooling gloves and I recommended this to her and gave her the literature supporting this in the website  She has had a itchy rash on her arms and legs which is not too bothersome and I have to suggested a steroid cream and some Benadryl and we will keep an eye on the rash- she has no shortness of breath        Current Outpatient Medications on File Prior to Visit   Medication Sig Dispense Refill   • diphenoxylate-atropine (LOMOTIL) 2.5-0.025 MG per tablet Take 1 tablet by mouth 4 (Four) Times a Day As Needed for Diarrhea. 90 tablet 0   • Fexofenadine HCl (ALLEGRA PO) Take  by mouth.     • HYDROcodone-acetaminophen (NORCO) 7.5-325 MG per tablet Take 2 tablets by mouth Every 6 (Six) Hours As Needed for Moderate Pain . 24 tablet 0   • MAGNESIUM PO Take 200 mg by  mouth.     • mometasone (NASONEX) 50 MCG/ACT nasal spray 2 sprays into the nostril(s) as directed by provider Daily.     • Multiple Vitamins-Minerals (CENTRUM SILVER PO) Take 1 tablet by mouth Daily. womens vitamin     • omeprazole (priLOSEC) 20 MG capsule Take 20 mg by mouth Daily.     • ondansetron ODT (ZOFRAN-ODT) 8 MG disintegrating tablet Take 1 tablet by mouth Every 8 (Eight) Hours As Needed for Nausea or Vomiting. 30 tablet 2   • potassium chloride ER (K-TAB) 20 MEQ tablet controlled-release ER tablet Take 1 tablet by mouth 2 (Two) Times a Day. 60 tablet 1   • sertraline (ZOLOFT) 50 MG tablet TAKE 1 TABLET BY MOUTH ONE TIME A DAY  30 tablet 1   • TiZANidine (ZANAFLEX) 4 MG capsule Take 1 capsule by mouth 3 (Three) Times a Day. (Patient taking differently: Take 4 mg by mouth 3 (Three) Times a Day As Needed.) 90 capsule 0   • vitamin B-12 (CYANOCOBALAMIN) 100 MCG tablet Take 100 mcg by mouth Daily.       No current facility-administered medications on file prior to visit.         ALLERGIES:    Allergies   Allergen Reactions   • Gabapentin Hallucinations   • Effexor [Venlafaxine] Itching   • Naproxen Itching     Pt reports severe vaginal itching    • Zyrtec [Cetirizine] Itching     Non-effecious   • Ibuprofen Unknown - Low Severity     Burns while urinating  Can take low dose Ibuprofen   • Penicillins Rash   • Sulfa Antibiotics Rash        Social History     Socioeconomic History   • Marital status:      Spouse name: Pawan   • Number of children: 3   • Years of education: High school   • Highest education level: Not on file   Occupational History     Employer: RETIRED   Tobacco Use   • Smoking status: Current Every Day Smoker     Packs/day: 0.25     Years: 48.00     Pack years: 12.00     Types: Cigarettes   • Smokeless tobacco: Never Used   • Tobacco comment: 3-4 cigarettes a day. Trying to quit.    Substance and Sexual Activity   • Alcohol use: No   • Drug use: No   • Sexual activity: Yes     Partners:  "Male     Birth control/protection: Post-menopausal        Family History   Problem Relation Age of Onset   • Ovarian cancer Mother    • Endometrial cancer Mother    • COPD Father    • Stroke Brother    • Malig Hyperthermia Neg Hx         Review of Systems   Constitutional: Positive for fatigue (same 5/28/2020). Negative for diaphoresis.   HENT: Positive for sneezing (sneezing an drainage 5/28/2020). Negative for congestion (better 10/31/19) and sinus pain (stable 5/28/2020).    Eyes: Positive for discharge (5/28/2020). Negative for visual disturbance (right eye twitching better 1/23/2020).   Respiratory: Positive for cough (in the morning 5/28/2020).    Cardiovascular: Negative for chest pain.   Gastrointestinal: Positive for diarrhea (worse 5/28/2020). Negative for nausea (stable 10/10/19).   Endocrine: Negative.    Genitourinary: Negative for difficulty urinating (cant hold bladder 11/22/19).   Musculoskeletal: Positive for back pain (low back muscle spasm same lump on upper back 5/28/2020), gait problem (right shoulder same 5/28/2020) and neck pain (neck into shoulder worse 5/28/2020).   Skin: Negative for rash (resolved 3/26/2020).   Neurological: Positive for dizziness (5/28/2020) and numbness (comes and goes 5/28/2020).   Hematological: Negative.    Psychiatric/Behavioral: Negative.         Objective     Vitals:    05/28/20 0828   BP: (!) 187/78   Pulse: 63   Resp: 16   Temp: 98 °F (36.7 °C)   SpO2: 95%   Weight: 97.1 kg (214 lb)   Height: 176.3 cm (69.41\")   PainSc:   5   PainLoc: Back  Comment: took pain meds     Current Status 5/28/2020   ECOG score 0       Physical Exam   Pulmonary/Chest:           GENERAL:  Well-developed, well-nourished in no acute distress.   SKIN:  Warm, dry fading maculopapular rash on her arms and legs,no  purpura or petechiae.  EYES:  Pupils equal, round and reactive to light.  EOMs intact.  Conjunctivae normal.  EARS:  Hearing intact.  NOSE:  Septum midline.  No excoriations or " nasal discharge.  MOUTH:  Tongue is well-papillated; no stomatitis or ulcers.  Lips normal.  THROAT:  Oropharynx without lesions or exudates.  NECK:  Supple with good range of motion; no thyromegaly or masses, no JVD.  LYMPHATICS:  No cervical, supraclavicular, axillary or inguinal adenopathy.  CHEST:  Lungs clear to auscultation. Good airflow.  Mediport right chest wall benign.  BREASTS: Right breast is benign; left chest wall shows a well-healed mastectomy scar with nodularity laterally no change as of 5/27/2020  CARDIAC:  Regular rate and rhythm without murmurs, rubs or gallops. Normal S1,S2.  ABDOMEN:  Soft, nontender with no hepatosplenomegaly or masses.  EXTREMITIES:  No clubbing, cyanosis or edema.  NEUROLOGICAL:  Cranial Nerves II-XII grossly intact.  No focal neurological deficits.  PSYCHIATRIC:  Normal affect and mood.        RECENT LABS:  Results from last 7 days   Lab Units 05/28/20  0753   WBC 10*3/mm3 5.70   NEUTROS ABS 10*3/mm3 2.35   HEMOGLOBIN g/dL 12.8   HEMATOCRIT % 38.3   PLATELETS 10*3/mm3 213     Results from last 7 days   Lab Units 05/28/20  0753   SODIUM mmol/L 141   POTASSIUM mmol/L 3.9   CHLORIDE mmol/L 105   CO2 mmol/L 24.9   BUN mg/dL 10   CREATININE mg/dL 0.72   CALCIUM mg/dL 9.4   ALBUMIN g/dL 4.20   BILIRUBIN mg/dL 0.4   ALK PHOS U/L 125*   ALT (SGPT) U/L 16   AST (SGOT) U/L 22   GLUCOSE mg/dL 79   MAGNESIUM mg/dL 1.9         RADIOGRAPHIC DATA:    US Breat Left Limited    1. Left Breast Lumpectomy (47 Grams):  A. Invasive poorly differentiated mammary carcinoma of no special type (ductal carcinoma)  with apocrine features.  1. Invasive carcinoma measures up to 40 x 36 x 27 mm (measured grossly).  2. Overall [place] Grade III (glandular score = 3, nuclear score = 3, mitotic score = 3).  3. No definitive lymphovascular space invasion identified.  B. Invasive carcinoma focally extends to one undesignated peripheral inked margin of excision.  C. Associated ductal carcinoma in situ  (DCIS).  1. Ductal carcinoma in situ (DCIS) spans area measuring 40 mm in single greatest  aggregate dimension (estimated from gross and glass slides).  2. DCIS predominantly solid and comedo type with high grade nuclear features.  3. High grade DCIS approximates the closest peripheral inked margin to 0.2 mm.  Page: 1 of 5 Printed: 5/6/2019 1:10 PM  [phone]  Resulting  Tissue Pathology Exam: [id number]   Order: [id number]   Collected:  5/2/2019 12:26 Status:  Edited Result - FINAL   Visible to patient:  No (Not Released) Dx:  Malignant neoplasm of upper-inner hector...   Component    Addendum   HER2 by Immunohistochemistry   Positive (Score 3+)     HER2 by Immunohistochemistry  FDA approved (specify test/vendor): [employer]     Primary Antibody  [id number]     Cold Ischemia and Fixation Times   Meet requirements specified in latest version of the ASCO/CAP guidelines         Cold Ischemia Time: 18 minutes  Fixation Time: 8.25 hours  Testing Performed on Block Number(s): 1E  Fixative: Formalin   Addendum electronically signed by Harman Perry MD on 5/6/2019          Final Diagnosis  1. Left Breast, Modified Radical Mastectomy (935 grams): HIGH GRADE DUCTAL CARCINOMA IN-SITU  (DCIS) .  A. Nuclear Grade: High.  B. Architectural Type: Solid and comedo with lobular extension.  1. Size (extent) of DCIS: DCIS multifocal in the lower inner quadrant, measuring 0.9 cm in maximal  dimension (DCIS present in 3/18 tissue blocks).  C. No LCIS identified.  D. Skin and nipple uninvolved by DCIS.  E. Margins: Uninvolved by DCIS.  1. DCIS comes to within 1.3 cm of the anterior margin of excision (closest margin).  F. Additional findings: Incidental intraductal papilloma, florid ductal hyperplasia and apocrine cysts.  G. Lymph nodes: Sixteen benign lymph nodes (0/16).  H. Hormone receptor status: ER and HI negative, Her2/kali positive by IHC (performed on prior resection  [id number]).  I. Pathologic stage: pT2, N0.  Swm/kds      Regadenoson  Stress Test With Myocardial Perfusion SPECT   Interpretation Summary     · Myocardial perfusion imaging indicates a normal myocardial perfusion study with no evidence of ischemia.  · Left ventricular ejection fraction is borderline normal (Calculated EF = 50%).  · Impressions are consistent with a low risk study.      Study Description         Nuclear Perfusion Images Overall image quality is excellent. There are no artifacts present. Raw images reviewed with no abnormalities noted.       Echo 9/16  Interpretation Summary     · Left ventricular systolic function is normal. Calculated EF = 55%. Estimated EF = 55%. Global Longitudinal LV strain = -21.6%. Strain data was reviewed and speckle tracking was considered accurate. The global longitudinal LV strain is -21.6 and normal. Normal left ventricular cavity size and wall thickness noted. All left ventricular wall segments contract normally. Left ventricular diastolic dysfunction is noted (grade I) consistent with impaired relaxation.  · Trace mitral valve regurgitation is present.  · Physiologic tricuspid valve regurgitation is present. Calculated right ventricular systolic pressure from tricuspid regurgitation is 16.0 mmHg. Insufficient TR velocity profile to estimate the right ventricular systolic pressure.     Interpretation Summary     · Left ventricular systolic function is normal. Calculated EF = 57%. Estimated EF was in agreement with the calculated EF. Estimated EF = 57%. Global Longitudinal LV strain = -21%. Strain data was reviewed and speckle tracking was considered accurate. The global longitudinal LV strain is normal.  · Normal left ventricular cavity size noted. All left ventricular wall segments contract normally. Left ventricular wall thickness is consistent with mild concentric hypertrophy. Left ventricular diastolic dysfunction is noted (grade I) consistent with impaired relaxation.  · Mild tricuspid valve regurgitation is present. Estimated right  ventricular systolic pressure from tricuspid regurgitation is normal (<35 mmHg). Calculated right ventricular systolic pressure from tricuspid regurgitation is 30 mmHg.        Interpretation Summary     · Left ventricular systolic function is normal. Calculated EF = 57%. Estimated EF was in agreement with the calculated EF. Estimated EF = 57%. Global Longitudinal LV strain = -20.1%. All left ventricular wall segments contract normally. The left ventricular cavity is mildly dilated. Left ventricular wall thickness is consistent with moderate concentric hypertrophy. Left ventricular diastolic dysfunction is noted (grade I a w/high LAP) consistent with impaired relaxation.  · Left atrial cavity size is mildly dilated.  · Mild mitral valve regurgitation is present.  · Trace tricuspid valve regurgitation is present. Estimated right ventricular systolic pressure from tricuspid regurgitation is normal (<35 mmHg). Calculated right ventricular systolic pressure from tricuspid regurgitation is 21 mmHg.  · There is a small (<1cm) pericardial effusion. There is no evidence of cardiac tamponade.      Electronically signed by Keara Dudley MD on 1/22/20 at 0900 EST    MRI OF THE THORACIC SPINE WITH AND WITHOUT CONTRAST 03/05/2020  IMPRESSION:  1. There is a left posterior lateral disc bulge or tiny disc protrusion  that results in minimal if any narrowing of the left side of the canal  at C5-6 and there is a right paracentral disc herniation at T6-7 that  abuts and mildly flattens the right ventral surface of the cord mildly  narrowing the right side of the canal at T6-7. There is a small right  paracentral disc bulge or protrusion only minimally narrowing the canal  at T7-8. At T11-12 there is a left posterior lateral disc osteophyte  complex minimally narrowing the left side of the canal. No additional  canal or foraminal narrowing is seen in the thoracic spine.  2. There is a right paracentral posterior lateral disc herniation  at  T12-L1 with an inferiorly migrated extruded disc fragment that maximally  measures up to 13 x 6 x 10 mm in medial lateral, anterior posterior and  craniocaudal dimension and mild to moderately narrows the right side of  the canal at the level of the superior body and endplate of L1, it abuts  the ventral aspect of the right L1 nerve root in the superior portion of  the right lateral recess of L1.  3. There are some right anterior lateral marginal bridging osteophytes  from T8 to T11 with some focal bone marrow edema and enhancement in the  right anterior aspect of the T9 vertebra that is likely degenerative  marrow edema. The remainder of the thoracic spine MRI is normal with no  evidence of metastatic disease of the thoracic spine.     Interpretation Summary 5/4/2020    · Left ventricular systolic function is normal. Calculated EF = 58%. Estimated EF = 58%. Global Longitudinal LV strain = -19%. Strain data was reviewed and speckle tracking was considered accurate. The global longitudinal LV strain is normal.  · All left ventricular wall segments contract normally. The left ventricular cavity is borderline dilated. Left ventricular wall thickness is consistent with mild concentric hypertrophy. Left ventricular diastolic dysfunction is noted (grade I a w/high LAP) consistent with impaired  · Mild mitral valve regurgitation is present.  · Mild tricuspid valve regurgitation is present. Estimated right ventricular systolic pressure from tricuspid regurgitation is normal (<35 mmHg). Calculated right ventricular systolic pressure from tricuspid regurgitation is 33 mmHg.             Assessment/Plan   1.  T2N0 grade 3 infiltrating ductal carcinoma left breast ER [place] negative HER-2 positive post mastectomy and axillary node dissection with clear margins.  · Initiated Taxol/Herceptin/Perjeta today, 8/29/19.  · Plan to do 12 weeks of Taxol Herceptin perjeta without Adriamycin because of her borderline cardiac function  followed by a year of Perjeta Herceptin    2.  Tobacco abuse and COPD    3.  Abnormal echo with stress test normal but borderline cardiac function at 52%  We will avoid Adriamycin Cytoxan  and treat with Taxol Herceptin perjeta and a year of Herceptin perjeta. Dr. Dudley, cardiology, has given clearance for patient to begin.  Planning repeat echocardiogram 6 weeks from previous.    · Improved EF to 55%  · EF 57% in 1/21/2020  · EF 58% in 5/4/2020    4.  Venous access.  Status post new Mediport placement 8/27/2019.  Working well today.  .    · No more refills of Percocet planned    5.  Diarrhea related to Perjeta.  Improved with Imodium    6.  Skin rash on her arms and legs likely from  perjeta continue to watch and use steroid cream-stable    7.  Low magnesium due to diarrhea-diarrhea resolved    8.  Back and shoulder and neck pain MRI of thoracic spine shows multiple disc protrusions with no evidence of metastatic disease will refer to neurosurgery    Plan:  1.  Proceed with maintenance/herceptin perjeta.  Every 3 weeks until the end of July  2.   Utilize Imodium as outlined above.  Continue Prilosec  3.   Return in 3 /6 week for perjeta Herceptin  4.  Return in 9 weeks for follow-up with Dr. Jhaveri and last dose of Herceptin/Perjeta.       This patient is on drug therapy requiring intensive monitoring for toxicity.  We addressed current concerns of pain and diarrhea as outlined above.

## 2020-05-27 NOTE — TELEPHONE ENCOUNTER
PT returned call for covid screening.         Cough: no     Shortness of breath: no     Fever: no     Chills: no     Muscle pain: no     Sore throat: no     New loss of taste or smell: no     Trouble breathing: no     Persistent pain or pressure in the chest: no     New confusion: no     Inability to wake or stay awake: no     Bluish lips or face: no

## 2020-05-28 ENCOUNTER — INFUSION (OUTPATIENT)
Dept: ONCOLOGY | Facility: HOSPITAL | Age: 65
End: 2020-05-28

## 2020-05-28 ENCOUNTER — OFFICE VISIT (OUTPATIENT)
Dept: ONCOLOGY | Facility: CLINIC | Age: 65
End: 2020-05-28

## 2020-05-28 VITALS
WEIGHT: 214 LBS | HEIGHT: 69 IN | TEMPERATURE: 98 F | HEART RATE: 63 BPM | BODY MASS INDEX: 31.7 KG/M2 | RESPIRATION RATE: 16 BRPM | OXYGEN SATURATION: 95 % | SYSTOLIC BLOOD PRESSURE: 187 MMHG | DIASTOLIC BLOOD PRESSURE: 78 MMHG

## 2020-05-28 DIAGNOSIS — Z79.899 ENCOUNTER FOR LONG-TERM (CURRENT) USE OF HIGH-RISK MEDICATION: ICD-10-CM

## 2020-05-28 DIAGNOSIS — Z17.1 MALIGNANT NEOPLASM OF UPPER-INNER QUADRANT OF LEFT BREAST IN FEMALE, ESTROGEN RECEPTOR NEGATIVE (HCC): Primary | ICD-10-CM

## 2020-05-28 DIAGNOSIS — C50.212 MALIGNANT NEOPLASM OF UPPER-INNER QUADRANT OF LEFT BREAST IN FEMALE, ESTROGEN RECEPTOR NEGATIVE (HCC): Primary | ICD-10-CM

## 2020-05-28 DIAGNOSIS — C50.212 MALIGNANT NEOPLASM OF UPPER-INNER QUADRANT OF LEFT BREAST IN FEMALE, ESTROGEN RECEPTOR NEGATIVE (HCC): ICD-10-CM

## 2020-05-28 DIAGNOSIS — Z17.1 MALIGNANT NEOPLASM OF UPPER-INNER QUADRANT OF LEFT BREAST IN FEMALE, ESTROGEN RECEPTOR NEGATIVE (HCC): ICD-10-CM

## 2020-05-28 LAB
ALBUMIN SERPL-MCNC: 4.2 G/DL (ref 3.5–5.2)
ALBUMIN/GLOB SERPL: 1.6 G/DL (ref 1.1–2.4)
ALP SERPL-CCNC: 125 U/L (ref 38–116)
ALT SERPL W P-5'-P-CCNC: 16 U/L (ref 0–33)
ANION GAP SERPL CALCULATED.3IONS-SCNC: 11.1 MMOL/L (ref 5–15)
AST SERPL-CCNC: 22 U/L (ref 0–32)
BASOPHILS # BLD AUTO: 0.05 10*3/MM3 (ref 0–0.2)
BASOPHILS NFR BLD AUTO: 0.9 % (ref 0–1.5)
BILIRUB SERPL-MCNC: 0.4 MG/DL (ref 0.2–1.2)
BUN BLD-MCNC: 10 MG/DL (ref 6–20)
BUN/CREAT SERPL: 13.9 (ref 7.3–30)
CALCIUM SPEC-SCNC: 9.4 MG/DL (ref 8.5–10.2)
CHLORIDE SERPL-SCNC: 105 MMOL/L (ref 98–107)
CO2 SERPL-SCNC: 24.9 MMOL/L (ref 22–29)
CREAT BLD-MCNC: 0.72 MG/DL (ref 0.6–1.1)
DEPRECATED RDW RBC AUTO: 42.5 FL (ref 37–54)
EOSINOPHIL # BLD AUTO: 0.23 10*3/MM3 (ref 0–0.4)
EOSINOPHIL NFR BLD AUTO: 4 % (ref 0.3–6.2)
ERYTHROCYTE [DISTWIDTH] IN BLOOD BY AUTOMATED COUNT: 12.9 % (ref 12.3–15.4)
GFR SERPL CREATININE-BSD FRML MDRD: 81 ML/MIN/1.73
GLOBULIN UR ELPH-MCNC: 2.7 GM/DL (ref 1.8–3.5)
GLUCOSE BLD-MCNC: 79 MG/DL (ref 74–124)
HCT VFR BLD AUTO: 38.3 % (ref 34–46.6)
HGB BLD-MCNC: 12.8 G/DL (ref 12–15.9)
IMM GRANULOCYTES # BLD AUTO: 0.01 10*3/MM3 (ref 0–0.05)
IMM GRANULOCYTES NFR BLD AUTO: 0.2 % (ref 0–0.5)
LYMPHOCYTES # BLD AUTO: 2.62 10*3/MM3 (ref 0.7–3.1)
LYMPHOCYTES NFR BLD AUTO: 46 % (ref 19.6–45.3)
MAGNESIUM SERPL-MCNC: 1.9 MG/DL (ref 1.8–2.5)
MCH RBC QN AUTO: 30.5 PG (ref 26.6–33)
MCHC RBC AUTO-ENTMCNC: 33.4 G/DL (ref 31.5–35.7)
MCV RBC AUTO: 91.2 FL (ref 79–97)
MONOCYTES # BLD AUTO: 0.44 10*3/MM3 (ref 0.1–0.9)
MONOCYTES NFR BLD AUTO: 7.7 % (ref 5–12)
NEUTROPHILS # BLD AUTO: 2.35 10*3/MM3 (ref 1.7–7)
NEUTROPHILS NFR BLD AUTO: 41.2 % (ref 42.7–76)
NRBC BLD AUTO-RTO: 0 /100 WBC (ref 0–0.2)
PLATELET # BLD AUTO: 213 10*3/MM3 (ref 140–450)
PMV BLD AUTO: 9.4 FL (ref 6–12)
POTASSIUM BLD-SCNC: 3.9 MMOL/L (ref 3.5–4.7)
PROT SERPL-MCNC: 6.9 G/DL (ref 6.3–8)
RBC # BLD AUTO: 4.2 10*6/MM3 (ref 3.77–5.28)
SODIUM BLD-SCNC: 141 MMOL/L (ref 134–145)
WBC NRBC COR # BLD: 5.7 10*3/MM3 (ref 3.4–10.8)

## 2020-05-28 PROCEDURE — 96367 TX/PROPH/DG ADDL SEQ IV INF: CPT

## 2020-05-28 PROCEDURE — 83735 ASSAY OF MAGNESIUM: CPT

## 2020-05-28 PROCEDURE — 96375 TX/PRO/DX INJ NEW DRUG ADDON: CPT

## 2020-05-28 PROCEDURE — 96413 CHEMO IV INFUSION 1 HR: CPT

## 2020-05-28 PROCEDURE — 80053 COMPREHEN METABOLIC PANEL: CPT

## 2020-05-28 PROCEDURE — 25010000002 DIPHENHYDRAMINE PER 50 MG: Performed by: INTERNAL MEDICINE

## 2020-05-28 PROCEDURE — 96415 CHEMO IV INFUSION ADDL HR: CPT

## 2020-05-28 PROCEDURE — 25010000002 PERTUZUMAB 420 MG/14ML SOLUTION 420 MG VIAL: Performed by: INTERNAL MEDICINE

## 2020-05-28 PROCEDURE — 99214 OFFICE O/P EST MOD 30 MIN: CPT | Performed by: INTERNAL MEDICINE

## 2020-05-28 PROCEDURE — 25010000002 TRASTUZUMAB PER 10 MG: Performed by: INTERNAL MEDICINE

## 2020-05-28 PROCEDURE — 85025 COMPLETE CBC W/AUTO DIFF WBC: CPT

## 2020-05-28 PROCEDURE — 96417 CHEMO IV INFUS EACH ADDL SEQ: CPT

## 2020-05-28 RX ORDER — DIPHENHYDRAMINE HYDROCHLORIDE 50 MG/ML
50 INJECTION INTRAMUSCULAR; INTRAVENOUS AS NEEDED
Status: CANCELLED | OUTPATIENT
Start: 2020-05-28

## 2020-05-28 RX ORDER — FAMOTIDINE 10 MG/ML
20 INJECTION, SOLUTION INTRAVENOUS AS NEEDED
Status: CANCELLED | OUTPATIENT
Start: 2020-05-28

## 2020-05-28 RX ORDER — SODIUM CHLORIDE 9 MG/ML
250 INJECTION, SOLUTION INTRAVENOUS ONCE
Status: CANCELLED | OUTPATIENT
Start: 2020-06-25

## 2020-05-28 RX ORDER — FAMOTIDINE 10 MG/ML
20 INJECTION, SOLUTION INTRAVENOUS ONCE
Status: COMPLETED | OUTPATIENT
Start: 2020-05-28 | End: 2020-05-28

## 2020-05-28 RX ORDER — FAMOTIDINE 10 MG/ML
20 INJECTION, SOLUTION INTRAVENOUS ONCE
Status: CANCELLED | OUTPATIENT
Start: 2020-05-28

## 2020-05-28 RX ORDER — DIPHENHYDRAMINE HYDROCHLORIDE 50 MG/ML
50 INJECTION INTRAMUSCULAR; INTRAVENOUS AS NEEDED
Status: CANCELLED | OUTPATIENT
Start: 2020-06-25

## 2020-05-28 RX ORDER — SODIUM CHLORIDE 9 MG/ML
250 INJECTION, SOLUTION INTRAVENOUS ONCE
Status: COMPLETED | OUTPATIENT
Start: 2020-05-28 | End: 2020-05-28

## 2020-05-28 RX ORDER — FAMOTIDINE 10 MG/ML
20 INJECTION, SOLUTION INTRAVENOUS AS NEEDED
Status: CANCELLED | OUTPATIENT
Start: 2020-06-25

## 2020-05-28 RX ORDER — FAMOTIDINE 10 MG/ML
20 INJECTION, SOLUTION INTRAVENOUS ONCE
Status: CANCELLED | OUTPATIENT
Start: 2020-06-25

## 2020-05-28 RX ORDER — SODIUM CHLORIDE 9 MG/ML
250 INJECTION, SOLUTION INTRAVENOUS ONCE
Status: CANCELLED | OUTPATIENT
Start: 2020-05-28

## 2020-05-28 RX ADMIN — DIPHENHYDRAMINE HYDROCHLORIDE 25 MG: 50 INJECTION INTRAMUSCULAR; INTRAVENOUS at 09:18

## 2020-05-28 RX ADMIN — SODIUM CHLORIDE 250 ML: 9 INJECTION, SOLUTION INTRAVENOUS at 09:17

## 2020-05-28 RX ADMIN — TRASTUZUMAB 590 MG: 150 INJECTION, POWDER, LYOPHILIZED, FOR SOLUTION INTRAVENOUS at 10:36

## 2020-05-28 RX ADMIN — FAMOTIDINE 20 MG: 10 INJECTION INTRAVENOUS at 09:18

## 2020-05-28 RX ADMIN — PERTUZUMAB 420 MG: 30 INJECTION, SOLUTION, CONCENTRATE INTRAVENOUS at 09:36

## 2020-06-01 ENCOUNTER — TELEPHONE (OUTPATIENT)
Dept: NEUROSURGERY | Facility: CLINIC | Age: 65
End: 2020-06-01

## 2020-06-10 ENCOUNTER — TELEPHONE (OUTPATIENT)
Dept: NEUROSURGERY | Facility: CLINIC | Age: 65
End: 2020-06-10

## 2020-06-10 NOTE — TELEPHONE ENCOUNTER
I called and left message on voicemail regarding I have refaxed the records and they were sent on 5-21-20.  Call the office with any questions or can call Dr Kirk office and left the phone number.

## 2020-06-12 ENCOUNTER — TELEPHONE (OUTPATIENT)
Dept: FAMILY MEDICINE CLINIC | Facility: CLINIC | Age: 65
End: 2020-06-12

## 2020-06-12 NOTE — TELEPHONE ENCOUNTER
DR PAYTON, ONCOLOGIST IS CONCERNED ABOUT THE BLOOD PRESSURE LEVEL.  184/80 IS AN EXAMPLE. AND WHEN SHE WENT TO SEE THE NEUROLOGIST IS WAS HIGH AS WELL.    DR PAYTON RECOMMENDS UPPING THE DOSAGE OF THE BLOOD PRESSURE MEDICATION METOPROLOL FROM 25MG TO 50MG.    ALSO, SHE NEEDS A REFILL OF THE SINGULAIR.    LAST FOUR DAYS SHE HAS FREQUENT, URGENT URINATION AND BURNING  AND PAINFUL.    PLEASE CALL AND ADVISE -[phone]

## 2020-06-15 RX ORDER — NITROFURANTOIN MACROCRYSTALS 100 MG/1
100 CAPSULE ORAL 2 TIMES DAILY
Qty: 10 CAPSULE | Refills: 0 | Status: SHIPPED | OUTPATIENT
Start: 2020-06-15 | End: 2020-07-23

## 2020-06-15 RX ORDER — MONTELUKAST SODIUM 10 MG/1
10 TABLET ORAL NIGHTLY
Qty: 30 TABLET | Refills: 3 | Status: SHIPPED | OUTPATIENT
Start: 2020-06-15 | End: 2021-06-08

## 2020-06-15 NOTE — TELEPHONE ENCOUNTER
PATIENT WAS CALLING TO CHECK ON STATUS OF PHONE CALL FROM Friday June 12TH SEE PREVIOUS MESSAGE FOR MORE INFORMATION PATIENT STATED IT WAS REGARDING HER URINARY ISSUES.     PATIENT CALL BACK NUMBER [phone]    PATIENT CONFIRMED [facility] [id number] [facility]

## 2020-06-15 NOTE — TELEPHONE ENCOUNTER
Patient notified. She will double metoprolol, monitor bp and call with results. She wants a refill on singulair but this is not on med list. Okay to send?

## 2020-06-15 NOTE — TELEPHONE ENCOUNTER
Ok to change metoprolol to 50 mg and send macrodantin 100mg BID #[id number]. Leave UA if symptoms not improving in 48 hours

## 2020-06-18 NOTE — TELEPHONE ENCOUNTER
Pt says the macrodantin helped with the dysuria but she is severely incontinent. States she changed clothes 7 times last night. She is coming in tomorrow for the UA. She cancelled her treatment today because she cannot twist or turn without leaking urine. She just wanted to update you.

## 2020-06-19 ENCOUNTER — CLINICAL SUPPORT (OUTPATIENT)
Dept: FAMILY MEDICINE CLINIC | Facility: CLINIC | Age: 65
End: 2020-06-19

## 2020-06-19 ENCOUNTER — TELEPHONE (OUTPATIENT)
Dept: ONCOLOGY | Facility: CLINIC | Age: 65
End: 2020-06-19

## 2020-06-19 DIAGNOSIS — R39.9 UTI SYMPTOMS: Primary | ICD-10-CM

## 2020-06-19 LAB
BILIRUB BLD-MCNC: NEGATIVE MG/DL
CLARITY, POC: CLEAR
COLOR UR: YELLOW
GLUCOSE UR STRIP-MCNC: NEGATIVE MG/DL
KETONES UR QL: NEGATIVE
LEUKOCYTE EST, POC: NEGATIVE
NITRITE UR-MCNC: NEGATIVE MG/ML
PH UR: 6 [PH] (ref 5–8)
PROT UR STRIP-MCNC: NEGATIVE MG/DL
RBC # UR STRIP: NEGATIVE /UL
SP GR UR: 1.01 (ref 1–1.03)
UROBILINOGEN UR QL: NORMAL

## 2020-06-19 PROCEDURE — 81003 URINALYSIS AUTO W/O SCOPE: CPT | Performed by: NURSE PRACTITIONER

## 2020-06-25 ENCOUNTER — INFUSION (OUTPATIENT)
Dept: ONCOLOGY | Facility: HOSPITAL | Age: 65
End: 2020-06-25

## 2020-06-25 VITALS
HEART RATE: 68 BPM | RESPIRATION RATE: 16 BRPM | DIASTOLIC BLOOD PRESSURE: 72 MMHG | SYSTOLIC BLOOD PRESSURE: 161 MMHG | OXYGEN SATURATION: 96 % | BODY MASS INDEX: 31.03 KG/M2 | TEMPERATURE: 98.4 F | WEIGHT: 212.6 LBS

## 2020-06-25 DIAGNOSIS — C50.212 MALIGNANT NEOPLASM OF UPPER-INNER QUADRANT OF LEFT BREAST IN FEMALE, ESTROGEN RECEPTOR NEGATIVE (HCC): Primary | ICD-10-CM

## 2020-06-25 DIAGNOSIS — Z17.1 MALIGNANT NEOPLASM OF UPPER-INNER QUADRANT OF LEFT BREAST IN FEMALE, ESTROGEN RECEPTOR NEGATIVE (HCC): Primary | ICD-10-CM

## 2020-06-25 DIAGNOSIS — Z79.899 ENCOUNTER FOR LONG-TERM (CURRENT) USE OF HIGH-RISK MEDICATION: ICD-10-CM

## 2020-06-25 LAB
ALBUMIN SERPL-MCNC: 4.1 G/DL (ref 3.5–5.2)
ALBUMIN/GLOB SERPL: 1.3 G/DL (ref 1.1–2.4)
ALP SERPL-CCNC: 136 U/L (ref 38–116)
ALT SERPL W P-5'-P-CCNC: 13 U/L (ref 0–33)
ANION GAP SERPL CALCULATED.3IONS-SCNC: 8.5 MMOL/L (ref 5–15)
AST SERPL-CCNC: 20 U/L (ref 0–32)
BASOPHILS # BLD AUTO: 0.06 10*3/MM3 (ref 0–0.2)
BASOPHILS NFR BLD AUTO: 1 % (ref 0–1.5)
BILIRUB SERPL-MCNC: 0.3 MG/DL (ref 0.2–1.2)
BUN BLD-MCNC: 9 MG/DL (ref 6–20)
BUN/CREAT SERPL: 12.2 (ref 7.3–30)
CALCIUM SPEC-SCNC: 9.7 MG/DL (ref 8.5–10.2)
CHLORIDE SERPL-SCNC: 105 MMOL/L (ref 98–107)
CO2 SERPL-SCNC: 25.5 MMOL/L (ref 22–29)
CREAT BLD-MCNC: 0.74 MG/DL (ref 0.6–1.1)
DEPRECATED RDW RBC AUTO: 41.4 FL (ref 37–54)
EOSINOPHIL # BLD AUTO: 0.27 10*3/MM3 (ref 0–0.4)
EOSINOPHIL NFR BLD AUTO: 4.3 % (ref 0.3–6.2)
ERYTHROCYTE [DISTWIDTH] IN BLOOD BY AUTOMATED COUNT: 12.7 % (ref 12.3–15.4)
GFR SERPL CREATININE-BSD FRML MDRD: 79 ML/MIN/1.73
GLOBULIN UR ELPH-MCNC: 3.2 GM/DL (ref 1.8–3.5)
GLUCOSE BLD-MCNC: 91 MG/DL (ref 74–124)
HCT VFR BLD AUTO: 38.8 % (ref 34–46.6)
HGB BLD-MCNC: 12.9 G/DL (ref 12–15.9)
IMM GRANULOCYTES # BLD AUTO: 0.01 10*3/MM3 (ref 0–0.05)
IMM GRANULOCYTES NFR BLD AUTO: 0.2 % (ref 0–0.5)
LYMPHOCYTES # BLD AUTO: 2.07 10*3/MM3 (ref 0.7–3.1)
LYMPHOCYTES NFR BLD AUTO: 33 % (ref 19.6–45.3)
MAGNESIUM SERPL-MCNC: 1.9 MG/DL (ref 1.8–2.5)
MCH RBC QN AUTO: 30.4 PG (ref 26.6–33)
MCHC RBC AUTO-ENTMCNC: 33.2 G/DL (ref 31.5–35.7)
MCV RBC AUTO: 91.3 FL (ref 79–97)
MONOCYTES # BLD AUTO: 0.48 10*3/MM3 (ref 0.1–0.9)
MONOCYTES NFR BLD AUTO: 7.6 % (ref 5–12)
NEUTROPHILS # BLD AUTO: 3.39 10*3/MM3 (ref 1.7–7)
NEUTROPHILS NFR BLD AUTO: 53.9 % (ref 42.7–76)
NRBC BLD AUTO-RTO: 0 /100 WBC (ref 0–0.2)
PLATELET # BLD AUTO: 257 10*3/MM3 (ref 140–450)
PMV BLD AUTO: 9.9 FL (ref 6–12)
POTASSIUM BLD-SCNC: 4.2 MMOL/L (ref 3.5–4.7)
PROT SERPL-MCNC: 7.3 G/DL (ref 6.3–8)
RBC # BLD AUTO: 4.25 10*6/MM3 (ref 3.77–5.28)
SODIUM BLD-SCNC: 139 MMOL/L (ref 134–145)
WBC NRBC COR # BLD: 6.28 10*3/MM3 (ref 3.4–10.8)

## 2020-06-25 PROCEDURE — 96367 TX/PROPH/DG ADDL SEQ IV INF: CPT

## 2020-06-25 PROCEDURE — 83735 ASSAY OF MAGNESIUM: CPT

## 2020-06-25 PROCEDURE — 96413 CHEMO IV INFUSION 1 HR: CPT

## 2020-06-25 PROCEDURE — 80053 COMPREHEN METABOLIC PANEL: CPT

## 2020-06-25 PROCEDURE — 96375 TX/PRO/DX INJ NEW DRUG ADDON: CPT

## 2020-06-25 PROCEDURE — 85025 COMPLETE CBC W/AUTO DIFF WBC: CPT

## 2020-06-25 PROCEDURE — 25010000003 HEPARIN LOCK FLUSH PER 10 UNITS: Performed by: INTERNAL MEDICINE

## 2020-06-25 PROCEDURE — 25010000002 TRASTUZUMAB PER 10 MG: Performed by: INTERNAL MEDICINE

## 2020-06-25 PROCEDURE — 96417 CHEMO IV INFUS EACH ADDL SEQ: CPT

## 2020-06-25 PROCEDURE — 25010000002 DIPHENHYDRAMINE PER 50 MG: Performed by: INTERNAL MEDICINE

## 2020-06-25 PROCEDURE — 25010000002 PERTUZUMAB 420 MG/14ML SOLUTION 420 MG VIAL: Performed by: INTERNAL MEDICINE

## 2020-06-25 RX ORDER — HEPARIN SODIUM (PORCINE) LOCK FLUSH IV SOLN 100 UNIT/ML 100 UNIT/ML
500 SOLUTION INTRAVENOUS AS NEEDED
Status: DISCONTINUED | OUTPATIENT
Start: 2020-06-25 | End: 2020-06-25 | Stop reason: HOSPADM

## 2020-06-25 RX ORDER — FAMOTIDINE 10 MG/ML
20 INJECTION, SOLUTION INTRAVENOUS ONCE
Status: COMPLETED | OUTPATIENT
Start: 2020-06-25 | End: 2020-06-25

## 2020-06-25 RX ORDER — HEPARIN SODIUM (PORCINE) LOCK FLUSH IV SOLN 100 UNIT/ML 100 UNIT/ML
500 SOLUTION INTRAVENOUS AS NEEDED
Status: CANCELLED | OUTPATIENT
Start: 2020-06-25

## 2020-06-25 RX ORDER — SODIUM CHLORIDE 0.9 % (FLUSH) 0.9 %
10 SYRINGE (ML) INJECTION AS NEEDED
Status: CANCELLED | OUTPATIENT
Start: 2020-06-25

## 2020-06-25 RX ORDER — SODIUM CHLORIDE 9 MG/ML
250 INJECTION, SOLUTION INTRAVENOUS ONCE
Status: COMPLETED | OUTPATIENT
Start: 2020-06-25 | End: 2020-06-25

## 2020-06-25 RX ORDER — SODIUM CHLORIDE 0.9 % (FLUSH) 0.9 %
10 SYRINGE (ML) INJECTION AS NEEDED
Status: DISCONTINUED | OUTPATIENT
Start: 2020-06-25 | End: 2020-06-25 | Stop reason: HOSPADM

## 2020-06-25 RX ADMIN — FAMOTIDINE 20 MG: 10 INJECTION INTRAVENOUS at 10:34

## 2020-06-25 RX ADMIN — SODIUM CHLORIDE 250 ML: 9 INJECTION, SOLUTION INTRAVENOUS at 10:35

## 2020-06-25 RX ADMIN — DIPHENHYDRAMINE HYDROCHLORIDE 25 MG: 50 INJECTION, SOLUTION INTRAMUSCULAR; INTRAVENOUS at 10:35

## 2020-06-25 RX ADMIN — PERTUZUMAB 420 MG: 30 INJECTION, SOLUTION, CONCENTRATE INTRAVENOUS at 11:11

## 2020-06-25 RX ADMIN — Medication 500 UNITS: at 12:49

## 2020-06-25 RX ADMIN — TRASTUZUMAB 590 MG: 150 INJECTION, POWDER, LYOPHILIZED, FOR SOLUTION INTRAVENOUS at 12:14

## 2020-07-01 ENCOUNTER — TELEPHONE (OUTPATIENT)
Dept: NEUROSURGERY | Facility: CLINIC | Age: 65
End: 2020-07-01

## 2020-07-06 ENCOUNTER — TELEPHONE (OUTPATIENT)
Dept: GENERAL RADIOLOGY | Facility: HOSPITAL | Age: 65
End: 2020-07-06

## 2020-07-06 NOTE — TELEPHONE ENCOUNTER
----- Message from Heidi Garner RN sent at 7/6/2020 12:53 PM EDT -----  Her last treatment was 6/25, next not due until 7/16, looks like she's scheduled again then on 7/30 with [facility], but that'll need to be pushed back a week as well.      Call with questions    Thank you!!

## 2020-07-09 ENCOUNTER — APPOINTMENT (OUTPATIENT)
Dept: ONCOLOGY | Facility: HOSPITAL | Age: 65
End: 2020-07-09

## 2020-07-13 ENCOUNTER — TELEPHONE (OUTPATIENT)
Dept: FAMILY MEDICINE CLINIC | Facility: CLINIC | Age: 65
End: 2020-07-13

## 2020-07-14 NOTE — TELEPHONE ENCOUNTER
She is taking singulair and nasonex prn. She is not taking allegra. She has bought ear gabriella and is going to try tonight. She will call back tomorrow if this does not help.

## 2020-07-16 ENCOUNTER — INFUSION (OUTPATIENT)
Dept: ONCOLOGY | Facility: HOSPITAL | Age: 65
End: 2020-07-16

## 2020-07-16 VITALS
TEMPERATURE: 98.9 F | DIASTOLIC BLOOD PRESSURE: 91 MMHG | RESPIRATION RATE: 18 BRPM | HEART RATE: 64 BPM | BODY MASS INDEX: 30.94 KG/M2 | SYSTOLIC BLOOD PRESSURE: 186 MMHG | OXYGEN SATURATION: 94 % | WEIGHT: 212 LBS

## 2020-07-16 DIAGNOSIS — C50.212 MALIGNANT NEOPLASM OF UPPER-INNER QUADRANT OF LEFT BREAST IN FEMALE, ESTROGEN RECEPTOR NEGATIVE (HCC): Primary | ICD-10-CM

## 2020-07-16 DIAGNOSIS — Z17.1 MALIGNANT NEOPLASM OF UPPER-INNER QUADRANT OF LEFT BREAST IN FEMALE, ESTROGEN RECEPTOR NEGATIVE (HCC): Primary | ICD-10-CM

## 2020-07-16 DIAGNOSIS — Z79.899 ENCOUNTER FOR LONG-TERM (CURRENT) USE OF HIGH-RISK MEDICATION: ICD-10-CM

## 2020-07-16 LAB
ALBUMIN SERPL-MCNC: 4.3 G/DL (ref 3.5–5.2)
ALBUMIN/GLOB SERPL: 1.5 G/DL (ref 1.1–2.4)
ALP SERPL-CCNC: 134 U/L (ref 38–116)
ALT SERPL W P-5'-P-CCNC: 16 U/L (ref 0–33)
ANION GAP SERPL CALCULATED.3IONS-SCNC: 11.7 MMOL/L (ref 5–15)
AST SERPL-CCNC: 19 U/L (ref 0–32)
BASOPHILS # BLD AUTO: 0.07 10*3/MM3 (ref 0–0.2)
BASOPHILS NFR BLD AUTO: 1 % (ref 0–1.5)
BILIRUB SERPL-MCNC: 0.4 MG/DL (ref 0.2–1.2)
BUN SERPL-MCNC: 8 MG/DL (ref 6–20)
BUN/CREAT SERPL: 10.8 (ref 7.3–30)
CALCIUM SPEC-SCNC: 9.6 MG/DL (ref 8.5–10.2)
CHLORIDE SERPL-SCNC: 103 MMOL/L (ref 98–107)
CO2 SERPL-SCNC: 25.3 MMOL/L (ref 22–29)
CREAT SERPL-MCNC: 0.74 MG/DL (ref 0.6–1.1)
DEPRECATED RDW RBC AUTO: 39.8 FL (ref 37–54)
EOSINOPHIL # BLD AUTO: 0.17 10*3/MM3 (ref 0–0.4)
EOSINOPHIL NFR BLD AUTO: 2.4 % (ref 0.3–6.2)
ERYTHROCYTE [DISTWIDTH] IN BLOOD BY AUTOMATED COUNT: 12.1 % (ref 12.3–15.4)
GFR SERPL CREATININE-BSD FRML MDRD: 79 ML/MIN/1.73
GLOBULIN UR ELPH-MCNC: 2.8 GM/DL (ref 1.8–3.5)
GLUCOSE SERPL-MCNC: 85 MG/DL (ref 74–124)
HCT VFR BLD AUTO: 38.7 % (ref 34–46.6)
HGB BLD-MCNC: 13 G/DL (ref 12–15.9)
IMM GRANULOCYTES # BLD AUTO: 0.01 10*3/MM3 (ref 0–0.05)
IMM GRANULOCYTES NFR BLD AUTO: 0.1 % (ref 0–0.5)
LYMPHOCYTES # BLD AUTO: 2.84 10*3/MM3 (ref 0.7–3.1)
LYMPHOCYTES NFR BLD AUTO: 40.9 % (ref 19.6–45.3)
MAGNESIUM SERPL-MCNC: 1.8 MG/DL (ref 1.8–2.5)
MCH RBC QN AUTO: 30.2 PG (ref 26.6–33)
MCHC RBC AUTO-ENTMCNC: 33.6 G/DL (ref 31.5–35.7)
MCV RBC AUTO: 89.8 FL (ref 79–97)
MONOCYTES # BLD AUTO: 0.5 10*3/MM3 (ref 0.1–0.9)
MONOCYTES NFR BLD AUTO: 7.2 % (ref 5–12)
NEUTROPHILS NFR BLD AUTO: 3.35 10*3/MM3 (ref 1.7–7)
NEUTROPHILS NFR BLD AUTO: 48.4 % (ref 42.7–76)
NRBC BLD AUTO-RTO: 0 /100 WBC (ref 0–0.2)
PLATELET # BLD AUTO: 217 10*3/MM3 (ref 140–450)
PMV BLD AUTO: 9.5 FL (ref 6–12)
POTASSIUM SERPL-SCNC: 3.9 MMOL/L (ref 3.5–4.7)
PROT SERPL-MCNC: 7.1 G/DL (ref 6.3–8)
RBC # BLD AUTO: 4.31 10*6/MM3 (ref 3.77–5.28)
SODIUM SERPL-SCNC: 140 MMOL/L (ref 134–145)
WBC # BLD AUTO: 6.94 10*3/MM3 (ref 3.4–10.8)

## 2020-07-16 PROCEDURE — 96417 CHEMO IV INFUS EACH ADDL SEQ: CPT

## 2020-07-16 PROCEDURE — 25010000002 TRASTUZUMAB PER 10 MG: Performed by: NURSE PRACTITIONER

## 2020-07-16 PROCEDURE — 25010000002 PERTUZUMAB 420 MG/14ML SOLUTION 420 MG VIAL: Performed by: NURSE PRACTITIONER

## 2020-07-16 PROCEDURE — 25010000002 DIPHENHYDRAMINE PER 50 MG: Performed by: INTERNAL MEDICINE

## 2020-07-16 PROCEDURE — 96413 CHEMO IV INFUSION 1 HR: CPT

## 2020-07-16 PROCEDURE — 96375 TX/PRO/DX INJ NEW DRUG ADDON: CPT

## 2020-07-16 PROCEDURE — 96367 TX/PROPH/DG ADDL SEQ IV INF: CPT

## 2020-07-16 PROCEDURE — 85025 COMPLETE CBC W/AUTO DIFF WBC: CPT

## 2020-07-16 PROCEDURE — 25010000003 HEPARIN LOCK FLUSH PER 10 UNITS: Performed by: INTERNAL MEDICINE

## 2020-07-16 PROCEDURE — 83735 ASSAY OF MAGNESIUM: CPT

## 2020-07-16 PROCEDURE — 80053 COMPREHEN METABOLIC PANEL: CPT

## 2020-07-16 RX ORDER — HEPARIN SODIUM (PORCINE) LOCK FLUSH IV SOLN 100 UNIT/ML 100 UNIT/ML
500 SOLUTION INTRAVENOUS AS NEEDED
Status: CANCELLED | OUTPATIENT
Start: 2020-07-16

## 2020-07-16 RX ORDER — FAMOTIDINE 10 MG/ML
20 INJECTION, SOLUTION INTRAVENOUS AS NEEDED
Status: CANCELLED | OUTPATIENT
Start: 2020-07-16

## 2020-07-16 RX ORDER — SODIUM CHLORIDE 0.9 % (FLUSH) 0.9 %
10 SYRINGE (ML) INJECTION AS NEEDED
Status: CANCELLED | OUTPATIENT
Start: 2020-07-16

## 2020-07-16 RX ORDER — SODIUM CHLORIDE 0.9 % (FLUSH) 0.9 %
10 SYRINGE (ML) INJECTION AS NEEDED
Status: DISCONTINUED | OUTPATIENT
Start: 2020-07-16 | End: 2020-07-16 | Stop reason: HOSPADM

## 2020-07-16 RX ORDER — FAMOTIDINE 10 MG/ML
20 INJECTION, SOLUTION INTRAVENOUS ONCE
Status: COMPLETED | OUTPATIENT
Start: 2020-07-16 | End: 2020-07-16

## 2020-07-16 RX ORDER — SODIUM CHLORIDE 9 MG/ML
250 INJECTION, SOLUTION INTRAVENOUS ONCE
Status: COMPLETED | OUTPATIENT
Start: 2020-07-16 | End: 2020-07-16

## 2020-07-16 RX ORDER — DIPHENHYDRAMINE HYDROCHLORIDE 50 MG/ML
50 INJECTION INTRAMUSCULAR; INTRAVENOUS AS NEEDED
Status: CANCELLED | OUTPATIENT
Start: 2020-07-16

## 2020-07-16 RX ORDER — HEPARIN SODIUM (PORCINE) LOCK FLUSH IV SOLN 100 UNIT/ML 100 UNIT/ML
500 SOLUTION INTRAVENOUS AS NEEDED
Status: DISCONTINUED | OUTPATIENT
Start: 2020-07-16 | End: 2020-07-16 | Stop reason: HOSPADM

## 2020-07-16 RX ADMIN — DIPHENHYDRAMINE HYDROCHLORIDE 25 MG: 50 INJECTION, SOLUTION INTRAMUSCULAR; INTRAVENOUS at 13:31

## 2020-07-16 RX ADMIN — Medication 500 UNITS: at 15:41

## 2020-07-16 RX ADMIN — FAMOTIDINE 20 MG: 10 INJECTION INTRAVENOUS at 13:31

## 2020-07-16 RX ADMIN — PERTUZUMAB 420 MG: 30 INJECTION, SOLUTION, CONCENTRATE INTRAVENOUS at 13:55

## 2020-07-16 RX ADMIN — TRASTUZUMAB 590 MG: 150 INJECTION, POWDER, LYOPHILIZED, FOR SOLUTION INTRAVENOUS at 15:06

## 2020-07-16 RX ADMIN — SODIUM CHLORIDE, PRESERVATIVE FREE 10 ML: 5 INJECTION INTRAVENOUS at 15:41

## 2020-07-16 RX ADMIN — SODIUM CHLORIDE 250 ML: 9 INJECTION, SOLUTION INTRAVENOUS at 13:31

## 2020-07-23 ENCOUNTER — OFFICE VISIT (OUTPATIENT)
Dept: FAMILY MEDICINE CLINIC | Facility: CLINIC | Age: 65
End: 2020-07-23

## 2020-07-23 VITALS
OXYGEN SATURATION: 98 % | DIASTOLIC BLOOD PRESSURE: 90 MMHG | HEART RATE: 58 BPM | HEIGHT: 69 IN | BODY MASS INDEX: 31.1 KG/M2 | SYSTOLIC BLOOD PRESSURE: 178 MMHG | TEMPERATURE: 98.2 F | WEIGHT: 210 LBS

## 2020-07-23 DIAGNOSIS — F32.9 REACTIVE DEPRESSION: ICD-10-CM

## 2020-07-23 DIAGNOSIS — H61.23 EXCESSIVE CERUMEN IN BOTH EAR CANALS: ICD-10-CM

## 2020-07-23 DIAGNOSIS — K11.8 PAROTID GLAND PAIN: Primary | ICD-10-CM

## 2020-07-23 DIAGNOSIS — I10 BENIGN ESSENTIAL HYPERTENSION: ICD-10-CM

## 2020-07-23 PROCEDURE — 69209 REMOVE IMPACTED EAR WAX UNI: CPT | Performed by: NURSE PRACTITIONER

## 2020-07-23 PROCEDURE — 99214 OFFICE O/P EST MOD 30 MIN: CPT | Performed by: NURSE PRACTITIONER

## 2020-07-23 RX ORDER — DOXYCYCLINE HYCLATE 100 MG/1
100 CAPSULE ORAL 2 TIMES DAILY
Qty: 14 CAPSULE | Refills: 0 | Status: SHIPPED | OUTPATIENT
Start: 2020-07-23 | End: 2020-09-30

## 2020-07-23 RX ORDER — AMLODIPINE BESYLATE 10 MG/1
10 TABLET ORAL DAILY
Qty: 30 TABLET | Refills: 1 | Status: SHIPPED | OUTPATIENT
Start: 2020-07-23 | End: 2020-10-29

## 2020-07-23 NOTE — PROGRESS NOTES
"Anam Temple is a 65 y.o. female who presents c/o fullness in left ear.    History of Present Illness   L ear initially full and feels tube snap closed. Painful shooting under L ear, sharp  The following portions of the patient's history were reviewed and updated as appropriate: allergies, current medications, past family history, past medical history, past social history, past surgical history and problem list.    Review of Systems   Constitutional: Negative.  Negative for activity change, appetite change, chills, fatigue, fever, unexpected weight gain and unexpected weight loss.   HENT: Positive for sinus pressure (did have but now resolved) and swollen glands. Negative for ear pain, sore throat and voice change.    Eyes: Negative.    Respiratory: Negative.  Negative for shortness of breath.    Cardiovascular: Negative.  Negative for chest pain, palpitations and leg swelling.   Gastrointestinal: Negative.    Endocrine: Negative.    Genitourinary:        Finishes chemotherapy in August   Musculoskeletal: Positive for arthralgias and back pain (improving). Negative for gait problem.   Allergic/Immunologic: Negative.    Neurological: Negative.    Hematological: Negative.    Psychiatric/Behavioral: Negative.  Positive for stress.     /90   Pulse 58   Temp 98.2 °F (36.8 °C)   Ht 175.3 cm (69\")   Wt 95.3 kg (210 lb)   SpO2 98%   BMI 31.01 kg/m²     Objective   Physical Exam   Constitutional: She appears well-developed and well-nourished.   HENT:   Right Ear: External ear normal. No drainage or tenderness. No mastoid tenderness. No decreased hearing is noted. cerumen impaction is present.  Left Ear: External ear normal. No drainage or tenderness. No mastoid tenderness. A middle ear effusion (mild) is present. No decreased hearing is noted. An impacted cerumen is present.  Nose: Right sinus exhibits no maxillary sinus tenderness and no frontal sinus tenderness. Left sinus exhibits no maxillary " sinus tenderness and no frontal sinus tenderness.   Mouth/Throat: Oropharynx is clear and moist.   Tender under angle of L jaw. No tragus tenderness.   Neck: Normal range of motion. Neck supple. No thyromegaly present.   Cardiovascular: Normal rate, regular rhythm and normal heart sounds.   Pulmonary/Chest: Effort normal and breath sounds normal.   Lymphadenopathy:        Head (right side): No submental, no submandibular, no tonsillar, no preauricular and no posterior auricular adenopathy present.        Head (left side): Submental adenopathy present. No submandibular, no tonsillar, no preauricular and no posterior auricular adenopathy present.     She has no cervical adenopathy.   Skin: Skin is warm and dry.   Psychiatric: She has a normal mood and affect. Her behavior is normal. Judgment and thought content normal.   Nursing note and vitals reviewed.    Assessment/Plan   Problems Addressed this Visit        Cardiovascular and Mediastinum    Benign essential hypertension    Relevant Medications    amLODIPine (NORVASC) 10 MG tablet       Other    Depression    Relevant Medications    sertraline (ZOLOFT) 50 MG tablet      Other Visit Diagnoses     Parotid gland pain    -  Primary    Relevant Medications    doxycycline (VIBRAMYCIN) 100 MG capsule    Excessive cerumen in both ear canals        Relevant Orders    Ear Cerumen Removal        HTN--has been confirmed now on multiple occasions. No drops in blood pressure during chemo treatment. Dr. Jhaveri agrees she needs to consider medication. To start amlodipine as typically does not alter electrolytes and low side effect profile generally. Cautions of side effects given.   Depression--continue sertraline--life stresses significant in her family. Active cancer treatment, bipolar adult daughter improving. She appreciates the care and humor of Dr. Jhaveri, and support of her spouse. Favor appropriate control of symptoms  Parotid tenderness--consider imaging in 2 weeks if not  settling with antibiotics.  Cerumen removal only partially relieved symptoms.    Ear Cerumen Removal  Date/Time: 7/23/2020 11:27 AM  Performed by: Mireya Wood APRN  Authorized by: Mireya Wood APRN   Consent: Verbal consent obtained.  Risks and benefits: risks, benefits and alternatives were discussed  Consent given by: patient  Patient understanding: patient states understanding of the procedure being performed  Patient consent: the patient's understanding of the procedure matches consent given  Patient identity confirmed: verbally with patient    Anesthesia:  Local Anesthetic: none  Ceruminolytics applied: Ceruminolytics applied prior to the procedure.  Location details: left ear and right ear  Patient tolerance: Patient tolerated the procedure well with no immediate complications  Procedure type: irrigation   Sedation:  Patient sedated: no

## 2020-08-06 ENCOUNTER — OFFICE VISIT (OUTPATIENT)
Dept: ONCOLOGY | Facility: CLINIC | Age: 65
End: 2020-08-06

## 2020-08-06 ENCOUNTER — APPOINTMENT (OUTPATIENT)
Dept: ONCOLOGY | Facility: HOSPITAL | Age: 65
End: 2020-08-06

## 2020-08-06 ENCOUNTER — INFUSION (OUTPATIENT)
Dept: ONCOLOGY | Facility: HOSPITAL | Age: 65
End: 2020-08-06

## 2020-08-06 ENCOUNTER — TELEPHONE (OUTPATIENT)
Dept: ONCOLOGY | Facility: CLINIC | Age: 65
End: 2020-08-06

## 2020-08-06 VITALS
HEART RATE: 69 BPM | BODY MASS INDEX: 31.84 KG/M2 | TEMPERATURE: 98.2 F | HEIGHT: 69 IN | SYSTOLIC BLOOD PRESSURE: 149 MMHG | OXYGEN SATURATION: 97 % | RESPIRATION RATE: 18 BRPM | WEIGHT: 215 LBS | DIASTOLIC BLOOD PRESSURE: 93 MMHG

## 2020-08-06 DIAGNOSIS — Z17.1 MALIGNANT NEOPLASM OF UPPER-INNER QUADRANT OF LEFT BREAST IN FEMALE, ESTROGEN RECEPTOR NEGATIVE (HCC): Primary | ICD-10-CM

## 2020-08-06 DIAGNOSIS — C50.212 MALIGNANT NEOPLASM OF UPPER-INNER QUADRANT OF LEFT BREAST IN FEMALE, ESTROGEN RECEPTOR NEGATIVE (HCC): Primary | ICD-10-CM

## 2020-08-06 DIAGNOSIS — C50.212 MALIGNANT NEOPLASM OF UPPER-INNER QUADRANT OF LEFT BREAST IN FEMALE, ESTROGEN RECEPTOR NEGATIVE (HCC): ICD-10-CM

## 2020-08-06 DIAGNOSIS — Z79.899 ENCOUNTER FOR LONG-TERM (CURRENT) USE OF HIGH-RISK MEDICATION: ICD-10-CM

## 2020-08-06 DIAGNOSIS — Z17.1 MALIGNANT NEOPLASM OF UPPER-INNER QUADRANT OF LEFT BREAST IN FEMALE, ESTROGEN RECEPTOR NEGATIVE (HCC): ICD-10-CM

## 2020-08-06 LAB
ALBUMIN SERPL-MCNC: 4.3 G/DL (ref 3.5–5.2)
ALBUMIN/GLOB SERPL: 1.4 G/DL (ref 1.1–2.4)
ALP SERPL-CCNC: 135 U/L (ref 38–116)
ALT SERPL W P-5'-P-CCNC: 17 U/L (ref 0–33)
ANION GAP SERPL CALCULATED.3IONS-SCNC: 9.4 MMOL/L (ref 5–15)
AST SERPL-CCNC: 22 U/L (ref 0–32)
BASOPHILS # BLD AUTO: 0.06 10*3/MM3 (ref 0–0.2)
BASOPHILS NFR BLD AUTO: 1 % (ref 0–1.5)
BILIRUB SERPL-MCNC: 0.3 MG/DL (ref 0.2–1.2)
BUN SERPL-MCNC: 12 MG/DL (ref 6–20)
BUN/CREAT SERPL: 16.9 (ref 7.3–30)
CALCIUM SPEC-SCNC: 10.1 MG/DL (ref 8.5–10.2)
CHLORIDE SERPL-SCNC: 104 MMOL/L (ref 98–107)
CO2 SERPL-SCNC: 25.6 MMOL/L (ref 22–29)
CREAT SERPL-MCNC: 0.71 MG/DL (ref 0.6–1.1)
DEPRECATED RDW RBC AUTO: 39.9 FL (ref 37–54)
EOSINOPHIL # BLD AUTO: 0.19 10*3/MM3 (ref 0–0.4)
EOSINOPHIL NFR BLD AUTO: 3 % (ref 0.3–6.2)
ERYTHROCYTE [DISTWIDTH] IN BLOOD BY AUTOMATED COUNT: 12.2 % (ref 12.3–15.4)
GFR SERPL CREATININE-BSD FRML MDRD: 83 ML/MIN/1.73
GLOBULIN UR ELPH-MCNC: 3.1 GM/DL (ref 1.8–3.5)
GLUCOSE SERPL-MCNC: 96 MG/DL (ref 74–124)
HCT VFR BLD AUTO: 40.8 % (ref 34–46.6)
HGB BLD-MCNC: 13.6 G/DL (ref 12–15.9)
IMM GRANULOCYTES # BLD AUTO: 0.01 10*3/MM3 (ref 0–0.05)
IMM GRANULOCYTES NFR BLD AUTO: 0.2 % (ref 0–0.5)
LYMPHOCYTES # BLD AUTO: 2.67 10*3/MM3 (ref 0.7–3.1)
LYMPHOCYTES NFR BLD AUTO: 42.6 % (ref 19.6–45.3)
MAGNESIUM SERPL-MCNC: 1.9 MG/DL (ref 1.8–2.5)
MCH RBC QN AUTO: 30.1 PG (ref 26.6–33)
MCHC RBC AUTO-ENTMCNC: 33.3 G/DL (ref 31.5–35.7)
MCV RBC AUTO: 90.3 FL (ref 79–97)
MONOCYTES # BLD AUTO: 0.48 10*3/MM3 (ref 0.1–0.9)
MONOCYTES NFR BLD AUTO: 7.7 % (ref 5–12)
NEUTROPHILS NFR BLD AUTO: 2.86 10*3/MM3 (ref 1.7–7)
NEUTROPHILS NFR BLD AUTO: 45.5 % (ref 42.7–76)
NRBC BLD AUTO-RTO: 0 /100 WBC (ref 0–0.2)
PLATELET # BLD AUTO: 230 10*3/MM3 (ref 140–450)
PMV BLD AUTO: 9.4 FL (ref 6–12)
POTASSIUM SERPL-SCNC: 4.1 MMOL/L (ref 3.5–4.7)
PROT SERPL-MCNC: 7.4 G/DL (ref 6.3–8)
RBC # BLD AUTO: 4.52 10*6/MM3 (ref 3.77–5.28)
SODIUM SERPL-SCNC: 139 MMOL/L (ref 134–145)
WBC # BLD AUTO: 6.27 10*3/MM3 (ref 3.4–10.8)

## 2020-08-06 PROCEDURE — 99213 OFFICE O/P EST LOW 20 MIN: CPT | Performed by: NURSE PRACTITIONER

## 2020-08-06 PROCEDURE — 80053 COMPREHEN METABOLIC PANEL: CPT

## 2020-08-06 PROCEDURE — 25010000002 DIPHENHYDRAMINE PER 50 MG: Performed by: NURSE PRACTITIONER

## 2020-08-06 PROCEDURE — 25010000002 TRASTUZUMAB PER 10 MG: Performed by: NURSE PRACTITIONER

## 2020-08-06 PROCEDURE — 96413 CHEMO IV INFUSION 1 HR: CPT

## 2020-08-06 PROCEDURE — 25010000002 PERTUZUMAB 420 MG/14ML SOLUTION 420 MG VIAL: Performed by: NURSE PRACTITIONER

## 2020-08-06 PROCEDURE — 96417 CHEMO IV INFUS EACH ADDL SEQ: CPT

## 2020-08-06 PROCEDURE — 96375 TX/PRO/DX INJ NEW DRUG ADDON: CPT

## 2020-08-06 PROCEDURE — 85025 COMPLETE CBC W/AUTO DIFF WBC: CPT

## 2020-08-06 PROCEDURE — 25010000003 HEPARIN LOCK FLUSH PER 10 UNITS: Performed by: INTERNAL MEDICINE

## 2020-08-06 PROCEDURE — 83735 ASSAY OF MAGNESIUM: CPT

## 2020-08-06 RX ORDER — SODIUM CHLORIDE 0.9 % (FLUSH) 0.9 %
10 SYRINGE (ML) INJECTION AS NEEDED
OUTPATIENT
Start: 2020-08-06

## 2020-08-06 RX ORDER — FAMOTIDINE 10 MG/ML
20 INJECTION, SOLUTION INTRAVENOUS ONCE
Status: CANCELLED | OUTPATIENT
Start: 2020-08-06

## 2020-08-06 RX ORDER — DIPHENHYDRAMINE HYDROCHLORIDE 50 MG/ML
50 INJECTION INTRAMUSCULAR; INTRAVENOUS AS NEEDED
Status: CANCELLED | OUTPATIENT
Start: 2020-08-06

## 2020-08-06 RX ORDER — SODIUM CHLORIDE 9 MG/ML
250 INJECTION, SOLUTION INTRAVENOUS ONCE
Status: COMPLETED | OUTPATIENT
Start: 2020-08-06 | End: 2020-08-06

## 2020-08-06 RX ORDER — FAMOTIDINE 10 MG/ML
20 INJECTION, SOLUTION INTRAVENOUS ONCE
Status: COMPLETED | OUTPATIENT
Start: 2020-08-06 | End: 2020-08-06

## 2020-08-06 RX ORDER — HEPARIN SODIUM (PORCINE) LOCK FLUSH IV SOLN 100 UNIT/ML 100 UNIT/ML
500 SOLUTION INTRAVENOUS AS NEEDED
Status: DISCONTINUED | OUTPATIENT
Start: 2020-08-06 | End: 2020-08-06 | Stop reason: HOSPADM

## 2020-08-06 RX ORDER — HEPARIN SODIUM (PORCINE) LOCK FLUSH IV SOLN 100 UNIT/ML 100 UNIT/ML
500 SOLUTION INTRAVENOUS AS NEEDED
OUTPATIENT
Start: 2020-08-06

## 2020-08-06 RX ORDER — FAMOTIDINE 10 MG/ML
20 INJECTION, SOLUTION INTRAVENOUS AS NEEDED
Status: CANCELLED | OUTPATIENT
Start: 2020-08-06

## 2020-08-06 RX ORDER — SODIUM CHLORIDE 9 MG/ML
250 INJECTION, SOLUTION INTRAVENOUS ONCE
Status: CANCELLED | OUTPATIENT
Start: 2020-08-06

## 2020-08-06 RX ORDER — SODIUM CHLORIDE 0.9 % (FLUSH) 0.9 %
10 SYRINGE (ML) INJECTION AS NEEDED
Status: DISCONTINUED | OUTPATIENT
Start: 2020-08-06 | End: 2020-08-06 | Stop reason: HOSPADM

## 2020-08-06 RX ADMIN — SODIUM CHLORIDE 250 ML: 9 INJECTION, SOLUTION INTRAVENOUS at 12:29

## 2020-08-06 RX ADMIN — DIPHENHYDRAMINE HYDROCHLORIDE 25 MG: 50 INJECTION, SOLUTION INTRAMUSCULAR; INTRAVENOUS at 12:29

## 2020-08-06 RX ADMIN — Medication 500 UNITS: at 14:43

## 2020-08-06 RX ADMIN — SODIUM CHLORIDE, PRESERVATIVE FREE 10 ML: 5 INJECTION INTRAVENOUS at 14:43

## 2020-08-06 RX ADMIN — TRASTUZUMAB 590 MG: 150 INJECTION, POWDER, LYOPHILIZED, FOR SOLUTION INTRAVENOUS at 14:03

## 2020-08-06 RX ADMIN — PERTUZUMAB 420 MG: 30 INJECTION, SOLUTION, CONCENTRATE INTRAVENOUS at 12:47

## 2020-08-06 RX ADMIN — FAMOTIDINE 20 MG: 10 INJECTION INTRAVENOUS at 12:29

## 2020-08-06 NOTE — TELEPHONE ENCOUNTER
Patient forgot to stop by to get appointment scheduled today after seeing Chantal.  She said she is supposed to see Dr. Jhaveri in a month.    [phone]

## 2020-08-06 NOTE — PROGRESS NOTES
Subjective     REASON FOR CONSULTATION:   1. Left breast cancer T2N0 3.6 cm grade 3 ER MA negative HER-2 positive infiltrating ductal carcinoma post excisional biopsy, followed by mastectomy and axillary dissection- borderline -EF  Taxol Herceptin Perjeta for 12 weeks followed by Perjeta Herceptin for 1 year planned  2.  8/6/2020 scheduled Perjeta/Herceptin                             REQUESTING PHYSICIAN: Jase Khan,     History of Present Illness patient is a 64-year-old female with a large 3.6cm ER MA negative HER-2 positive breast cancer node negative surgically excised receiving adjuvant treatment.      Mrs. Haines is a maykel 65-year-old female with the above medical history here today for scheduled Perjeta Herceptin.  This is her final scheduled dose.  She reports feeling relatively well in the office today.  She denies new symptoms such as fever, chills, shortness of breath, skin changes, neuropathy.  She does continue to experience very mild, occasional diarrhea and nausea both of which are tolerable and do not require treatment.  Her CBC is stable.  Her vital signs are stable.      Oncologic history: Initially because of the large size and comorbidities we were thinking of weekly Taxol Herceptin perjeta followed by Adriamycin Cytoxan however she had cardiac evaluation with echo with a borderline ejection fraction and had a stress test which showed no ischemia but again the ejection fraction of 50 to 52% and based on her shortness of breath with exertion and symptomatology, it was felt best to proceed instead with just weekly Taxol Herceptin perjeta followed by a year of Herceptin perjeta. Concern for the anthracycline possibly contributing to cardiotoxicity in a patient with borderline cardiac function.    She is tolerating Herceptin Perjeta without any problems.  Her echocardiogram dated 5/4/2020 shows an ejection fraction of 58  which is actually better than it has been before she started.  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    Past Medical History:   Diagnosis Date   • Adjustment disorder    • Allergic rhinitis    • Amenorrhea    • Anxiety    • Breast cancer (CMS/HCC) 04/18/2019    Left breast mammary carcinoma   • Bruit    • Carpal tunnel syndrome    • Carrier of tuberculosis    • Chronic pain    • Daytime somnolence    • De Quervain's tenosynovitis    • Degenerative cervical disc    • Depression    • Dyslipidemia    • Eustachian tube dysfunction    • History of UTI    • Hyperlipidemia    • Hypertension    • Impaired fasting glucose    • Lumbar degenerative disc disease    • Numbness and tingling     LEFT LEG   • OAB (overactive bladder)    • Precordial pain    • Scapulothoracic syndrome    • Sciatica         Past Surgical History:   Procedure Laterality Date   • APPENDECTOMY N/A    • BREAST BIOPSY Left 2019    Left breast ultrasound guided cyst aspiration, 9:00 position-Dr. Gerry Taylor, [facility]   • BREAST LUMPECTOMY Left 5/2/2019    Procedure: Left BREAST LUMPECTOMY;  Surgeon: Jase Evans MD;  Location: [facility] OR;  Service: General   • LUMBAR EPIDURAL INJECTION N/A    • MASTECTOMY Left 6/21/2019    Procedure: Left BREAST MASTECTOMY;  Surgeon: Jase Evans MD;  Location: [facility]  MAIN OR;  Service: General   • TUBAL ABDOMINAL LIGATION Bilateral    • VAGINAL DELIVERY      x3   • VENOUS ACCESS DEVICE (PORT) INSERTION Right 2019    Procedure: INSERTION VENOUS ACCESS DEVICE;  Surgeon: Jase Evans MD;  Location: [facility] MAIN OR;  Service: General      ONC HISTORY;  patient is a 64-year-old retired  with hypertension hypercholesterolemia and degenerative arthritis who felt a mass in her left breast in March.  She showed it to her family doctor and underwent imaging at  [facility] on 3/13/2019 which Showed a 3 x 3.2 cm mass at 9:00 which on ultrasound showed a thick-walled benign-appearing 2.8 x 2.5 cm cyst which was felt to not be suspicious .because of persistence of symptoms she was reevaluated on 2019 and underwent aspiration and core biopsy because there was a significant soft tissue component to the mass at that time this was aspirated and the final report  showed fine-needle aspiration suspicious for malignant cells favor mammary carcinoma  Patient was referred to Dr. Kee who took her for an excisional biopsy on 2019 with the final report showing a  mass grade 3 -4x3.6cm with tumor extending to one margin and DCIS also 0.2 mm from one margin.  The tumor was ER NE negative HER-2 3+.  The patient was then taken for surgery on 2019 at which time she had a completion mastectomy and axillary node biopsy with the findings of residual high-grade DCIS with clear margins and an incidental intraductal papilloma and 17-neg lymph nodes making this a T2N0 tumor    She has done well postoperatively and is here to discuss adjuvant treatment    She is  3 para 3 menarche  at age 15 and menopause in her early 40s when she had a hysterectomy for heavy menstrual bleeding.  She took hormone replacement for 10 years and stopped 10 years ago first childbirth was at age 25 she did not breast-feed  Family history is positive for mother who had uterine cancer at age 60 and  of  it at age 65 she has a brother who  of liver cancer related to drinking there is no other malignancy in the family that she is aware of    She is a significant smoker for the last 48 years but does not drink     Patient and her  were very taken to back by the suggestion about chemotherapy and apparently had thought that there was no further treatment needed and I spent almost an hour presenting the data concerning the extreme effectiveness of HER-2 directed therapy and this situation with a high risk of recurrence without adjuvant treatment and she finally was convinced to do the treatment    We discussed smoking cessation but at this point she is so nervous and agitated with the prospect of chemotherapy that we will hold off on this until she is intermediate through the chemotherapy and rediscuss it    I discussed the case also with . Chilango will place a port and we will plan to start chemotherapy in 2 weeks.    Patient initiating therapy with Taxol/Herceptin/Perjeta 19.    She is followed by Dr. Dudley, cardiology, who gave clearance for the patient to proceed with chemotherapy.  repeat echo 6 weeks from last actually showed improvement in the cardiologist thought there may have been some technical issues with her first echocardiogram.    She returns back today, due for cycle3 day 1 of Taxol/Herceptin perjeta her diarrhea is-clearly controlled with the Imodium and in fact she became constipated because she took too much of this but she is got this under control and feels good  She has some reflux symptoms in the morning which makes her nauseous and I suggested she take 1 Prilosec every day till the chemo was over and this is improved    She has no neuropathy but is not brought the cooling gloves and I recommended this to her and gave her the literature supporting this in the website  She has had a itchy rash on her arms and legs which is not too bothersome and I have to suggested a steroid cream and  some Benadryl and we will keep an eye on the rash- she has no shortness of breath        Current Outpatient Medications on File Prior to Visit   Medication Sig Dispense Refill   • amLODIPine (NORVASC) 10 MG tablet Take 1 tablet by mouth Daily. 30 tablet 1   • diphenoxylate-atropine (LOMOTIL) 2.5-0.025 MG per tablet Take 1 tablet by mouth 4 (Four) Times a Day As Needed for Diarrhea. 90 tablet 0   • doxycycline (VIBRAMYCIN) 100 MG capsule Take 1 capsule by mouth 2 (Two) Times a Day. 14 capsule 0   • MAGNESIUM PO Take 200 mg by mouth.     • mometasone (NASONEX) 50 MCG/ACT nasal spray 2 sprays into the nostril(s) as directed by provider Daily.     • montelukast (Singulair) 10 MG tablet Take 1 tablet by mouth Every Night. 30 tablet 3   • Multiple Vitamins-Minerals (CENTRUM SILVER PO) Take 1 tablet by mouth Daily. womens vitamin     • potassium chloride ER (K-TAB) 20 MEQ tablet controlled-release ER tablet Take 1 tablet by mouth 2 (Two) Times a Day. 60 tablet 1   • sertraline (ZOLOFT) 50 MG tablet Take 1 tablet by mouth Daily. 90 tablet 1   • TiZANidine (ZANAFLEX) 4 MG capsule Take 1 capsule by mouth 3 (Three) Times a Day. (Patient taking differently: Take 4 mg by mouth 3 (Three) Times a Day As Needed.) 90 capsule 0     No current facility-administered medications on file prior to visit.         ALLERGIES:    Allergies   Allergen Reactions   • Gabapentin Hallucinations   • Effexor [Venlafaxine] Itching   • Naproxen Itching     Pt reports severe vaginal itching    • Zyrtec [Cetirizine] Itching     Non-effecious   • Ibuprofen Unknown - Low Severity     Burns while urinating  Can take low dose Ibuprofen   • Penicillins Rash   • Sulfa Antibiotics Rash        Social History     Socioeconomic History   • Marital status:      Spouse name: Pawan   • Number of children: 3   • Years of education: High school   • Highest education level: Not on file   Occupational History     Employer: RETIRED   Tobacco Use   • Smoking status:  Current Every Day Smoker     Packs/day: 0.25     Years: 48.00     Pack years: 12.00     Types: Cigarettes   • Smokeless tobacco: Never Used   • Tobacco comment: 3-4 cigarettes a day. Trying to quit.    Substance and Sexual Activity   • Alcohol use: No   • Drug use: No   • Sexual activity: Yes     Partners: Male     Birth control/protection: Post-menopausal        Family History   Problem Relation Age of Onset   • Ovarian cancer Mother    • Endometrial cancer Mother    • COPD Father    • Stroke Brother    • Malig Hyperthermia Neg Hx          Review of Systems   Constitutional: Negative.    HENT:  Negative.    Eyes: Negative.    Respiratory: Negative.    Cardiovascular: Negative.    Gastrointestinal: Positive for diarrhea (see HPI) and nausea (see HPI).   Endocrine: Negative.    Genitourinary: Negative.     Musculoskeletal: Negative.    Skin: Negative.    Neurological: Negative.    Hematological: Negative.    Psychiatric/Behavioral: Negative.          Objective     There were no vitals filed for this visit.  Current Status 5/28/2020   ECOG score 0       Physical Exam   Constitutional: She is oriented to person, place, and time. She appears well-developed and well-nourished.   Eyes: Pupils are equal, round, and reactive to light. EOM are normal.   Neck: Normal range of motion.   Cardiovascular: Normal rate and regular rhythm.   Pulmonary/Chest: Effort normal and breath sounds normal.       Abdominal: Soft.   Musculoskeletal: Normal range of motion.   Neurological: She is alert and oriented to person, place, and time.   Skin: Skin is warm.   Psychiatric: She has a normal mood and affect.             RECENT LABS:              RADIOGRAPHIC DATA:    US Breat Left Limited    1. Left Breast Lumpectomy (47 Grams):  A. Invasive poorly differentiated mammary carcinoma of no special type (ductal carcinoma)  with apocrine features.  1. Invasive carcinoma measures up to 40 x 36 x 27 mm (measured grossly).  2. Overall [place]  Grade III (glandular score = 3, nuclear score = 3, mitotic score = 3).  3. No definitive lymphovascular space invasion identified.  B. Invasive carcinoma focally extends to one undesignated peripheral inked margin of excision.  C. Associated ductal carcinoma in situ (DCIS).  1. Ductal carcinoma in situ (DCIS) spans area measuring 40 mm in single greatest  aggregate dimension (estimated from gross and glass slides).  2. DCIS predominantly solid and comedo type with high grade nuclear features.  3. High grade DCIS approximates the closest peripheral inked margin to 0.2 mm.  Page: 1 of 5 Printed: 5/6/2019 1:10 PM  [phone]  Resulting  Tissue Pathology Exam: [id number]   Order: [id number]   Collected:  5/2/2019 12:26 Status:  Edited Result - FINAL   Visible to patient:  No (Not Released) Dx:  Malignant neoplasm of upper-inner hector...   Component    Addendum   HER2 by Immunohistochemistry   Positive (Score 3+)     HER2 by Immunohistochemistry  FDA approved (specify test/vendor): Leica     Primary Antibody  [id number]     Cold Ischemia and Fixation Times   Meet requirements specified in latest version of the ASCO/CAP guidelines         Cold Ischemia Time: 18 minutes  Fixation Time: 8.25 hours  Testing Performed on Block Number(s): 1E  Fixative: Formalin   Addendum electronically signed by Harman Perry MD on 5/6/2019          Final Diagnosis  1. Left Breast, Modified Radical Mastectomy (935 grams): HIGH GRADE DUCTAL CARCINOMA IN-SITU  (DCIS) .  A. Nuclear Grade: High.  B. Architectural Type: Solid and comedo with lobular extension.  1. Size (extent) of DCIS: DCIS multifocal in the lower inner quadrant, measuring 0.9 cm in maximal  dimension (DCIS present in 3/18 tissue blocks).  C. No LCIS identified.  D. Skin and nipple uninvolved by DCIS.  E. Margins: Uninvolved by DCIS.  1. DCIS comes to within 1.3 cm of the anterior margin of excision (closest margin).  F. Additional findings: Incidental intraductal papilloma, florid  ductal hyperplasia and apocrine cysts.  G. Lymph nodes: Sixteen benign lymph nodes (0/16).  H. Hormone receptor status: ER and WA negative, Her2/kali positive by IHC (performed on prior resection  [id number]).  I. Pathologic stage: pT2, N0.  [facility]/kds      Regadenoson Stress Test With Myocardial Perfusion SPECT   Interpretation Summary     · Myocardial perfusion imaging indicates a normal myocardial perfusion study with no evidence of ischemia.  · Left ventricular ejection fraction is borderline normal (Calculated EF = 50%).  · Impressions are consistent with a low risk study.      Study Description         Nuclear Perfusion Images Overall image quality is excellent. There are no artifacts present. Raw images reviewed with no abnormalities noted.       Echo 9/16  Interpretation Summary     · Left ventricular systolic function is normal. Calculated EF = 55%. Estimated EF = 55%. Global Longitudinal LV strain = -21.6%. Strain data was reviewed and speckle tracking was considered accurate. The global longitudinal LV strain is -21.6 and normal. Normal left ventricular cavity size and wall thickness noted. All left ventricular wall segments contract normally. Left ventricular diastolic dysfunction is noted (grade I) consistent with impaired relaxation.  · Trace mitral valve regurgitation is present.  · Physiologic tricuspid valve regurgitation is present. Calculated right ventricular systolic pressure from tricuspid regurgitation is 16.0 mmHg. Insufficient TR velocity profile to estimate the right ventricular systolic pressure.     Interpretation Summary     · Left ventricular systolic function is normal. Calculated EF = 57%. Estimated EF was in agreement with the calculated EF. Estimated EF = 57%. Global Longitudinal LV strain = -21%. Strain data was reviewed and speckle tracking was considered accurate. The global longitudinal LV strain is normal.  · Normal left ventricular cavity size noted. All left ventricular wall  segments contract normally. Left ventricular wall thickness is consistent with mild concentric hypertrophy. Left ventricular diastolic dysfunction is noted (grade I) consistent with impaired relaxation.  · Mild tricuspid valve regurgitation is present. Estimated right ventricular systolic pressure from tricuspid regurgitation is normal (<35 mmHg). Calculated right ventricular systolic pressure from tricuspid regurgitation is 30 mmHg.        Interpretation Summary     · Left ventricular systolic function is normal. Calculated EF = 57%. Estimated EF was in agreement with the calculated EF. Estimated EF = 57%. Global Longitudinal LV strain = -20.1%. All left ventricular wall segments contract normally. The left ventricular cavity is mildly dilated. Left ventricular wall thickness is consistent with moderate concentric hypertrophy. Left ventricular diastolic dysfunction is noted (grade I a w/high LAP) consistent with impaired relaxation.  · Left atrial cavity size is mildly dilated.  · Mild mitral valve regurgitation is present.  · Trace tricuspid valve regurgitation is present. Estimated right ventricular systolic pressure from tricuspid regurgitation is normal (<35 mmHg). Calculated right ventricular systolic pressure from tricuspid regurgitation is 21 mmHg.  · There is a small (<1cm) pericardial effusion. There is no evidence of cardiac tamponade.      Electronically signed by Keara Dudley MD on 1/22/20 at 0900 EST    MRI OF THE THORACIC SPINE WITH AND WITHOUT CONTRAST 03/05/2020  IMPRESSION:  1. There is a left posterior lateral disc bulge or tiny disc protrusion  that results in minimal if any narrowing of the left side of the canal  at C5-6 and there is a right paracentral disc herniation at T6-7 that  abuts and mildly flattens the right ventral surface of the cord mildly  narrowing the right side of the canal at T6-7. There is a small right  paracentral disc bulge or protrusion only minimally narrowing the  canal  at T7-8. At T11-12 there is a left posterior lateral disc osteophyte  complex minimally narrowing the left side of the canal. No additional  canal or foraminal narrowing is seen in the thoracic spine.  2. There is a right paracentral posterior lateral disc herniation at  T12-L1 with an inferiorly migrated extruded disc fragment that maximally  measures up to 13 x 6 x 10 mm in medial lateral, anterior posterior and  craniocaudal dimension and mild to moderately narrows the right side of  the canal at the level of the superior body and endplate of L1, it abuts  the ventral aspect of the right L1 nerve root in the superior portion of  the right lateral recess of L1.  3. There are some right anterior lateral marginal bridging osteophytes  from T8 to T11 with some focal bone marrow edema and enhancement in the  right anterior aspect of the T9 vertebra that is likely degenerative  marrow edema. The remainder of the thoracic spine MRI is normal with no  evidence of metastatic disease of the thoracic spine.     Interpretation Summary 5/4/2020    · Left ventricular systolic function is normal. Calculated EF = 58%. Estimated EF = 58%. Global Longitudinal LV strain = -19%. Strain data was reviewed and speckle tracking was considered accurate. The global longitudinal LV strain is normal.  · All left ventricular wall segments contract normally. The left ventricular cavity is borderline dilated. Left ventricular wall thickness is consistent with mild concentric hypertrophy. Left ventricular diastolic dysfunction is noted (grade I a w/high LAP) consistent with impaired  · Mild mitral valve regurgitation is present.  · Mild tricuspid valve regurgitation is present. Estimated right ventricular systolic pressure from tricuspid regurgitation is normal (<35 mmHg). Calculated right ventricular systolic pressure from tricuspid regurgitation is 33 mmHg.             Assessment/Plan   1.  T2N0 grade 3 infiltrating ductal carcinoma  left breast ER UT negative HER-2 positive post mastectomy and axillary node dissection with clear margins.  · Initiated Taxol/Herceptin/Perjeta today, 8/29/19.  · Plan to do 12 weeks of Taxol Herceptin perjeta without Adriamycin because of her borderline cardiac function followed by a year of Perjeta Herceptin  · She is here today for scheduled Herceptin Perjeta, this is her final dose.  She did initiate treatment August 29, 2019.  Tolerating treatment quite well and anxious to complete.    2.  Tobacco abuse and COPD    3.  Abnormal echo with stress test normal but borderline cardiac function at 52%  We will avoid Adriamycin Cytoxan  and treat with Taxol Herceptin perjeta and a year of Herceptin perjeta. Dr. Dudley, cardiology, has given clearance for patient to begin.  Planning repeat echocardiogram 6 weeks from previous.    · Improved EF to 55%  · EF 57% in 1/21/2020  · EF 58% in 5/4/2020    4.  Venous access.  Status post new Mediport placement 8/27/2019.  Working well today.  .    · No more refills of Percocet planned    5.  Diarrhea related to Perjeta.  Improved with Imodium    6.  Skin rash on her arms and legs likely from  perjeta continue to watch and use steroid cream-stable.  Resolved.    7.  Low magnesium due to diarrhea-diarrhea resolved    8.  Back and shoulder and neck pain MRI of thoracic spine shows multiple disc protrusions with no evidence of metastatic disease will refer to neurosurgery.  Stable.    Plan:  1.  Proceed with maintenance/herceptin perjeta as scheduled today.  2.  She will return in 1 month to see Dr. Jhaveri to discuss further surveillance.    I have asked the patient to call the office with any new or worsening symptoms before her next scheduled visit.

## 2020-08-16 DIAGNOSIS — M54.6 ACUTE BILATERAL THORACIC BACK PAIN: ICD-10-CM

## 2020-08-17 RX ORDER — TIZANIDINE HYDROCHLORIDE 4 MG/1
CAPSULE, GELATIN COATED ORAL
Qty: 90 CAPSULE | Refills: 0 | Status: SHIPPED | OUTPATIENT
Start: 2020-08-17 | End: 2020-11-20

## 2020-08-31 DIAGNOSIS — Z17.1 MALIGNANT NEOPLASM OF UPPER-INNER QUADRANT OF LEFT BREAST IN FEMALE, ESTROGEN RECEPTOR NEGATIVE (HCC): Primary | ICD-10-CM

## 2020-08-31 DIAGNOSIS — C50.212 MALIGNANT NEOPLASM OF UPPER-INNER QUADRANT OF LEFT BREAST IN FEMALE, ESTROGEN RECEPTOR NEGATIVE (HCC): Primary | ICD-10-CM

## 2020-09-01 NOTE — PROGRESS NOTES
Subjective     REASON FOR CONSULTATION:   1. Left breast cancer T2N0 3.6 cm grade 3 ER IA negative HER-2 positive infiltrating ductal carcinoma post excisional biopsy, followed by mastectomy and axillary dissection- borderline -EF  Taxol Herceptin Perjeta for 12 weeks followed by Perjeta Herceptin for 1 year planned                               REQUESTING PHYSICIAN: Jase Khan,     History of Present Illness patient is a 64-year-old female with a large 3.6cm ER IA negative HER-2 positive breast cancer node negative surgically excised receiving adjuvant treatment.      Initially because of the large size and comorbidities we were thinking of weekly Taxol Herceptin perjeta followed by Adriamycin Cytoxan however she had cardiac evaluation with echo with a borderline ejection fraction and had a stress test which showed no ischemia but again the ejection fraction of 50 to 52% and based on her shortness of breath with exertion and symptomatology, it was felt best to proceed instead with just weekly Taxol Herceptin perjeta followed by a year of Herceptin perjeta. Concern for the anthracycline possibly contributing to cardiotoxicity in a patient with borderline cardiac function.    She is completed 1 year of Herceptin Perjeta without any problems.  Her echocardiogram dated 5/4/2020 shows an ejection fraction of 58 which is actually better than it has been before she started.  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  Mammogram on her right breast is due soon    Past Medical History:   Diagnosis Date   • Adjustment disorder    • Allergic rhinitis    • Amenorrhea    • Anxiety    • Breast cancer (CMS/HCC) 04/18/2019    Left breast mammary carcinoma   • Bruit    • Carpal tunnel syndrome    • Carrier of tuberculosis    • Chronic pain    • Daytime somnolence    • De Quervain's tenosynovitis    • Degenerative cervical disc    • Depression    • Dyslipidemia    • Eustachian tube dysfunction    • History of UTI    • Hyperlipidemia    • Hypertension    • Impaired fasting glucose    • Lumbar degenerative disc disease    • Numbness and tingling     LEFT LEG   • OAB (overactive bladder)    • Precordial pain    • Scapulothoracic syndrome    • Sciatica         Past Surgical History:   Procedure Laterality Date   • APPENDECTOMY N/A    • BREAST BIOPSY Left 2019    Left breast ultrasound guided cyst aspiration, 9:00 position-Dr. Gerry Taylor, [facility]   • BREAST LUMPECTOMY Left 5/2/2019    Procedure: Left BREAST LUMPECTOMY;  Surgeon: Jase Evans MD;  Location: [facility] OR;  Service: General   • LUMBAR EPIDURAL INJECTION N/A    • MASTECTOMY Left 6/21/2019    Procedure: Left BREAST MASTECTOMY;  Surgeon: Jase Evans MD;  Location: [facility] OR;  Service: General   • TUBAL ABDOMINAL LIGATION Bilateral    • VAGINAL DELIVERY      x3   • VENOUS ACCESS DEVICE (PORT) INSERTION Right 8/27/2019    Procedure: INSERTION VENOUS ACCESS DEVICE;  Surgeon: Jase Evans MD;  Location: [facility] OR;  Service: General       ONC HISTORY;  patient is a 64-year-old retired  with hypertension hypercholesterolemia and degenerative arthritis who felt a mass in her left breast in March.  She showed it to her family doctor and underwent imaging at  [facility] on 3/13/2019 which Showed a 3 x 3.2 cm mass at 9:00 which on ultrasound showed a thick-walled benign-appearing 2.8 x 2.5 cm cyst which was felt to not be suspicious .because of persistence of symptoms she was reevaluated on 2019 and underwent aspiration and core biopsy because there was a significant soft tissue component to the mass at that time this was aspirated and the final report  showed fine-needle aspiration suspicious for malignant cells favor mammary carcinoma  Patient was referred to Dr. Kee who took her for an excisional biopsy on 2019 with the final report showing a  mass grade 3 -4x3.6cm with tumor extending to one margin and DCIS also 0.2 mm from one margin.  The tumor was ER TN negative HER-2 3+.  The patient was then taken for surgery on 2019 at which time she had a completion mastectomy and axillary node biopsy with the findings of residual high-grade DCIS with clear margins and an incidental intraductal papilloma and 17-neg lymph nodes making this a T2N0 tumor    She has done well postoperatively and is here to discuss adjuvant treatment    She is  3 para 3 menarche  at age 15 and menopause in her early 40s when she had a hysterectomy for heavy menstrual bleeding.  She took hormone replacement for 10 years and stopped 10 years ago first childbirth was at age 25 she did not breast-feed  Family history is positive for mother who had uterine cancer at age 60 and  of it at age 65 she has a brother who  of liver cancer related to drinking there is no other malignancy in the family that she is aware of    She is a significant smoker for the last 48 years but does not drink     Patient and her  were very taken to back by the  suggestion about chemotherapy and apparently had thought that there was no further treatment needed and I spent almost an hour presenting the data concerning the extreme effectiveness of HER-2 directed therapy and this situation with a high risk of recurrence without adjuvant treatment and she finally was convinced to do the treatment    We discussed smoking cessation but at this point she is so nervous and agitated with the prospect of chemotherapy that we will hold off on this until she is correction through the chemotherapy and rediscuss it    I discussed the case also with Dr. Kee will place a port and we will plan to start chemotherapy in 2 weeks.    Patient initiating therapy with Taxol/Herceptin/Perjeta 8/29/19.  9/19  She is followed by Dr. Dudley, cardiology, who gave clearance for the patient to proceed with chemotherapy.  repeat echo 6 weeks from last actually showed improvement in the cardiologist thought there may have been some technical issues with her first echocardiogram.    She returns back today, due for cycle3 day 1 of Taxol/Herceptin perjeta her diarrhea is-clearly controlled with the Imodium and in fact she became constipated because she took too much of this but she is got this under control and feels good  She has some reflux symptoms in the morning which makes her nauseous and I suggested she take 1 Prilosec every day till the chemo was over and this is improved    She has no neuropathy but is not brought the cooling gloves and I recommended this to her and gave her the literature supporting this in the website  She has had a itchy rash on her arms and legs which is not too bothersome and I have to suggested a steroid cream and some Benadryl and we will keep an eye on the rash- she has no shortness of breath        Current Outpatient Medications on File Prior to Visit   Medication Sig Dispense Refill   • amLODIPine (NORVASC) 10 MG tablet Take 1 tablet by mouth Daily. 30 tablet 1   •  diphenoxylate-atropine (LOMOTIL) 2.5-0.025 MG per tablet Take 1 tablet by mouth 4 (Four) Times a Day As Needed for Diarrhea. 90 tablet 0   • doxycycline (VIBRAMYCIN) 100 MG capsule Take 1 capsule by mouth 2 (Two) Times a Day. 14 capsule 0   • MAGNESIUM PO Take 200 mg by mouth.     • mometasone (NASONEX) 50 MCG/ACT nasal spray 2 sprays into the nostril(s) as directed by provider Daily.     • montelukast (Singulair) 10 MG tablet Take 1 tablet by mouth Every Night. 30 tablet 3   • Multiple Vitamins-Minerals (CENTRUM SILVER PO) Take 1 tablet by mouth Daily. womens vitamin     • potassium chloride ER (K-TAB) 20 MEQ tablet controlled-release ER tablet Take 1 tablet by mouth 2 (Two) Times a Day. 60 tablet 1   • sertraline (ZOLOFT) 50 MG tablet Take 1 tablet by mouth Daily. 90 tablet 1   • TiZANidine (ZANAFLEX) 4 MG capsule TAKE 1 CAPSULE BY MOUTH THREE TIMES A DAY  90 capsule 0     No current facility-administered medications on file prior to visit.         ALLERGIES:    Allergies   Allergen Reactions   • Gabapentin Hallucinations   • Effexor [Venlafaxine] Itching   • Naproxen Itching     Pt reports severe vaginal itching    • Zyrtec [Cetirizine] Itching     Non-effecious   • Ibuprofen Unknown - Low Severity     Burns while urinating  Can take low dose Ibuprofen   • Penicillins Rash   • Sulfa Antibiotics Rash        Social History     Socioeconomic History   • Marital status:      Spouse name: Pawan   • Number of children: 3   • Years of education: High school   • Highest education level: Not on file   Occupational History     Employer: RETIRED   Tobacco Use   • Smoking status: Current Every Day Smoker     Packs/day: 0.25     Years: 48.00     Pack years: 12.00     Types: Cigarettes   • Smokeless tobacco: Never Used   • Tobacco comment: 3-4 cigarettes a day. Trying to quit.    Substance and Sexual Activity   • Alcohol use: No   • Drug use: No   • Sexual activity: Yes     Partners: Male     Birth control/protection:  "Post-menopausal        Family History   Problem Relation Age of Onset   • Ovarian cancer Mother    • Endometrial cancer Mother    • COPD Father    • Stroke Brother    • Malig Hyperthermia Neg Hx         Review of Systems   Constitutional: Negative for appetite change, chills, diaphoresis, fever and unexpected weight change.   HENT: Positive for sinus pain (09/03/20-Unchanged) and sneezing (sneezing an drainage 09/03/20-Unchanged). Negative for hearing loss, sore throat and trouble swallowing.    Respiratory: Positive for cough (in the morning 5/28/2020). Negative for chest tightness, shortness of breath and wheezing.    Cardiovascular: Negative for chest pain, palpitations and leg swelling.   Gastrointestinal: Positive for nausea (09/03/20-Unchanged). Negative for abdominal distention, abdominal pain, constipation and vomiting.   Endocrine: Negative.    Genitourinary: Negative for dysuria, frequency, hematuria and urgency.   Musculoskeletal: Positive for back pain (low back muscle spasm same lump on upper back 5/28/2020) and neck pain (neck into shoulder worse 5/28/2020). Negative for joint swelling.        No muscle weakness.   Skin: Negative for rash and wound.   Neurological: Positive for numbness (Lt leg comes and goes 5/28/2020). Negative for seizures, syncope, speech difficulty, weakness and headaches.   Hematological: Negative.  Negative for adenopathy. Does not bruise/bleed easily.   Psychiatric/Behavioral: Negative.  Negative for behavioral problems, confusion and suicidal ideas.      I have reviewed and confirmed the accuracy of the ROS as documented by the MA/LPN/RN Larry Jhaveri MD      Objective     Vitals:    09/03/20 0809   BP: 140/81   Pulse: 68   Resp: 18   Temp: 98 °F (36.7 °C)   TempSrc: Skin   SpO2: 97%   Weight: 97.3 kg (214 lb 9.6 oz)   Height: 175.3 cm (69.02\")   PainSc: 0-No pain     Current Status 9/3/2020   ECOG score 0       Physical Exam   Pulmonary/Chest:           GENERAL:  " Well-developed, well-nourished in no acute distress.   SKIN:  Warm, dry fading maculopapular rash on her arms and legs,no  purpura or petechiae.  EYES:  Pupils equal, round and reactive to light.  EOMs intact.  Conjunctivae normal.  EARS:  Hearing intact.  NOSE:  Septum midline.  No excoriations or nasal discharge.  MOUTH:  Tongue is well-papillated; no stomatitis or ulcers.  Lips normal.  THROAT:  Oropharynx without lesions or exudates.  NECK:  Supple with good range of motion; no thyromegaly or masses, no JVD.  Tenderness in the right supraclavicular and jugular areas  LYMPHATICS:  No cervical, supraclavicular, axillary or inguinal adenopathy.  CHEST:  Lungs clear to auscultation. Good airflow.  Mediport right chest wall benign.  BREASTS: Right breast is benign; left chest wall shows a well-healed mastectomy scar with nodularity laterally no change as of 9/3/2020 cARDIAC:  Regular rate and rhythm without murmurs, rubs or gallops. Normal S1,S2.  ABDOMEN:  Soft, nontender with no hepatosplenomegaly or masses.  EXTREMITIES:  No clubbing, cyanosis or edema.  NEUROLOGICAL:  Cranial Nerves II-XII grossly intact.  No focal neurological deficits.  PSYCHIATRIC:  Normal affect and mood.        RECENT LABS:  Results from last 7 days   Lab Units 09/03/20  0757   WBC 10*3/mm3 5.52   NEUTROS ABS 10*3/mm3 2.18   HEMOGLOBIN g/dL 13.6   HEMATOCRIT % 40.7   PLATELETS 10*3/mm3 239     Results from last 7 days   Lab Units 09/03/20  0757   SODIUM mmol/L 141   POTASSIUM mmol/L 4.0   CHLORIDE mmol/L 105   CO2 mmol/L 25.4   BUN mg/dL 11   CREATININE mg/dL 0.71   CALCIUM mg/dL 10.0   ALBUMIN g/dL 4.30   BILIRUBIN mg/dL 0.3   ALK PHOS U/L 149*   ALT (SGPT) U/L 16   AST (SGOT) U/L 20   GLUCOSE mg/dL 90         RADIOGRAPHIC DATA:    US Breat Left Limited    1. Left Breast Lumpectomy (47 Grams):  A. Invasive poorly differentiated mammary carcinoma of no special type (ductal carcinoma)  with apocrine features.  1. Invasive carcinoma measures  up to 40 x 36 x 27 mm (measured grossly).  2. Overall Syl Grade III (glandular score = 3, nuclear score = 3, mitotic score = 3).  3. No definitive lymphovascular space invasion identified.  B. Invasive carcinoma focally extends to one undesignated peripheral inked margin of excision.  C. Associated ductal carcinoma in situ (DCIS).  1. Ductal carcinoma in situ (DCIS) spans area measuring 40 mm in single greatest  aggregate dimension (estimated from gross and glass slides).  2. DCIS predominantly solid and comedo type with high grade nuclear features.  3. High grade DCIS approximates the closest peripheral inked margin to 0.2 mm.  Page: 1 of 5 Printed: 5/6/2019 1:10 PM  [phone]  Resulting  Tissue Pathology Exam: [id number]   Order: [id number]   Collected:  5/2/2019 12:26 Status:  Edited Result - FINAL   Visible to patient:  No (Not Released) Dx:  Malignant neoplasm of upper-inner hector...   Component    Addendum   HER2 by Immunohistochemistry   Positive (Score 3+)     HER2 by Immunohistochemistry  FDA approved (specify test/vendor): [employer]     Primary Antibody  [id number]     Cold Ischemia and Fixation Times   Meet requirements specified in latest version of the ASCO/CAP guidelines         Cold Ischemia Time: 18 minutes  Fixation Time: 8.25 hours  Testing Performed on Block Number(s): 1E  Fixative: Formalin   Addendum electronically signed by Harman Perry MD on 5/6/2019          Final Diagnosis  1. Left Breast, Modified Radical Mastectomy (935 grams): HIGH GRADE DUCTAL CARCINOMA IN-SITU  (DCIS) .  A. Nuclear Grade: High.  B. Architectural Type: Solid and comedo with lobular extension.  1. Size (extent) of DCIS: DCIS multifocal in the lower inner quadrant, measuring 0.9 cm in maximal  dimension (DCIS present in 3/18 tissue blocks).  C. No LCIS identified.  D. Skin and nipple uninvolved by DCIS.  E. Margins: Uninvolved by DCIS.  1. DCIS comes to within 1.3 cm of the anterior margin of excision (closest  margin).  F. Additional findings: Incidental intraductal papilloma, florid ductal hyperplasia and apocrine cysts.  G. Lymph nodes: Sixteen benign lymph nodes (0/16).  H. Hormone receptor status: ER and MS negative, Her2/kali positive by IHC (performed on prior resection  [id number]).  I. Pathologic stage: pT2, N0.  Sw/kds      Regadenoson Stress Test With Myocardial Perfusion SPECT   Interpretation Summary     · Myocardial perfusion imaging indicates a normal myocardial perfusion study with no evidence of ischemia.  · Left ventricular ejection fraction is borderline normal (Calculated EF = 50%).  · Impressions are consistent with a low risk study.      Study Description         Nuclear Perfusion Images Overall image quality is excellent. There are no artifacts present. Raw images reviewed with no abnormalities noted.       Echo 9/16  Interpretation Summary     · Left ventricular systolic function is normal. Calculated EF = 55%. Estimated EF = 55%. Global Longitudinal LV strain = -21.6%. Strain data was reviewed and speckle tracking was considered accurate. The global longitudinal LV strain is -21.6 and normal. Normal left ventricular cavity size and wall thickness noted. All left ventricular wall segments contract normally. Left ventricular diastolic dysfunction is noted (grade I) consistent with impaired relaxation.  · Trace mitral valve regurgitation is present.  · Physiologic tricuspid valve regurgitation is present. Calculated right ventricular systolic pressure from tricuspid regurgitation is 16.0 mmHg. Insufficient TR velocity profile to estimate the right ventricular systolic pressure.     Interpretation Summary     · Left ventricular systolic function is normal. Calculated EF = 57%. Estimated EF was in agreement with the calculated EF. Estimated EF = 57%. Global Longitudinal LV strain = -21%. Strain data was reviewed and speckle tracking was considered accurate. The global longitudinal LV strain is  normal.  · Normal left ventricular cavity size noted. All left ventricular wall segments contract normally. Left ventricular wall thickness is consistent with mild concentric hypertrophy. Left ventricular diastolic dysfunction is noted (grade I) consistent with impaired relaxation.  · Mild tricuspid valve regurgitation is present. Estimated right ventricular systolic pressure from tricuspid regurgitation is normal (<35 mmHg). Calculated right ventricular systolic pressure from tricuspid regurgitation is 30 mmHg.        Interpretation Summary     · Left ventricular systolic function is normal. Calculated EF = 57%. Estimated EF was in agreement with the calculated EF. Estimated EF = 57%. Global Longitudinal LV strain = -20.1%. All left ventricular wall segments contract normally. The left ventricular cavity is mildly dilated. Left ventricular wall thickness is consistent with moderate concentric hypertrophy. Left ventricular diastolic dysfunction is noted (grade I a w/high LAP) consistent with impaired relaxation.  · Left atrial cavity size is mildly dilated.  · Mild mitral valve regurgitation is present.  · Trace tricuspid valve regurgitation is present. Estimated right ventricular systolic pressure from tricuspid regurgitation is normal (<35 mmHg). Calculated right ventricular systolic pressure from tricuspid regurgitation is 21 mmHg.  · There is a small (<1cm) pericardial effusion. There is no evidence of cardiac tamponade.      Electronically signed by Keara Dudley MD on 1/22/20 at 0900 EST    MRI OF THE THORACIC SPINE WITH AND WITHOUT CONTRAST 03/05/2020  IMPRESSION:  1. There is a left posterior lateral disc bulge or tiny disc protrusion  that results in minimal if any narrowing of the left side of the canal  at C5-6 and there is a right paracentral disc herniation at T6-7 that  abuts and mildly flattens the right ventral surface of the cord mildly  narrowing the right side of the canal at T6-7. There is a small  right  paracentral disc bulge or protrusion only minimally narrowing the canal  at T7-8. At T11-12 there is a left posterior lateral disc osteophyte  complex minimally narrowing the left side of the canal. No additional  canal or foraminal narrowing is seen in the thoracic spine.  2. There is a right paracentral posterior lateral disc herniation at  T12-L1 with an inferiorly migrated extruded disc fragment that maximally  measures up to 13 x 6 x 10 mm in medial lateral, anterior posterior and  craniocaudal dimension and mild to moderately narrows the right side of  the canal at the level of the superior body and endplate of L1, it abuts  the ventral aspect of the right L1 nerve root in the superior portion of  the right lateral recess of L1.  3. There are some right anterior lateral marginal bridging osteophytes  from T8 to T11 with some focal bone marrow edema and enhancement in the  right anterior aspect of the T9 vertebra that is likely degenerative  marrow edema. The remainder of the thoracic spine MRI is normal with no  evidence of metastatic disease of the thoracic spine.     Interpretation Summary 5/4/2020    · Left ventricular systolic function is normal. Calculated EF = 58%. Estimated EF = 58%. Global Longitudinal LV strain = -19%. Strain data was reviewed and speckle tracking was considered accurate. The global longitudinal LV strain is normal.  · All left ventricular wall segments contract normally. The left ventricular cavity is borderline dilated. Left ventricular wall thickness is consistent with mild concentric hypertrophy. Left ventricular diastolic dysfunction is noted (grade I a w/high LAP) consistent with impaired  · Mild mitral valve regurgitation is present.  · Mild tricuspid valve regurgitation is present. Estimated right ventricular systolic pressure from tricuspid regurgitation is normal (<35 mmHg). Calculated right ventricular systolic pressure from tricuspid regurgitation is 33 mmHg.              Assessment/Plan   1.  T2N0 grade 3 infiltrating ductal carcinoma left breast ER AL negative HER-2 positive post mastectomy and axillary node dissection with clear margins.  · Initiated Taxol/Herceptin/Perjeta today, 8/29/19.  · Plan to do 12 weeks of Taxol Herceptin perjeta without Adriamycin because of her borderline cardiac function followed by a year of Perjeta Herceptin    2.  Tobacco abuse and COPD    3.  Abnormal echo with stress test normal but borderline cardiac function at 52%  We will avoid Adriamycin Cytoxan  and treat with Taxol Herceptin perjeta and a year of Herceptin perjeta. Dr. Dudley, cardiology, has given clearance for patient to begin.  Planning repeat echocardiogram 6 weeks from previous.    · Improved EF to 55%  · EF 57% in 1/21/2020  · EF 58% in 5/4/2020    4.  Venous access.  Status post new Mediport placement 8/27/2019.  Working well today.  .    · No more refills of Percocet planned    5.  Diarrhea related to Perjeta.  Improved with Imodium    6.  Skin rash on her arms and legs likely from  perjeta continue to watch and use steroid cream-stable    7.  Low magnesium due to diarrhea-diarrhea resolved    8.  Back and shoulder and neck pain MRI of thoracic spine shows multiple disc protrusions with no evidence of metastatic disease will refer to neurosurgery    Plan:  1.  Doppler right arm and neck to rule out port related clot   2.   Refer back to Dr. Kee for port removal   3.   Return in 12 weeks for follow-up with Dr. Jhaveri and have a mammogram done soon  4.  Epidural injections for her disc prolapse planned      Addendum Doppler of her right upper extremity and internal jugular was negative for clot

## 2020-09-03 ENCOUNTER — OFFICE VISIT (OUTPATIENT)
Dept: ONCOLOGY | Facility: CLINIC | Age: 65
End: 2020-09-03

## 2020-09-03 ENCOUNTER — LAB (OUTPATIENT)
Dept: LAB | Facility: HOSPITAL | Age: 65
End: 2020-09-03

## 2020-09-03 ENCOUNTER — HOSPITAL ENCOUNTER (OUTPATIENT)
Dept: CARDIOLOGY | Facility: HOSPITAL | Age: 65
Discharge: HOME | End: 2020-09-03
Admitting: INTERNAL MEDICINE

## 2020-09-03 ENCOUNTER — APPOINTMENT (OUTPATIENT)
Dept: ONCOLOGY | Facility: HOSPITAL | Age: 65
End: 2020-09-03

## 2020-09-03 VITALS
HEART RATE: 68 BPM | RESPIRATION RATE: 18 BRPM | DIASTOLIC BLOOD PRESSURE: 81 MMHG | WEIGHT: 214.6 LBS | BODY MASS INDEX: 31.78 KG/M2 | SYSTOLIC BLOOD PRESSURE: 140 MMHG | HEIGHT: 69 IN | OXYGEN SATURATION: 97 % | TEMPERATURE: 98 F

## 2020-09-03 DIAGNOSIS — Z17.1 MALIGNANT NEOPLASM OF UPPER-INNER QUADRANT OF LEFT BREAST IN FEMALE, ESTROGEN RECEPTOR NEGATIVE (HCC): ICD-10-CM

## 2020-09-03 DIAGNOSIS — C50.212 MALIGNANT NEOPLASM OF UPPER-INNER QUADRANT OF LEFT BREAST IN FEMALE, ESTROGEN RECEPTOR NEGATIVE (HCC): Primary | ICD-10-CM

## 2020-09-03 DIAGNOSIS — Z17.1 MALIGNANT NEOPLASM OF UPPER-INNER QUADRANT OF LEFT BREAST IN FEMALE, ESTROGEN RECEPTOR NEGATIVE (HCC): Primary | ICD-10-CM

## 2020-09-03 DIAGNOSIS — C50.212 MALIGNANT NEOPLASM OF UPPER-INNER QUADRANT OF LEFT BREAST IN FEMALE, ESTROGEN RECEPTOR NEGATIVE (HCC): ICD-10-CM

## 2020-09-03 LAB
ALBUMIN SERPL-MCNC: 4.3 G/DL (ref 3.5–5.2)
ALBUMIN/GLOB SERPL: 1.4 G/DL (ref 1.1–2.4)
ALP SERPL-CCNC: 149 U/L (ref 38–116)
ALT SERPL W P-5'-P-CCNC: 16 U/L (ref 0–33)
ANION GAP SERPL CALCULATED.3IONS-SCNC: 10.6 MMOL/L (ref 5–15)
AST SERPL-CCNC: 20 U/L (ref 0–32)
BASOPHILS # BLD AUTO: 0.06 10*3/MM3 (ref 0–0.2)
BASOPHILS NFR BLD AUTO: 1.1 % (ref 0–1.5)
BH CV UPPER VENOUS LEFT INTERNAL JUGULAR AUGMENT: NORMAL
BH CV UPPER VENOUS LEFT INTERNAL JUGULAR COMPETENT: NORMAL
BH CV UPPER VENOUS LEFT INTERNAL JUGULAR COMPRESS: NORMAL
BH CV UPPER VENOUS LEFT INTERNAL JUGULAR PHASIC: NORMAL
BH CV UPPER VENOUS LEFT INTERNAL JUGULAR SPONT: NORMAL
BH CV UPPER VENOUS RIGHT AXILLARY AUGMENT: NORMAL
BH CV UPPER VENOUS RIGHT AXILLARY COMPETENT: NORMAL
BH CV UPPER VENOUS RIGHT AXILLARY COMPRESS: NORMAL
BH CV UPPER VENOUS RIGHT AXILLARY PHASIC: NORMAL
BH CV UPPER VENOUS RIGHT AXILLARY SPONT: NORMAL
BH CV UPPER VENOUS RIGHT BASILIC FOREARM COMPRESS: NORMAL
BH CV UPPER VENOUS RIGHT BASILIC UPPER COMPRESS: NORMAL
BH CV UPPER VENOUS RIGHT BRACHIAL COMPRESS: NORMAL
BH CV UPPER VENOUS RIGHT CEPHALIC FOREARM COMPRESS: NORMAL
BH CV UPPER VENOUS RIGHT CEPHALIC UPPER COMPRESS: NORMAL
BH CV UPPER VENOUS RIGHT INTERNAL JUGULAR AUGMENT: NORMAL
BH CV UPPER VENOUS RIGHT INTERNAL JUGULAR COMPETENT: NORMAL
BH CV UPPER VENOUS RIGHT INTERNAL JUGULAR COMPRESS: NORMAL
BH CV UPPER VENOUS RIGHT INTERNAL JUGULAR PHASIC: NORMAL
BH CV UPPER VENOUS RIGHT INTERNAL JUGULAR SPONT: NORMAL
BH CV UPPER VENOUS RIGHT RADIAL COMPRESS: NORMAL
BH CV UPPER VENOUS RIGHT SUBCLAVIAN AUGMENT: NORMAL
BH CV UPPER VENOUS RIGHT SUBCLAVIAN COMPETENT: NORMAL
BH CV UPPER VENOUS RIGHT SUBCLAVIAN COMPRESS: NORMAL
BH CV UPPER VENOUS RIGHT SUBCLAVIAN PHASIC: NORMAL
BH CV UPPER VENOUS RIGHT SUBCLAVIAN SPONT: NORMAL
BH CV UPPER VENOUS RIGHT ULNAR COMPRESS: NORMAL
BILIRUB SERPL-MCNC: 0.3 MG/DL (ref 0.2–1.2)
BUN SERPL-MCNC: 11 MG/DL (ref 6–20)
BUN/CREAT SERPL: 15.5 (ref 7.3–30)
CALCIUM SPEC-SCNC: 10 MG/DL (ref 8.5–10.2)
CHLORIDE SERPL-SCNC: 105 MMOL/L (ref 98–107)
CO2 SERPL-SCNC: 25.4 MMOL/L (ref 22–29)
CREAT SERPL-MCNC: 0.71 MG/DL (ref 0.6–1.1)
DEPRECATED RDW RBC AUTO: 40.1 FL (ref 37–54)
EOSINOPHIL # BLD AUTO: 0.26 10*3/MM3 (ref 0–0.4)
EOSINOPHIL NFR BLD AUTO: 4.7 % (ref 0.3–6.2)
ERYTHROCYTE [DISTWIDTH] IN BLOOD BY AUTOMATED COUNT: 12.2 % (ref 12.3–15.4)
GFR SERPL CREATININE-BSD FRML MDRD: 83 ML/MIN/1.73
GLOBULIN UR ELPH-MCNC: 3 GM/DL (ref 1.8–3.5)
GLUCOSE SERPL-MCNC: 90 MG/DL (ref 74–124)
HCT VFR BLD AUTO: 40.7 % (ref 34–46.6)
HGB BLD-MCNC: 13.6 G/DL (ref 12–15.9)
IMM GRANULOCYTES # BLD AUTO: 0.02 10*3/MM3 (ref 0–0.05)
IMM GRANULOCYTES NFR BLD AUTO: 0.4 % (ref 0–0.5)
LYMPHOCYTES # BLD AUTO: 2.48 10*3/MM3 (ref 0.7–3.1)
LYMPHOCYTES NFR BLD AUTO: 44.9 % (ref 19.6–45.3)
MCH RBC QN AUTO: 30 PG (ref 26.6–33)
MCHC RBC AUTO-ENTMCNC: 33.4 G/DL (ref 31.5–35.7)
MCV RBC AUTO: 89.6 FL (ref 79–97)
MONOCYTES # BLD AUTO: 0.52 10*3/MM3 (ref 0.1–0.9)
MONOCYTES NFR BLD AUTO: 9.4 % (ref 5–12)
NEUTROPHILS NFR BLD AUTO: 2.18 10*3/MM3 (ref 1.7–7)
NEUTROPHILS NFR BLD AUTO: 39.5 % (ref 42.7–76)
NRBC BLD AUTO-RTO: 0 /100 WBC (ref 0–0.2)
PLATELET # BLD AUTO: 239 10*3/MM3 (ref 140–450)
PMV BLD AUTO: 9.4 FL (ref 6–12)
POTASSIUM SERPL-SCNC: 4 MMOL/L (ref 3.5–4.7)
PROT SERPL-MCNC: 7.3 G/DL (ref 6.3–8)
RBC # BLD AUTO: 4.54 10*6/MM3 (ref 3.77–5.28)
SODIUM SERPL-SCNC: 141 MMOL/L (ref 134–145)
WBC # BLD AUTO: 5.52 10*3/MM3 (ref 3.4–10.8)

## 2020-09-03 PROCEDURE — 80053 COMPREHEN METABOLIC PANEL: CPT

## 2020-09-03 PROCEDURE — 93971 EXTREMITY STUDY: CPT

## 2020-09-03 PROCEDURE — 36415 COLL VENOUS BLD VENIPUNCTURE: CPT

## 2020-09-03 PROCEDURE — 99214 OFFICE O/P EST MOD 30 MIN: CPT | Performed by: INTERNAL MEDICINE

## 2020-09-03 PROCEDURE — 85025 COMPLETE CBC W/AUTO DIFF WBC: CPT

## 2020-09-03 NOTE — PROGRESS NOTES
Subjective     REASON FOR CONSULTATION:   1. Left breast cancer T2N0 3.6 cm grade 3 ER OH negative HER-2 positive infiltrating ductal carcinoma post excisional biopsy, followed by mastectomy and axillary dissection- borderline -EF  Taxol Herceptin Perjeta for 12 weeks followed by Perjeta Herceptin for 1 year planned                               REQUESTING PHYSICIAN: Jase Khan,     History of Present Illness patient is a 64-year-old female with a large 3.6cm ER OH negative HER-2 positive breast cancer node negative surgically excised receiving adjuvant treatment.      Initially because of the large size and comorbidities we were thinking of weekly Taxol Herceptin perjeta followed by Adriamycin Cytoxan however she had cardiac evaluation with echo with a borderline ejection fraction and had a stress test which showed no ischemia but again the ejection fraction of 50 to 52% and based on her shortness of breath with exertion and symptomatology, it was felt best to proceed instead with just weekly Taxol Herceptin perjeta followed by a year of Herceptin perjeta. Concern for the anthracycline possibly contributing to cardiotoxicity in a patient with borderline cardiac function.    She is completed 1 year of Herceptin Perjeta without any problems.  Her echocardiogram dated 5/4/2020 shows an ejection fraction of 58 which is actually better than it has been before she started.  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  Mammogram on her right breast is due soon    Past Medical History:   Diagnosis Date   • Adjustment disorder    • Allergic rhinitis    • Amenorrhea    • Anxiety    • Breast cancer (CMS/HCC) 04/18/2019    Left breast mammary carcinoma   • Bruit    • Carpal tunnel syndrome    • Carrier of tuberculosis    • Chronic pain    • Daytime somnolence    • De Quervain's tenosynovitis    • Degenerative cervical disc    • Depression    • Dyslipidemia    • Eustachian tube dysfunction    • History of UTI    • Hyperlipidemia    • Hypertension    • Impaired fasting glucose    • Lumbar degenerative disc disease    • Numbness and tingling     LEFT LEG   • OAB (overactive bladder)    • Precordial pain    • Scapulothoracic syndrome    • Sciatica         Past Surgical History:   Procedure Laterality Date   • APPENDECTOMY N/A    • BREAST BIOPSY Left 2019    Left breast ultrasound guided cyst aspiration, 9:00 position-Dr. Gerry Taylor, [facility]   • BREAST LUMPECTOMY Left 5/2/2019    Procedure: Left BREAST LUMPECTOMY;  Surgeon: Jase Evans MD;  Location: [facility];  Service: General   • LUMBAR EPIDURAL INJECTION N/A    • MASTECTOMY Left 6/21/2019    Procedure: Left BREAST MASTECTOMY;  Surgeon: Jase Evans MD;  Location: [facility];  Service: General   • TUBAL ABDOMINAL LIGATION Bilateral    • VAGINAL DELIVERY      x3   • VENOUS ACCESS DEVICE (PORT) INSERTION Right 8/27/2019    Procedure: INSERTION VENOUS ACCESS DEVICE;  Surgeon: Jase Evans MD;  Location: [facility];  Service: General       ONC HISTORY;  patient is a 64-year-old retired  with hypertension hypercholesterolemia and degenerative arthritis who felt a mass in her left breast in March.  She showed it to her family doctor and underwent imaging at  [facility] on 3/13/2019 which Showed a 3 x 3.2 cm mass at 9:00 which on ultrasound showed a thick-walled benign-appearing 2.8 x 2.5 cm cyst which was felt to not be suspicious .because of persistence of symptoms she was reevaluated on 2019 and underwent aspiration and core biopsy because there was a significant soft tissue component to the mass at that time this was aspirated and the final report  showed fine-needle aspiration suspicious for malignant cells favor mammary carcinoma  Patient was referred to Dr. Kee who took her for an excisional biopsy on 2019 with the final report showing a  mass grade 3 -4x3.6cm with tumor extending to one margin and DCIS also 0.2 mm from one margin.  The tumor was ER ID negative HER-2 3+.  The patient was then taken for surgery on 2019 at which time she had a completion mastectomy and axillary node biopsy with the findings of residual high-grade DCIS with clear margins and an incidental intraductal papilloma and 17-neg lymph nodes making this a T2N0 tumor    She has done well postoperatively and is here to discuss adjuvant treatment    She is  3 para 3 menarche  at age 15 and menopause in her early 40s when she had a hysterectomy for heavy menstrual bleeding.  She took hormone replacement for 10 years and stopped 10 years ago first childbirth was at age 25 she did not breast-feed  Family history is positive for mother who had uterine cancer at age 60 and  of it at age 65 she has a brother who  of liver cancer related to drinking there is no other malignancy in the family that she is aware of    She is a significant smoker for the last 48 years but does not drink     Patient and her  were very taken to back by the  suggestion about chemotherapy and apparently had thought that there was no further treatment needed and I spent almost an hour presenting the data concerning the extreme effectiveness of HER-2 directed therapy and this situation with a high risk of recurrence without adjuvant treatment and she finally was convinced to do the treatment    We discussed smoking cessation but at this point she is so nervous and agitated with the prospect of chemotherapy that we will hold off on this until she is USP through the chemotherapy and rediscuss it    I discussed the case also with Dr. Kee will place a port and we will plan to start chemotherapy in 2 weeks.    Patient initiating therapy with Taxol/Herceptin/Perjeta 8/29/19.  9/19  She is followed by Dr. Dudley, cardiology, who gave clearance for the patient to proceed with chemotherapy.  repeat echo 6 weeks from last actually showed improvement in the cardiologist thought there may have been some technical issues with her first echocardiogram.    She returns back today, due for cycle3 day 1 of Taxol/Herceptin perjeta her diarrhea is-clearly controlled with the Imodium and in fact she became constipated because she took too much of this but she is got this under control and feels good  She has some reflux symptoms in the morning which makes her nauseous and I suggested she take 1 Prilosec every day till the chemo was over and this is improved    She has no neuropathy but is not brought the cooling gloves and I recommended this to her and gave her the literature supporting this in the website  She has had a itchy rash on her arms and legs which is not too bothersome and I have to suggested a steroid cream and some Benadryl and we will keep an eye on the rash- she has no shortness of breath        Current Outpatient Medications on File Prior to Visit   Medication Sig Dispense Refill   • amLODIPine (NORVASC) 10 MG tablet Take 1 tablet by mouth Daily. 30 tablet 1   •  diphenoxylate-atropine (LOMOTIL) 2.5-0.025 MG per tablet Take 1 tablet by mouth 4 (Four) Times a Day As Needed for Diarrhea. 90 tablet 0   • doxycycline (VIBRAMYCIN) 100 MG capsule Take 1 capsule by mouth 2 (Two) Times a Day. 14 capsule 0   • MAGNESIUM PO Take 200 mg by mouth.     • mometasone (NASONEX) 50 MCG/ACT nasal spray 2 sprays into the nostril(s) as directed by provider Daily.     • montelukast (Singulair) 10 MG tablet Take 1 tablet by mouth Every Night. 30 tablet 3   • Multiple Vitamins-Minerals (CENTRUM SILVER PO) Take 1 tablet by mouth Daily. womens vitamin     • potassium chloride ER (K-TAB) 20 MEQ tablet controlled-release ER tablet Take 1 tablet by mouth 2 (Two) Times a Day. 60 tablet 1   • sertraline (ZOLOFT) 50 MG tablet Take 1 tablet by mouth Daily. 90 tablet 1   • TiZANidine (ZANAFLEX) 4 MG capsule TAKE 1 CAPSULE BY MOUTH THREE TIMES A DAY  90 capsule 0     No current facility-administered medications on file prior to visit.         ALLERGIES:    Allergies   Allergen Reactions   • Gabapentin Hallucinations   • Effexor [Venlafaxine] Itching   • Naproxen Itching     Pt reports severe vaginal itching    • Zyrtec [Cetirizine] Itching     Non-effecious   • Ibuprofen Unknown - Low Severity     Burns while urinating  Can take low dose Ibuprofen   • Penicillins Rash   • Sulfa Antibiotics Rash        Social History     Socioeconomic History   • Marital status:      Spouse name: Pawan   • Number of children: 3   • Years of education: High school   • Highest education level: Not on file   Occupational History     Employer: RETIRED   Tobacco Use   • Smoking status: Current Every Day Smoker     Packs/day: 0.25     Years: 48.00     Pack years: 12.00     Types: Cigarettes   • Smokeless tobacco: Never Used   • Tobacco comment: 3-4 cigarettes a day. Trying to quit.    Substance and Sexual Activity   • Alcohol use: No   • Drug use: No   • Sexual activity: Yes     Partners: Male     Birth control/protection:  "Post-menopausal        Family History   Problem Relation Age of Onset   • Ovarian cancer Mother    • Endometrial cancer Mother    • COPD Father    • Stroke Brother    • Malig Hyperthermia Neg Hx         Review of Systems   Constitutional: Negative for appetite change, chills, diaphoresis, fever and unexpected weight change.   HENT: Positive for sinus pain (09/03/20-Unchanged) and sneezing (sneezing an drainage 09/03/20-Unchanged). Negative for hearing loss, sore throat and trouble swallowing.    Respiratory: Positive for cough (in the morning 5/28/2020). Negative for chest tightness, shortness of breath and wheezing.    Cardiovascular: Negative for chest pain, palpitations and leg swelling.   Gastrointestinal: Positive for nausea (09/03/20-Unchanged). Negative for abdominal distention, abdominal pain, constipation and vomiting.   Endocrine: Negative.    Genitourinary: Negative for dysuria, frequency, hematuria and urgency.   Musculoskeletal: Positive for back pain (low back muscle spasm same lump on upper back 5/28/2020) and neck pain (neck into shoulder worse 5/28/2020). Negative for joint swelling.        No muscle weakness.   Skin: Negative for rash and wound.   Neurological: Positive for numbness (Lt leg comes and goes 5/28/2020). Negative for seizures, syncope, speech difficulty, weakness and headaches.   Hematological: Negative.  Negative for adenopathy. Does not bruise/bleed easily.   Psychiatric/Behavioral: Negative.  Negative for behavioral problems, confusion and suicidal ideas.      I have reviewed and confirmed the accuracy of the ROS as documented by the MA/LPN/RN Larry Jhaveri MD      Objective     Vitals:    09/03/20 0809   BP: 140/81   Pulse: 68   Resp: 18   Temp: 98 °F (36.7 °C)   TempSrc: Skin   SpO2: 97%   Weight: 97.3 kg (214 lb 9.6 oz)   Height: 175.3 cm (69.02\")   PainSc: 0-No pain     Current Status 9/3/2020   ECOG score 0       Physical Exam   Pulmonary/Chest:           GENERAL:  " Well-developed, well-nourished in no acute distress.   SKIN:  Warm, dry fading maculopapular rash on her arms and legs,no  purpura or petechiae.  EYES:  Pupils equal, round and reactive to light.  EOMs intact.  Conjunctivae normal.  EARS:  Hearing intact.  NOSE:  Septum midline.  No excoriations or nasal discharge.  MOUTH:  Tongue is well-papillated; no stomatitis or ulcers.  Lips normal.  THROAT:  Oropharynx without lesions or exudates.  NECK:  Supple with good range of motion; no thyromegaly or masses, no JVD.  Tenderness in the right supraclavicular and jugular areas  LYMPHATICS:  No cervical, supraclavicular, axillary or inguinal adenopathy.  CHEST:  Lungs clear to auscultation. Good airflow.  Mediport right chest wall benign.  BREASTS: Right breast is benign; left chest wall shows a well-healed mastectomy scar with nodularity laterally no change as of 9/3/2020 cARDIAC:  Regular rate and rhythm without murmurs, rubs or gallops. Normal S1,S2.  ABDOMEN:  Soft, nontender with no hepatosplenomegaly or masses.  EXTREMITIES:  No clubbing, cyanosis or edema.  NEUROLOGICAL:  Cranial Nerves II-XII grossly intact.  No focal neurological deficits.  PSYCHIATRIC:  Normal affect and mood.        RECENT LABS:  Results from last 7 days   Lab Units 09/03/20  0757   WBC 10*3/mm3 5.52   NEUTROS ABS 10*3/mm3 2.18   HEMOGLOBIN g/dL 13.6   HEMATOCRIT % 40.7   PLATELETS 10*3/mm3 239     Results from last 7 days   Lab Units 09/03/20  0757   SODIUM mmol/L 141   POTASSIUM mmol/L 4.0   CHLORIDE mmol/L 105   CO2 mmol/L 25.4   BUN mg/dL 11   CREATININE mg/dL 0.71   CALCIUM mg/dL 10.0   ALBUMIN g/dL 4.30   BILIRUBIN mg/dL 0.3   ALK PHOS U/L 149*   ALT (SGPT) U/L 16   AST (SGOT) U/L 20   GLUCOSE mg/dL 90         RADIOGRAPHIC DATA:    US Breat Left Limited    1. Left Breast Lumpectomy (47 Grams):  A. Invasive poorly differentiated mammary carcinoma of no special type (ductal carcinoma)  with apocrine features.  1. Invasive carcinoma measures  up to 40 x 36 x 27 mm (measured grossly).  2. Overall Syl Grade III (glandular score = 3, nuclear score = 3, mitotic score = 3).  3. No definitive lymphovascular space invasion identified.  B. Invasive carcinoma focally extends to one undesignated peripheral inked margin of excision.  C. Associated ductal carcinoma in situ (DCIS).  1. Ductal carcinoma in situ (DCIS) spans area measuring 40 mm in single greatest  aggregate dimension (estimated from gross and glass slides).  2. DCIS predominantly solid and comedo type with high grade nuclear features.  3. High grade DCIS approximates the closest peripheral inked margin to 0.2 mm.  Page: 1 of 5 Printed: 5/6/2019 1:10 PM  [phone]  Resulting  Tissue Pathology Exam: [id number]   Order: [id number]   Collected:  5/2/2019 12:26 Status:  Edited Result - FINAL   Visible to patient:  No (Not Released) Dx:  Malignant neoplasm of upper-inner hector...   Component    Addendum   HER2 by Immunohistochemistry   Positive (Score 3+)     HER2 by Immunohistochemistry  FDA approved (specify test/vendor): [employer]     Primary Antibody  [id number]     Cold Ischemia and Fixation Times   Meet requirements specified in latest version of the ASCO/CAP guidelines         Cold Ischemia Time: 18 minutes  Fixation Time: 8.25 hours  Testing Performed on Block Number(s): 1E  Fixative: Formalin   Addendum electronically signed by Harman Perry MD on 5/6/2019          Final Diagnosis  1. Left Breast, Modified Radical Mastectomy (935 grams): HIGH GRADE DUCTAL CARCINOMA IN-SITU  (DCIS) .  A. Nuclear Grade: High.  B. Architectural Type: Solid and comedo with lobular extension.  1. Size (extent) of DCIS: DCIS multifocal in the lower inner quadrant, measuring 0.9 cm in maximal  dimension (DCIS present in 3/18 tissue blocks).  C. No LCIS identified.  D. Skin and nipple uninvolved by DCIS.  E. Margins: Uninvolved by DCIS.  1. DCIS comes to within 1.3 cm of the anterior margin of excision (closest  margin).  F. Additional findings: Incidental intraductal papilloma, florid ductal hyperplasia and apocrine cysts.  G. Lymph nodes: Sixteen benign lymph nodes (0/16).  H. Hormone receptor status: ER and SD negative, Her2/kali positive by IHC (performed on prior resection  [id number]).  I. Pathologic stage: pT2, N0.  Sw/kds      Regadenoson Stress Test With Myocardial Perfusion SPECT   Interpretation Summary     · Myocardial perfusion imaging indicates a normal myocardial perfusion study with no evidence of ischemia.  · Left ventricular ejection fraction is borderline normal (Calculated EF = 50%).  · Impressions are consistent with a low risk study.      Study Description         Nuclear Perfusion Images Overall image quality is excellent. There are no artifacts present. Raw images reviewed with no abnormalities noted.       Echo 9/16  Interpretation Summary     · Left ventricular systolic function is normal. Calculated EF = 55%. Estimated EF = 55%. Global Longitudinal LV strain = -21.6%. Strain data was reviewed and speckle tracking was considered accurate. The global longitudinal LV strain is -21.6 and normal. Normal left ventricular cavity size and wall thickness noted. All left ventricular wall segments contract normally. Left ventricular diastolic dysfunction is noted (grade I) consistent with impaired relaxation.  · Trace mitral valve regurgitation is present.  · Physiologic tricuspid valve regurgitation is present. Calculated right ventricular systolic pressure from tricuspid regurgitation is 16.0 mmHg. Insufficient TR velocity profile to estimate the right ventricular systolic pressure.     Interpretation Summary     · Left ventricular systolic function is normal. Calculated EF = 57%. Estimated EF was in agreement with the calculated EF. Estimated EF = 57%. Global Longitudinal LV strain = -21%. Strain data was reviewed and speckle tracking was considered accurate. The global longitudinal LV strain is  normal.  · Normal left ventricular cavity size noted. All left ventricular wall segments contract normally. Left ventricular wall thickness is consistent with mild concentric hypertrophy. Left ventricular diastolic dysfunction is noted (grade I) consistent with impaired relaxation.  · Mild tricuspid valve regurgitation is present. Estimated right ventricular systolic pressure from tricuspid regurgitation is normal (<35 mmHg). Calculated right ventricular systolic pressure from tricuspid regurgitation is 30 mmHg.        Interpretation Summary     · Left ventricular systolic function is normal. Calculated EF = 57%. Estimated EF was in agreement with the calculated EF. Estimated EF = 57%. Global Longitudinal LV strain = -20.1%. All left ventricular wall segments contract normally. The left ventricular cavity is mildly dilated. Left ventricular wall thickness is consistent with moderate concentric hypertrophy. Left ventricular diastolic dysfunction is noted (grade I a w/high LAP) consistent with impaired relaxation.  · Left atrial cavity size is mildly dilated.  · Mild mitral valve regurgitation is present.  · Trace tricuspid valve regurgitation is present. Estimated right ventricular systolic pressure from tricuspid regurgitation is normal (<35 mmHg). Calculated right ventricular systolic pressure from tricuspid regurgitation is 21 mmHg.  · There is a small (<1cm) pericardial effusion. There is no evidence of cardiac tamponade.      Electronically signed by Keara Dudley MD on 1/22/20 at 0900 EST    MRI OF THE THORACIC SPINE WITH AND WITHOUT CONTRAST 03/05/2020  IMPRESSION:  1. There is a left posterior lateral disc bulge or tiny disc protrusion  that results in minimal if any narrowing of the left side of the canal  at C5-6 and there is a right paracentral disc herniation at T6-7 that  abuts and mildly flattens the right ventral surface of the cord mildly  narrowing the right side of the canal at T6-7. There is a small  right  paracentral disc bulge or protrusion only minimally narrowing the canal  at T7-8. At T11-12 there is a left posterior lateral disc osteophyte  complex minimally narrowing the left side of the canal. No additional  canal or foraminal narrowing is seen in the thoracic spine.  2. There is a right paracentral posterior lateral disc herniation at  T12-L1 with an inferiorly migrated extruded disc fragment that maximally  measures up to 13 x 6 x 10 mm in medial lateral, anterior posterior and  craniocaudal dimension and mild to moderately narrows the right side of  the canal at the level of the superior body and endplate of L1, it abuts  the ventral aspect of the right L1 nerve root in the superior portion of  the right lateral recess of L1.  3. There are some right anterior lateral marginal bridging osteophytes  from T8 to T11 with some focal bone marrow edema and enhancement in the  right anterior aspect of the T9 vertebra that is likely degenerative  marrow edema. The remainder of the thoracic spine MRI is normal with no  evidence of metastatic disease of the thoracic spine.     Interpretation Summary 5/4/2020    · Left ventricular systolic function is normal. Calculated EF = 58%. Estimated EF = 58%. Global Longitudinal LV strain = -19%. Strain data was reviewed and speckle tracking was considered accurate. The global longitudinal LV strain is normal.  · All left ventricular wall segments contract normally. The left ventricular cavity is borderline dilated. Left ventricular wall thickness is consistent with mild concentric hypertrophy. Left ventricular diastolic dysfunction is noted (grade I a w/high LAP) consistent with impaired  · Mild mitral valve regurgitation is present.  · Mild tricuspid valve regurgitation is present. Estimated right ventricular systolic pressure from tricuspid regurgitation is normal (<35 mmHg). Calculated right ventricular systolic pressure from tricuspid regurgitation is 33 mmHg.              Assessment/Plan   1.  T2N0 grade 3 infiltrating ductal carcinoma left breast ER NE negative HER-2 positive post mastectomy and axillary node dissection with clear margins.  · Initiated Taxol/Herceptin/Perjeta today, 8/29/19.  · Plan to do 12 weeks of Taxol Herceptin perjeta without Adriamycin because of her borderline cardiac function followed by a year of Perjeta Herceptin    2.  Tobacco abuse and COPD    3.  Abnormal echo with stress test normal but borderline cardiac function at 52%  We will avoid Adriamycin Cytoxan  and treat with Taxol Herceptin perjeta and a year of Herceptin perjeta. Dr. Dudley, cardiology, has given clearance for patient to begin.  Planning repeat echocardiogram 6 weeks from previous.    · Improved EF to 55%  · EF 57% in 1/21/2020  · EF 58% in 5/4/2020    4.  Venous access.  Status post new Mediport placement 8/27/2019.  Working well today.  .    · No more refills of Percocet planned    5.  Diarrhea related to Perjeta.  Improved with Imodium    6.  Skin rash on her arms and legs likely from  perjeta continue to watch and use steroid cream-stable    7.  Low magnesium due to diarrhea-diarrhea resolved    8.  Back and shoulder and neck pain MRI of thoracic spine shows multiple disc protrusions with no evidence of metastatic disease will refer to neurosurgery    Plan:  1.  Doppler right arm and neck to rule out port related clot   2.   Refer back to Dr. Kee for port removal   3.   Return in 12 weeks for follow-up with Dr. Jhaveri and have a mammogram done soon  4.  Epidural injections for her disc prolapse planned      Addendum Doppler of her right upper extremity and internal jugular was negative for clot

## 2020-09-21 ENCOUNTER — TELEPHONE (OUTPATIENT)
Dept: SURGERY | Facility: CLINIC | Age: 65
End: 2020-09-21

## 2020-09-21 NOTE — TELEPHONE ENCOUNTER
Left message for Ms Temple to call regarding appt to discuss port removal.  Need to move to a time that allows for in office removal in the event Dr Evans wants to do the same day

## 2020-09-30 ENCOUNTER — PROCEDURE VISIT (OUTPATIENT)
Dept: SURGERY | Facility: CLINIC | Age: 65
End: 2020-09-30

## 2020-09-30 VITALS — HEIGHT: 69 IN | WEIGHT: 214 LBS | BODY MASS INDEX: 31.7 KG/M2

## 2020-09-30 DIAGNOSIS — C50.212 MALIGNANT NEOPLASM OF UPPER-INNER QUADRANT OF LEFT BREAST IN FEMALE, ESTROGEN RECEPTOR NEGATIVE (HCC): Primary | ICD-10-CM

## 2020-09-30 DIAGNOSIS — Z17.1 MALIGNANT NEOPLASM OF UPPER-INNER QUADRANT OF LEFT BREAST IN FEMALE, ESTROGEN RECEPTOR NEGATIVE (HCC): Primary | ICD-10-CM

## 2020-09-30 PROCEDURE — 36590 REMOVAL TUNNELED CV CATH: CPT | Performed by: SURGERY

## 2020-09-30 RX ORDER — ACETAMINOPHEN 500 MG
500 TABLET ORAL EVERY 6 HOURS PRN
COMMUNITY

## 2020-10-01 NOTE — PROGRESS NOTES
Procedure note    Preoperative diagnosis: Personal history of breast cancer    Postoperative diagnosis: Same    Procedure performed: Removal of [id number]    Surgeon: Jase Evans MD    Anesthesia: Local    Estimated blood loss: Less than 2 cc    Specimens: Port was removed in its entirety    Complications: None noted    Description of procedure:  After discussion with patient, she was placed on the procedure table supine.  Her neck and chest were sterilely prepped and draped.  The previous incision was anesthetized with a 1% lidocaine solution and #15 blade was then used to incise through her previous scar.  I dissected down to the junction of the port and catheter and lifted this out of the wound.  I then opened up the capsule around the port and was able to identify the Prolene sutures securing the port to the chest wall and I cut these.  The port was then delivered through the incision and the catheter was then removed with the tip intact.  Direct pressure was held but no bleeding was seen.  The wound was irrigated and I then closed the deep tissues with 3-0 Vicryl.  Skin was closed with glue.  The patient tolerated this well.    Jase Evans MD  General and Endoscopic Surgery  [facility]    [address], [address]  [place], [place], [address]  P: [phone]  F: [phone]

## 2020-10-29 DIAGNOSIS — I10 BENIGN ESSENTIAL HYPERTENSION: ICD-10-CM

## 2020-10-29 RX ORDER — AMLODIPINE BESYLATE 10 MG/1
TABLET ORAL
Qty: 30 TABLET | Refills: 0 | Status: SHIPPED | OUTPATIENT
Start: 2020-10-29 | End: 2021-08-25

## 2020-11-19 DIAGNOSIS — M54.6 ACUTE BILATERAL THORACIC BACK PAIN: ICD-10-CM

## 2020-11-20 ENCOUNTER — TELEPHONE (OUTPATIENT)
Dept: FAMILY MEDICINE CLINIC | Facility: CLINIC | Age: 65
End: 2020-11-20

## 2020-11-20 RX ORDER — TIZANIDINE HYDROCHLORIDE 4 MG/1
CAPSULE, GELATIN COATED ORAL
Qty: 90 CAPSULE | Refills: 0 | Status: SHIPPED | OUTPATIENT
Start: 2020-11-20 | End: 2021-01-19

## 2020-11-20 NOTE — TELEPHONE ENCOUNTER
PATIENT IS CALLING NEEDING A CALL BACK FORM JUSTIN OR GIN    PATIENT HAVE SOME COVID-19 CONCERNS     PLEASE CONTACT PATIENT @[phone]

## 2020-11-20 NOTE — TELEPHONE ENCOUNTER
Spouses boss has been dx with covid. They share a work truck. Allie is worried about the exposure risk. Spouse has no symptoms. She wants advice. Her nerves are shot. She is taking antidepressants as prescribed but feels like she is snapping.     Also reports that she cannot tolerate amlodipine. It was making her very itchy. She stopped taking It and the itching resolved.

## 2020-11-20 NOTE — TELEPHONE ENCOUNTER
Patient informed. She can tell her blood pressure has been high since stopping the amlodipine. She does not have exact numbers but can tell it has been high. Should this be replaced with something else?

## 2020-11-20 NOTE — TELEPHONE ENCOUNTER
Would have spouse tested about 6-7 days after last exposed to boss. Should quarantine for 10 days if known exposure and wasn't wearing mask

## 2020-11-23 ENCOUNTER — TELEPHONE (OUTPATIENT)
Dept: ONCOLOGY | Facility: CLINIC | Age: 65
End: 2020-11-23

## 2020-11-23 NOTE — TELEPHONE ENCOUNTER
Caller: MELODIE AREVALO    Relationship to patient: SELF    Best call back number: [phone]    PT STATES SHE CANNOT MAKE IT TO HER APPT 11/25. STATES HER  HAS BEEN EXPOSED TO COVID AND THEY ARE  CURRENTLY UNDER QUARANTINE. NEEDS TO RESCHED FOR AFTER 12/7

## 2020-11-23 NOTE — TELEPHONE ENCOUNTER
Patient says she was on metoprolol before trying amlodipine and it did not help to bring her blood pressure down. She went back on this when she stopped the amlodipine and has been taking 25mg once daily and it is still not working to bring BP down. Is there anything else she can take?

## 2020-11-25 ENCOUNTER — APPOINTMENT (OUTPATIENT)
Dept: LAB | Facility: HOSPITAL | Age: 65
End: 2020-11-25

## 2020-12-07 NOTE — PROGRESS NOTES
Subjective     REASON FOR CONSULTATION:   1. Left breast cancer T2N0 3.6 cm grade 3 ER [place] negative HER-2 positive infiltrating ductal carcinoma post excisional biopsy, followed by mastectomy and axillary dissection- borderline -EF.  · Taxol Herceptin Perjeta for 12 weeks followed by Perjeta Herceptin for 1 year planned.  · Therapy completed 8/6/2020                               REQUESTING PHYSICIAN: Jase Khan DO    History of Present Illness patient is a 65 y.o. female with the above medical history who returns today for 3-month follow-up.  Since we last saw her the patient did undergo removal of her Mediport per Dr. Evans on 10/1/2020.  She is glad to have this removed.    Patient is overdue for annual mammogram, reportedly because she has an overdue bill from previous imaging that she has not paid.  As she is reviewed today she states that she plans to pay this after the [place] holiday and is hopeful to get her right screening mammogram performed in January 2021.    Overall the patient has been doing well.  She does note getting what sounds like a GI virus around Thanksgiving.  She notes her grandson had it for about a day, specifically with profuse diarrhea.  The patient thereafter developed similar symptoms reporting watery stools every few hours.  Her bowels are slowly normalizing, noting pasty, more formed stools in the last few days.  She has not required Imodium.  She has been drinking propel and other electrolyte supplements.  Patient was started on magnesium supplements back during chemotherapy due to treatment associated diarrhea.  With recent issues she has backed off her magnesium supplement every other day dosing wondering if it might be contributing to her loose stool.  We will recheck a magnesium level today but we discussed that she likely does not need the magnesium supplement anymore since she is done with  treatment.    Patient also notes some persistent numbness in her toes and also her feet feeling generally cold, worse at night.  Patient previously took gabapentin which caused her crazy dreams and she therefore discontinued.  She denies any leg cramps or restless legs.    She denies other concerns this time.    Past Medical History:   Diagnosis Date   • Adjustment disorder    • Allergic rhinitis    • Amenorrhea    • Anxiety    • Breast cancer (CMS/HCC) 04/18/2019    Left breast mammary carcinoma   • Bruit    • Carpal tunnel syndrome    • Carrier of tuberculosis    • Chronic pain    • Daytime somnolence    • De Quervain's tenosynovitis    • Degenerative cervical disc    • Depression    • Dyslipidemia    • Eustachian tube dysfunction    • History of UTI    • Hyperlipidemia    • Hypertension    • Impaired fasting glucose    • Lumbar degenerative disc disease    • Numbness and tingling     LEFT LEG   • OAB (overactive bladder)    • Precordial pain    • Scapulothoracic syndrome    • Sciatica         Past Surgical History:   Procedure Laterality Date   • APPENDECTOMY N/A    • BREAST BIOPSY Left 2019    Left breast ultrasound guided cyst aspiration, 9:00 position-Dr. Gerry [facility]   • BREAST LUMPECTOMY Left 5/2/2019    Procedure: Left BREAST LUMPECTOMY;  Surgeon: Jase Evans MD;  Location: [facility] OR;  Service: General   • LUMBAR EPIDURAL INJECTION N/A    • MASTECTOMY Left 6/21/2019    Procedure: Left BREAST MASTECTOMY;  Surgeon: Jase Evans MD;  Location: [facility] OR;  Service: General   • TUBAL ABDOMINAL LIGATION Bilateral    • VAGINAL DELIVERY      x3   • VENOUS ACCESS DEVICE (PORT) INSERTION Right 8/27/2019    Procedure: INSERTION VENOUS ACCESS DEVICE;  Surgeon: Jase Evans MD;  Location: [facility] OR;  Service: General      ONC HISTORY;  patient is a 64-year-old retired  with hypertension hypercholesterolemia and degenerative arthritis who felt a mass in her left  breast in March.  She showed it to her family doctor and underwent imaging at [facility] on 3/13/2019 which Showed a 3 x 3.2 cm mass at 9:00 which on ultrasound showed a thick-walled benign-appearing 2.8 x 2.5 cm cyst which was felt to not be suspicious .because of persistence of symptoms she was reevaluated on 2019 and underwent aspiration and core biopsy because there was a significant soft tissue component to the mass at that time this was aspirated and the final report  showed fine-needle aspiration suspicious for malignant cells favor mammary carcinoma  Patient was referred to Dr. Kee who took her for an excisional biopsy on 2019 with the final report showing a  mass grade 3 -4x3.6cm with tumor extending to one margin and DCIS also 0.2 mm from one margin.  The tumor was ER SC negative HER-2 3+.  The patient was then taken for surgery on 2019 at which time she had a completion mastectomy and axillary node biopsy with the findings of residual high-grade DCIS with clear margins and an incidental intraductal papilloma and 17-neg lymph nodes making this a T2N0 tumor    She has done well postoperatively and is here to discuss adjuvant treatment    She is  3 para 3 menarche  at age 15 and menopause in her early 40s when she had a hysterectomy for heavy menstrual bleeding.  She took hormone replacement for 10 years and stopped 10 years ago first childbirth was at age 25 she did not breast-feed  Family history is positive for mother who had uterine cancer at age 60 and  of it at age 65 she has a brother who  of liver cancer related to drinking there is no other malignancy in the family that she is aware of    She is a significant smoker for the last 48 years but does not drink     Patient and her  were very taken to back by the suggestion about chemotherapy and apparently had thought that there was no further treatment needed and I spent almost an hour presenting the data concerning the  extreme effectiveness of HER-2 directed therapy and this situation with a high risk of recurrence without adjuvant treatment and she finally was convinced to do the treatment    We discussed smoking cessation but at this point she is so nervous and agitated with the prospect of chemotherapy that we will hold off on this until she is shelter through the chemotherapy and rediscuss it    I discussed the case also with Dr. Kee will place a port and we will plan to start chemotherapy in 2 weeks.    Patient initiating therapy with Taxol/Herceptin/Perjeta 8/29/19.  9/19    She is followed by Dr. Dudley, cardiology, who gave clearance for the patient to proceed with chemotherapy.  repeat echo 6 weeks from last actually showed improvement in the cardiologist thought there may have been some technical issues with her first echocardiogram.    She returns back today, due for cycle3 day 1 of Taxol/Herceptin perjeta her diarrhea is-clearly controlled with the Imodium and in fact she became constipated because she took too much of this but she is got this under control and feels good  She has some reflux symptoms in the morning which makes her nauseous and I suggested she take 1 Prilosec every day till the chemo was over and this is improved    She has no neuropathy but is not brought the cooling gloves and I recommended this to her and gave her the literature supporting this in the website  She has had a itchy rash on her arms and legs which is not too bothersome and I have to suggested a steroid cream and some Benadryl and we will keep an eye on the rash- she has no shortness of breath    Perjeta/Herceptin completed 8/6/2020.      Current Outpatient Medications on File Prior to Visit   Medication Sig Dispense Refill   • acetaminophen (TYLENOL) 500 MG tablet Take 500 mg by mouth Every 6 (Six) Hours As Needed for Mild Pain .     • amLODIPine (NORVASC) 10 MG tablet TAKE 1 TABLET BY MOUTH ONE TIME A DAY  30 tablet 0   •  diphenoxylate-atropine (LOMOTIL) 2.5-0.025 MG per tablet Take 1 tablet by mouth 4 (Four) Times a Day As Needed for Diarrhea. 90 tablet 0   • MAGNESIUM PO Take 200 mg by mouth.     • metoprolol tartrate (LOPRESSOR) 25 MG tablet Take 1 tablet by mouth 2 (Two) Times a Day. 60 tablet 0   • mometasone (NASONEX) 50 MCG/ACT nasal spray 2 sprays into the nostril(s) as directed by provider Daily.     • montelukast (Singulair) 10 MG tablet Take 1 tablet by mouth Every Night. 30 tablet 3   • Multiple Vitamins-Minerals (CENTRUM SILVER PO) Take 1 tablet by mouth Daily. womens vitamin     • potassium chloride ER (K-TAB) 20 MEQ tablet controlled-release ER tablet Take 1 tablet by mouth 2 (Two) Times a Day. 60 tablet 1   • sertraline (ZOLOFT) 50 MG tablet Take 1 tablet by mouth Daily. 90 tablet 1   • TiZANidine (ZANAFLEX) 4 MG capsule TAKE 1 CAPSULE BY MOUTH THREE TIMES A DAY  90 capsule 0     No current facility-administered medications on file prior to visit.         ALLERGIES:    Allergies   Allergen Reactions   • Gabapentin Hallucinations   • Effexor [Venlafaxine] Itching   • Naproxen Itching     Pt reports severe vaginal itching    • Zyrtec [Cetirizine] Itching     Non-effecious   • Ibuprofen Unknown - Low Severity     Burns while urinating  Can take low dose Ibuprofen   • Penicillins Rash   • Sulfa Antibiotics Rash        Social History     Socioeconomic History   • Marital status:      Spouse name: Pawan   • Number of children: 3   • Years of education: High school   • Highest education level: Not on file   Occupational History     Employer: RETIRED   Tobacco Use   • Smoking status: Current Every Day Smoker     Packs/day: 0.25     Years: 48.00     Pack years: 12.00     Types: Cigarettes   • Smokeless tobacco: Never Used   • Tobacco comment: 3-4 cigarettes a day. Trying to quit.    Substance and Sexual Activity   • Alcohol use: No   • Drug use: No   • Sexual activity: Yes     Partners: Male     Birth control/protection:  "Post-menopausal        Family History   Problem Relation Age of Onset   • Ovarian cancer Mother    • Endometrial cancer Mother    • COPD Father    • Stroke Brother    • Malig Hyperthermia Neg Hx         Review of Systems   Constitutional: Negative for appetite change, chills, diaphoresis, fever and unexpected weight change.   HENT: Negative for hearing loss, sinus pain, sneezing, sore throat and trouble swallowing.    Respiratory: Negative for cough, chest tightness, shortness of breath and wheezing.    Cardiovascular: Negative for chest pain, palpitations and leg swelling.   Gastrointestinal: Positive for diarrhea (see HPI - improving now). Negative for abdominal distention, abdominal pain, constipation, nausea and vomiting.   Endocrine: Negative.    Genitourinary: Negative for dysuria, frequency, hematuria and urgency.   Musculoskeletal: Negative.  Negative for back pain, joint swelling and neck pain.        No muscle weakness.   Skin: Negative for rash and wound.   Neurological: Positive for numbness (toes numb/cold, worse at night). Negative for seizures, syncope, speech difficulty, weakness and headaches.   Hematological: Negative.  Negative for adenopathy. Does not bruise/bleed easily.   Psychiatric/Behavioral: Negative.  Negative for behavioral problems, confusion and suicidal ideas.       Objective     Vitals:    12/10/20 0942   BP: 136/78   Pulse: 64   Resp: 16   Temp: 96.8 °F (36 °C)   TempSrc: Skin   SpO2: 96%   Weight: 99.5 kg (219 lb 6.4 oz)   Height: 175.3 cm (69.02\")   PainSc: 0-No pain     Current Status 12/10/2020   ECOG score 0       Physical Exam   Pulmonary/Chest:           GENERAL:  Well-developed, well-nourished in no acute distress.   SKIN:  Warm, dry fading maculopapular rash on her arms and legs,no  purpura or petechiae.  EYES:  Pupils equal, round and reactive to light.  EOMs intact.  Conjunctivae normal.  EARS:  Hearing intact.  NOSE:  Septum midline.  No excoriations or nasal " discharge.  MOUTH:  Tongue is well-papillated; no stomatitis or ulcers.  Lips normal.  THROAT:  Oropharynx without lesions or exudates.  NECK:  Supple with good range of motion; no thyromegaly or masses, no JVD.    LYMPHATICS:  No cervical, supraclavicular, axillary or inguinal adenopathy.  CHEST:  Lungs clear to auscultation. Good airflow.  Mediport has been removed, well healed right chest scargn.  BREASTS: Right breast is benign; left chest wall shows a well-healed mastectomy scar, access skin tissue  CARDIAC:  Regular rate and rhythm without murmurs, rubs or gallops. Normal S1,S2.  ABDOMEN:  Soft, nontender with no hepatosplenomegaly or masses.  EXTREMITIES:  No clubbing, cyanosis or edema.  NEUROLOGICAL:  Cranial Nerves II-XII grossly intact.  No focal neurological deficits.  PSYCHIATRIC:  Normal affect and mood.        RECENT LABS:  Results from last 7 days   Lab Units 12/10/20  0933   WBC 10*3/mm3 6.80   NEUTROS ABS 10*3/mm3 3.57   HEMOGLOBIN g/dL 13.9   HEMATOCRIT % 41.1   PLATELETS 10*3/mm3 242     Results from last 7 days   Lab Units 12/10/20  0933   SODIUM mmol/L 140   POTASSIUM mmol/L 4.0   CHLORIDE mmol/L 105   CO2 mmol/L 24.7   BUN mg/dL 8   CREATININE mg/dL 0.72   CALCIUM mg/dL 9.9   ALBUMIN g/dL 4.20   BILIRUBIN mg/dL 0.4   ALK PHOS U/L 130*   ALT (SGPT) U/L 15   AST (SGOT) U/L 23   GLUCOSE mg/dL 85   MAGNESIUM mg/dL 1.9            RADIOGRAPHIC DATA:    US Breat Left Limited    1. Left Breast Lumpectomy (47 Grams):  A. Invasive poorly differentiated mammary carcinoma of no special type (ductal carcinoma)  with apocrine features.  1. Invasive carcinoma measures up to 40 x 36 x 27 mm (measured grossly).  2. Overall Syl Grade III (glandular score = 3, nuclear score = 3, mitotic score = 3).  3. No definitive lymphovascular space invasion identified.  B. Invasive carcinoma focally extends to one undesignated peripheral inked margin of excision.  C. Associated ductal carcinoma in situ (DCIS).  1.  Ductal carcinoma in situ (DCIS) spans area measuring 40 mm in single greatest  aggregate dimension (estimated from gross and glass slides).  2. DCIS predominantly solid and comedo type with high grade nuclear features.  3. High grade DCIS approximates the closest peripheral inked margin to 0.2 mm.  Page: 1 of 5 Printed: 5/6/2019 1:10 PM  [phone]  Resulting  Tissue Pathology Exam: [id number]   Order: [id number]   Collected:  5/2/2019 12:26 Status:  Edited Result - FINAL   Visible to patient:  No (Not Released) Dx:  Malignant neoplasm of upper-inner hector...   Component    Addendum   HER2 by Immunohistochemistry   Positive (Score 3+)     HER2 by Immunohistochemistry  FDA approved (specify test/vendor): [employer]     Primary Antibody  [id number]     Cold Ischemia and Fixation Times   Meet requirements specified in latest version of the ASCO/CAP guidelines         Cold Ischemia Time: 18 minutes  Fixation Time: 8.25 hours  Testing Performed on Block Number(s): 1E  Fixative: Formalin   Addendum electronically signed by Harman Perry MD on 5/6/2019          Final Diagnosis  1. Left Breast, Modified Radical Mastectomy (935 grams): HIGH GRADE DUCTAL CARCINOMA IN-SITU  (DCIS) .  A. Nuclear Grade: High.  B. Architectural Type: Solid and comedo with lobular extension.  1. Size (extent) of DCIS: DCIS multifocal in the lower inner quadrant, measuring 0.9 cm in maximal  dimension (DCIS present in 3/18 tissue blocks).  C. No LCIS identified.  D. Skin and nipple uninvolved by DCIS.  E. Margins: Uninvolved by DCIS.  1. DCIS comes to within 1.3 cm of the anterior margin of excision (closest margin).  F. Additional findings: Incidental intraductal papilloma, florid ductal hyperplasia and apocrine cysts.  G. Lymph nodes: Sixteen benign lymph nodes (0/16).  H. Hormone receptor status: ER and GA negative, Her2/kali positive by IHC (performed on prior resection  [id number]).  I. Pathologic stage: pT2, N0.  Swm/kds      Regadenoson Stress Test  With Myocardial Perfusion SPECT   Interpretation Summary     · Myocardial perfusion imaging indicates a normal myocardial perfusion study with no evidence of ischemia.  · Left ventricular ejection fraction is borderline normal (Calculated EF = 50%).  · Impressions are consistent with a low risk study.      Study Description         Nuclear Perfusion Images Overall image quality is excellent. There are no artifacts present. Raw images reviewed with no abnormalities noted.       Echo 9/16  Interpretation Summary     · Left ventricular systolic function is normal. Calculated EF = 55%. Estimated EF = 55%. Global Longitudinal LV strain = -21.6%. Strain data was reviewed and speckle tracking was considered accurate. The global longitudinal LV strain is -21.6 and normal. Normal left ventricular cavity size and wall thickness noted. All left ventricular wall segments contract normally. Left ventricular diastolic dysfunction is noted (grade I) consistent with impaired relaxation.  · Trace mitral valve regurgitation is present.  · Physiologic tricuspid valve regurgitation is present. Calculated right ventricular systolic pressure from tricuspid regurgitation is 16.0 mmHg. Insufficient TR velocity profile to estimate the right ventricular systolic pressure.     Interpretation Summary     · Left ventricular systolic function is normal. Calculated EF = 57%. Estimated EF was in agreement with the calculated EF. Estimated EF = 57%. Global Longitudinal LV strain = -21%. Strain data was reviewed and speckle tracking was considered accurate. The global longitudinal LV strain is normal.  · Normal left ventricular cavity size noted. All left ventricular wall segments contract normally. Left ventricular wall thickness is consistent with mild concentric hypertrophy. Left ventricular diastolic dysfunction is noted (grade I) consistent with impaired relaxation.  · Mild tricuspid valve regurgitation is present. Estimated right ventricular  systolic pressure from tricuspid regurgitation is normal (<35 mmHg). Calculated right ventricular systolic pressure from tricuspid regurgitation is 30 mmHg.        Interpretation Summary     · Left ventricular systolic function is normal. Calculated EF = 57%. Estimated EF was in agreement with the calculated EF. Estimated EF = 57%. Global Longitudinal LV strain = -20.1%. All left ventricular wall segments contract normally. The left ventricular cavity is mildly dilated. Left ventricular wall thickness is consistent with moderate concentric hypertrophy. Left ventricular diastolic dysfunction is noted (grade I a w/high LAP) consistent with impaired relaxation.  · Left atrial cavity size is mildly dilated.  · Mild mitral valve regurgitation is present.  · Trace tricuspid valve regurgitation is present. Estimated right ventricular systolic pressure from tricuspid regurgitation is normal (<35 mmHg). Calculated right ventricular systolic pressure from tricuspid regurgitation is 21 mmHg.  · There is a small (<1cm) pericardial effusion. There is no evidence of cardiac tamponade.      Electronically signed by Keara Dudley MD on 1/22/20 at 0900 EST    MRI OF THE THORACIC SPINE WITH AND WITHOUT CONTRAST 03/05/2020  IMPRESSION:  1. There is a left posterior lateral disc bulge or tiny disc protrusion  that results in minimal if any narrowing of the left side of the canal  at C5-6 and there is a right paracentral disc herniation at T6-7 that  abuts and mildly flattens the right ventral surface of the cord mildly  narrowing the right side of the canal at T6-7. There is a small right  paracentral disc bulge or protrusion only minimally narrowing the canal  at T7-8. At T11-12 there is a left posterior lateral disc osteophyte  complex minimally narrowing the left side of the canal. No additional  canal or foraminal narrowing is seen in the thoracic spine.  2. There is a right paracentral posterior lateral disc herniation at  T12-L1  with an inferiorly migrated extruded disc fragment that maximally  measures up to 13 x 6 x 10 mm in medial lateral, anterior posterior and  craniocaudal dimension and mild to moderately narrows the right side of  the canal at the level of the superior body and endplate of L1, it abuts  the ventral aspect of the right L1 nerve root in the superior portion of  the right lateral recess of L1.  3. There are some right anterior lateral marginal bridging osteophytes  from T8 to T11 with some focal bone marrow edema and enhancement in the  right anterior aspect of the T9 vertebra that is likely degenerative  marrow edema. The remainder of the thoracic spine MRI is normal with no  evidence of metastatic disease of the thoracic spine.     Interpretation Summary 5/4/2020    · Left ventricular systolic function is normal. Calculated EF = 58%. Estimated EF = 58%. Global Longitudinal LV strain = -19%. Strain data was reviewed and speckle tracking was considered accurate. The global longitudinal LV strain is normal.  · All left ventricular wall segments contract normally. The left ventricular cavity is borderline dilated. Left ventricular wall thickness is consistent with mild concentric hypertrophy. Left ventricular diastolic dysfunction is noted (grade I a w/high LAP) consistent with impaired  · Mild mitral valve regurgitation is present.  · Mild tricuspid valve regurgitation is present. Estimated right ventricular systolic pressure from tricuspid regurgitation is normal (<35 mmHg). Calculated right ventricular systolic pressure from tricuspid regurgitation is 33 mmHg.             Assessment/Plan   1.  T2N0 grade 3 infiltrating ductal carcinoma left breast ER [facility] negative HER-2 positive post mastectomy and axillary node dissection with clear margins.  · Initiated Taxol/Herceptin/Perjeta today, 8/29/19.  · Plan to do 12 weeks of Taxol Herceptin perjeta without Adriamycin because of her borderline cardiac function followed by a  year of Perjeta Herceptin  · Therapy completed 8/6/2020  · Mediport removed 9/22/2020 per Dr. Evans  · Patient overdue for right screening mammogram (due 9/2020).  As detailed in the HPI she needs to pay an overdue bill and will also have a co-pay of $100 before she can get her mammogram.  She hopes to be able to pay for this and proceed in January 2021.    2.  Tobacco abuse and COPD    3.  Abnormal echo with stress test normal but borderline cardiac function at 52%  We will avoid Adriamycin Cytoxan  and treat with Taxol Herceptin perjeta and a year of Herceptin perjeta. Dr. Dudley, cardiology, has given clearance for patient to begin.  Planning repeat echocardiogram 6 weeks from previous.    · Improved EF to 55%  · EF 57% in 1/21/2020  · EF 58% in 5/4/2020    4.  Venous access.  Status post new Mediport placement 8/27/2019.  Working well today.      · No more refills of Percocet planned    5.  Diarrhea related to Perjeta. Resolved post treatment completion.    6.  Skin rash on her arms and legs likely from  perjeta continue to watch and use steroid cream-stable    7.  Previous history of low magnesium due to diarrhea.  Patient has continued on magnesium supplement post completion of treatment.  She is asking if she can discontinue which I think is reasonable.  We will recheck a magnesium level when she returns in 3 months to monitor.    8.  Recent acute GI illness with reports of watery diarrhea multiple times a day roughly 2 weeks ago.  Bowels are slowly improving with more formed stool.  Patient's weight is stable.  She is drinking electrolyte drinks and chemistries reviewed today show no concerning abnormalities.  I encouraged her to contact primary care if symptoms do not completely resolve.    9.  Chemotherapy-induced neuropathy, grade 1-2.  She has numbness and coldness of the toes, worse at night.  She was intolerant to gabapentin previously.  She is not having any pain.  We discussed utilizing topical creams  such as Theraworx relief and continuing to sleeping in socks which she is already doing.  We did discuss at this point symptoms are probably permanent.      Plan:  1.  Patient given okay to discontinue oral magnesium supplement.  2.  Patient undergo mammogram as soon as she has the co-pay findings as detailed above.  She hopes to complete right screening mammogram in January 2021.  3.  Patient to contact primary care if diarrhea from recent GI illness does not completely resolve.  Currently improving.  4.  Patient otherwise return in 3 months for follow-up with Dr. Jhaveri with CBC, CMP and magnesium level for review.    Patient has no evidence of recurrent disease on physical exam today.  Doing well overall post chemotherapy though has lingering mild neuropathy. Mediport out, previous neck pain resolved.

## 2020-12-10 ENCOUNTER — OFFICE VISIT (OUTPATIENT)
Dept: ONCOLOGY | Facility: CLINIC | Age: 65
End: 2020-12-10

## 2020-12-10 ENCOUNTER — LAB (OUTPATIENT)
Dept: LAB | Facility: HOSPITAL | Age: 65
End: 2020-12-10

## 2020-12-10 VITALS
WEIGHT: 219.4 LBS | SYSTOLIC BLOOD PRESSURE: 136 MMHG | BODY MASS INDEX: 32.5 KG/M2 | RESPIRATION RATE: 16 BRPM | TEMPERATURE: 96.8 F | OXYGEN SATURATION: 96 % | HEART RATE: 64 BPM | DIASTOLIC BLOOD PRESSURE: 78 MMHG | HEIGHT: 69 IN

## 2020-12-10 DIAGNOSIS — T45.1X5A CHEMOTHERAPY-INDUCED NEUROPATHY (HCC): ICD-10-CM

## 2020-12-10 DIAGNOSIS — Z17.1 MALIGNANT NEOPLASM OF UPPER-INNER QUADRANT OF LEFT BREAST IN FEMALE, ESTROGEN RECEPTOR NEGATIVE (HCC): Primary | ICD-10-CM

## 2020-12-10 DIAGNOSIS — Z17.1 MALIGNANT NEOPLASM OF UPPER-INNER QUADRANT OF LEFT BREAST IN FEMALE, ESTROGEN RECEPTOR NEGATIVE (HCC): ICD-10-CM

## 2020-12-10 DIAGNOSIS — C50.212 MALIGNANT NEOPLASM OF UPPER-INNER QUADRANT OF LEFT BREAST IN FEMALE, ESTROGEN RECEPTOR NEGATIVE (HCC): Primary | ICD-10-CM

## 2020-12-10 DIAGNOSIS — C50.212 MALIGNANT NEOPLASM OF UPPER-INNER QUADRANT OF LEFT BREAST IN FEMALE, ESTROGEN RECEPTOR NEGATIVE (HCC): ICD-10-CM

## 2020-12-10 DIAGNOSIS — G62.0 CHEMOTHERAPY-INDUCED NEUROPATHY (HCC): ICD-10-CM

## 2020-12-10 DIAGNOSIS — E83.42 HYPOMAGNESEMIA: Primary | ICD-10-CM

## 2020-12-10 DIAGNOSIS — R19.7 DIARRHEA, UNSPECIFIED TYPE: ICD-10-CM

## 2020-12-10 LAB
ALBUMIN SERPL-MCNC: 4.2 G/DL (ref 3.5–5.2)
ALBUMIN/GLOB SERPL: 1.4 G/DL (ref 1.1–2.4)
ALP SERPL-CCNC: 130 U/L (ref 38–116)
ALT SERPL W P-5'-P-CCNC: 15 U/L (ref 0–33)
ANION GAP SERPL CALCULATED.3IONS-SCNC: 10.3 MMOL/L (ref 5–15)
AST SERPL-CCNC: 23 U/L (ref 0–32)
BASOPHILS # BLD AUTO: 0.06 10*3/MM3 (ref 0–0.2)
BASOPHILS NFR BLD AUTO: 0.9 % (ref 0–1.5)
BILIRUB SERPL-MCNC: 0.4 MG/DL (ref 0.2–1.2)
BUN SERPL-MCNC: 8 MG/DL (ref 6–20)
BUN/CREAT SERPL: 11.1 (ref 7.3–30)
CALCIUM SPEC-SCNC: 9.9 MG/DL (ref 8.5–10.2)
CHLORIDE SERPL-SCNC: 105 MMOL/L (ref 98–107)
CO2 SERPL-SCNC: 24.7 MMOL/L (ref 22–29)
CREAT SERPL-MCNC: 0.72 MG/DL (ref 0.6–1.1)
DEPRECATED RDW RBC AUTO: 39.8 FL (ref 37–54)
EOSINOPHIL # BLD AUTO: 0.2 10*3/MM3 (ref 0–0.4)
EOSINOPHIL NFR BLD AUTO: 2.9 % (ref 0.3–6.2)
ERYTHROCYTE [DISTWIDTH] IN BLOOD BY AUTOMATED COUNT: 12.1 % (ref 12.3–15.4)
GFR SERPL CREATININE-BSD FRML MDRD: 81 ML/MIN/1.73
GLOBULIN UR ELPH-MCNC: 3 GM/DL (ref 1.8–3.5)
GLUCOSE SERPL-MCNC: 85 MG/DL (ref 74–124)
HCT VFR BLD AUTO: 41.1 % (ref 34–46.6)
HGB BLD-MCNC: 13.9 G/DL (ref 12–15.9)
IMM GRANULOCYTES # BLD AUTO: 0.01 10*3/MM3 (ref 0–0.05)
IMM GRANULOCYTES NFR BLD AUTO: 0.1 % (ref 0–0.5)
LYMPHOCYTES # BLD AUTO: 2.44 10*3/MM3 (ref 0.7–3.1)
LYMPHOCYTES NFR BLD AUTO: 35.9 % (ref 19.6–45.3)
MAGNESIUM SERPL-MCNC: 1.9 MG/DL (ref 1.8–2.5)
MCH RBC QN AUTO: 30.4 PG (ref 26.6–33)
MCHC RBC AUTO-ENTMCNC: 33.8 G/DL (ref 31.5–35.7)
MCV RBC AUTO: 89.9 FL (ref 79–97)
MONOCYTES # BLD AUTO: 0.52 10*3/MM3 (ref 0.1–0.9)
MONOCYTES NFR BLD AUTO: 7.6 % (ref 5–12)
NEUTROPHILS NFR BLD AUTO: 3.57 10*3/MM3 (ref 1.7–7)
NEUTROPHILS NFR BLD AUTO: 52.6 % (ref 42.7–76)
NRBC BLD AUTO-RTO: 0 /100 WBC (ref 0–0.2)
PLATELET # BLD AUTO: 242 10*3/MM3 (ref 140–450)
PMV BLD AUTO: 9.5 FL (ref 6–12)
POTASSIUM SERPL-SCNC: 4 MMOL/L (ref 3.5–4.7)
PROT SERPL-MCNC: 7.2 G/DL (ref 6.3–8)
RBC # BLD AUTO: 4.57 10*6/MM3 (ref 3.77–5.28)
SODIUM SERPL-SCNC: 140 MMOL/L (ref 134–145)
WBC # BLD AUTO: 6.8 10*3/MM3 (ref 3.4–10.8)

## 2020-12-10 PROCEDURE — 85025 COMPLETE CBC W/AUTO DIFF WBC: CPT

## 2020-12-10 PROCEDURE — 36415 COLL VENOUS BLD VENIPUNCTURE: CPT

## 2020-12-10 PROCEDURE — 80053 COMPREHEN METABOLIC PANEL: CPT

## 2020-12-10 PROCEDURE — 99215 OFFICE O/P EST HI 40 MIN: CPT | Performed by: NURSE PRACTITIONER

## 2020-12-10 PROCEDURE — 83735 ASSAY OF MAGNESIUM: CPT | Performed by: NURSE PRACTITIONER

## 2021-01-19 DIAGNOSIS — M54.6 ACUTE BILATERAL THORACIC BACK PAIN: ICD-10-CM

## 2021-01-19 RX ORDER — TIZANIDINE HYDROCHLORIDE 4 MG/1
CAPSULE, GELATIN COATED ORAL
Qty: 270 CAPSULE | Refills: 0 | Status: SHIPPED | OUTPATIENT
Start: 2021-01-19 | End: 2022-04-15 | Stop reason: SDUPTHER

## 2021-02-26 ENCOUNTER — TELEPHONE (OUTPATIENT)
Dept: FAMILY MEDICINE CLINIC | Facility: CLINIC | Age: 66
End: 2021-02-26

## 2021-02-26 DIAGNOSIS — F32.9 REACTIVE DEPRESSION: ICD-10-CM

## 2021-02-26 RX ORDER — CLINDAMYCIN HYDROCHLORIDE 300 MG/1
300 CAPSULE ORAL 3 TIMES DAILY
Qty: 30 CAPSULE | Refills: 0 | Status: SHIPPED | OUTPATIENT
Start: 2021-02-26 | End: 2021-03-12

## 2021-02-26 NOTE — TELEPHONE ENCOUNTER
Caller: Allie Temple A    Relationship to patient: Self    Best call back number: [phone]     Concerns or Questions if Applicable: Patient is calling to state she has an abscess tooth and we do not have any available appointments today.  She states tat there is a knot in the area where the abscess is and she is beginning to get an ear ache.  She states she can not get in to the dentist until 03/10/21.  She is requesting an antibiotic.      [id number] - [facility] [address] [place] [address] - [phone] [facility] [phone]   [phone]    Please advise.

## 2021-03-08 NOTE — PROGRESS NOTES
Subjective     REASON FOR CONSULTATION:   1. Left breast cancer T2N0 3.6 cm grade 3 ER IL negative HER-2 positive infiltrating ductal carcinoma post excisional biopsy, followed by mastectomy and axillary dissection- borderline -EF.  · Taxol Herceptin Perjeta for 12 weeks followed by Perjeta Herceptin for 1 year planned.  · Therapy completed 8/6/2020                               REQUESTING PHYSICIAN: Jase Khan DO    History of Present Illness patient is a 66 y.o. female with the above medical history who returns today for follow-up.        Patient is overdue for annual mammogram, reportedly because she has an overdue bill from previous imaging that she has not paid.  As she is reviewed today she states that she plans to pay this next week with the stimulus check and is hopeful to get her right screening mammogram performed in April 2021.    Overall the patient has been doing well.      Patient also notes some persistent numbness in her toes and also her feet feeling generally cold, worse at night.  Patient previously took gabapentin which caused her crazy dreams and she therefore discontinued.  She denies any leg cramps or restless legs.    She denies other concerns this time.    Past Medical History:   Diagnosis Date   • Adjustment disorder    • Allergic rhinitis    • Amenorrhea    • Anxiety    • Breast cancer (CMS/[facility]) 04/18/2019    Left breast mammary carcinoma   • Bruit    • Carpal tunnel syndrome    • Carrier of tuberculosis    • Chronic pain    • Daytime somnolence    • De Quervain's tenosynovitis    • Degenerative cervical disc    • Depression    • Dyslipidemia    • Eustachian tube dysfunction    • History of UTI    • Hyperlipidemia    • Hypertension    • Impaired fasting glucose    • Lumbar degenerative disc disease    • Numbness and tingling     LEFT LEG   • OAB (overactive bladder)    • Precordial pain    • Scapulothoracic syndrome     • Sciatica         Past Surgical History:   Procedure Laterality Date   • APPENDECTOMY N/A    • BREAST BIOPSY Left 2019    Left breast ultrasound guided cyst aspiration, 9:00 position-Dr. Gerry Taylor, [facility]   • BREAST LUMPECTOMY Left 5/2/2019    Procedure: Left BREAST LUMPECTOMY;  Surgeon: Jase Evans MD;  Location: [facility];  Service: General   • LUMBAR EPIDURAL INJECTION N/A    • MASTECTOMY Left 6/21/2019    Procedure: Left BREAST MASTECTOMY;  Surgeon: Jase Evans MD;  Location: [facility];  Service: General   • TUBAL ABDOMINAL LIGATION Bilateral    • VAGINAL DELIVERY      x3   • VENOUS ACCESS DEVICE (PORT) INSERTION Right 8/27/2019    Procedure: INSERTION VENOUS ACCESS DEVICE;  Surgeon: Jase Evans MD;  Location: [facility];  Service: General      ONC HISTORY;  patient is a 64-year-old retired  with hypertension hypercholesterolemia and degenerative arthritis who felt a mass in her left breast in March.  She showed it to her family doctor and underwent imaging at [facility] on 3/13/2019 which Showed a 3 x 3.2 cm mass at 9:00 which on ultrasound showed a thick-walled benign-appearing 2.8 x 2.5 cm cyst which was felt to not be suspicious .because of persistence of symptoms she was reevaluated on 4/16/2019 and underwent aspiration and core biopsy because there was a significant soft tissue component to the mass at that time this was aspirated and the final report  showed fine-needle aspiration suspicious for malignant cells favor mammary carcinoma  Patient was referred to Dr. Kee who took her for an excisional biopsy on 5/2/2019 with the final report showing a  mass grade 3 -4x3.6cm with tumor extending to one margin and DCIS also 0.2 mm from one margin.  The tumor was ER AL negative HER-2 3+.  The patient was then taken for surgery on 6/21/2019 at which time she had a completion mastectomy and axillary node biopsy with the findings of residual high-grade DCIS with  clear margins and an incidental intraductal papilloma and 17-neg lymph nodes making this a T2N0 tumor    She has done well postoperatively and is here to discuss adjuvant treatment    She is  3 para 3 menarche  at age 15 and menopause in her early 40s when she had a hysterectomy for heavy menstrual bleeding.  She took hormone replacement for 10 years and stopped 10 years ago first childbirth was at age 25 she did not breast-feed  Family history is positive for mother who had uterine cancer at age 60 and  of it at age 65 she has a brother who  of liver cancer related to drinking there is no other malignancy in the family that she is aware of    She is a significant smoker for the last 48 years but does not drink     Patient and her  were very taken to back by the suggestion about chemotherapy and apparently had thought that there was no further treatment needed and I spent almost an hour presenting the data concerning the extreme effectiveness of HER-2 directed therapy and this situation with a high risk of recurrence without adjuvant treatment and she finally was convinced to do the treatment    We discussed smoking cessation but at this point she is so nervous and agitated with the prospect of chemotherapy that we will hold off on this until she is half-way through the chemotherapy and rediscuss it    I discussed the case also with Dr. Kee will place a port and we will plan to start chemotherapy in 2 weeks.    Patient initiating therapy with Taxol/Herceptin/Perjeta 19.      She is followed by Dr. Dudley, cardiology, who gave clearance for the patient to proceed with chemotherapy.  repeat echo 6 weeks from last actually showed improvement in the cardiologist thought there may have been some technical issues with her first echocardiogram.    She returns back today, due for cycle3 day 1 of Taxol/Herceptin perjeta her diarrhea is-clearly controlled with the Imodium and in fact she became  constipated because she took too much of this but she is got this under control and feels good  She has some reflux symptoms in the morning which makes her nauseous and I suggested she take 1 Prilosec every day till the chemo was over and this is improved    She has no neuropathy but is not brought the cooling gloves and I recommended this to her and gave her the literature supporting this in the website  She has had a itchy rash on her arms and legs which is not too bothersome and I have to suggested a steroid cream and some Benadryl and we will keep an eye on the rash- she has no shortness of breath    Perjeta/Herceptin completed 8/6/2020.      Current Outpatient Medications on File Prior to Visit   Medication Sig Dispense Refill   • acetaminophen (TYLENOL) 500 MG tablet Take 500 mg by mouth Every 6 (Six) Hours As Needed for Mild Pain .     • amLODIPine (NORVASC) 10 MG tablet TAKE 1 TABLET BY MOUTH ONE TIME A DAY  30 tablet 0   • metoprolol tartrate (LOPRESSOR) 25 MG tablet TAKE 1 TABLET BY MOUTH TWO TIMES A DAY  180 tablet 0   • mometasone (NASONEX) 50 MCG/ACT nasal spray 2 sprays into the nostril(s) as directed by provider Daily.     • montelukast (Singulair) 10 MG tablet Take 1 tablet by mouth Every Night. 30 tablet 3   • Multiple Vitamins-Minerals (CENTRUM SILVER PO) Take 1 tablet by mouth Daily. womens vitamin     • sertraline (ZOLOFT) 50 MG tablet TAKE 1 TABLET BY MOUTH ONE TIME A DAY  90 tablet 1   • TiZANidine (ZANAFLEX) 4 MG capsule TAKE 1 CAPSULE BY MOUTH THREE TIMES A DAY  270 capsule 0   • diphenoxylate-atropine (LOMOTIL) 2.5-0.025 MG per tablet Take 1 tablet by mouth 4 (Four) Times a Day As Needed for Diarrhea. 90 tablet 0   • [DISCONTINUED] clindamycin (Cleocin) 300 MG capsule Take 1 capsule by mouth 3 (Three) Times a Day. 30 capsule 0   • [DISCONTINUED] MAGNESIUM PO Take 200 mg by mouth.     • [DISCONTINUED] potassium chloride ER (K-TAB) 20 MEQ tablet controlled-release ER tablet Take 1 tablet by  mouth 2 (Two) Times a Day. 60 tablet 1     No current facility-administered medications on file prior to visit.        ALLERGIES:    Allergies   Allergen Reactions   • Gabapentin Hallucinations   • Effexor [Venlafaxine] Itching   • Naproxen Itching     Pt reports severe vaginal itching    • Zyrtec [Cetirizine] Itching     Non-effecious   • Ibuprofen Unknown - Low Severity     Burns while urinating  Can take low dose Ibuprofen   • Penicillins Rash   • Sulfa Antibiotics Rash        Social History     Socioeconomic History   • Marital status:      Spouse name: Pawan   • Number of children: 3   • Years of education: High school   • Highest education level: Not on file   Tobacco Use   • Smoking status: Current Every Day Smoker     Packs/day: 0.25     Years: 48.00     Pack years: 12.00     Types: Cigarettes   • Smokeless tobacco: Never Used   • Tobacco comment: 3-4 cigarettes a day. Trying to quit.    Substance and Sexual Activity   • Alcohol use: No   • Drug use: No   • Sexual activity: Yes     Partners: Male     Birth control/protection: Post-menopausal        Family History   Problem Relation Age of Onset   • Ovarian cancer Mother    • Endometrial cancer Mother    • COPD Father    • Stroke Brother    • Malig Hyperthermia Neg Hx         Review of Systems   Constitutional: Negative for appetite change, chills, diaphoresis, fever and unexpected weight change.   HENT: Negative for hearing loss, sinus pain, sneezing, sore throat and trouble swallowing.    Respiratory: Negative for cough, chest tightness, shortness of breath and wheezing.    Cardiovascular: Negative for chest pain, palpitations and leg swelling.   Gastrointestinal: Positive for diarrhea (see HPI - improving now). Negative for abdominal distention, abdominal pain, constipation, nausea and vomiting.   Endocrine: Negative.    Genitourinary: Negative for dysuria, frequency, hematuria and urgency.   Musculoskeletal: Negative.  Negative for back pain, joint  "swelling and neck pain.        No muscle weakness.   Skin: Negative for rash and wound.   Neurological: Positive for numbness (toes numb/cold, worse at night). Negative for seizures, syncope, speech difficulty, weakness and headaches.   Hematological: Negative.  Negative for adenopathy. Does not bruise/bleed easily.   Psychiatric/Behavioral: Negative.  Negative for behavioral problems, confusion and suicidal ideas.       Objective     Vitals:    03/12/21 1007   BP: 176/96  Comment: w/meds taken 7:30am   Pulse: (!) 47   Resp: 16   Temp: 97.5 °F (36.4 °C)   TempSrc: Skin   SpO2: 97%   Weight: 102 kg (224 lb 6.4 oz)   Height: 175.3 cm (69.02\")   PainSc: 0-No pain     Current Status 3/12/2021   ECOG score 0       Physical Exam   Pulmonary/Chest:           GENERAL:  Well-developed, well-nourished in no acute distress.   SKIN:  Warm, dry fading maculopapular rash on her arms and legs,no  purpura or petechiae.  EYES:  Pupils equal, round and reactive to light.  EOMs intact.  Conjunctivae normal.  EARS:  Hearing intact.  NOSE:  Septum midline.  No excoriations or nasal discharge.  MOUTH:  Tongue is well-papillated; no stomatitis or ulcers.  Lips normal.  THROAT:  Oropharynx without lesions or exudates.  NECK:  Supple with good range of motion; no thyromegaly or masses, no JVD.    LYMPHATICS:  No cervical, supraclavicular, axillary or inguinal adenopathy.  CHEST:  Lungs clear to auscultation. Good airflow.  Mediport has been removed, well healed right chest scargn.  BREASTS: Right breast is benign; left chest wall shows a well-healed mastectomy scar, small nodule felt in the skin mid incision  CARDIAC:  Regular rate and rhythm without murmurs, rubs or gallops. Normal S1,S2.  ABDOMEN:  Soft, nontender with no hepatosplenomegaly or masses.  EXTREMITIES:  No clubbing, cyanosis or edema.  NEUROLOGICAL:  Cranial Nerves II-XII grossly intact.  No focal neurological deficits.  PSYCHIATRIC:  Normal affect and mood.        RECENT " LABS:  Results from last 7 days   Lab Units 03/12/21  1004   WBC 10*3/mm3 7.66   NEUTROS ABS 10*3/mm3 3.69   HEMOGLOBIN g/dL 14.4   HEMATOCRIT % 42.2   PLATELETS 10*3/mm3 254     Results from last 7 days   Lab Units 03/12/21  1004   SODIUM mmol/L 140   POTASSIUM mmol/L 4.2   CHLORIDE mmol/L 105   CO2 mmol/L 25.7   BUN mg/dL 9   CREATININE mg/dL 0.73   CALCIUM mg/dL 9.7   ALBUMIN g/dL 4.40   BILIRUBIN mg/dL 0.4   ALK PHOS U/L 147*   ALT (SGPT) U/L 20   AST (SGOT) U/L 24   GLUCOSE mg/dL 94   MAGNESIUM mg/dL 1.8            RADIOGRAPHIC DATA:    US Breat Left Limited    1. Left Breast Lumpectomy (47 Grams):  A. Invasive poorly differentiated mammary carcinoma of no special type (ductal carcinoma)  with apocrine features.  1. Invasive carcinoma measures up to 40 x 36 x 27 mm (measured grossly).  2. Overall [place] Grade III (glandular score = 3, nuclear score = 3, mitotic score = 3).  3. No definitive lymphovascular space invasion identified.  B. Invasive carcinoma focally extends to one undesignated peripheral inked margin of excision.  C. Associated ductal carcinoma in situ (DCIS).  1. Ductal carcinoma in situ (DCIS) spans area measuring 40 mm in single greatest  aggregate dimension (estimated from gross and glass slides).  2. DCIS predominantly solid and comedo type with high grade nuclear features.  3. High grade DCIS approximates the closest peripheral inked margin to 0.2 mm.  Page: 1 of 5 Printed: 5/6/2019 1:10 PM  [phone]  Resulting  Tissue Pathology Exam: [id number]   Order: [id number]   Collected:  5/2/2019 12:26 Status:  Edited Result - FINAL   Visible to patient:  No (Not Released) Dx:  Malignant neoplasm of upper-inner hector...   Component    Addendum   HER2 by Immunohistochemistry   Positive (Score 3+)     HER2 by Immunohistochemistry  FDA approved (specify test/vendor): [employer]     Primary Antibody  [id number]     Cold Ischemia and Fixation Times   Meet requirements specified in latest version of the ASCO/CAP  guidelines         Cold Ischemia Time: 18 minutes  Fixation Time: 8.25 hours  Testing Performed on Block Number(s): 1E  Fixative: Formalin   Addendum electronically signed by Harman Perry MD on 5/6/2019          Final Diagnosis  1. Left Breast, Modified Radical Mastectomy (935 grams): HIGH GRADE DUCTAL CARCINOMA IN-SITU  (DCIS) .  A. Nuclear Grade: High.  B. Architectural Type: Solid and comedo with lobular extension.  1. Size (extent) of DCIS: DCIS multifocal in the lower inner quadrant, measuring 0.9 cm in maximal  dimension (DCIS present in 3/18 tissue blocks).  C. No LCIS identified.  D. Skin and nipple uninvolved by DCIS.  E. Margins: Uninvolved by DCIS.  1. DCIS comes to within 1.3 cm of the anterior margin of excision (closest margin).  F. Additional findings: Incidental intraductal papilloma, florid ductal hyperplasia and apocrine cysts.  G. Lymph nodes: Sixteen benign lymph nodes (0/16).  H. Hormone receptor status: ER and SD negative, Her2/kali positive by IHC (performed on prior resection  [id number]).  I. Pathologic stage: pT2, N0.  [facility]ds      Regadenoson Stress Test With Myocardial Perfusion SPECT   Interpretation Summary     · Myocardial perfusion imaging indicates a normal myocardial perfusion study with no evidence of ischemia.  · Left ventricular ejection fraction is borderline normal (Calculated EF = 50%).  · Impressions are consistent with a low risk study.      Study Description         Nuclear Perfusion Images Overall image quality is excellent. There are no artifacts present. Raw images reviewed with no abnormalities noted.       Echo 9/16  Interpretation Summary     · Left ventricular systolic function is normal. Calculated EF = 55%. Estimated EF = 55%. Global Longitudinal LV strain = -21.6%. Strain data was reviewed and speckle tracking was considered accurate. The global longitudinal LV strain is -21.6 and normal. Normal left ventricular cavity size and wall thickness noted. All left  ventricular wall segments contract normally. Left ventricular diastolic dysfunction is noted (grade I) consistent with impaired relaxation.  · Trace mitral valve regurgitation is present.  · Physiologic tricuspid valve regurgitation is present. Calculated right ventricular systolic pressure from tricuspid regurgitation is 16.0 mmHg. Insufficient TR velocity profile to estimate the right ventricular systolic pressure.     Interpretation Summary     · Left ventricular systolic function is normal. Calculated EF = 57%. Estimated EF was in agreement with the calculated EF. Estimated EF = 57%. Global Longitudinal LV strain = -21%. Strain data was reviewed and speckle tracking was considered accurate. The global longitudinal LV strain is normal.  · Normal left ventricular cavity size noted. All left ventricular wall segments contract normally. Left ventricular wall thickness is consistent with mild concentric hypertrophy. Left ventricular diastolic dysfunction is noted (grade I) consistent with impaired relaxation.  · Mild tricuspid valve regurgitation is present. Estimated right ventricular systolic pressure from tricuspid regurgitation is normal (<35 mmHg). Calculated right ventricular systolic pressure from tricuspid regurgitation is 30 mmHg.        Interpretation Summary     · Left ventricular systolic function is normal. Calculated EF = 57%. Estimated EF was in agreement with the calculated EF. Estimated EF = 57%. Global Longitudinal LV strain = -20.1%. All left ventricular wall segments contract normally. The left ventricular cavity is mildly dilated. Left ventricular wall thickness is consistent with moderate concentric hypertrophy. Left ventricular diastolic dysfunction is noted (grade I a w/high LAP) consistent with impaired relaxation.  · Left atrial cavity size is mildly dilated.  · Mild mitral valve regurgitation is present.  · Trace tricuspid valve regurgitation is present. Estimated right ventricular systolic  pressure from tricuspid regurgitation is normal (<35 mmHg). Calculated right ventricular systolic pressure from tricuspid regurgitation is 21 mmHg.  · There is a small (<1cm) pericardial effusion. There is no evidence of cardiac tamponade.      Electronically signed by Keara Dudley MD on 1/22/20 at 0900 EST    MRI OF THE THORACIC SPINE WITH AND WITHOUT CONTRAST 03/05/2020  IMPRESSION:  1. There is a left posterior lateral disc bulge or tiny disc protrusion  that results in minimal if any narrowing of the left side of the canal  at C5-6 and there is a right paracentral disc herniation at T6-7 that  abuts and mildly flattens the right ventral surface of the cord mildly  narrowing the right side of the canal at T6-7. There is a small right  paracentral disc bulge or protrusion only minimally narrowing the canal  at T7-8. At T11-12 there is a left posterior lateral disc osteophyte  complex minimally narrowing the left side of the canal. No additional  canal or foraminal narrowing is seen in the thoracic spine.  2. There is a right paracentral posterior lateral disc herniation at  T12-L1 with an inferiorly migrated extruded disc fragment that maximally  measures up to 13 x 6 x 10 mm in medial lateral, anterior posterior and  craniocaudal dimension and mild to moderately narrows the right side of  the canal at the level of the superior body and endplate of L1, it abuts  the ventral aspect of the right L1 nerve root in the superior portion of  the right lateral recess of L1.  3. There are some right anterior lateral marginal bridging osteophytes  from T8 to T11 with some focal bone marrow edema and enhancement in the  right anterior aspect of the T9 vertebra that is likely degenerative  marrow edema. The remainder of the thoracic spine MRI is normal with no  evidence of metastatic disease of the thoracic spine.     Interpretation Summary 5/4/2020    · Left ventricular systolic function is normal. Calculated EF = 58%.  Estimated EF = 58%. Global Longitudinal LV strain = -19%. Strain data was reviewed and speckle tracking was considered accurate. The global longitudinal LV strain is normal.  · All left ventricular wall segments contract normally. The left ventricular cavity is borderline dilated. Left ventricular wall thickness is consistent with mild concentric hypertrophy. Left ventricular diastolic dysfunction is noted (grade I a w/high LAP) consistent with impaired  · Mild mitral valve regurgitation is present.  · Mild tricuspid valve regurgitation is present. Estimated right ventricular systolic pressure from tricuspid regurgitation is normal (<35 mmHg). Calculated right ventricular systolic pressure from tricuspid regurgitation is 33 mmHg.             Assessment/Plan   1.  T2N0 grade 3 infiltrating ductal carcinoma left breast ER NC negative HER-2 positive post mastectomy and axillary node dissection with clear margins.  · Initiated Taxol/Herceptin/Perjeta today, 8/29/19.  · Plan to do 12 weeks of Taxol Herceptin perjeta without Adriamycin because of her borderline cardiac function followed by a year of Perjeta Herceptin  · Therapy completed 8/6/2020  · Mediport removed 9/22/2020 per Dr. Evans  · Patient overdue for right screening mammogram (due 9/2020).  As detailed in the HPI she needs to pay an overdue bill and will also have a co-pay of $100 before she can get her mammogram.  She hopes to be able to pay for this and proceed in January 2021.-still not done 3/21    2.  Tobacco abuse and COPD    3.  Abnormal echo with stress test normal but borderline cardiac function at 52%  We will avoid Adriamycin Cytoxan  and treat with Taxol Herceptin perjeta and a year of Herceptin perjeta. Dr. Dudley, cardiology, has given clearance for patient to begin.  Planning repeat echocardiogram 6 weeks from previous.    · Improved EF to 55%  · EF 57% in 1/21/2020  · EF 58% in 5/4/2020    4.  Venous access.  Status post new Mediport placement  8/27/2019.  Working well today.      · No more refills of Percocet planned    5.  Diarrhea related to Perjeta. Resolved post treatment completion.    6.  Skin rash on her arms and legs likely from  perjeta continue to watch and use steroid cream-stable    7.  Previous history of low magnesium due to diarrhea.  Patient has continued on magnesium supplement post completion of treatment.  She is asking if she can discontinue which I think is reasonable.  We will recheck a magnesium level when she returns in 3 months to monitor.    8.  Recent acute GI illness with reports of watery diarrhea multiple times a day roughly 2 weeks ago.  Bowels are slowly improving with more formed stool.  Patient's weight is stable.  She is drinking electrolyte drinks and chemistries reviewed today show no concerning abnormalities.  I encouraged her to contact primary care if symptoms do not completely resolve.    9.  Chemotherapy-induced neuropathy, grade 1-2.  She has numbness and coldness of the toes, worse at night.  She was intolerant to gabapentin previously.  She is not having any pain.  We discussed utilizing topical creams such as Theraworx relief and continuing to sleeping in socks which she is already doing.  We did discuss at this point symptoms are probably permanent.      Plan:  1.  Patient undergo mammogram as soon as she has the co-pay findings as detailed above.  She hopes to complete right screening mammogram in April 2021.  2..  Patient otherwise return in 3 months for follow-up with Dr. Jhaveri with CBC, CMP and magnesium level for review.

## 2021-03-12 ENCOUNTER — OFFICE VISIT (OUTPATIENT)
Dept: ONCOLOGY | Facility: CLINIC | Age: 66
End: 2021-03-12

## 2021-03-12 ENCOUNTER — LAB (OUTPATIENT)
Dept: LAB | Facility: HOSPITAL | Age: 66
End: 2021-03-12

## 2021-03-12 VITALS
SYSTOLIC BLOOD PRESSURE: 176 MMHG | DIASTOLIC BLOOD PRESSURE: 96 MMHG | HEIGHT: 69 IN | OXYGEN SATURATION: 97 % | WEIGHT: 224.4 LBS | BODY MASS INDEX: 33.24 KG/M2 | TEMPERATURE: 97.5 F | RESPIRATION RATE: 16 BRPM | HEART RATE: 47 BPM

## 2021-03-12 DIAGNOSIS — M54.12 CERVICAL RADICULOPATHY: ICD-10-CM

## 2021-03-12 DIAGNOSIS — C50.212 MALIGNANT NEOPLASM OF UPPER-INNER QUADRANT OF LEFT BREAST IN FEMALE, ESTROGEN RECEPTOR NEGATIVE (HCC): ICD-10-CM

## 2021-03-12 DIAGNOSIS — C50.212 MALIGNANT NEOPLASM OF UPPER-INNER QUADRANT OF LEFT BREAST IN FEMALE, ESTROGEN RECEPTOR NEGATIVE (HCC): Primary | ICD-10-CM

## 2021-03-12 DIAGNOSIS — Z17.1 MALIGNANT NEOPLASM OF UPPER-INNER QUADRANT OF LEFT BREAST IN FEMALE, ESTROGEN RECEPTOR NEGATIVE (HCC): Primary | ICD-10-CM

## 2021-03-12 DIAGNOSIS — Z17.1 MALIGNANT NEOPLASM OF UPPER-INNER QUADRANT OF LEFT BREAST IN FEMALE, ESTROGEN RECEPTOR NEGATIVE (HCC): ICD-10-CM

## 2021-03-12 DIAGNOSIS — E83.42 HYPOMAGNESEMIA: ICD-10-CM

## 2021-03-12 LAB
ALBUMIN SERPL-MCNC: 4.4 G/DL (ref 3.5–5.2)
ALBUMIN/GLOB SERPL: 1.5 G/DL (ref 1.1–2.4)
ALP SERPL-CCNC: 147 U/L (ref 38–116)
ALT SERPL W P-5'-P-CCNC: 20 U/L (ref 0–33)
ANION GAP SERPL CALCULATED.3IONS-SCNC: 9.3 MMOL/L (ref 5–15)
AST SERPL-CCNC: 24 U/L (ref 0–32)
BASOPHILS # BLD AUTO: 0.06 10*3/MM3 (ref 0–0.2)
BASOPHILS NFR BLD AUTO: 0.8 % (ref 0–1.5)
BILIRUB SERPL-MCNC: 0.4 MG/DL (ref 0.2–1.2)
BUN SERPL-MCNC: 9 MG/DL (ref 6–20)
BUN/CREAT SERPL: 12.3 (ref 7.3–30)
CALCIUM SPEC-SCNC: 9.7 MG/DL (ref 8.5–10.2)
CHLORIDE SERPL-SCNC: 105 MMOL/L (ref 98–107)
CO2 SERPL-SCNC: 25.7 MMOL/L (ref 22–29)
CREAT SERPL-MCNC: 0.73 MG/DL (ref 0.6–1.1)
DEPRECATED RDW RBC AUTO: 40.6 FL (ref 37–54)
EOSINOPHIL # BLD AUTO: 0.33 10*3/MM3 (ref 0–0.4)
EOSINOPHIL NFR BLD AUTO: 4.3 % (ref 0.3–6.2)
ERYTHROCYTE [DISTWIDTH] IN BLOOD BY AUTOMATED COUNT: 12.2 % (ref 12.3–15.4)
GFR SERPL CREATININE-BSD FRML MDRD: 80 ML/MIN/1.73
GLOBULIN UR ELPH-MCNC: 2.9 GM/DL (ref 1.8–3.5)
GLUCOSE SERPL-MCNC: 94 MG/DL (ref 74–124)
HCT VFR BLD AUTO: 42.2 % (ref 34–46.6)
HGB BLD-MCNC: 14.4 G/DL (ref 12–15.9)
IMM GRANULOCYTES # BLD AUTO: 0.02 10*3/MM3 (ref 0–0.05)
IMM GRANULOCYTES NFR BLD AUTO: 0.3 % (ref 0–0.5)
LYMPHOCYTES # BLD AUTO: 2.92 10*3/MM3 (ref 0.7–3.1)
LYMPHOCYTES NFR BLD AUTO: 38.1 % (ref 19.6–45.3)
MAGNESIUM SERPL-MCNC: 1.8 MG/DL (ref 1.8–2.5)
MCH RBC QN AUTO: 31.1 PG (ref 26.6–33)
MCHC RBC AUTO-ENTMCNC: 34.1 G/DL (ref 31.5–35.7)
MCV RBC AUTO: 91.1 FL (ref 79–97)
MONOCYTES # BLD AUTO: 0.64 10*3/MM3 (ref 0.1–0.9)
MONOCYTES NFR BLD AUTO: 8.4 % (ref 5–12)
NEUTROPHILS NFR BLD AUTO: 3.69 10*3/MM3 (ref 1.7–7)
NEUTROPHILS NFR BLD AUTO: 48.1 % (ref 42.7–76)
NRBC BLD AUTO-RTO: 0 /100 WBC (ref 0–0.2)
PLATELET # BLD AUTO: 254 10*3/MM3 (ref 140–450)
PMV BLD AUTO: 9.6 FL (ref 6–12)
POTASSIUM SERPL-SCNC: 4.2 MMOL/L (ref 3.5–4.7)
PROT SERPL-MCNC: 7.3 G/DL (ref 6.3–8)
RBC # BLD AUTO: 4.63 10*6/MM3 (ref 3.77–5.28)
SODIUM SERPL-SCNC: 140 MMOL/L (ref 134–145)
WBC # BLD AUTO: 7.66 10*3/MM3 (ref 3.4–10.8)

## 2021-03-12 PROCEDURE — 36415 COLL VENOUS BLD VENIPUNCTURE: CPT

## 2021-03-12 PROCEDURE — 85025 COMPLETE CBC W/AUTO DIFF WBC: CPT

## 2021-03-12 PROCEDURE — 99213 OFFICE O/P EST LOW 20 MIN: CPT | Performed by: INTERNAL MEDICINE

## 2021-03-12 PROCEDURE — 83735 ASSAY OF MAGNESIUM: CPT

## 2021-03-12 PROCEDURE — 80053 COMPREHEN METABOLIC PANEL: CPT

## 2021-03-15 DIAGNOSIS — F32.9 REACTIVE DEPRESSION: ICD-10-CM

## 2021-03-22 ENCOUNTER — BULK ORDERING (OUTPATIENT)
Dept: CASE MANAGEMENT | Facility: OTHER | Age: 66
End: 2021-03-22

## 2021-03-22 DIAGNOSIS — Z23 IMMUNIZATION DUE: ICD-10-CM

## 2021-04-10 RX ORDER — DOXYCYCLINE HYCLATE 100 MG/1
100 CAPSULE ORAL 2 TIMES DAILY
Qty: 14 CAPSULE | Refills: 0 | Status: SHIPPED | OUTPATIENT
Start: 2021-04-10 | End: 2021-08-09

## 2021-06-08 RX ORDER — MONTELUKAST SODIUM 10 MG/1
TABLET ORAL
Qty: 30 TABLET | Refills: 0 | Status: SHIPPED | OUTPATIENT
Start: 2021-06-08 | End: 2021-08-11

## 2021-06-08 RX ORDER — METOPROLOL SUCCINATE 25 MG/1
TABLET, EXTENDED RELEASE ORAL
Qty: 30 TABLET | Refills: 0 | Status: SHIPPED | OUTPATIENT
Start: 2021-06-08 | End: 2021-08-11

## 2021-08-09 ENCOUNTER — TELEPHONE (OUTPATIENT)
Dept: ONCOLOGY | Facility: CLINIC | Age: 66
End: 2021-08-09

## 2021-08-09 DIAGNOSIS — F32.9 REACTIVE DEPRESSION: ICD-10-CM

## 2021-08-09 RX ORDER — DOXYCYCLINE HYCLATE 100 MG/1
100 CAPSULE ORAL 2 TIMES DAILY
Qty: 14 CAPSULE | Refills: 0 | Status: SHIPPED | OUTPATIENT
Start: 2021-08-09 | End: 2021-08-25

## 2021-08-09 NOTE — TELEPHONE ENCOUNTER
Caller: Allie Temple A    Relationship to patient: Self    Best call back number: [phone]    Chief complaint: HAD A DEATH IN FAMILY DUE TO COVID, VIEWING IS ON  AND  ON  NEEDS TO R/S FOLLOW UP WITH DR PAYTON     Type of visit: 5M FOLLOW UP     Requested date: ANYTIME NEXT WEEK     If rescheduling, when is the original appointment:     Additional notes:

## 2021-08-11 RX ORDER — MONTELUKAST SODIUM 10 MG/1
TABLET ORAL
Qty: 30 TABLET | Refills: 0 | Status: SHIPPED | OUTPATIENT
Start: 2021-08-11 | End: 2021-09-24

## 2021-08-11 RX ORDER — METOPROLOL SUCCINATE 25 MG/1
TABLET, EXTENDED RELEASE ORAL
Qty: 30 TABLET | Refills: 0 | Status: SHIPPED | OUTPATIENT
Start: 2021-08-11 | End: 2021-08-25 | Stop reason: SDUPTHER

## 2021-08-12 ENCOUNTER — APPOINTMENT (OUTPATIENT)
Dept: LAB | Facility: HOSPITAL | Age: 66
End: 2021-08-12

## 2021-08-25 ENCOUNTER — OFFICE VISIT (OUTPATIENT)
Dept: FAMILY MEDICINE CLINIC | Facility: CLINIC | Age: 66
End: 2021-08-25

## 2021-08-25 VITALS
TEMPERATURE: 97.8 F | HEART RATE: 82 BPM | OXYGEN SATURATION: 99 % | BODY MASS INDEX: 33.33 KG/M2 | SYSTOLIC BLOOD PRESSURE: 144 MMHG | WEIGHT: 225 LBS | HEIGHT: 69 IN | DIASTOLIC BLOOD PRESSURE: 86 MMHG

## 2021-08-25 DIAGNOSIS — I10 BENIGN ESSENTIAL HYPERTENSION: ICD-10-CM

## 2021-08-25 DIAGNOSIS — F41.1 GENERALIZED ANXIETY DISORDER: ICD-10-CM

## 2021-08-25 DIAGNOSIS — E78.5 DYSLIPIDEMIA: ICD-10-CM

## 2021-08-25 DIAGNOSIS — F32.9 REACTIVE DEPRESSION: Primary | ICD-10-CM

## 2021-08-25 PROCEDURE — 99214 OFFICE O/P EST MOD 30 MIN: CPT | Performed by: NURSE PRACTITIONER

## 2021-08-25 PROCEDURE — G0439 PPPS, SUBSEQ VISIT: HCPCS | Performed by: NURSE PRACTITIONER

## 2021-08-25 RX ORDER — SERTRALINE HYDROCHLORIDE 100 MG/1
100 TABLET, FILM COATED ORAL DAILY
Qty: 90 TABLET | Refills: 1 | Status: SHIPPED | OUTPATIENT
Start: 2021-08-25 | End: 2021-09-10

## 2021-08-25 NOTE — PROGRESS NOTES
The ABCs of the Annual Wellness Visit  Subsequent Medicare Wellness Visit    Chief Complaint   Patient presents with   • Annual Exam       Subjective   History of Present Illness:  Allie Temple is a 66 y.o. female who presents for a Subsequent Medicare Wellness Visit.    HEALTH RISK ASSESSMENT    Recent Hospitalizations:  No hospitalization(s) within the last year.    Current Medical Providers:  Patient Care Team:  Mireya Wood APRN as PCP - General (Family Medicine)  Jase Evans MD as Referring Physician (General Surgery)  Larry Jhaveri MD as Consulting Physician (Hematology and Oncology)    Smoking Status:  Social History     Tobacco Use   Smoking Status Current Every Day Smoker   • Packs/day: 0.25   • Years: 48.00   • Pack years: 12.00   • Types: Cigarettes   Smokeless Tobacco Never Used   Tobacco Comment    3-4 cigarettes a day. Trying to quit.        Alcohol Consumption:  Social History     Substance and Sexual Activity   Alcohol Use No       Depression Screen:   PHQ-2/PHQ-9 Depression Screening 8/25/2021   Little interest or pleasure in doing things 0   Feeling down, depressed, or hopeless 2   Trouble falling or staying asleep, or sleeping too much 2   Feeling tired or having little energy 1   Poor appetite or overeating 0   Feeling bad about yourself - or that you are a failure or have let yourself or your family down 0   Trouble concentrating on things, such as reading the newspaper or watching television 0   Moving or speaking so slowly that other people could have noticed. Or the opposite - being so fidgety or restless that you have been moving around a lot more than usual 0   Thoughts that you would be better off dead, or of hurting yourself in some way 0   Total Score 5   If you checked off any problems, how difficult have these problems made it for you to do your work, take care of things at home, or get along with other people? Not difficult at all       Fall Risk  Screen:  DALILA Fall Risk Assessment was completed, and patient is at LOW risk for falls.Assessment completed on:8/25/2021    Health Habits and Functional and Cognitive Screening:  Functional & Cognitive Status 8/25/2021   Do you have difficulty preparing food and eating? No   Do you have difficulty bathing yourself, getting dressed or grooming yourself? No   Do you have difficulty using the toilet? No   Do you have difficulty moving around from place to place? No   Do you have trouble with steps or getting out of a bed or a chair? No   Current Diet Well Balanced Diet   Dental Exam Up to date   Eye Exam Not up to date   Exercise (times per week) 2 times per week   Current Exercises Include Yard Work   Do you need help using the phone?  No   Are you deaf or do you have serious difficulty hearing?  No   Do you need help with transportation? No   Do you need help shopping? No   Do you need help preparing meals?  No   Do you need help with housework?  No   Do you need help with laundry? No   Do you need help taking your medications? No   Do you need help managing money? No   Do you ever drive or ride in a car without wearing a seat belt? No   Have you felt unusual stress, anger or loneliness in the last month? No   Who do you live with? Spouse   If you need help, do you have trouble finding someone available to you? No   Have you been bothered in the last four weeks by sexual problems? No   Do you have difficulty concentrating, remembering or making decisions? No         Does the patient have evidence of cognitive impairment? No    Asprin use counseling:Start ASA 81 mg daily     Age-appropriate Screening Schedule:  Refer to the list below for future screening recommendations based on patient's age, sex and/or medical conditions. Orders for these recommended tests are listed in the plan section. The patient has been provided with a written plan.    Health Maintenance   Topic Date Due   • DXA SCAN  05/10/2018   • LIPID  PANEL  08/01/2019   • PAP SMEAR  09/08/2019   • MAMMOGRAM  03/28/2021   • INFLUENZA VACCINE  10/01/2021   • TDAP/TD VACCINES (2 - Td or Tdap) 09/08/2026          The following portions of the patient's history were reviewed and updated as appropriate: allergies, current medications, past family history, past medical history, past social history, past surgical history and problem list.    Outpatient Medications Prior to Visit   Medication Sig Dispense Refill   • acetaminophen (TYLENOL) 500 MG tablet Take 500 mg by mouth Every 6 (Six) Hours As Needed for Mild Pain .     • metoprolol succinate XL (TOPROL-XL) 25 MG 24 hr tablet TAKE 1 TABLET BY MOUTH EVERY DAY  30 tablet 0   • metoprolol tartrate (LOPRESSOR) 25 MG tablet TAKE 1 TABLET BY MOUTH TWO TIMES A DAY  180 tablet 0   • mometasone (NASONEX) 50 MCG/ACT nasal spray 2 sprays into the nostril(s) as directed by provider Daily.     • montelukast (SINGULAIR) 10 MG tablet TAKE 1 TABLET BY MOUTH IN THE EVENING  30 tablet 0   • Multiple Vitamins-Minerals (CENTRUM SILVER PO) Take 1 tablet by mouth Daily. womens vitamin     • sertraline (ZOLOFT) 50 MG tablet Take 1 tablet by mouth Daily. 200001 90 tablet 0   • TiZANidine (ZANAFLEX) 4 MG capsule TAKE 1 CAPSULE BY MOUTH THREE TIMES A DAY  270 capsule 0   • amLODIPine (NORVASC) 10 MG tablet TAKE 1 TABLET BY MOUTH ONE TIME A DAY  30 tablet 0   • doxycycline (VIBRAMYCIN) 100 MG capsule Take 1 capsule by mouth 2 (Two) Times a Day. 14 capsule 0     No facility-administered medications prior to visit.       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 Patient does not have an advance directive, information provided.    Review of Systems     Compared to one year ago, the patient feels her physical health is better.  Compared to one year ago, the patient feels her mental health is worse.    Reviewed chart for potential of high risk medication in the elderly: yes  Reviewed chart for potential of harmful drug interactions in the elderly:yes    Objective         Vitals:    08/25/21 1437   Pulse: 82   Temp: 97.8 °F (36.6 °C)   TempSrc: Infrared   SpO2: 99%   Weight: 102 kg (225 lb)   Height: 175.3 cm (69\")       Body mass index is 33.23 kg/m².  Discussed the patient's BMI with her. The BMI is above average; BMI management plan is completed.    Physical Exam          Assessment/Plan   Medicare Risks and Personalized Health Plan  CMS Preventative Services Quick Reference  Advance Directive Discussion  Breast Cancer/Mammogram Screening  Chronic Pain   Depression/Dysphoria  Immunizations Discussed/Encouraged (specific immunizations; Pneumococcal 23 and COVID19 )  Inadequate Social Support, Isolation, Loneliness, Lack of Transportation, Financial Difficulties, or Caregiver Stress   Obesity/Overweight   Osteoporosis Risk  Polypharmacy    The above risks/problems have been discussed with the patient.  Pertinent information has been shared with the patient in the After Visit Summary.  Follow up plans and orders are seen below in the Assessment/Plan Section.    There are no diagnoses linked to " this encounter.  Follow Up:  No follow-ups on file.     An After Visit Summary and PPPS were given to the patient.

## 2021-08-25 NOTE — PATIENT INSTRUCTIONS
Medicare Wellness  Personal Prevention Plan of Service     Date of Office Visit:  2021  Encounter Provider:  ASH Allison  Place of Service:  [facility]  Patient Name: Allie Temple  :  1955    As part of the Medicare Wellness portion of your visit today, we are providing you with this personalized preventive plan of services (PPPS). This plan is based upon recommendations of the [place] Preventive Services Task Force (USPSTF) and the Advisory Committee on Immunization Practices (ACIP).    This lists the preventive care services that should be considered, and provides dates of when you are due. Items listed as completed are up-to-date and do not require any further intervention.    Health Maintenance   Topic Date Due   • Pneumococcal Vaccine 65+ (1 of 2 - PPSV23) Never done   • COVID-19 Vaccine (1) Never done   • ANNUAL WELLNESS VISIT  Never done   • DXA SCAN  05/10/2018   • LIPID PANEL  2019   • PAP SMEAR  2019   • MAMMOGRAM  2021   • INFLUENZA VACCINE  10/01/2021   • TDAP/TD VACCINES (2 - Td or Tdap) 2026   • COLORECTAL CANCER SCREENING  2026   • HEPATITIS C SCREENING  Addressed       Orders Placed This Encounter   Procedures   • CBC (No Diff)     Order Specific Question:   Release to patient     Answer:   Immediate   • Comprehensive Metabolic Panel     Order Specific Question:   Release to patient     Answer:   Immediate   • Lipid Panel   • TSH     Order Specific Question:   Release to patient     Answer:   Immediate       No follow-ups on file.

## 2021-08-25 NOTE — PROGRESS NOTES
"Anam Temple is a 66 y.o. female who presents for a follow up on depression, hypertension.    History of Present Illness   Adult children are fighting with her mother in law and making her crazy. This is the first time in nearly 2 months out of house. Has not been vaccinated for COVID or had her FU mammogram yet. Had to reschedule her cancer FU with Dr. Jhvaeri  The following portions of the patient's history were reviewed and updated as appropriate: allergies, current medications, past family history, past medical history, past social history, past surgical history and problem list.    Review of Systems   Constitutional: Positive for activity change. Negative for unexpected weight change.   HENT: Negative.    Eyes: Negative.  Negative for visual disturbance.   Respiratory: Negative.    Cardiovascular: Negative.  Negative for chest pain.   Gastrointestinal: Negative.  Negative for constipation and diarrhea.   Endocrine: Negative.    Genitourinary: Negative for difficulty urinating and frequency.   Musculoskeletal: Positive for arthralgias and back pain.   Skin: Negative.    Neurological: Negative for weakness and headaches.   Hematological: Negative.    Psychiatric/Behavioral: Positive for agitation, dysphoric mood and sleep disturbance. Negative for decreased concentration, self-injury and suicidal ideas. The patient is nervous/anxious.      /86   Pulse 82   Temp 97.8 °F (36.6 °C) (Infrared)   Ht 175.3 cm (69\")   Wt 102 kg (225 lb)   LMP  (LMP Unknown)   SpO2 99%   BMI 33.23 kg/m²     Objective   Physical Exam  Vitals and nursing note reviewed.   Constitutional:       Appearance: She is well-developed. She is not diaphoretic.   HENT:      Head: Normocephalic and atraumatic.   Neck:      Thyroid: No thyromegaly.   Cardiovascular:      Rate and Rhythm: Normal rate and regular rhythm.      Pulses:           Carotid pulses are 2+ on the right side and 2+ on the left side.     Heart sounds: " Normal heart sounds.   Pulmonary:      Effort: Pulmonary effort is normal.      Breath sounds: Normal breath sounds.   Musculoskeletal:      Cervical back: Normal range of motion and neck supple.   Lymphadenopathy:      Cervical: No cervical adenopathy.   Psychiatric:         Attention and Perception: Attention and perception normal.         Mood and Affect: Mood is anxious and depressed. Affect is tearful.         Speech: Speech is rapid and pressured.         Behavior: Behavior normal.         Thought Content: Thought content normal.         Judgment: Judgment normal.       Assessment/Plan   Problems Addressed this Visit        Cardiac and Vasculature    Benign essential hypertension    Dyslipidemia       Mental Health    Generalized anxiety disorder    Depression - Primary      Diagnoses       Codes Comments    Reactive depression    -  Primary ICD-10-CM: F32.9  ICD-9-CM: 300.4     Benign essential hypertension     ICD-10-CM: I10  ICD-9-CM: 401.1     Generalized anxiety disorder     ICD-10-CM: F41.1  ICD-9-CM: 300.02     Dyslipidemia     ICD-10-CM: E78.5  ICD-9-CM: 272.4         Depression--she had done well with sertraline initially. Will increase to 100mg, can raise dose again in 2-3 weeks if needed. Needs to keep boundaries with adult children and have her mother in law respect boundaries in multigenerational household.   HTN--acceptable control on recheck. Check labs  JIMBO--consider counseling  HLD--update assessment    This document is intended for medical expert use only. Reading of this document by patients and/or patient's family without participating medical staff guidance may result in misinterpretation and unintended morbidity.  Any interpretation of such data is the responsibility of the patient and/or family member responsible for the patient in concert with their primary or specialist providers, not to be left for sources of online searches such as [employer], [employer] or similar queries. Relying on these  approaches to knowledge may result in misinterpretation, misguided goals of care and even death should patients or family members try recommendations outside of the realm of professional medical care in a supervised way.    Please allow 3-5 business days for recommendations based on new results    Go to the ER for any possible lifethreatening symptoms such as chest pain or shortness of air.     I personally spent 25 minutes reviewing the chart before the visit, time with the patient, and time documenting the visit. An additional 8 min on AWV and vaccination counseling

## 2021-09-07 NOTE — PROGRESS NOTES
Please call the patient regarding her abnormal result. 4 discs are bulging, T56,T67, T78, T12-L1, no compression fracture. Recommend Epidural to address severe pain
English

## 2021-09-09 ENCOUNTER — LAB (OUTPATIENT)
Dept: LAB | Facility: HOSPITAL | Age: 66
End: 2021-09-09

## 2021-09-09 ENCOUNTER — OFFICE VISIT (OUTPATIENT)
Dept: ONCOLOGY | Facility: CLINIC | Age: 66
End: 2021-09-09

## 2021-09-09 VITALS
OXYGEN SATURATION: 93 % | SYSTOLIC BLOOD PRESSURE: 171 MMHG | WEIGHT: 226.7 LBS | BODY MASS INDEX: 33.58 KG/M2 | DIASTOLIC BLOOD PRESSURE: 86 MMHG | TEMPERATURE: 98.2 F | HEART RATE: 66 BPM | HEIGHT: 69 IN | RESPIRATION RATE: 16 BRPM

## 2021-09-09 DIAGNOSIS — Z17.1 MALIGNANT NEOPLASM OF UPPER-INNER QUADRANT OF LEFT BREAST IN FEMALE, ESTROGEN RECEPTOR NEGATIVE (HCC): ICD-10-CM

## 2021-09-09 DIAGNOSIS — C50.212 MALIGNANT NEOPLASM OF UPPER-INNER QUADRANT OF LEFT BREAST IN FEMALE, ESTROGEN RECEPTOR NEGATIVE (HCC): ICD-10-CM

## 2021-09-09 DIAGNOSIS — C50.212 MALIGNANT NEOPLASM OF UPPER-INNER QUADRANT OF LEFT BREAST IN FEMALE, ESTROGEN RECEPTOR NEGATIVE (HCC): Primary | ICD-10-CM

## 2021-09-09 DIAGNOSIS — Z17.1 MALIGNANT NEOPLASM OF UPPER-INNER QUADRANT OF LEFT BREAST IN FEMALE, ESTROGEN RECEPTOR NEGATIVE (HCC): Primary | ICD-10-CM

## 2021-09-09 LAB
ALBUMIN SERPL-MCNC: 4.2 G/DL (ref 3.5–5.2)
ALBUMIN/GLOB SERPL: 1.6 G/DL (ref 1.1–2.4)
ALP SERPL-CCNC: 139 U/L (ref 38–116)
ALT SERPL W P-5'-P-CCNC: 25 U/L (ref 0–33)
ANION GAP SERPL CALCULATED.3IONS-SCNC: 10.5 MMOL/L (ref 5–15)
AST SERPL-CCNC: 29 U/L (ref 0–32)
BASOPHILS # BLD AUTO: 0.06 10*3/MM3 (ref 0–0.2)
BASOPHILS NFR BLD AUTO: 0.8 % (ref 0–1.5)
BILIRUB SERPL-MCNC: 0.4 MG/DL (ref 0.2–1.2)
BUN SERPL-MCNC: 8 MG/DL (ref 6–20)
BUN/CREAT SERPL: 11.4 (ref 7.3–30)
CALCIUM SPEC-SCNC: 9.6 MG/DL (ref 8.5–10.2)
CHLORIDE SERPL-SCNC: 103 MMOL/L (ref 98–107)
CO2 SERPL-SCNC: 24.5 MMOL/L (ref 22–29)
CREAT SERPL-MCNC: 0.7 MG/DL (ref 0.6–1.1)
DEPRECATED RDW RBC AUTO: 42.1 FL (ref 37–54)
EOSINOPHIL # BLD AUTO: 0.32 10*3/MM3 (ref 0–0.4)
EOSINOPHIL NFR BLD AUTO: 4.4 % (ref 0.3–6.2)
ERYTHROCYTE [DISTWIDTH] IN BLOOD BY AUTOMATED COUNT: 12.8 % (ref 12.3–15.4)
GFR SERPL CREATININE-BSD FRML MDRD: 84 ML/MIN/1.73
GLOBULIN UR ELPH-MCNC: 2.7 GM/DL (ref 1.8–3.5)
GLUCOSE SERPL-MCNC: 81 MG/DL (ref 74–124)
HCT VFR BLD AUTO: 39.3 % (ref 34–46.6)
HGB BLD-MCNC: 13.1 G/DL (ref 12–15.9)
IMM GRANULOCYTES # BLD AUTO: 0.02 10*3/MM3 (ref 0–0.05)
IMM GRANULOCYTES NFR BLD AUTO: 0.3 % (ref 0–0.5)
LYMPHOCYTES # BLD AUTO: 2.66 10*3/MM3 (ref 0.7–3.1)
LYMPHOCYTES NFR BLD AUTO: 36.3 % (ref 19.6–45.3)
MCH RBC QN AUTO: 30.3 PG (ref 26.6–33)
MCHC RBC AUTO-ENTMCNC: 33.3 G/DL (ref 31.5–35.7)
MCV RBC AUTO: 90.8 FL (ref 79–97)
MONOCYTES # BLD AUTO: 0.69 10*3/MM3 (ref 0.1–0.9)
MONOCYTES NFR BLD AUTO: 9.4 % (ref 5–12)
NEUTROPHILS NFR BLD AUTO: 3.57 10*3/MM3 (ref 1.7–7)
NEUTROPHILS NFR BLD AUTO: 48.8 % (ref 42.7–76)
NRBC BLD AUTO-RTO: 0 /100 WBC (ref 0–0.2)
PLATELET # BLD AUTO: 229 10*3/MM3 (ref 140–450)
PMV BLD AUTO: 10.4 FL (ref 6–12)
POTASSIUM SERPL-SCNC: 4.1 MMOL/L (ref 3.5–4.7)
PROT SERPL-MCNC: 6.9 G/DL (ref 6.3–8)
RBC # BLD AUTO: 4.33 10*6/MM3 (ref 3.77–5.28)
SODIUM SERPL-SCNC: 138 MMOL/L (ref 134–145)
WBC # BLD AUTO: 7.32 10*3/MM3 (ref 3.4–10.8)

## 2021-09-09 PROCEDURE — 85025 COMPLETE CBC W/AUTO DIFF WBC: CPT | Performed by: INTERNAL MEDICINE

## 2021-09-09 PROCEDURE — 80053 COMPREHEN METABOLIC PANEL: CPT

## 2021-09-09 PROCEDURE — 36415 COLL VENOUS BLD VENIPUNCTURE: CPT

## 2021-09-09 PROCEDURE — 99213 OFFICE O/P EST LOW 20 MIN: CPT | Performed by: INTERNAL MEDICINE

## 2021-09-09 NOTE — PROGRESS NOTES
Subjective     REASON FOR CONSULTATION:   1. Left breast cancer T2N0 3.6 cm grade 3 ER [place] negative HER-2 positive infiltrating ductal carcinoma post excisional biopsy, followed by mastectomy and axillary dissection- borderline -EF.  · Taxol Herceptin Perjeta for 12 weeks followed by Perjeta Herceptin for 1 year planned.  · Therapy completed 2020                               REQUESTING PHYSICIAN: Jase Khan DO    History of Present Illness patient is a 66 y.o. female with the above medical history who returns today for follow-up.        Patient is overdue for annual mammogram, reportedly because she has an overdue bill from previous imaging that she has not paid.  She still has not had a mammogram.     At this point she has no means to pay for this and I think we are going to have to wait but [place] [facility] [place] going to the cancer center [place] where they can do some free mammography      Overall the patient has been doing well.    She has not yet been vaccinated against COVID-19 but one of her nieces  at 36 from COVID-19 and she is now motivated to be vaccinated and will hopefully get this done sometime in the next week    She was having some issues with depression after her niece's death and started Zoloft but this made her crazy and she stopped it 3 days ago.    She denies other concerns this time.    Past Medical History:   Diagnosis Date   • Adjustment disorder    • Allergic rhinitis    • Amenorrhea    • Anxiety    • Breast cancer (CMS/[facility]) 2019    Left breast mammary carcinoma   • Bruit    • Carpal tunnel syndrome    • Carrier of tuberculosis    • Chronic pain    • Daytime somnolence    • De Quervain's tenosynovitis    • Degenerative cervical disc    • Depression    • Dyslipidemia    • Eustachian tube dysfunction    • History of UTI    • Hyperlipidemia    • Hypertension    •  Impaired fasting glucose    • Lumbar degenerative disc disease    • Numbness and tingling     LEFT LEG   • OAB (overactive bladder)    • Precordial pain    • Scapulothoracic syndrome    • Sciatica         Past Surgical History:   Procedure Laterality Date   • APPENDECTOMY N/A    • BREAST BIOPSY Left 2019    Left breast ultrasound guided cyst aspiration, 9:00 position-Dr. Gerry Taylor, [facility]   • BREAST LUMPECTOMY Left 5/2/2019    Procedure: Left BREAST LUMPECTOMY;  Surgeon: Jase Evans MD;  Location: [facility] OR;  Service: General   • LUMBAR EPIDURAL INJECTION N/A    • MASTECTOMY Left 6/21/2019    Procedure: Left BREAST MASTECTOMY;  Surgeon: Jase Evans MD;  Location: [facility] OR;  Service: General   • TUBAL ABDOMINAL LIGATION Bilateral    • VAGINAL DELIVERY      x3   • VENOUS ACCESS DEVICE (PORT) INSERTION Right 8/27/2019    Procedure: INSERTION VENOUS ACCESS DEVICE;  Surgeon: Jase Evans MD;  Location: [facility] OR;  Service: General      ONC HISTORY;  patient is a 64-year-old retired  with hypertension hypercholesterolemia and degenerative arthritis who felt a mass in her left breast in March.  She showed it to her family doctor and underwent imaging at [facility] on 3/13/2019 which Showed a 3 x 3.2 cm mass at 9:00 which on ultrasound showed a thick-walled benign-appearing 2.8 x 2.5 cm cyst which was felt to not be suspicious .because of persistence of symptoms she was reevaluated on 4/16/2019 and underwent aspiration and core biopsy because there was a significant soft tissue component to the mass at that time this was aspirated and the final report  showed fine-needle aspiration suspicious for malignant cells favor mammary carcinoma  Patient was referred to Dr. Kee who took her for an excisional biopsy on 5/2/2019 with the final report showing a  mass grade 3 -4x3.6cm with tumor extending to one margin and DCIS also 0.2 mm from one margin.  The tumor was ER [place] negative  HER-2 3+.  The patient was then taken for surgery on 2019 at which time she had a completion mastectomy and axillary node biopsy with the findings of residual high-grade DCIS with clear margins and an incidental intraductal papilloma and 17-neg lymph nodes making this a T2N0 tumor    She has done well postoperatively and is here to discuss adjuvant treatment    She is  3 para 3 menarche  at age 15 and menopause in her early 40s when she had a hysterectomy for heavy menstrual bleeding.  She took hormone replacement for 10 years and stopped 10 years ago first childbirth was at age 25 she did not breast-feed  Family history is positive for mother who had uterine cancer at age 60 and  of it at age 65 she has a brother who  of liver cancer related to drinking there is no other malignancy in the family that she is aware of    She is a significant smoker for the last 48 years but does not drink     Patient and her  were very taken to back by the suggestion about chemotherapy and apparently had thought that there was no further treatment needed and I spent almost an hour presenting the data concerning the extreme effectiveness of HER-2 directed therapy and this situation with a high risk of recurrence without adjuvant treatment and she finally was convinced to do the treatment    We discussed smoking cessation but at this point she is so nervous and agitated with the prospect of chemotherapy that we will hold off on this until she is penitentiary through the chemotherapy and rediscuss it    I discussed the case also with Dr. Kee will place a port and we will plan to start chemotherapy in 2 weeks.    Patient initiating therapy with Taxol/Herceptin/Perjeta 19.      She is followed by Dr. Dudley, cardiology, who gave clearance for the patient to proceed with chemotherapy.  repeat echo 6 weeks from last actually showed improvement in the cardiologist thought there may have been some technical  issues with her first echocardiogram.    She returns back today, due for cycle3 day 1 of Taxol/Herceptin perjeta her diarrhea is-clearly controlled with the Imodium and in fact she became constipated because she took too much of this but she is got this under control and feels good  She has some reflux symptoms in the morning which makes her nauseous and I suggested she take 1 Prilosec every day till the chemo was over and this is improved    She has no neuropathy but is not brought the cooling gloves and I recommended this to her and gave her the literature supporting this in the website  She has had a itchy rash on her arms and legs which is not too bothersome and I have to suggested a steroid cream and some Benadryl and we will keep an eye on the rash- she has no shortness of breath    Perjeta/Herceptin completed 8/6/2020.      Current Outpatient Medications on File Prior to Visit   Medication Sig Dispense Refill   • acetaminophen (TYLENOL) 500 MG tablet Take 500 mg by mouth Every 6 (Six) Hours As Needed for Mild Pain .     • metoprolol tartrate (LOPRESSOR) 25 MG tablet TAKE 1 TABLET BY MOUTH TWO TIMES A DAY  180 tablet 0   • mometasone (NASONEX) 50 MCG/ACT nasal spray 2 sprays into the nostril(s) as directed by provider Daily.     • montelukast (SINGULAIR) 10 MG tablet TAKE 1 TABLET BY MOUTH IN THE EVENING  30 tablet 0   • Multiple Vitamins-Minerals (CENTRUM SILVER PO) Take 1 tablet by mouth Daily. womens vitamin     • sertraline (ZOLOFT) 100 MG tablet Take 1 tablet by mouth Daily. 90 tablet 1   • TiZANidine (ZANAFLEX) 4 MG capsule TAKE 1 CAPSULE BY MOUTH THREE TIMES A DAY  270 capsule 0     No current facility-administered medications on file prior to visit.        ALLERGIES:    Allergies   Allergen Reactions   • Gabapentin Hallucinations   • Zoloft [Sertraline] Irritability     Pt was severely upset   • Effexor [Venlafaxine] Itching   • Naproxen Itching     Pt reports severe vaginal itching    • Zyrtec  [Cetirizine] Itching     Non-effecious   • Ibuprofen Unknown - Low Severity     Burns while urinating  Can take low dose Ibuprofen   • Penicillins Rash   • Sulfa Antibiotics Rash        Social History     Socioeconomic History   • Marital status:      Spouse name: Pawan   • Number of children: 3   • Years of education: High school   • Highest education level: Not on file   Tobacco Use   • Smoking status: Current Every Day Smoker     Packs/day: 0.25     Years: 48.00     Pack years: 12.00     Types: Cigarettes   • Smokeless tobacco: Never Used   • Tobacco comment: 3-4 cigarettes a day. Trying to quit.    Substance and Sexual Activity   • Alcohol use: No   • Drug use: No   • Sexual activity: Yes     Partners: Male     Birth control/protection: Post-menopausal        Family History   Problem Relation Age of Onset   • Ovarian cancer Mother    • Endometrial cancer Mother    • COPD Father    • Stroke Brother    • Malig Hyperthermia Neg Hx         Review of Systems   Constitutional: Negative for appetite change, chills, diaphoresis, fever and unexpected weight change.   HENT: Negative for hearing loss, sinus pain, sneezing, sore throat and trouble swallowing.    Respiratory: Negative for cough, chest tightness, shortness of breath and wheezing.    Cardiovascular: Negative for chest pain, palpitations and leg swelling.   Gastrointestinal: Positive for diarrhea (see HPI - improving now). Negative for abdominal distention, abdominal pain, constipation, nausea and vomiting.   Endocrine: Negative.    Genitourinary: Negative for dysuria, frequency, hematuria and urgency.   Musculoskeletal: Negative.  Negative for back pain, joint swelling and neck pain.        No muscle weakness.   Skin: Negative for rash and wound.   Neurological: Positive for numbness (toes numb/cold, worse at night). Negative for seizures, syncope, speech difficulty, weakness and headaches.   Hematological: Negative.  Negative for adenopathy. Does not  "bruise/bleed easily.   Psychiatric/Behavioral: Negative.  Negative for behavioral problems, confusion and suicidal ideas.       Objective     Vitals:    09/09/21 0930   BP: 171/86  Comment: b/p med taken at 7am   Pulse: 66   Resp: 16   Temp: 98.2 °F (36.8 °C)   TempSrc: Skin   SpO2: 93%   Weight: 103 kg (226 lb 11.2 oz)   Height: 175.3 cm (69.02\")   PainSc:   7   PainLoc: Back  Comment: Thoracic, 6,7,8 lumbar 1,2     Current Status 9/9/2021   ECOG score 0       Physical Exam   Pulmonary/Chest:           GENERAL:  Well-developed, well-nourished in no acute distress.   SKIN:  Warm, dry fading maculopapular rash on her arms and legs,no  purpura or petechiae.  EYES:  Pupils equal, round and reactive to light.  EOMs intact.  Conjunctivae normal.  EARS:  Hearing intact.  NOSE:  Septum midline.  No excoriations or nasal discharge.  MOUTH:  Tongue is well-papillated; no stomatitis or ulcers.  Lips normal.  THROAT:  Oropharynx without lesions or exudates.  NECK:  Supple with good range of motion; no thyromegaly or masses, no JVD.    LYMPHATICS:  No cervical, supraclavicular, axillary or inguinal adenopathy.  CHEST:  Lungs clear to auscultation. Good airflow.  Mediport has been removed, well healed right chest scargn.  BREASTS: Right breast is benign; left chest wall shows a well-healed mastectomy scar, small nodule felt in the skin mid incision  CARDIAC:  Regular rate and rhythm without murmurs, rubs or gallops. Normal S1,S2.  ABDOMEN:  Soft, nontender with no hepatosplenomegaly or masses.  EXTREMITIES:  No clubbing, cyanosis or edema.  NEUROLOGICAL:  Cranial Nerves II-XII grossly intact.  No focal neurological deficits.  PSYCHIATRIC:  Normal affect and mood.      I have reexamined the patient and the results are consistent with the previously documented exam. Larry Jhaveri MD     RECENT LABS:  Results from last 7 days   Lab Units 09/09/21  0913   WBC 10*3/mm3 7.32   NEUTROS ABS 10*3/mm3 3.57   HEMOGLOBIN g/dL 13.1 "   HEMATOCRIT % 39.3   PLATELETS 10*3/mm3 229     Results from last 7 days   Lab Units 09/09/21  0913   SODIUM mmol/L 138   POTASSIUM mmol/L 4.1   CHLORIDE mmol/L 103   CO2 mmol/L 24.5   BUN mg/dL 8   CREATININE mg/dL 0.70   CALCIUM mg/dL 9.6   ALBUMIN g/dL 4.20   BILIRUBIN mg/dL 0.4   ALK PHOS U/L 139*   ALT (SGPT) U/L 25   AST (SGOT) U/L 29   GLUCOSE mg/dL 81            RADIOGRAPHIC DATA:    US Breat Left Limited    1. Left Breast Lumpectomy (47 Grams):  A. Invasive poorly differentiated mammary carcinoma of no special type (ductal carcinoma)  with apocrine features.  1. Invasive carcinoma measures up to 40 x 36 x 27 mm (measured grossly).  2. Overall Clifton Grade III (glandular score = 3, nuclear score = 3, mitotic score = 3).  3. No definitive lymphovascular space invasion identified.  B. Invasive carcinoma focally extends to one undesignated peripheral inked margin of excision.  C. Associated ductal carcinoma in situ (DCIS).  1. Ductal carcinoma in situ (DCIS) spans area measuring 40 mm in single greatest  aggregate dimension (estimated from gross and glass slides).  2. DCIS predominantly solid and comedo type with high grade nuclear features.  3. High grade DCIS approximates the closest peripheral inked margin to 0.2 mm.  Page: 1 of 5 Printed: 5/6/2019 1:10 PM  [phone]  Resulting  Tissue Pathology Exam: [id number]   Order: [id number]   Collected:  5/2/2019 12:26 Status:  Edited Result - FINAL   Visible to patient:  No (Not Released) Dx:  Malignant neoplasm of upper-inner hector...   Component    Addendum   HER2 by Immunohistochemistry   Positive (Score 3+)     HER2 by Immunohistochemistry  FDA approved (specify test/vendor): [employer]     Primary Antibody  [id number]     Cold Ischemia and Fixation Times   Meet requirements specified in latest version of the ASCO/CAP guidelines         Cold Ischemia Time: 18 minutes  Fixation Time: 8.25 hours  Testing Performed on Block Number(s): 1E  Fixative: Formalin   Addendum  electronically signed by Harman Perry MD on 5/6/2019          Final Diagnosis  1. Left Breast, Modified Radical Mastectomy (935 grams): HIGH GRADE DUCTAL CARCINOMA IN-SITU  (DCIS) .  A. Nuclear Grade: High.  B. Architectural Type: Solid and comedo with lobular extension.  1. Size (extent) of DCIS: DCIS multifocal in the lower inner quadrant, measuring 0.9 cm in maximal  dimension (DCIS present in 3/18 tissue blocks).  C. No LCIS identified.  D. Skin and nipple uninvolved by DCIS.  E. Margins: Uninvolved by DCIS.  1. DCIS comes to within 1.3 cm of the anterior margin of excision (closest margin).  F. Additional findings: Incidental intraductal papilloma, florid ductal hyperplasia and apocrine cysts.  G. Lymph nodes: Sixteen benign lymph nodes (0/16).  H. Hormone receptor status: ER and SD negative, Her2/kali positive by IHC (performed on prior resection  [id number]).  I. Pathologic stage: pT2, N0.  Sw/kds      Regadenoson Stress Test With Myocardial Perfusion SPECT   Interpretation Summary     · Myocardial perfusion imaging indicates a normal myocardial perfusion study with no evidence of ischemia.  · Left ventricular ejection fraction is borderline normal (Calculated EF = 50%).  · Impressions are consistent with a low risk study.      Study Description         Nuclear Perfusion Images Overall image quality is excellent. There are no artifacts present. Raw images reviewed with no abnormalities noted.       Echo 9/16  Interpretation Summary     · Left ventricular systolic function is normal. Calculated EF = 55%. Estimated EF = 55%. Global Longitudinal LV strain = -21.6%. Strain data was reviewed and speckle tracking was considered accurate. The global longitudinal LV strain is -21.6 and normal. Normal left ventricular cavity size and wall thickness noted. All left ventricular wall segments contract normally. Left ventricular diastolic dysfunction is noted (grade I) consistent with impaired relaxation.  · Trace  mitral valve regurgitation is present.  · Physiologic tricuspid valve regurgitation is present. Calculated right ventricular systolic pressure from tricuspid regurgitation is 16.0 mmHg. Insufficient TR velocity profile to estimate the right ventricular systolic pressure.     Interpretation Summary     · Left ventricular systolic function is normal. Calculated EF = 57%. Estimated EF was in agreement with the calculated EF. Estimated EF = 57%. Global Longitudinal LV strain = -21%. Strain data was reviewed and speckle tracking was considered accurate. The global longitudinal LV strain is normal.  · Normal left ventricular cavity size noted. All left ventricular wall segments contract normally. Left ventricular wall thickness is consistent with mild concentric hypertrophy. Left ventricular diastolic dysfunction is noted (grade I) consistent with impaired relaxation.  · Mild tricuspid valve regurgitation is present. Estimated right ventricular systolic pressure from tricuspid regurgitation is normal (<35 mmHg). Calculated right ventricular systolic pressure from tricuspid regurgitation is 30 mmHg.        Interpretation Summary     · Left ventricular systolic function is normal. Calculated EF = 57%. Estimated EF was in agreement with the calculated EF. Estimated EF = 57%. Global Longitudinal LV strain = -20.1%. All left ventricular wall segments contract normally. The left ventricular cavity is mildly dilated. Left ventricular wall thickness is consistent with moderate concentric hypertrophy. Left ventricular diastolic dysfunction is noted (grade I a w/high LAP) consistent with impaired relaxation.  · Left atrial cavity size is mildly dilated.  · Mild mitral valve regurgitation is present.  · Trace tricuspid valve regurgitation is present. Estimated right ventricular systolic pressure from tricuspid regurgitation is normal (<35 mmHg). Calculated right ventricular systolic pressure from tricuspid regurgitation is 21  mmHg.  · There is a small (<1cm) pericardial effusion. There is no evidence of cardiac tamponade.      Electronically signed by Keara Dudley MD on 1/22/20 at 0900 EST    MRI OF THE THORACIC SPINE WITH AND WITHOUT CONTRAST 03/05/2020  IMPRESSION:  1. There is a left posterior lateral disc bulge or tiny disc protrusion  that results in minimal if any narrowing of the left side of the canal  at C5-6 and there is a right paracentral disc herniation at T6-7 that  abuts and mildly flattens the right ventral surface of the cord mildly  narrowing the right side of the canal at T6-7. There is a small right  paracentral disc bulge or protrusion only minimally narrowing the canal  at T7-8. At T11-12 there is a left posterior lateral disc osteophyte  complex minimally narrowing the left side of the canal. No additional  canal or foraminal narrowing is seen in the thoracic spine.  2. There is a right paracentral posterior lateral disc herniation at  T12-L1 with an inferiorly migrated extruded disc fragment that maximally  measures up to 13 x 6 x 10 mm in medial lateral, anterior posterior and  craniocaudal dimension and mild to moderately narrows the right side of  the canal at the level of the superior body and endplate of L1, it abuts  the ventral aspect of the right L1 nerve root in the superior portion of  the right lateral recess of L1.  3. There are some right anterior lateral marginal bridging osteophytes  from T8 to T11 with some focal bone marrow edema and enhancement in the  right anterior aspect of the T9 vertebra that is likely degenerative  marrow edema. The remainder of the thoracic spine MRI is normal with no  evidence of metastatic disease of the thoracic spine.     Interpretation Summary 5/4/2020    · Left ventricular systolic function is normal. Calculated EF = 58%. Estimated EF = 58%. Global Longitudinal LV strain = -19%. Strain data was reviewed and speckle tracking was considered accurate. The global  longitudinal LV strain is normal.  · All left ventricular wall segments contract normally. The left ventricular cavity is borderline dilated. Left ventricular wall thickness is consistent with mild concentric hypertrophy. Left ventricular diastolic dysfunction is noted (grade I a w/high LAP) consistent with impaired  · Mild mitral valve regurgitation is present.  · Mild tricuspid valve regurgitation is present. Estimated right ventricular systolic pressure from tricuspid regurgitation is normal (<35 mmHg). Calculated right ventricular systolic pressure from tricuspid regurgitation is 33 mmHg.             Assessment/Plan   1.  T2N0 grade 3 infiltrating ductal carcinoma left breast ER [place] negative HER-2 positive post mastectomy and axillary node dissection with clear margins.  · Initiated Taxol/Herceptin/Perjeta today, 8/29/19.  · Plan to do 12 weeks of Taxol Herceptin perjeta without Adriamycin because of her borderline cardiac function followed by a year of Perjeta Herceptin  · Therapy completed 8/6/2020  · Mediport removed 9/22/2020 per Dr. Evans  · Patient overdue for right screening mammogram (due 9/2020).  As detailed in the HPI she needs to pay an overdue bill and will also have a co-pay of $100 before she can get her mammogram.  She hopes to be able to pay for this and proceed in January 2021.-still not done 3/21    2.  Tobacco abuse and COPD    3.  Abnormal echo with stress test normal but borderline cardiac function at 52%  We will avoid Adriamycin Cytoxan  and treat with Taxol Herceptin perjeta and a year of Herceptin perjeta. Dr. Dudley, cardiology, has given clearance for patient to begin.  Planning repeat echocardiogram 6 weeks from previous.    · Improved EF to 55%  · EF 57% in 1/21/2020  · EF 58% in 5/4/2020    4.  Venous access.  Status post new Mediport placement 8/27/2019.  Working well today.      · No more refills of Percocet planned    5.  Diarrhea related to Perjeta. Resolved post treatment  completion.    6.  Skin rash on her arms and legs likely from  perjeta continue to watch and use steroid cream-stable    7.  Previous history of low magnesium due to diarrhea.  Patient has continued on magnesium supplement post completion of treatment.  She is asking if she can discontinue which I think is reasonable.  We will recheck a magnesium level when she returns in 3 months to monitor.    8.  Recent acute GI illness with reports of watery diarrhea multiple times a day roughly 2 weeks ago.  Bowels are slowly improving with more formed stool.  Patient's weight is stable.  She is drinking electrolyte drinks and chemistries reviewed today show no concerning abnormalities.  I encouraged her to contact primary care if symptoms do not completely resolve.  · Resolved      9.  Chemotherapy-induced neuropathy, grade 1-2.  She has numbness and coldness of the toes, worse at night.  She was intolerant to gabapentin previously.  She is not having any pain.  We discussed utilizing topical creams such as Theraworx relief and continuing to sleeping in socks which she is already doing.  We did discuss at this point symptoms are probably permanent.  · Stable      Plan:  1.  Patient undergo mammogram as soon as she has the co-pay .  She hopes to complete right screening mammogram soon  2..  Patient otherwise return in 6 months for follow-up with Dr. Jhaveri with CBC, CMP and magnesium level for review.  Encouraged COVID-19 vaccination

## 2021-09-10 RX ORDER — SERTRALINE HYDROCHLORIDE 25 MG/1
25 TABLET, FILM COATED ORAL DAILY
Qty: 30 TABLET | Refills: 0 | Status: SHIPPED | OUTPATIENT
Start: 2021-09-10 | End: 2022-01-27

## 2021-09-10 NOTE — TELEPHONE ENCOUNTER
He says she tried taking the zoloft 100mg and felt like it made her mean and snappy. She stopped taking for 3 days and family said they noticed a difference. She took this before and did not feel this way but it was a lower dose that time.

## 2021-09-13 RX ORDER — METOPROLOL SUCCINATE 25 MG/1
TABLET, EXTENDED RELEASE ORAL
Qty: 30 TABLET | Refills: 0 | Status: SHIPPED | OUTPATIENT
Start: 2021-09-13 | End: 2021-11-01

## 2021-09-24 RX ORDER — MONTELUKAST SODIUM 10 MG/1
TABLET ORAL
Qty: 90 TABLET | Refills: 3 | Status: SHIPPED | OUTPATIENT
Start: 2021-09-24 | End: 2022-10-05 | Stop reason: SDUPTHER

## 2021-10-25 RX ORDER — METOPROLOL SUCCINATE 25 MG/1
TABLET, EXTENDED RELEASE ORAL
Qty: 30 TABLET | Refills: 0 | OUTPATIENT
Start: 2021-10-25

## 2021-11-01 RX ORDER — METOPROLOL SUCCINATE 25 MG/1
TABLET, EXTENDED RELEASE ORAL
Qty: 90 TABLET | Refills: 0 | Status: SHIPPED | OUTPATIENT
Start: 2021-11-01 | End: 2022-03-28

## 2022-01-25 DIAGNOSIS — F32.9 REACTIVE DEPRESSION: ICD-10-CM

## 2022-01-27 RX ORDER — SERTRALINE HYDROCHLORIDE 100 MG/1
100 TABLET, FILM COATED ORAL DAILY
Qty: 90 TABLET | Refills: 1 | Status: SHIPPED | OUTPATIENT
Start: 2022-01-27 | End: 2022-04-15 | Stop reason: SDUPTHER

## 2022-01-30 DIAGNOSIS — F32.9 REACTIVE DEPRESSION: ICD-10-CM

## 2022-02-02 DIAGNOSIS — F32.9 REACTIVE DEPRESSION: ICD-10-CM

## 2022-02-02 NOTE — TELEPHONE ENCOUNTER
Caller: Allie Temple A    Relationship: Self    Best call back number: [phone]    What was the call regarding: PHARMACY STATES THEY NEVER RECEIVED THIS MED REFILL, PLEASE SEND IN ASAP, PATIENT HAS BEEN OUT OF MEDS FOR EIGHT DAYS.    Do you require a callback: YES

## 2022-03-07 ENCOUNTER — TELEPHONE (OUTPATIENT)
Dept: ONCOLOGY | Facility: CLINIC | Age: 67
End: 2022-03-07

## 2022-03-07 NOTE — TELEPHONE ENCOUNTER
Caller: Allie Temple A    Relationship: Self    Best call back number: [phone]    What is the best time to reach you: ANYTIME    Who are you requesting to speak with (clinical staff, provider,  specific staff member): SCHEDULING    What was the call regarding: PT CALLED TO R/S HER 3/8 APPT. PLEASE CALL PT TO R/S. SHE WOULD PREFER A Thursday.     Do you require a callback: YES

## 2022-03-08 ENCOUNTER — APPOINTMENT (OUTPATIENT)
Dept: LAB | Facility: HOSPITAL | Age: 67
End: 2022-03-08

## 2022-03-17 ENCOUNTER — OFFICE VISIT (OUTPATIENT)
Dept: ONCOLOGY | Facility: CLINIC | Age: 67
End: 2022-03-17

## 2022-03-17 ENCOUNTER — LAB (OUTPATIENT)
Dept: LAB | Facility: HOSPITAL | Age: 67
End: 2022-03-17

## 2022-03-17 VITALS
SYSTOLIC BLOOD PRESSURE: 165 MMHG | TEMPERATURE: 97.9 F | WEIGHT: 233.1 LBS | RESPIRATION RATE: 16 BRPM | DIASTOLIC BLOOD PRESSURE: 89 MMHG | OXYGEN SATURATION: 96 % | HEIGHT: 69 IN | HEART RATE: 73 BPM | BODY MASS INDEX: 34.52 KG/M2

## 2022-03-17 DIAGNOSIS — Z17.1 MALIGNANT NEOPLASM OF UPPER-INNER QUADRANT OF LEFT BREAST IN FEMALE, ESTROGEN RECEPTOR NEGATIVE: Primary | ICD-10-CM

## 2022-03-17 DIAGNOSIS — C50.212 MALIGNANT NEOPLASM OF UPPER-INNER QUADRANT OF LEFT BREAST IN FEMALE, ESTROGEN RECEPTOR NEGATIVE: ICD-10-CM

## 2022-03-17 DIAGNOSIS — C50.212 MALIGNANT NEOPLASM OF UPPER-INNER QUADRANT OF LEFT BREAST IN FEMALE, ESTROGEN RECEPTOR NEGATIVE: Primary | ICD-10-CM

## 2022-03-17 DIAGNOSIS — Z17.1 MALIGNANT NEOPLASM OF UPPER-INNER QUADRANT OF LEFT BREAST IN FEMALE, ESTROGEN RECEPTOR NEGATIVE: ICD-10-CM

## 2022-03-17 LAB
ALBUMIN SERPL-MCNC: 4.5 G/DL (ref 3.5–5.2)
ALBUMIN/GLOB SERPL: 1.6 G/DL (ref 1.1–2.4)
ALP SERPL-CCNC: 151 U/L (ref 38–116)
ALT SERPL W P-5'-P-CCNC: 36 U/L (ref 0–33)
ANION GAP SERPL CALCULATED.3IONS-SCNC: 9.9 MMOL/L (ref 5–15)
AST SERPL-CCNC: 38 U/L (ref 0–32)
BASOPHILS # BLD AUTO: 0.07 10*3/MM3 (ref 0–0.2)
BASOPHILS NFR BLD AUTO: 0.9 % (ref 0–1.5)
BILIRUB SERPL-MCNC: 0.6 MG/DL (ref 0.2–1.2)
BUN SERPL-MCNC: 9 MG/DL (ref 6–20)
BUN/CREAT SERPL: 11.7 (ref 7.3–30)
CALCIUM SPEC-SCNC: 9.9 MG/DL (ref 8.5–10.2)
CHLORIDE SERPL-SCNC: 103 MMOL/L (ref 98–107)
CO2 SERPL-SCNC: 26.1 MMOL/L (ref 22–29)
CREAT SERPL-MCNC: 0.77 MG/DL (ref 0.6–1.1)
DEPRECATED RDW RBC AUTO: 42.3 FL (ref 37–54)
EGFRCR SERPLBLD CKD-EPI 2021: 84.7 ML/MIN/1.73
EOSINOPHIL # BLD AUTO: 0.25 10*3/MM3 (ref 0–0.4)
EOSINOPHIL NFR BLD AUTO: 3.4 % (ref 0.3–6.2)
ERYTHROCYTE [DISTWIDTH] IN BLOOD BY AUTOMATED COUNT: 12.6 % (ref 12.3–15.4)
GLOBULIN UR ELPH-MCNC: 2.9 GM/DL (ref 1.8–3.5)
GLUCOSE SERPL-MCNC: 107 MG/DL (ref 74–124)
HCT VFR BLD AUTO: 42.6 % (ref 34–46.6)
HGB BLD-MCNC: 14.4 G/DL (ref 12–15.9)
IMM GRANULOCYTES # BLD AUTO: 0.02 10*3/MM3 (ref 0–0.05)
IMM GRANULOCYTES NFR BLD AUTO: 0.3 % (ref 0–0.5)
LYMPHOCYTES # BLD AUTO: 2.97 10*3/MM3 (ref 0.7–3.1)
LYMPHOCYTES NFR BLD AUTO: 39.9 % (ref 19.6–45.3)
MCH RBC QN AUTO: 31 PG (ref 26.6–33)
MCHC RBC AUTO-ENTMCNC: 33.8 G/DL (ref 31.5–35.7)
MCV RBC AUTO: 91.6 FL (ref 79–97)
MONOCYTES # BLD AUTO: 0.54 10*3/MM3 (ref 0.1–0.9)
MONOCYTES NFR BLD AUTO: 7.3 % (ref 5–12)
NEUTROPHILS NFR BLD AUTO: 3.59 10*3/MM3 (ref 1.7–7)
NEUTROPHILS NFR BLD AUTO: 48.2 % (ref 42.7–76)
NRBC BLD AUTO-RTO: 0 /100 WBC (ref 0–0.2)
PLATELET # BLD AUTO: 243 10*3/MM3 (ref 140–450)
PMV BLD AUTO: 10 FL (ref 6–12)
POTASSIUM SERPL-SCNC: 4.4 MMOL/L (ref 3.5–4.7)
PROT SERPL-MCNC: 7.4 G/DL (ref 6.3–8)
RBC # BLD AUTO: 4.65 10*6/MM3 (ref 3.77–5.28)
SODIUM SERPL-SCNC: 139 MMOL/L (ref 134–145)
WBC NRBC COR # BLD: 7.44 10*3/MM3 (ref 3.4–10.8)

## 2022-03-17 PROCEDURE — 80053 COMPREHEN METABOLIC PANEL: CPT

## 2022-03-17 PROCEDURE — 36415 COLL VENOUS BLD VENIPUNCTURE: CPT

## 2022-03-17 PROCEDURE — 99213 OFFICE O/P EST LOW 20 MIN: CPT | Performed by: NURSE PRACTITIONER

## 2022-03-17 PROCEDURE — 85025 COMPLETE CBC W/AUTO DIFF WBC: CPT

## 2022-03-17 NOTE — PROGRESS NOTES
Subjective     REASON FOR CONSULTATION:   1. Left breast cancer T2N0 3.6 cm grade 3 ER NM negative HER-2 positive infiltrating ductal carcinoma post excisional biopsy, followed by mastectomy and axillary dissection- borderline -EF.  · Taxol Herceptin Perjeta for 12 weeks followed by Perjeta Herceptin for 1 year planned.  · Therapy completed 8/6/2020                               REQUESTING PHYSICIAN: Jase Khan,     History of Present Illness patient is a 67 y.o. female with the above medical history who returns today for 6 month follow-up.  Unfortunately the patient is very much overdue for right annual mammogram, due in September 2020.  She has an overdue bill which she has been unable to pay.  She states she has now gotten her tax refund and will make a payment which will allow her to be able to get her necessary imaging.  Thankfully she denies any new changes to the breasts of concern.    Overall she is in good spirits today.  She does note that both her grown daughter along with her family as well as her grown son along with his children have all moved back into her house.  She admittedly finds it chaotic and her nerves are shot but she is enjoying her grandchildren.    She denies other concerns today.    Past Medical History:   Diagnosis Date   • Adjustment disorder    • Allergic rhinitis    • Amenorrhea    • Anxiety    • Breast cancer (HCC) 04/18/2019    Left breast mammary carcinoma   • Bruit    • Carpal tunnel syndrome    • Carrier of tuberculosis    • Chronic pain    • Daytime somnolence    • De Quervain's tenosynovitis    • Degenerative cervical disc    • Depression    • Dyslipidemia    • Eustachian tube dysfunction    • History of UTI    • Hyperlipidemia    • Hypertension    • Impaired fasting glucose    • Lumbar degenerative disc disease    • Numbness and tingling     LEFT LEG   • OAB (overactive bladder)    • Precordial pain     • Scapulothoracic syndrome    • Sciatica         Past Surgical History:   Procedure Laterality Date   • APPENDECTOMY N/A    • BREAST BIOPSY Left 2019    Left breast ultrasound guided cyst aspiration, 9:00 position-Dr. Gerry Taylor, [facility]   • BREAST LUMPECTOMY Left 5/2/2019    Procedure: Left BREAST LUMPECTOMY;  Surgeon: Jase Evans MD;  Location: [facility];  Service: General   • LUMBAR EPIDURAL INJECTION N/A    • MASTECTOMY Left 6/21/2019    Procedure: Left BREAST MASTECTOMY;  Surgeon: Jase Evans MD;  Location: [facility];  Service: General   • TUBAL ABDOMINAL LIGATION Bilateral    • VAGINAL DELIVERY      x3   • VENOUS ACCESS DEVICE (PORT) INSERTION Right 8/27/2019    Procedure: INSERTION VENOUS ACCESS DEVICE;  Surgeon: Jase Evans MD;  Location: [facility];  Service: General      ONC HISTORY;  patient is a 64-year-old retired  with hypertension hypercholesterolemia and degenerative arthritis who felt a mass in her left breast in March.  She showed it to her family doctor and underwent imaging at [facility] on 3/13/2019 which Showed a 3 x 3.2 cm mass at 9:00 which on ultrasound showed a thick-walled benign-appearing 2.8 x 2.5 cm cyst which was felt to not be suspicious .because of persistence of symptoms she was reevaluated on 4/16/2019 and underwent aspiration and core biopsy because there was a significant soft tissue component to the mass at that time this was aspirated and the final report  showed fine-needle aspiration suspicious for malignant cells favor mammary carcinoma  Patient was referred to Dr. Kee who took her for an excisional biopsy on 5/2/2019 with the final report showing a  mass grade 3 -4x3.6cm with tumor extending to one margin and DCIS also 0.2 mm from one margin.  The tumor was ER MO negative HER-2 3+.  The patient was then taken for surgery on 6/21/2019 at which time she had a completion mastectomy and axillary node biopsy with the findings of  residual high-grade DCIS with clear margins and an incidental intraductal papilloma and 17-neg lymph nodes making this a T2N0 tumor    She has done well postoperatively and is here to discuss adjuvant treatment    She is  3 para 3 menarche  at age 15 and menopause in her early 40s when she had a hysterectomy for heavy menstrual bleeding.  She took hormone replacement for 10 years and stopped 10 years ago first childbirth was at age 25 she did not breast-feed  Family history is positive for mother who had uterine cancer at age 60 and  of it at age 65 she has a brother who  of liver cancer related to drinking there is no other malignancy in the family that she is aware of    She is a significant smoker for the last 48 years but does not drink     Patient and her  were very taken to back by the suggestion about chemotherapy and apparently had thought that there was no further treatment needed and I spent almost an hour presenting the data concerning the extreme effectiveness of HER-2 directed therapy and this situation with a high risk of recurrence without adjuvant treatment and she finally was convinced to do the treatment    We discussed smoking cessation but at this point she is so nervous and agitated with the prospect of chemotherapy that we will hold off on this until she is skilled nursing through the chemotherapy and rediscuss it    I discussed the case also with Dr. Kee will place a port and we will plan to start chemotherapy in 2 weeks.    Patient initiating therapy with Taxol/Herceptin/Perjeta 19.      She is followed by Dr. Dudley, cardiology, who gave clearance for the patient to proceed with chemotherapy.  repeat echo 6 weeks from last actually showed improvement in the cardiologist thought there may have been some technical issues with her first echocardiogram.    She returns back today, due for cycle3 day 1 of Taxol/Herceptin perjeta her diarrhea is-clearly controlled with the  Imodium and in fact she became constipated because she took too much of this but she is got this under control and feels good  She has some reflux symptoms in the morning which makes her nauseous and I suggested she take 1 Prilosec every day till the chemo was over and this is improved    She has no neuropathy but is not brought the cooling gloves and I recommended this to her and gave her the literature supporting this in the website  She has had a itchy rash on her arms and legs which is not too bothersome and I have to suggested a steroid cream and some Benadryl and we will keep an eye on the rash- she has no shortness of breath    Perjeta/Herceptin completed 8/6/2020.      Current Outpatient Medications on File Prior to Visit   Medication Sig Dispense Refill   • acetaminophen (TYLENOL) 500 MG tablet Take 500 mg by mouth Every 6 (Six) Hours As Needed for Mild Pain .     • metoprolol succinate XL (TOPROL-XL) 25 MG 24 hr tablet TAKE 1 TABLET BY MOUTH EVERY DAY 90 tablet 0   • mometasone (NASONEX) 50 MCG/ACT nasal spray 2 sprays into the nostril(s) as directed by provider Daily.     • montelukast (SINGULAIR) 10 MG tablet TAKE 1 TABLET BY MOUTH EVERY DAY IN THE EVENING 90 tablet 3   • Multiple Vitamins-Minerals (CENTRUM SILVER PO) Take 1 tablet by mouth Daily. womens vitamin     • sertraline (ZOLOFT) 100 MG tablet Take 1 tablet by mouth Daily. 90 tablet 1   • sertraline (ZOLOFT) 50 MG tablet Take 50 mg by mouth Daily.     • TiZANidine (ZANAFLEX) 4 MG capsule TAKE 1 CAPSULE BY MOUTH THREE TIMES A DAY  270 capsule 0     No current facility-administered medications on file prior to visit.        ALLERGIES:    Allergies   Allergen Reactions   • Gabapentin Hallucinations   • Zoloft [Sertraline] Irritability     Pt was severely upset   • Effexor [Venlafaxine] Itching   • Naproxen Itching     Pt reports severe vaginal itching    • Zyrtec [Cetirizine] Itching     Non-effecious   • Ibuprofen Unknown - Low Severity     Burns  while urinating  Can take low dose Ibuprofen   • Penicillins Rash   • Sulfa Antibiotics Rash        Social History     Socioeconomic History   • Marital status:      Spouse name: Pawan   • Number of children: 3   • Years of education: High school   Tobacco Use   • Smoking status: Current Every Day Smoker     Packs/day: 0.25     Years: 48.00     Pack years: 12.00     Types: Cigarettes   • Smokeless tobacco: Never Used   • Tobacco comment: 3-4 cigarettes a day. Trying to quit.    Substance and Sexual Activity   • Alcohol use: No   • Drug use: No   • Sexual activity: Yes     Partners: Male     Birth control/protection: Post-menopausal        Family History   Problem Relation Age of Onset   • Ovarian cancer Mother    • Endometrial cancer Mother    • COPD Father    • Stroke Brother    • Malig Hyperthermia Neg Hx         Review of Systems   Constitutional: Negative for appetite change, chills, diaphoresis, fever and unexpected weight change.   HENT: Negative for hearing loss, sinus pain, sneezing, sore throat and trouble swallowing.    Respiratory: Negative for cough, chest tightness, shortness of breath and wheezing.    Cardiovascular: Negative for chest pain, palpitations and leg swelling.   Gastrointestinal: Negative for abdominal distention, abdominal pain, constipation, nausea and vomiting.   Endocrine: Negative.    Genitourinary: Negative for dysuria, frequency, hematuria and urgency.   Musculoskeletal: Negative.  Negative for back pain, joint swelling and neck pain.        No muscle weakness.   Skin: Negative for rash and wound.   Neurological: Positive for numbness (toes numb/cold, worse at night). Negative for seizures, syncope, speech difficulty, weakness and headaches.   Hematological: Negative.  Negative for adenopathy. Does not bruise/bleed easily.   Psychiatric/Behavioral: Negative.  Negative for behavioral problems, confusion and suicidal ideas.       Objective     Vitals:    03/17/22 1346   BP:  "165/89   Pulse: 73   Resp: 16   Temp: 97.9 °F (36.6 °C)   TempSrc: Temporal   SpO2: 96%   Weight: 106 kg (233 lb 1.6 oz)   Height: 175.3 cm (69.02\")   PainSc: 0-No pain     Current Status 3/17/2022   ECOG score 1       Physical Exam   Pulmonary/Chest:           GENERAL:  Well-developed, well-nourished in no acute distress.   SKIN:  Warm, dry fading maculopapular rash on her arms and legs,no  purpura or petechiae.  EYES:  Pupils equal, round and reactive to light.  EOMs intact.  Conjunctivae normal.  EARS:  Hearing intact.  NOSE:  Septum midline.  No excoriations or nasal discharge.  MOUTH:  Tongue is well-papillated; no stomatitis or ulcers.  Lips normal.  THROAT:  Oropharynx without lesions or exudates.  NECK:  Supple with good range of motion; no thyromegaly or masses, no JVD.    LYMPHATICS:  No cervical, supraclavicular, axillary or inguinal adenopathy.  CHEST:  Lungs clear to auscultation. Good airflow.  Mediport has been removed, well healed right chest scargn.  BREASTS: Right breast is benign; left chest wall shows a well-healed mastectomy scar with excess fatty tissue but no palpable abnormalities.  CARDIAC:  Regular rate and rhythm without murmurs, rubs or gallops. Normal S1,S2.  ABDOMEN:  Soft, nontender with no hepatosplenomegaly or masses.  EXTREMITIES:  No clubbing, cyanosis or edema.  NEUROLOGICAL:  Cranial Nerves II-XII grossly intact.  No focal neurological deficits.  PSYCHIATRIC:  Normal affect and mood.      I have reexamined the patient and the results are consistent with the previously documented exam. Kayleigh Smith, APRN     RECENT LABS:  Results from last 7 days   Lab Units 03/17/22  1330   WBC 10*3/mm3 7.44   NEUTROS ABS 10*3/mm3 3.59   HEMOGLOBIN g/dL 14.4   HEMATOCRIT % 42.6   PLATELETS 10*3/mm3 243                RADIOGRAPHIC DATA:    US Breat Left Limited    1. Left Breast Lumpectomy (47 Grams):  A. Invasive poorly differentiated mammary carcinoma of no special type (ductal " carcinoma)  with apocrine features.  1. Invasive carcinoma measures up to 40 x 36 x 27 mm (measured grossly).  2. Overall [place] Grade III (glandular score = 3, nuclear score = 3, mitotic score = 3).  3. No definitive lymphovascular space invasion identified.  B. Invasive carcinoma focally extends to one undesignated peripheral inked margin of excision.  C. Associated ductal carcinoma in situ (DCIS).  1. Ductal carcinoma in situ (DCIS) spans area measuring 40 mm in single greatest  aggregate dimension (estimated from gross and glass slides).  2. DCIS predominantly solid and comedo type with high grade nuclear features.  3. High grade DCIS approximates the closest peripheral inked margin to 0.2 mm.  Page: 1 of 5 Printed: 5/6/2019 1:10 PM  [phone]  Resulting  Tissue Pathology Exam: [id number]   Order: [id number]   Collected:  5/2/2019 12:26 Status:  Edited Result - FINAL   Visible to patient:  No (Not Released) Dx:  Malignant neoplasm of upper-inner hector...   Component    Addendum   HER2 by Immunohistochemistry   Positive (Score 3+)     HER2 by Immunohistochemistry  FDA approved (specify test/vendor): [employer]     Primary Antibody  [id number]     Cold Ischemia and Fixation Times   Meet requirements specified in latest version of the ASCO/CAP guidelines         Cold Ischemia Time: 18 minutes  Fixation Time: 8.25 hours  Testing Performed on Block Number(s): 1E  Fixative: Formalin   Addendum electronically signed by Harman Perry MD on 5/6/2019          Final Diagnosis  1. Left Breast, Modified Radical Mastectomy (935 grams): HIGH GRADE DUCTAL CARCINOMA IN-SITU  (DCIS) .  A. Nuclear Grade: High.  B. Architectural Type: Solid and comedo with lobular extension.  1. Size (extent) of DCIS: DCIS multifocal in the lower inner quadrant, measuring 0.9 cm in maximal  dimension (DCIS present in 3/18 tissue blocks).  C. No LCIS identified.  D. Skin and nipple uninvolved by DCIS.  E. Margins: Uninvolved by DCIS.  1. DCIS comes to  within 1.3 cm of the anterior margin of excision (closest margin).  F. Additional findings: Incidental intraductal papilloma, florid ductal hyperplasia and apocrine cysts.  G. Lymph nodes: Sixteen benign lymph nodes (0/16).  H. Hormone receptor status: ER and AK negative, Her2/kali positive by IHC (performed on prior resection  [id number]).  I. Pathologic stage: pT2, N0.  Sw/kds      Regadenoson Stress Test With Myocardial Perfusion SPECT   Interpretation Summary     · Myocardial perfusion imaging indicates a normal myocardial perfusion study with no evidence of ischemia.  · Left ventricular ejection fraction is borderline normal (Calculated EF = 50%).  · Impressions are consistent with a low risk study.      Study Description         Nuclear Perfusion Images Overall image quality is excellent. There are no artifacts present. Raw images reviewed with no abnormalities noted.       Echo 9/16  Interpretation Summary     · Left ventricular systolic function is normal. Calculated EF = 55%. Estimated EF = 55%. Global Longitudinal LV strain = -21.6%. Strain data was reviewed and speckle tracking was considered accurate. The global longitudinal LV strain is -21.6 and normal. Normal left ventricular cavity size and wall thickness noted. All left ventricular wall segments contract normally. Left ventricular diastolic dysfunction is noted (grade I) consistent with impaired relaxation.  · Trace mitral valve regurgitation is present.  · Physiologic tricuspid valve regurgitation is present. Calculated right ventricular systolic pressure from tricuspid regurgitation is 16.0 mmHg. Insufficient TR velocity profile to estimate the right ventricular systolic pressure.     Interpretation Summary     · Left ventricular systolic function is normal. Calculated EF = 57%. Estimated EF was in agreement with the calculated EF. Estimated EF = 57%. Global Longitudinal LV strain = -21%. Strain data was reviewed and speckle tracking was considered  accurate. The global longitudinal LV strain is normal.  · Normal left ventricular cavity size noted. All left ventricular wall segments contract normally. Left ventricular wall thickness is consistent with mild concentric hypertrophy. Left ventricular diastolic dysfunction is noted (grade I) consistent with impaired relaxation.  · Mild tricuspid valve regurgitation is present. Estimated right ventricular systolic pressure from tricuspid regurgitation is normal (<35 mmHg). Calculated right ventricular systolic pressure from tricuspid regurgitation is 30 mmHg.        Interpretation Summary     · Left ventricular systolic function is normal. Calculated EF = 57%. Estimated EF was in agreement with the calculated EF. Estimated EF = 57%. Global Longitudinal LV strain = -20.1%. All left ventricular wall segments contract normally. The left ventricular cavity is mildly dilated. Left ventricular wall thickness is consistent with moderate concentric hypertrophy. Left ventricular diastolic dysfunction is noted (grade I a w/high LAP) consistent with impaired relaxation.  · Left atrial cavity size is mildly dilated.  · Mild mitral valve regurgitation is present.  · Trace tricuspid valve regurgitation is present. Estimated right ventricular systolic pressure from tricuspid regurgitation is normal (<35 mmHg). Calculated right ventricular systolic pressure from tricuspid regurgitation is 21 mmHg.  · There is a small (<1cm) pericardial effusion. There is no evidence of cardiac tamponade.      Electronically signed by Keara Dudley MD on 1/22/20 at 0900 EST    MRI OF THE THORACIC SPINE WITH AND WITHOUT CONTRAST 03/05/2020  IMPRESSION:  1. There is a left posterior lateral disc bulge or tiny disc protrusion  that results in minimal if any narrowing of the left side of the canal  at C5-6 and there is a right paracentral disc herniation at T6-7 that  abuts and mildly flattens the right ventral surface of the cord mildly  narrowing the  right side of the canal at T6-7. There is a small right  paracentral disc bulge or protrusion only minimally narrowing the canal  at T7-8. At T11-12 there is a left posterior lateral disc osteophyte  complex minimally narrowing the left side of the canal. No additional  canal or foraminal narrowing is seen in the thoracic spine.  2. There is a right paracentral posterior lateral disc herniation at  T12-L1 with an inferiorly migrated extruded disc fragment that maximally  measures up to 13 x 6 x 10 mm in medial lateral, anterior posterior and  craniocaudal dimension and mild to moderately narrows the right side of  the canal at the level of the superior body and endplate of L1, it abuts  the ventral aspect of the right L1 nerve root in the superior portion of  the right lateral recess of L1.  3. There are some right anterior lateral marginal bridging osteophytes  from T8 to T11 with some focal bone marrow edema and enhancement in the  right anterior aspect of the T9 vertebra that is likely degenerative  marrow edema. The remainder of the thoracic spine MRI is normal with no  evidence of metastatic disease of the thoracic spine.     Interpretation Summary 5/4/2020    · Left ventricular systolic function is normal. Calculated EF = 58%. Estimated EF = 58%. Global Longitudinal LV strain = -19%. Strain data was reviewed and speckle tracking was considered accurate. The global longitudinal LV strain is normal.  · All left ventricular wall segments contract normally. The left ventricular cavity is borderline dilated. Left ventricular wall thickness is consistent with mild concentric hypertrophy. Left ventricular diastolic dysfunction is noted (grade I a w/high LAP) consistent with impaired  · Mild mitral valve regurgitation is present.  · Mild tricuspid valve regurgitation is present. Estimated right ventricular systolic pressure from tricuspid regurgitation is normal (<35 mmHg). Calculated right ventricular systolic  pressure from tricuspid regurgitation is 33 mmHg.       Assessment/Plan   1.  T2N0 grade 3 infiltrating ductal carcinoma left breast ER GA negative HER-2 positive post mastectomy and axillary node dissection with clear margins.  · Initiated Taxol/Herceptin/Perjeta 8/29/2019.  · 12 weeks of Taxol Herceptin perjeta without Adriamycin because of her borderline cardiac function followed by a year of Perjeta Herceptin. Therapy completed 8/6/2020  · Mediport removed 9/22/2020 per Dr. Evans  · Patient was due for right screening mammogram in September 2020.  It is now a year and a half past this time and she still has not been able to complete this imaging because she has high office bills she still owes, close to $800.  She states that she did get her tax refund and will be able to complete this soon.  She assures me that she will get this imaging done before she sees us back in follow-up. LIEN on exam today.    2.  Tobacco abuse and COPD    3.  Abnormal echo with stress test normal but borderline cardiac function at 52%  We will avoid Adriamycin Cytoxan  and treat with Taxol Herceptin perjeta and a year of Herceptin perjeta. Dr. Dudley, cardiology, has given clearance for patient to begin.  Planning repeat echocardiogram 6 weeks from previous.    · Improved EF to 55%  · EF 57% in 1/21/2020  · EF 58% in 5/4/2020    4.  Venous access.  Status post new Mediport placement 8/27/2019.  Working well today.      · No more refills of Percocet planned    5.  Chemotherapy-induced neuropathy, grade 1-2.  She has numbness and coldness of the toes, worse at night.  She was intolerant to gabapentin previously.  She is not having any pain.  We discussed utilizing topical creams such as Theraworx relief and continuing to sleeping in socks which she is already doing.  We did discuss at this point symptoms are probably permanent.  · Stable      Plan:  1.  Patient states she will pay her overdue bill now that her tax return has come in. She  will then be able to proceed with much-overdue annual right screening mammogram.  2.  She will otherwise return in 6 months for follow-up with Dr. Jhaveri with CBC and CMP for review.   review.

## 2022-03-28 RX ORDER — METOPROLOL SUCCINATE 25 MG/1
TABLET, EXTENDED RELEASE ORAL
Qty: 90 TABLET | Refills: 0 | Status: SHIPPED | OUTPATIENT
Start: 2022-03-28 | End: 2022-04-15 | Stop reason: SDUPTHER

## 2022-04-15 ENCOUNTER — OFFICE VISIT (OUTPATIENT)
Dept: FAMILY MEDICINE CLINIC | Facility: CLINIC | Age: 67
End: 2022-04-15

## 2022-04-15 VITALS
TEMPERATURE: 97.1 F | HEART RATE: 68 BPM | SYSTOLIC BLOOD PRESSURE: 168 MMHG | OXYGEN SATURATION: 98 % | BODY MASS INDEX: 33.77 KG/M2 | WEIGHT: 228 LBS | HEIGHT: 69 IN | DIASTOLIC BLOOD PRESSURE: 88 MMHG

## 2022-04-15 DIAGNOSIS — E78.5 DYSLIPIDEMIA: ICD-10-CM

## 2022-04-15 DIAGNOSIS — J30.2 SEASONAL ALLERGIC RHINITIS, UNSPECIFIED TRIGGER: ICD-10-CM

## 2022-04-15 DIAGNOSIS — M54.6 CHRONIC BILATERAL THORACIC BACK PAIN: ICD-10-CM

## 2022-04-15 DIAGNOSIS — Z59.9 HOUSING, HOUSEHOLD, AND ECONOMIC CIRCUMSTANCES: ICD-10-CM

## 2022-04-15 DIAGNOSIS — F32.9 REACTIVE DEPRESSION: ICD-10-CM

## 2022-04-15 DIAGNOSIS — G89.29 CHRONIC BILATERAL THORACIC BACK PAIN: ICD-10-CM

## 2022-04-15 DIAGNOSIS — I10 BENIGN ESSENTIAL HYPERTENSION: Primary | ICD-10-CM

## 2022-04-15 PROBLEM — C50.919 MALIGNANT NEOPLASM OF BREAST: Status: ACTIVE | Noted: 2022-04-15

## 2022-04-15 PROCEDURE — 99214 OFFICE O/P EST MOD 30 MIN: CPT | Performed by: NURSE PRACTITIONER

## 2022-04-15 RX ORDER — METOPROLOL SUCCINATE 50 MG/1
50 TABLET, EXTENDED RELEASE ORAL DAILY
Qty: 90 TABLET | Refills: 0 | Status: SHIPPED | OUTPATIENT
Start: 2022-04-15 | End: 2022-10-07 | Stop reason: SINTOL

## 2022-04-15 RX ORDER — TIZANIDINE HYDROCHLORIDE 4 MG/1
4 CAPSULE, GELATIN COATED ORAL 3 TIMES DAILY
Qty: 270 CAPSULE | Refills: 1 | Status: SHIPPED | OUTPATIENT
Start: 2022-04-15 | End: 2022-04-20

## 2022-04-15 RX ORDER — MOMETASONE FUROATE 50 UG/1
2 SPRAY, METERED NASAL DAILY
Qty: 17 G | Refills: 1 | Status: SHIPPED | OUTPATIENT
Start: 2022-04-15

## 2022-04-15 RX ORDER — HYDROCODONE BITARTRATE AND ACETAMINOPHEN 5; 325 MG/1; MG/1
TABLET ORAL
COMMUNITY
Start: 2022-03-22 | End: 2022-04-15

## 2022-04-15 SDOH — ECONOMIC STABILITY - INCOME SECURITY: PROBLEM RELATED TO HOUSING AND ECONOMIC CIRCUMSTANCES, UNSPECIFIED: Z59.9

## 2022-04-20 DIAGNOSIS — M51.36 DEGENERATION OF INTERVERTEBRAL DISC OF LUMBAR REGION: Primary | ICD-10-CM

## 2022-04-20 DIAGNOSIS — M51.34 DDD (DEGENERATIVE DISC DISEASE), THORACIC: ICD-10-CM

## 2022-04-20 DIAGNOSIS — M51.36 LUMBAR DEGENERATIVE DISC DISEASE: ICD-10-CM

## 2022-04-20 RX ORDER — TIZANIDINE 4 MG/1
4 TABLET ORAL 3 TIMES DAILY
Qty: 270 TABLET | Refills: 0 | Status: SHIPPED | OUTPATIENT
Start: 2022-04-20 | End: 2022-09-07

## 2022-04-20 RX ORDER — TIZANIDINE 4 MG/1
4 TABLET ORAL 3 TIMES DAILY
COMMUNITY
End: 2022-04-20 | Stop reason: SDUPTHER

## 2022-09-06 ENCOUNTER — TELEPHONE (OUTPATIENT)
Dept: ONCOLOGY | Facility: CLINIC | Age: 67
End: 2022-09-06

## 2022-09-06 NOTE — TELEPHONE ENCOUNTER
Caller: MELODIE    Relationship to patient: SELF    Best call back number: [phone]    Patient is needing: TO R/S 9-8-2022 LAB AND F/U APPT.

## 2022-09-07 ENCOUNTER — OFFICE VISIT (OUTPATIENT)
Dept: FAMILY MEDICINE CLINIC | Facility: CLINIC | Age: 67
End: 2022-09-07

## 2022-09-07 VITALS
HEART RATE: 80 BPM | DIASTOLIC BLOOD PRESSURE: 96 MMHG | SYSTOLIC BLOOD PRESSURE: 144 MMHG | HEIGHT: 69 IN | OXYGEN SATURATION: 98 % | BODY MASS INDEX: 34.07 KG/M2 | WEIGHT: 230 LBS

## 2022-09-07 DIAGNOSIS — R25.2 MUSCLE CRAMPS: ICD-10-CM

## 2022-09-07 DIAGNOSIS — M51.36 DEGENERATION OF INTERVERTEBRAL DISC OF LUMBAR REGION: Primary | ICD-10-CM

## 2022-09-07 DIAGNOSIS — F32.A ANXIETY AND DEPRESSION: ICD-10-CM

## 2022-09-07 DIAGNOSIS — E78.5 DYSLIPIDEMIA: ICD-10-CM

## 2022-09-07 DIAGNOSIS — I10 BENIGN ESSENTIAL HYPERTENSION: ICD-10-CM

## 2022-09-07 DIAGNOSIS — F41.9 ANXIETY AND DEPRESSION: ICD-10-CM

## 2022-09-07 PROCEDURE — 99214 OFFICE O/P EST MOD 30 MIN: CPT

## 2022-09-07 RX ORDER — MAGNESIUM GLUCONATE 27 MG(500)
27 TABLET ORAL DAILY
COMMUNITY

## 2022-09-07 RX ORDER — DULOXETIN HYDROCHLORIDE 30 MG/1
30 CAPSULE, DELAYED RELEASE ORAL DAILY
Qty: 60 CAPSULE | Refills: 0 | Status: SHIPPED | OUTPATIENT
Start: 2022-09-07 | End: 2022-12-08 | Stop reason: SDDI

## 2022-09-07 RX ORDER — BACLOFEN 10 MG/1
10 TABLET ORAL 3 TIMES DAILY PRN
Qty: 30 TABLET | Refills: 0 | Status: SHIPPED | OUTPATIENT
Start: 2022-09-07 | End: 2022-10-07

## 2022-09-07 NOTE — PROGRESS NOTES
"Anam Temple is a 67 y.o. female. Who presents with complaints of low back pain and follow up on chronic conditions- she is a new pt to me      History of Present Illness     Low back pain- chronic.   Tells me Pain in lower back but seems more thoracic in past per notes. muscles get cramping, sciatica down legs. No loss of bowel/bladder control. No leg weakness  Imaging with multiple bone spurs, disc bulging at multiple levels and DISH in past  Did not tolerate gabapentin. NSAIDs allergy since had chemo. PT did not help much  Injections did not help much.   Has TENS unit, heat and ice, topical, patches not helping.   Lortab helped- was weaned off as had been on it for 14 yrs. Used to see pain management- cannot afford payments.     Hx anxiety/depression - zoloft helps.   Has tried buspar with no relief. Denies concerns currently.       The following portions of the patient's history were reviewed and updated as appropriate: allergies, current medications, past family history, past medical history, past social history and past surgical history.    Review of Systems   Constitutional: Negative for fatigue and unexpected weight loss.   Respiratory: Negative.    Cardiovascular: Negative.    Musculoskeletal: Positive for back pain and myalgias.   Neurological: Positive for numbness. Negative for weakness.   Psychiatric/Behavioral: Positive for sleep disturbance.       Objective    /96   Pulse 80   Ht 175.3 cm (69\")   Wt 104 kg (230 lb)   LMP  (LMP Unknown)   SpO2 98%   BMI 33.97 kg/m²     Physical Exam  Constitutional:       Appearance: Normal appearance. She is not ill-appearing.   Cardiovascular:      Rate and Rhythm: Normal rate and regular rhythm.      Pulses: Normal pulses.      Heart sounds: Normal heart sounds. No murmur heard.  Pulmonary:      Effort: Pulmonary effort is normal. No respiratory distress.      Breath sounds: Normal breath sounds.   Musculoskeletal:      Thoracic back: " Normal.      Lumbar back: Tenderness and bony tenderness present. No swelling, edema or spasms. Decreased range of motion. Positive right straight leg raise test and positive left straight leg raise test.   Neurological:      General: No focal deficit present.      Mental Status: She is alert and oriented to person, place, and time.      Comments: Antalgic gait   Psychiatric:         Attention and Perception: Attention normal.         Mood and Affect: Mood normal.         Speech: Speech normal.         Behavior: Behavior normal.         Thought Content: Thought content normal.         Cognition and Memory: Cognition normal.         Judgment: Judgment normal.       Assessment & Plan   Diagnoses and all orders for this visit:    1. Degeneration of intervertebral disc of lumbar region (Primary)  -     DULoxetine (CYMBALTA) 30 MG capsule; Take 1 capsule by mouth Daily.  Dispense: 60 capsule; Refill: 0  -     baclofen (LIORESAL) 10 MG tablet; Take 1 tablet by mouth 3 (Three) Times a Day As Needed for Muscle Spasms.  Dispense: 30 tablet; Refill: 0  -     Magnesium    2. Muscle cramps  -     Comprehensive Metabolic Panel  -     TSH  -     Magnesium    3. Anxiety and depression  -     sertraline (ZOLOFT) 50 MG tablet; Take 2 tablets by mouth Daily.  Dispense: 60 tablet; Refill: 0    4. Dyslipidemia  -     Lipid Panel    5. Benign essential hypertension  -     CBC & Differential  -     Comprehensive Metabolic Panel  -     TSH  -     Magnesium      Back pain- chronic issue with no new symptoms per pt. Will add different MR and swicth zoloft to duloxetine and see if better pain relief. Cross taper discussed.     BP mildly elevated today but pt also in a lot of discomfort. Seems higher in past. We did not address chronic conditions and annual at this visit as main concern was to discuss back pain and we spent lengthy amount of time going through chart and discussing prior hx.     Pt will return in about 1 month to f.u on back  pain and address other chronic conditions.   Will go ahead and get labs.    - Pt agrees with plan of care and states no further concerns or questions today    This document is intended for medical expert use only. Reading of this document by patients and/or patient's family without participating medical staff guidance may result in misinterpretation and unintended morbidity.  Any interpretation of such data is the responsibility of the patient and/or family member responsible for the patient in concert with their primary or specialist providers, not to be left for sources of online searches such as [employer], [employer] or similar queries. Relying on these approaches to knowledge may result in misinterpretation, misguided goals of care and even death should patients or family members try recommendations outside of the realm of professional medical care in a supervised way.     Please allow 3-5 business days for recommendations based on new results     Go to the ER for any possible lifethreatening symptoms such as chest pain or shortness of air.      I personally spent 30  minutes with this patient, preparing for the visit, reviewing tests, obtaining and/or reviewing a separately obtained history, performing a medically appropriate examination and/or evaluation , counseling and educating the patient/family/caregiver, ordering medications, tests, or procedures, documenting information in the medical record and independently interpreting results.

## 2022-09-08 ENCOUNTER — APPOINTMENT (OUTPATIENT)
Dept: LAB | Facility: HOSPITAL | Age: 67
End: 2022-09-08

## 2022-09-08 LAB
ALBUMIN SERPL-MCNC: 4.7 G/DL (ref 3.5–5.2)
ALBUMIN/GLOB SERPL: 2.4 G/DL
ALP SERPL-CCNC: 129 U/L (ref 39–117)
ALT SERPL-CCNC: 26 U/L (ref 1–33)
AST SERPL-CCNC: 30 U/L (ref 1–32)
BASOPHILS # BLD AUTO: 0.06 10*3/MM3 (ref 0–0.2)
BASOPHILS NFR BLD AUTO: 0.9 % (ref 0–1.5)
BILIRUB SERPL-MCNC: 0.4 MG/DL (ref 0–1.2)
BUN SERPL-MCNC: 8 MG/DL (ref 8–23)
BUN/CREAT SERPL: 10.3 (ref 7–25)
CALCIUM SERPL-MCNC: 10.1 MG/DL (ref 8.6–10.5)
CHLORIDE SERPL-SCNC: 103 MMOL/L (ref 98–107)
CHOLEST SERPL-MCNC: 263 MG/DL (ref 0–200)
CO2 SERPL-SCNC: 29 MMOL/L (ref 22–29)
CREAT SERPL-MCNC: 0.78 MG/DL (ref 0.57–1)
EGFRCR-CYS SERPLBLD CKD-EPI 2021: 83.4 ML/MIN/1.73
EOSINOPHIL # BLD AUTO: 0.25 10*3/MM3 (ref 0–0.4)
EOSINOPHIL NFR BLD AUTO: 3.9 % (ref 0.3–6.2)
ERYTHROCYTE [DISTWIDTH] IN BLOOD BY AUTOMATED COUNT: 11.8 % (ref 12.3–15.4)
GLOBULIN SER CALC-MCNC: 2 GM/DL
GLUCOSE SERPL-MCNC: 90 MG/DL (ref 65–99)
HCT VFR BLD AUTO: 43.2 % (ref 34–46.6)
HDLC SERPL-MCNC: 37 MG/DL (ref 40–60)
HGB BLD-MCNC: 14.4 G/DL (ref 12–15.9)
IMM GRANULOCYTES # BLD AUTO: 0.01 10*3/MM3 (ref 0–0.05)
IMM GRANULOCYTES NFR BLD AUTO: 0.2 % (ref 0–0.5)
LDLC SERPL CALC-MCNC: 175 MG/DL (ref 0–100)
LYMPHOCYTES # BLD AUTO: 2.59 10*3/MM3 (ref 0.7–3.1)
LYMPHOCYTES NFR BLD AUTO: 40.7 % (ref 19.6–45.3)
MAGNESIUM SERPL-MCNC: 2.2 MG/DL (ref 1.6–2.4)
MCH RBC QN AUTO: 30.6 PG (ref 26.6–33)
MCHC RBC AUTO-ENTMCNC: 33.3 G/DL (ref 31.5–35.7)
MCV RBC AUTO: 91.9 FL (ref 79–97)
MONOCYTES # BLD AUTO: 0.49 10*3/MM3 (ref 0.1–0.9)
MONOCYTES NFR BLD AUTO: 7.7 % (ref 5–12)
NEUTROPHILS # BLD AUTO: 2.96 10*3/MM3 (ref 1.7–7)
NEUTROPHILS NFR BLD AUTO: 46.6 % (ref 42.7–76)
NRBC BLD AUTO-RTO: 0 /100 WBC (ref 0–0.2)
PLATELET # BLD AUTO: 231 10*3/MM3 (ref 140–450)
POTASSIUM SERPL-SCNC: 4.9 MMOL/L (ref 3.5–5.2)
PROT SERPL-MCNC: 6.7 G/DL (ref 6–8.5)
RBC # BLD AUTO: 4.7 10*6/MM3 (ref 3.77–5.28)
SODIUM SERPL-SCNC: 141 MMOL/L (ref 136–145)
TRIGL SERPL-MCNC: 264 MG/DL (ref 0–150)
TSH SERPL DL<=0.005 MIU/L-ACNC: 1.2 UIU/ML (ref 0.27–4.2)
VLDLC SERPL CALC-MCNC: 51 MG/DL (ref 5–40)
WBC # BLD AUTO: 6.36 10*3/MM3 (ref 3.4–10.8)

## 2022-09-09 DIAGNOSIS — E78.5 DYSLIPIDEMIA: Primary | ICD-10-CM

## 2022-09-09 RX ORDER — ATORVASTATIN CALCIUM 20 MG/1
20 TABLET, FILM COATED ORAL NIGHTLY
Qty: 90 TABLET | Refills: 0 | Status: SHIPPED | OUTPATIENT
Start: 2022-09-09

## 2022-09-09 NOTE — PROGRESS NOTES
Please call pt with abnormal results    Cholesterol and triglycerides elevated. Worsened from last time. At this point think important to start treatment as increased risk of plaque formation which leads to stroke, heart attack, etc if untreated. I have sent in atorvastatin to start once every evening. Also work on healthy diet and exercise. Drink plenty of water- at least 64 oz daily.     Blood levels within normal limits  Kidney, liver and thyroid function stable  Magnesium and potassium stable    Repeat lipids in 3 months- make sure fasting. Will place order. Call if questions.

## 2022-10-05 DIAGNOSIS — J30.2 SEASONAL ALLERGIC RHINITIS, UNSPECIFIED TRIGGER: Primary | ICD-10-CM

## 2022-10-05 RX ORDER — MONTELUKAST SODIUM 10 MG/1
10 TABLET ORAL EVERY EVENING
Qty: 90 TABLET | Refills: 3 | Status: SHIPPED | OUTPATIENT
Start: 2022-10-05

## 2022-10-06 ENCOUNTER — OFFICE VISIT (OUTPATIENT)
Dept: ONCOLOGY | Facility: CLINIC | Age: 67
End: 2022-10-06

## 2022-10-06 ENCOUNTER — LAB (OUTPATIENT)
Dept: LAB | Facility: HOSPITAL | Age: 67
End: 2022-10-06

## 2022-10-06 VITALS
DIASTOLIC BLOOD PRESSURE: 62 MMHG | WEIGHT: 230.2 LBS | HEIGHT: 69 IN | RESPIRATION RATE: 16 BRPM | OXYGEN SATURATION: 94 % | SYSTOLIC BLOOD PRESSURE: 185 MMHG | BODY MASS INDEX: 34.1 KG/M2 | HEART RATE: 60 BPM | TEMPERATURE: 97.5 F

## 2022-10-06 DIAGNOSIS — Z17.1 MALIGNANT NEOPLASM OF UPPER-INNER QUADRANT OF LEFT BREAST IN FEMALE, ESTROGEN RECEPTOR NEGATIVE: Primary | ICD-10-CM

## 2022-10-06 DIAGNOSIS — C50.212 MALIGNANT NEOPLASM OF UPPER-INNER QUADRANT OF LEFT BREAST IN FEMALE, ESTROGEN RECEPTOR NEGATIVE: ICD-10-CM

## 2022-10-06 DIAGNOSIS — Z17.1 MALIGNANT NEOPLASM OF UPPER-INNER QUADRANT OF LEFT BREAST IN FEMALE, ESTROGEN RECEPTOR NEGATIVE: ICD-10-CM

## 2022-10-06 DIAGNOSIS — C50.212 MALIGNANT NEOPLASM OF UPPER-INNER QUADRANT OF LEFT BREAST IN FEMALE, ESTROGEN RECEPTOR NEGATIVE: Primary | ICD-10-CM

## 2022-10-06 LAB
ALBUMIN SERPL-MCNC: 4.5 G/DL (ref 3.5–5.2)
ALBUMIN/GLOB SERPL: 1.6 G/DL (ref 1.1–2.4)
ALP SERPL-CCNC: 126 U/L (ref 38–116)
ALT SERPL W P-5'-P-CCNC: 20 U/L (ref 0–33)
ANION GAP SERPL CALCULATED.3IONS-SCNC: 11.1 MMOL/L (ref 5–15)
AST SERPL-CCNC: 23 U/L (ref 0–32)
BASOPHILS # BLD AUTO: 0.08 10*3/MM3 (ref 0–0.2)
BASOPHILS NFR BLD AUTO: 1.1 % (ref 0–1.5)
BILIRUB SERPL-MCNC: 0.4 MG/DL (ref 0.2–1.2)
BUN SERPL-MCNC: 11 MG/DL (ref 6–20)
BUN/CREAT SERPL: 13.3 (ref 7.3–30)
CALCIUM SPEC-SCNC: 10.1 MG/DL (ref 8.5–10.2)
CHLORIDE SERPL-SCNC: 105 MMOL/L (ref 98–107)
CO2 SERPL-SCNC: 24.9 MMOL/L (ref 22–29)
CREAT SERPL-MCNC: 0.83 MG/DL (ref 0.6–1.1)
DEPRECATED RDW RBC AUTO: 41.1 FL (ref 37–54)
EGFRCR SERPLBLD CKD-EPI 2021: 77.4 ML/MIN/1.73
EOSINOPHIL # BLD AUTO: 0.24 10*3/MM3 (ref 0–0.4)
EOSINOPHIL NFR BLD AUTO: 3.3 % (ref 0.3–6.2)
ERYTHROCYTE [DISTWIDTH] IN BLOOD BY AUTOMATED COUNT: 12.1 % (ref 12.3–15.4)
GLOBULIN UR ELPH-MCNC: 2.9 GM/DL (ref 1.8–3.5)
GLUCOSE SERPL-MCNC: 83 MG/DL (ref 74–124)
HCT VFR BLD AUTO: 43.3 % (ref 34–46.6)
HGB BLD-MCNC: 14.4 G/DL (ref 12–15.9)
IMM GRANULOCYTES # BLD AUTO: 0.01 10*3/MM3 (ref 0–0.05)
IMM GRANULOCYTES NFR BLD AUTO: 0.1 % (ref 0–0.5)
LYMPHOCYTES # BLD AUTO: 2.96 10*3/MM3 (ref 0.7–3.1)
LYMPHOCYTES NFR BLD AUTO: 40.3 % (ref 19.6–45.3)
MCH RBC QN AUTO: 30.7 PG (ref 26.6–33)
MCHC RBC AUTO-ENTMCNC: 33.3 G/DL (ref 31.5–35.7)
MCV RBC AUTO: 92.3 FL (ref 79–97)
MONOCYTES # BLD AUTO: 0.58 10*3/MM3 (ref 0.1–0.9)
MONOCYTES NFR BLD AUTO: 7.9 % (ref 5–12)
NEUTROPHILS NFR BLD AUTO: 3.48 10*3/MM3 (ref 1.7–7)
NEUTROPHILS NFR BLD AUTO: 47.3 % (ref 42.7–76)
NRBC BLD AUTO-RTO: 0 /100 WBC (ref 0–0.2)
PLATELET # BLD AUTO: 240 10*3/MM3 (ref 140–450)
PMV BLD AUTO: 9.8 FL (ref 6–12)
POTASSIUM SERPL-SCNC: 4.7 MMOL/L (ref 3.5–4.7)
PROT SERPL-MCNC: 7.4 G/DL (ref 6.3–8)
RBC # BLD AUTO: 4.69 10*6/MM3 (ref 3.77–5.28)
SODIUM SERPL-SCNC: 141 MMOL/L (ref 134–145)
WBC NRBC COR # BLD: 7.35 10*3/MM3 (ref 3.4–10.8)

## 2022-10-06 PROCEDURE — 85025 COMPLETE CBC W/AUTO DIFF WBC: CPT

## 2022-10-06 PROCEDURE — 99214 OFFICE O/P EST MOD 30 MIN: CPT | Performed by: INTERNAL MEDICINE

## 2022-10-06 PROCEDURE — 36415 COLL VENOUS BLD VENIPUNCTURE: CPT

## 2022-10-06 PROCEDURE — 80053 COMPREHEN METABOLIC PANEL: CPT

## 2022-10-06 RX ORDER — TIZANIDINE 4 MG/1
4 TABLET ORAL NIGHTLY PRN
COMMUNITY

## 2022-10-06 NOTE — PROGRESS NOTES
Subjective     REASON FOR CONSULTATION:   1. Left breast cancer T2N0 3.6 cm grade 3 ER [place] negative HER-2 positive infiltrating ductal carcinoma post excisional biopsy, followed by mastectomy and axillary dissection- borderline -EF.  · Taxol Herceptin Perjeta for 12 weeks followed by Perjeta Herceptin for 1 year planned.  · Therapy completed 8/6/2020                               REQUESTING PHYSICIAN: Jase Khan DO    History of Present Illness patient is a 67 y.o. female with the above medical history who returns today for 6 month follow-up.  Unfortunately the patient is very much overdue for right annual mammogram, due in September 2020.  She finally had a mammogram yesterday and thankfully it is benign   She has not had a colonoscopy and we encourage her to do so.    She denies other concerns today.    Past Medical History:   Diagnosis Date   • Adjustment disorder    • Allergic rhinitis    • Amenorrhea    • Anxiety    • Breast cancer (HCC) 04/18/2019    Left breast mammary carcinoma   • Bruit    • Carpal tunnel syndrome    • Carrier of tuberculosis    • Chronic pain    • Daytime somnolence    • De Quervain's tenosynovitis    • Degenerative cervical disc    • Depression    • Dyslipidemia    • Eustachian tube dysfunction    • History of UTI    • Hyperlipidemia    • Hypertension    • Impaired fasting glucose    • Lumbar degenerative disc disease    • Numbness and tingling     LEFT LEG   • OAB (overactive bladder)    • Precordial pain    • Scapulothoracic syndrome    • Sciatica         Past Surgical History:   Procedure Laterality Date   • APPENDECTOMY N/A    • BREAST BIOPSY Left 2019    Left breast ultrasound guided cyst aspiration, 9:00 position-Dr. Gerry Taylor, [facility]   • BREAST LUMPECTOMY Left 5/2/2019    Procedure: Left BREAST LUMPECTOMY;  Surgeon: Jase Evans MD;  Location: [facility];  Service: General   • LUMBAR  EPIDURAL INJECTION N/A    • MASTECTOMY Left 2019    Procedure: Left BREAST MASTECTOMY;  Surgeon: Jase Evans MD;  Location: [facility];  Service: General   • TUBAL ABDOMINAL LIGATION Bilateral    • VAGINAL DELIVERY      x3   • VENOUS ACCESS DEVICE (PORT) INSERTION Right 2019    Procedure: INSERTION VENOUS ACCESS DEVICE;  Surgeon: Jase Evans MD;  Location: [facility] OR;  Service: General      ONC HISTORY;  patient is a 64-year-old retired  with hypertension hypercholesterolemia and degenerative arthritis who felt a mass in her left breast in March.  She showed it to her family doctor and underwent imaging at [facility] on 3/13/2019 which Showed a 3 x 3.2 cm mass at 9:00 which on ultrasound showed a thick-walled benign-appearing 2.8 x 2.5 cm cyst which was felt to not be suspicious .because of persistence of symptoms she was reevaluated on 2019 and underwent aspiration and core biopsy because there was a significant soft tissue component to the mass at that time this was aspirated and the final report  showed fine-needle aspiration suspicious for malignant cells favor mammary carcinoma  Patient was referred to Dr. Kee who took her for an excisional biopsy on 2019 with the final report showing a  mass grade 3 -4x3.6cm with tumor extending to one margin and DCIS also 0.2 mm from one margin.  The tumor was ER [place] negative HER-2 3+.  The patient was then taken for surgery on 2019 at which time she had a completion mastectomy and axillary node biopsy with the findings of residual high-grade DCIS with clear margins and an incidental intraductal papilloma and 17-neg lymph nodes making this a T2N0 tumor    She has done well postoperatively and is here to discuss adjuvant treatment    She is  3 para 3 menarche  at age 15 and menopause in her early 40s when she had a hysterectomy for heavy menstrual bleeding.  She took hormone replacement for 10 years and stopped 10 years  ago first childbirth was at age 25 she did not breast-feed  Family history is positive for mother who had uterine cancer at age 60 and  of it at age 65 she has a brother who  of liver cancer related to drinking there is no other malignancy in the family that she is aware of    She is a significant smoker for the last 48 years but does not drink     Patient and her  were very taken to back by the suggestion about chemotherapy and apparently had thought that there was no further treatment needed and I spent almost an hour presenting the data concerning the extreme effectiveness of HER-2 directed therapy and this situation with a high risk of recurrence without adjuvant treatment and she finally was convinced to do the treatment    We discussed smoking cessation but at this point she is so nervous and agitated with the prospect of chemotherapy that we will hold off on this until she is FPC through the chemotherapy and rediscuss it    I discussed the case also with Dr. Kee will place a port and we will plan to start chemotherapy in 2 weeks.    Patient initiating therapy with Taxol/Herceptin/Perjeta 19.      She is followed by Dr. Dudley, cardiology, who gave clearance for the patient to proceed with chemotherapy.  repeat echo 6 weeks from last actually showed improvement in the cardiologist thought there may have been some technical issues with her first echocardiogram.    She returns back today, due for cycle3 day 1 of Taxol/Herceptin perjeta her diarrhea is-clearly controlled with the Imodium and in fact she became constipated because she took too much of this but she is got this under control and feels good  She has some reflux symptoms in the morning which makes her nauseous and I suggested she take 1 Prilosec every day till the chemo was over and this is improved    She has no neuropathy but is not brought the cooling gloves and I recommended this to her and gave her the literature  supporting this in the website  She has had a itchy rash on her arms and legs which is not too bothersome and I have to suggested a steroid cream and some Benadryl and we will keep an eye on the rash- she has no shortness of breath    Perjeta/Herceptin completed 8/6/2020.      Current Outpatient Medications on File Prior to Visit   Medication Sig Dispense Refill   • acetaminophen (TYLENOL) 500 MG tablet Take 500 mg by mouth Every 6 (Six) Hours As Needed for Mild Pain .     • atorvastatin (LIPITOR) 20 MG tablet Take 1 tablet by mouth Every Night. 90 tablet 0   • magnesium gluconate (MAGONATE) 500 MG tablet Take 27 mg by mouth Daily.     • mometasone (NASONEX) 50 MCG/ACT nasal spray 2 sprays into the nostril(s) as directed by provider Daily. 17 g 1   • montelukast (SINGULAIR) 10 MG tablet Take 1 tablet by mouth Every Evening. 90 tablet 3   • Multiple Vitamins-Minerals (CENTRUM SILVER PO) Take 1 tablet by mouth Daily. womens vitamin     • tiZANidine (ZANAFLEX) 4 MG tablet Take 4 mg by mouth At Night As Needed for Muscle Spasms.     • DULoxetine (CYMBALTA) 30 MG capsule Take 1 capsule by mouth Daily. 60 capsule 0   • sertraline (ZOLOFT) 50 MG tablet Take 2 tablets by mouth Daily. 60 tablet 0     No current facility-administered medications on file prior to visit.        ALLERGIES:    Allergies   Allergen Reactions   • Gabapentin Hallucinations   • Effexor [Venlafaxine] Itching   • Naproxen Itching     Pt reports severe vaginal itching    • Zyrtec [Cetirizine] Itching     Non-effecious   • Aspirin Unknown - Low Severity   • Ibuprofen Unknown - Low Severity     Burns while urinating  Can take low dose Ibuprofen   • Penicillins Rash   • Sulfa Antibiotics Rash        Social History     Socioeconomic History   • Marital status:      Spouse name: Pawan   • Number of children: 3   • Years of education: High school   Tobacco Use   • Smoking status: Every Day     Packs/day: 0.25     Years: 48.00     Pack years: 12.00      "Types: Cigarettes   • Smokeless tobacco: Never   • Tobacco comments:     3-4 cigarettes a day. Trying to quit.    Substance and Sexual Activity   • Alcohol use: No   • Drug use: No   • Sexual activity: Yes     Partners: Male     Birth control/protection: Post-menopausal        Family History   Problem Relation Age of Onset   • Ovarian cancer Mother    • Endometrial cancer Mother    • COPD Father    • Stroke Brother    • Malig Hyperthermia Neg Hx         Review of Systems   Constitutional: Negative for appetite change, chills, diaphoresis, fever and unexpected weight change.   HENT: Negative for hearing loss, sinus pain, sneezing, sore throat and trouble swallowing.    Respiratory: Negative for cough, chest tightness, shortness of breath and wheezing.    Cardiovascular: Negative for chest pain, palpitations and leg swelling.   Gastrointestinal: Negative for abdominal distention, abdominal pain, constipation, nausea and vomiting.   Endocrine: Negative.    Genitourinary: Negative for dysuria, frequency, hematuria and urgency.   Musculoskeletal: Negative.  Negative for back pain, joint swelling and neck pain.        No muscle weakness.   Skin: Negative for rash and wound.   Neurological: Positive for numbness (toes numb/cold, worse at night). Negative for seizures, syncope, speech difficulty, weakness and headaches.   Hematological: Negative.  Negative for adenopathy. Does not bruise/bleed easily.   Psychiatric/Behavioral: Negative.  Negative for behavioral problems, confusion and suicidal ideas.       Objective     Vitals:    10/06/22 1236   BP: (!) 185/62   Pulse: 60   Resp: 16   Temp: 97.5 °F (36.4 °C)   TempSrc: Temporal   SpO2: 94%   Weight: 104 kg (230 lb 3.2 oz)   Height: 175 cm (68.9\")   PainSc:   8   PainLoc: Back  Comment: lower     Current Status 10/6/2022   ECOG score 0       Physical Exam   Pulmonary/Chest:           GENERAL:  Well-developed, well-nourished in no acute distress.   SKIN:  Warm, dry fading " maculopapular rash on her arms and legs,no  purpura or petechiae.  EYES:  Pupils equal, round and reactive to light.  EOMs intact.  Conjunctivae normal.  EARS:  Hearing intact.  NOSE:  Septum midline.  No excoriations or nasal discharge.  MOUTH:  Tongue is well-papillated; no stomatitis or ulcers.  Lips normal.  THROAT:  Oropharynx without lesions or exudates.  NECK:  Supple with good range of motion; no thyromegaly or masses, no JVD.    LYMPHATICS:  No cervical, supraclavicular, axillary or inguinal adenopathy.  CHEST:  Lungs clear to auscultation. Good airflow.  Mediport has been removed, well healed right chest scargn.  BREASTS: Right breast is benign; left chest wall shows a well-healed mastectomy scar with excess fatty tissue but no palpable abnormalities.  CARDIAC:  Regular rate and rhythm without murmurs, rubs or gallops. Normal S1,S2.  ABDOMEN:  Soft, nontender with no hepatosplenomegaly or masses.  EXTREMITIES:  No clubbing, cyanosis or edema.  NEUROLOGICAL:  Cranial Nerves II-XII grossly intact.  No focal neurological deficits.  PSYCHIATRIC:  Normal affect and mood.      I have reexamined the patient and the results are consistent with the previously documented exam. Larry Jhaveri MD     RECENT LABS:  Results from last 7 days   Lab Units 10/06/22  1229   WBC 10*3/mm3 7.35   NEUTROS ABS 10*3/mm3 3.48   HEMOGLOBIN g/dL 14.4   HEMATOCRIT % 43.3   PLATELETS 10*3/mm3 240     Results from last 7 days   Lab Units 10/06/22  1229   SODIUM mmol/L 141   POTASSIUM mmol/L 4.7   CHLORIDE mmol/L 105   CO2 mmol/L 24.9   BUN mg/dL 11   CREATININE mg/dL 0.83   CALCIUM mg/dL 10.1   ALBUMIN g/dL 4.50   BILIRUBIN mg/dL 0.4   ALK PHOS U/L 126*   ALT (SGPT) U/L 20   AST (SGOT) U/L 23   GLUCOSE mg/dL 83            RADIOGRAPHIC DATA:    US Breat Left Limited    1. Left Breast Lumpectomy (47 Grams):  A. Invasive poorly differentiated mammary carcinoma of no special type (ductal carcinoma)  with apocrine features.  1.  Invasive carcinoma measures up to 40 x 36 x 27 mm (measured grossly).  2. Overall Syl Grade III (glandular score = 3, nuclear score = 3, mitotic score = 3).  3. No definitive lymphovascular space invasion identified.  B. Invasive carcinoma focally extends to one undesignated peripheral inked margin of excision.  C. Associated ductal carcinoma in situ (DCIS).  1. Ductal carcinoma in situ (DCIS) spans area measuring 40 mm in single greatest  aggregate dimension (estimated from gross and glass slides).  2. DCIS predominantly solid and comedo type with high grade nuclear features.  3. High grade DCIS approximates the closest peripheral inked margin to 0.2 mm.  Page: 1 of 5 Printed: 5/6/2019 1:10 PM  [phone]  Resulting  Tissue Pathology Exam: [id number]   Order: [id number]   Collected:  5/2/2019 12:26 Status:  Edited Result - FINAL   Visible to patient:  No (Not Released) Dx:  Malignant neoplasm of upper-inner hector...   Component    Addendum   HER2 by Immunohistochemistry   Positive (Score 3+)     HER2 by Immunohistochemistry  FDA approved (specify test/vendor): [employer]     Primary Antibody  [id number]     Cold Ischemia and Fixation Times   Meet requirements specified in latest version of the ASCO/CAP guidelines         Cold Ischemia Time: 18 minutes  Fixation Time: 8.25 hours  Testing Performed on Block Number(s): 1E  Fixative: Formalin   Addendum electronically signed by Harman Perry MD on 5/6/2019          Final Diagnosis  1. Left Breast, Modified Radical Mastectomy (935 grams): HIGH GRADE DUCTAL CARCINOMA IN-SITU  (DCIS) .  A. Nuclear Grade: High.  B. Architectural Type: Solid and comedo with lobular extension.  1. Size (extent) of DCIS: DCIS multifocal in the lower inner quadrant, measuring 0.9 cm in maximal  dimension (DCIS present in 3/18 tissue blocks).  C. No LCIS identified.  D. Skin and nipple uninvolved by DCIS.  E. Margins: Uninvolved by DCIS.  1. DCIS comes to within 1.3 cm of the anterior margin of  excision (closest margin).  F. Additional findings: Incidental intraductal papilloma, florid ductal hyperplasia and apocrine cysts.  G. Lymph nodes: Sixteen benign lymph nodes (0/16).  H. Hormone receptor status: ER and ND negative, Her2/kali positive by IHC (performed on prior resection  [id number]).  I. Pathologic stage: pT2, N0.  [facility]/kds      Regadenoson Stress Test With Myocardial Perfusion SPECT   Interpretation Summary     · Myocardial perfusion imaging indicates a normal myocardial perfusion study with no evidence of ischemia.  · Left ventricular ejection fraction is borderline normal (Calculated EF = 50%).  · Impressions are consistent with a low risk study.      Study Description         Nuclear Perfusion Images Overall image quality is excellent. There are no artifacts present. Raw images reviewed with no abnormalities noted.       Echo 9/16  Interpretation Summary     · Left ventricular systolic function is normal. Calculated EF = 55%. Estimated EF = 55%. Global Longitudinal LV strain = -21.6%. Strain data was reviewed and speckle tracking was considered accurate. The global longitudinal LV strain is -21.6 and normal. Normal left ventricular cavity size and wall thickness noted. All left ventricular wall segments contract normally. Left ventricular diastolic dysfunction is noted (grade I) consistent with impaired relaxation.  · Trace mitral valve regurgitation is present.  · Physiologic tricuspid valve regurgitation is present. Calculated right ventricular systolic pressure from tricuspid regurgitation is 16.0 mmHg. Insufficient TR velocity profile to estimate the right ventricular systolic pressure.     Interpretation Summary     · Left ventricular systolic function is normal. Calculated EF = 57%. Estimated EF was in agreement with the calculated EF. Estimated EF = 57%. Global Longitudinal LV strain = -21%. Strain data was reviewed and speckle tracking was considered accurate. The global longitudinal LV  strain is normal.  · Normal left ventricular cavity size noted. All left ventricular wall segments contract normally. Left ventricular wall thickness is consistent with mild concentric hypertrophy. Left ventricular diastolic dysfunction is noted (grade I) consistent with impaired relaxation.  · Mild tricuspid valve regurgitation is present. Estimated right ventricular systolic pressure from tricuspid regurgitation is normal (<35 mmHg). Calculated right ventricular systolic pressure from tricuspid regurgitation is 30 mmHg.        Interpretation Summary     · Left ventricular systolic function is normal. Calculated EF = 57%. Estimated EF was in agreement with the calculated EF. Estimated EF = 57%. Global Longitudinal LV strain = -20.1%. All left ventricular wall segments contract normally. The left ventricular cavity is mildly dilated. Left ventricular wall thickness is consistent with moderate concentric hypertrophy. Left ventricular diastolic dysfunction is noted (grade I a w/high LAP) consistent with impaired relaxation.  · Left atrial cavity size is mildly dilated.  · Mild mitral valve regurgitation is present.  · Trace tricuspid valve regurgitation is present. Estimated right ventricular systolic pressure from tricuspid regurgitation is normal (<35 mmHg). Calculated right ventricular systolic pressure from tricuspid regurgitation is 21 mmHg.  · There is a small (<1cm) pericardial effusion. There is no evidence of cardiac tamponade.      Electronically signed by Keara Dudley MD on 1/22/20 at 0900 EST    MRI OF THE THORACIC SPINE WITH AND WITHOUT CONTRAST 03/05/2020  IMPRESSION:  1. There is a left posterior lateral disc bulge or tiny disc protrusion  that results in minimal if any narrowing of the left side of the canal  at C5-6 and there is a right paracentral disc herniation at T6-7 that  abuts and mildly flattens the right ventral surface of the cord mildly  narrowing the right side of the canal at T6-7. There  is a small right  paracentral disc bulge or protrusion only minimally narrowing the canal  at T7-8. At T11-12 there is a left posterior lateral disc osteophyte  complex minimally narrowing the left side of the canal. No additional  canal or foraminal narrowing is seen in the thoracic spine.  2. There is a right paracentral posterior lateral disc herniation at  T12-L1 with an inferiorly migrated extruded disc fragment that maximally  measures up to 13 x 6 x 10 mm in medial lateral, anterior posterior and  craniocaudal dimension and mild to moderately narrows the right side of  the canal at the level of the superior body and endplate of L1, it abuts  the ventral aspect of the right L1 nerve root in the superior portion of  the right lateral recess of L1.  3. There are some right anterior lateral marginal bridging osteophytes  from T8 to T11 with some focal bone marrow edema and enhancement in the  right anterior aspect of the T9 vertebra that is likely degenerative  marrow edema. The remainder of the thoracic spine MRI is normal with no  evidence of metastatic disease of the thoracic spine.     Interpretation Summary 5/4/2020    · Left ventricular systolic function is normal. Calculated EF = 58%. Estimated EF = 58%. Global Longitudinal LV strain = -19%. Strain data was reviewed and speckle tracking was considered accurate. The global longitudinal LV strain is normal.  · All left ventricular wall segments contract normally. The left ventricular cavity is borderline dilated. Left ventricular wall thickness is consistent with mild concentric hypertrophy. Left ventricular diastolic dysfunction is noted (grade I a w/high LAP) consistent with impaired  · Mild mitral valve regurgitation is present.  · Mild tricuspid valve regurgitation is present. Estimated right ventricular systolic pressure from tricuspid regurgitation is normal (<35 mmHg). Calculated right ventricular systolic pressure from tricuspid regurgitation is 33  mmHg.       Assessment & Plan   1.  T2N0 grade 3 infiltrating ductal carcinoma left breast ER [place] negative HER-2 positive post mastectomy and axillary node dissection with clear margins.  · Initiated Taxol/Herceptin/Perjeta 8/29/2019.  · 12 weeks of Taxol Herceptin perjeta without Adriamycin because of her borderline cardiac function followed by a year of Perjeta Herceptin. Therapy completed 8/6/2020  · Mediport removed 9/22/2020 per Dr. Evans  · .  LIEN as of 10/22    2.  Tobacco abuse and COPD    3.  Abnormal echo with stress test normal but borderline cardiac function at 52%  We will avoid Adriamycin Cytoxan  and treat with Taxol Herceptin perjeta and a year of Herceptin perjeta. Dr. Dudley, cardiology, has given clearance for patient to begin.  Planning repeat echocardiogram 6 weeks from previous.    · Improved EF to 55%  · EF 57% in 1/21/2020  · EF 58% in 5/4/2020    4.  Venous access.  Status post new Mediport placement 8/27/2019.  Working well today.      · No more refills of Percocet planned    5.  Chemotherapy-induced neuropathy, grade 1-2.  She has numbness and coldness of the toes, worse at night.  She was intolerant to gabapentin previously.  She is not having any pain.  We discussed utilizing topical creams such as Theraworx relief and continuing to sleeping in socks which she is already doing.  We did discuss at this point symptoms are probably permanent.  · Stable      Plan:  1.  Return in 6 months for follow-up to see me

## 2022-10-07 ENCOUNTER — OFFICE VISIT (OUTPATIENT)
Dept: FAMILY MEDICINE CLINIC | Facility: CLINIC | Age: 67
End: 2022-10-07

## 2022-10-07 VITALS
HEART RATE: 83 BPM | SYSTOLIC BLOOD PRESSURE: 162 MMHG | DIASTOLIC BLOOD PRESSURE: 82 MMHG | BODY MASS INDEX: 34.66 KG/M2 | OXYGEN SATURATION: 98 % | HEIGHT: 69 IN | WEIGHT: 234 LBS

## 2022-10-07 DIAGNOSIS — Z13.820 SCREENING FOR OSTEOPOROSIS: ICD-10-CM

## 2022-10-07 DIAGNOSIS — Z78.0 POSTMENOPAUSAL: ICD-10-CM

## 2022-10-07 DIAGNOSIS — I10 BENIGN ESSENTIAL HYPERTENSION: ICD-10-CM

## 2022-10-07 DIAGNOSIS — J01.00 ACUTE NON-RECURRENT MAXILLARY SINUSITIS: ICD-10-CM

## 2022-10-07 DIAGNOSIS — M51.36 DEGENERATION OF INTERVERTEBRAL DISC OF LUMBAR REGION: Primary | ICD-10-CM

## 2022-10-07 PROCEDURE — 99214 OFFICE O/P EST MOD 30 MIN: CPT

## 2022-10-07 RX ORDER — LOSARTAN POTASSIUM 50 MG/1
50 TABLET ORAL DAILY
Qty: 90 TABLET | Refills: 0 | Status: SHIPPED | OUTPATIENT
Start: 2022-10-07 | End: 2022-12-08

## 2022-10-07 RX ORDER — MELOXICAM 15 MG/1
15 TABLET ORAL DAILY
Qty: 60 TABLET | Refills: 0 | Status: SHIPPED | OUTPATIENT
Start: 2022-10-07 | End: 2022-12-08

## 2022-10-07 RX ORDER — DOXYCYCLINE HYCLATE 100 MG/1
100 CAPSULE ORAL 2 TIMES DAILY
Qty: 14 CAPSULE | Refills: 0 | Status: SHIPPED | OUTPATIENT
Start: 2022-10-07 | End: 2022-10-14

## 2022-10-07 NOTE — PROGRESS NOTES
"Subjective   Allie Temple is a 67 y.o. female. Who presents to f/u on back pain and chronic conditions.      History of Present Illness     Back pain- unchanged. Did not start duloexetine as afraid of SE. Baclofen not helpful. Brought medication bottle full to get rid of it which was disposed by staff  Wants to try another medications- refuses to go to pain management.   Has tried multiple meds prior (see previous note)    HTN- metoprolol- picked up new prescription and had SE ( states shape and color of tablet different than before) - felt like SOA as soon as took it and resolved after few mins. No symptoms since off medication. States did well w previous well on this med but tablets looked different.   Has been taking for BP. No hx heart diease, palpitations, CAD    HLD- started Lipitor doing well, no SE.   Working on diet.     Rhinorrhea, congestion, pressure L side of face, pain in ear and headache. No fever, chills   Symptoms X1 week.. not improving with allergy medications (Singulair and benadryl)       The following portions of the patient's history were reviewed and updated as appropriate: allergies, current medications, past family history, past medical history, past social history and past surgical history.    Review of Systems   Constitutional: Negative for fatigue, fever and unexpected weight loss.   HENT: Positive for congestion, ear pain, rhinorrhea and sinus pressure. Negative for dental problem, sore throat and swollen glands.    Eyes: Negative for visual disturbance.   Respiratory: Negative.    Cardiovascular: Negative.    Neurological: Positive for headache. Negative for dizziness.       Objective    /82   Pulse 83   Ht 175.3 cm (69\")   Wt 106 kg (234 lb)   LMP  (LMP Unknown)   SpO2 98%   BMI 34.56 kg/m²     Physical Exam  Constitutional:       Appearance: Normal appearance. She is not ill-appearing.   HENT:      Right Ear: Ear canal and external ear normal. A middle ear effusion is " present. There is no impacted cerumen. Tympanic membrane is not injected, scarred, perforated, erythematous, retracted or bulging.      Left Ear: Ear canal and external ear normal. A middle ear effusion is present. There is no impacted cerumen. Tympanic membrane is not injected, scarred, perforated, erythematous, retracted or bulging.      Nose: Congestion and rhinorrhea present.      Right Sinus: No maxillary sinus tenderness or frontal sinus tenderness.      Left Sinus: Maxillary sinus tenderness present. No frontal sinus tenderness.      Mouth/Throat:      Pharynx: Oropharynx is clear. No oropharyngeal exudate or posterior oropharyngeal erythema.   Neck:      Vascular: No carotid bruit.   Cardiovascular:      Rate and Rhythm: Normal rate and regular rhythm.      Pulses: Normal pulses.           Carotid pulses are 2+ on the right side and 2+ on the left side.     Heart sounds: Normal heart sounds, S1 normal and S2 normal. No murmur heard.  Pulmonary:      Effort: Pulmonary effort is normal. No tachypnea, bradypnea or respiratory distress.      Breath sounds: Normal breath sounds.   Musculoskeletal:      Right lower leg: No edema.      Left lower leg: No edema.   Lymphadenopathy:      Cervical: No cervical adenopathy.   Neurological:      General: No focal deficit present.      Mental Status: She is alert and oriented to person, place, and time.      Cranial Nerves: No dysarthria.      Gait: Gait is intact.   Psychiatric:         Attention and Perception: Attention normal.         Mood and Affect: Mood normal.         Speech: Speech normal.         Behavior: Behavior normal.         Thought Content: Thought content normal.         Cognition and Memory: Cognition normal.         Judgment: Judgment normal.       Assessment & Plan   Diagnoses and all orders for this visit:    1. Degeneration of intervertebral disc of lumbar region (Primary)  -     meloxicam (Mobic) 15 MG tablet; Take 1 tablet by mouth Daily.   Dispense: 60 tablet; Refill: 0  -     Again discussed tx options. Limited as has tried multiple tx with no relief . Advised to see pain management and defers. Will trial on mobic for short time and see how she does. Do not recommend long term, use and SE discussed. Take with food and lots of water. Other conservative tx as before.     2. Benign essential hypertension  -     losartan (Cozaar) 50 MG tablet; Take 1 tablet by mouth Daily.  Dispense: 90 tablet; Refill: 0  -     BP elevated today, has been off metoprolol due to SE. Will switch to losartan. Use and common SE discussed. Monitor BP at home, goal < 130/80.     3. Postmenopausal  -     DEXA Bone Density Axial    4. Screening for osteoporosis  -     DEXA Bone Density Axial    5. Acute non-recurrent maxillary sinusitis  -     doxycycline (VIBRAMYCIN) 100 MG capsule; Take 1 capsule by mouth 2 (Two) Times a Day for 7 days.  Dispense: 14 capsule; Refill: 0  -     Symptoms and exam consistent with sinusitis. Will go ahead and tx as 7 days of symptoms. Use and SE of medication discussed. Cont allergy meds. Stay hydrated.     RTC if no improevement or worsening symptoms. Call if questions      Follow up in 2 months to check BP.       Inform pt that I will be out of office for 1 month starting October 18th. If needs follow up or other concerns schedule accordingly. Office will be open and Dr MURRAY may assist if needed however may take a few days to get messages.          - Pt agrees with plan of care and states no further concerns or questions today    This document is intended for medical expert use only. Reading of this document by patients and/or patient's family without participating medical staff guidance may result in misinterpretation and unintended morbidity.  Any interpretation of such data is the responsibility of the patient and/or family member responsible for the patient in concert with their primary or specialist providers, not to be left for sources of  online searches such as [employer], [employer] or similar queries. Relying on these approaches to knowledge may result in misinterpretation, misguided goals of care and even death should patients or family members try recommendations outside of the realm of professional medical care in a supervised way.     Please allow 3-5 business days for recommendations based on new results     Go to the ER for any possible lifethreatening symptoms such as chest pain or shortness of air.      I personally spent 35 minutes with this patient, preparing for the visit, reviewing tests, obtaining and/or reviewing a separately obtained history, performing a medically appropriate examination and/or evaluation , counseling and educating the patient/family/caregiver, ordering medications, tests, or procedures, documenting information in the medical record and independently interpreting results.

## 2022-10-08 DIAGNOSIS — J01.00 ACUTE NON-RECURRENT MAXILLARY SINUSITIS: ICD-10-CM

## 2022-10-10 RX ORDER — DOXYCYCLINE HYCLATE 100 MG/1
CAPSULE ORAL
Qty: 14 CAPSULE | Refills: 0 | OUTPATIENT
Start: 2022-10-10

## 2022-10-11 ENCOUNTER — APPOINTMENT (OUTPATIENT)
Dept: LAB | Facility: HOSPITAL | Age: 67
End: 2022-10-11

## 2022-12-08 ENCOUNTER — OFFICE VISIT (OUTPATIENT)
Dept: FAMILY MEDICINE CLINIC | Facility: CLINIC | Age: 67
End: 2022-12-08

## 2022-12-08 VITALS
DIASTOLIC BLOOD PRESSURE: 84 MMHG | HEIGHT: 69 IN | WEIGHT: 239 LBS | BODY MASS INDEX: 35.4 KG/M2 | TEMPERATURE: 96.9 F | SYSTOLIC BLOOD PRESSURE: 162 MMHG | RESPIRATION RATE: 20 BRPM | OXYGEN SATURATION: 98 % | HEART RATE: 89 BPM

## 2022-12-08 DIAGNOSIS — I10 BENIGN ESSENTIAL HYPERTENSION: ICD-10-CM

## 2022-12-08 DIAGNOSIS — M51.36 DEGENERATION OF INTERVERTEBRAL DISC OF LUMBAR REGION: ICD-10-CM

## 2022-12-08 DIAGNOSIS — F41.9 ANXIETY AND DEPRESSION: Primary | ICD-10-CM

## 2022-12-08 DIAGNOSIS — F32.A ANXIETY AND DEPRESSION: Primary | ICD-10-CM

## 2022-12-08 PROCEDURE — 99214 OFFICE O/P EST MOD 30 MIN: CPT

## 2022-12-08 RX ORDER — PROPRANOLOL HYDROCHLORIDE 10 MG/1
10 TABLET ORAL 2 TIMES DAILY
Qty: 60 TABLET | Refills: 0 | Status: SHIPPED | OUTPATIENT
Start: 2022-12-08

## 2022-12-08 RX ORDER — LOSARTAN POTASSIUM 50 MG/1
50 TABLET ORAL 2 TIMES DAILY
Qty: 180 TABLET | Refills: 0 | Status: SHIPPED | OUTPATIENT
Start: 2022-12-08

## 2022-12-08 NOTE — PROGRESS NOTES
"Subjective   Allie Temple is a 67 y.o. female. Who presents to follow-up on hypertension, back pain and anxiety    History of Present Illness     HTN- on losartan once daily. Not checking BP at home. Elevated today.  No headache, lower extremity swelling, vision changes    Back pain- mobic did not help so stopped taking. Has been thinking about using cbd oil. No further need for tx at this time    Depression/anxiety- currently on zoloft.  Patient insists that she was on a different antidepressant as well but cannot think of the name .  They have looked on her medication list and previous notes and I cannot find any other medications listed .  Would like something added for anxiety .  She is living living with 13 people in house including her mother in law, kids and grandkids.  High stress.  No thoughts of self-harm or to others.  Mother is not planning to move the first of the year and thinks it will be better for everyone.  Has tried BuSpar in the past and did not help.  Xanax helped in the past    The following portions of the patient's history were reviewed and updated as appropriate: allergies, current medications, past family history, past medical history, past social history and past surgical history.    Review of Systems   Constitutional: Negative for fatigue and fever.   Eyes: Negative for visual disturbance.   Respiratory: Negative.    Cardiovascular: Negative.    Genitourinary: Negative for difficulty urinating.   Musculoskeletal: Positive for back pain (chronic).   Neurological: Negative for dizziness and headache.   Psychiatric/Behavioral: Positive for sleep disturbance and stress. Negative for self-injury, suicidal ideas and depressed mood. The patient is nervous/anxious.        Objective    /84   Pulse 89   Temp 96.9 °F (36.1 °C) (Temporal)   Resp 20   Ht 175.3 cm (69\")   Wt 108 kg (239 lb)   LMP  (LMP Unknown)   SpO2 98%   BMI 35.29 kg/m²     Physical Exam  Constitutional:       " Appearance: Normal appearance. She is not ill-appearing.   Cardiovascular:      Rate and Rhythm: Normal rate and regular rhythm.      Pulses: Normal pulses.      Heart sounds: Normal heart sounds, S1 normal and S2 normal. No murmur heard.  Pulmonary:      Effort: Pulmonary effort is normal. No respiratory distress.      Breath sounds: Normal breath sounds.   Neurological:      General: No focal deficit present.      Mental Status: She is alert and oriented to person, place, and time.   Psychiatric:         Attention and Perception: Attention normal.         Mood and Affect: Mood normal.         Speech: Speech normal.         Behavior: Behavior normal.         Thought Content: Thought content normal.         Cognition and Memory: Cognition normal.         Judgment: Judgment normal.       Assessment & Plan   Diagnoses and all orders for this visit:    1. Anxiety and depression (Primary)  -     propranolol (INDERAL) 10 MG tablet; Take 1 tablet by mouth 2 (Two) Times a Day.  Dispense: 60 tablet; Refill: 0  -     Treatment options discussed.  Continue Zoloft.  We will add propanolol low-dose and see if it helps with anxiety.  And common side effects discussed, start with once at night and if tolerates well can increase to twice a day.   No thoughts of self-harm or to others.  This occurs call someone immediately go to the ER    2. Benign essential hypertension  -     losartan (Cozaar) 50 MG tablet; Take 1 tablet by mouth 2 (Two) Times a Day.  Dispense: 180 tablet; Refill: 0  -     Blood pressure uncontrolled.  Still above goal < 130/80.  Increase losartan to twice a day.  Come back to office in 1 to 2 weeks for nurse visit to get blood pressure check  -     Work on a healthy diet.  Limit salt, sugar and fatty foods.  Add exercise to your routine.  Recommendation is 150 minutes of moderate exercise per week if you cannot do that, at least walk 30 minutes 3-4 times per week.       3. Degeneration of intervertebral disc of  lumbar region        -   None of the previous treatments have worked.  Does not want any referrals.  Stable for now      Follow-up in 3 months, to check on hypertension, anxiety depression update labs.         - Pt agrees with plan of care and states no further concerns or questions today    This document is intended for medical expert use only. Reading of this document by patients and/or patient's family without participating medical staff guidance may result in misinterpretation and unintended morbidity.  Any interpretation of such data is the responsibility of the patient and/or family member responsible for the patient in concert with their primary or specialist providers, not to be left for sources of online searches such as [employer], [employer] or similar queries. Relying on these approaches to knowledge may result in misinterpretation, misguided goals of care and even death should patients or family members try recommendations outside of the realm of professional medical care in a supervised way.     Please allow 3-5 business days for recommendations based on new results     Go to the ER for any possible lifethreatening symptoms such as chest pain or shortness of air.      I personally spent 30 minutes with this patient, preparing for the visit, reviewing tests, obtaining and/or reviewing a separately obtained history, performing a medically appropriate examination and/or evaluation , counseling and educating the patient/family/caregiver, ordering medications, tests, or procedures, documenting information in the medical record and independently interpreting results.

## 2023-04-20 ENCOUNTER — HOSPITAL ENCOUNTER (OUTPATIENT)
Dept: BONE DENSITY | Facility: HOSPITAL | Age: 68
Discharge: HOME | End: 2023-04-20
Payer: MEDICARE

## 2023-04-20 PROCEDURE — 77080 DXA BONE DENSITY AXIAL: CPT

## 2023-04-26 ENCOUNTER — TELEPHONE (OUTPATIENT)
Dept: ONCOLOGY | Facility: CLINIC | Age: 68
End: 2023-04-26

## 2023-04-26 NOTE — TELEPHONE ENCOUNTER
Caller: Allie Temple A    Relationship to patient: Self    Best call back number: [phone]    Chief complaint: PATIENT NEEDS TO RESCHEDULE DUE TO TRANSPORTATION     Type of visit: VITALS AND FOLLOW UP    Requested date: THURSDAYS ONLY     If rescheduling, when is the original appointment: 4-28-23       PATIENT ALSO STATED SHE DOES NOT HAVE THE ANTHEM ANYMORE BUT IT IS STILL SHOWING ACTIVE

## 2023-08-24 ENCOUNTER — OFFICE VISIT (OUTPATIENT)
Dept: FAMILY MEDICINE CLINIC | Facility: CLINIC | Age: 68
End: 2023-08-24
Payer: COMMERCIAL

## 2023-08-24 VITALS
OXYGEN SATURATION: 96 % | HEIGHT: 69 IN | HEART RATE: 95 BPM | WEIGHT: 223 LBS | DIASTOLIC BLOOD PRESSURE: 90 MMHG | BODY MASS INDEX: 33.03 KG/M2 | SYSTOLIC BLOOD PRESSURE: 142 MMHG

## 2023-08-24 DIAGNOSIS — J30.2 SEASONAL ALLERGIC RHINITIS, UNSPECIFIED TRIGGER: ICD-10-CM

## 2023-08-24 DIAGNOSIS — Z13.220 LIPID SCREENING: ICD-10-CM

## 2023-08-24 DIAGNOSIS — E55.9 VITAMIN D DEFICIENCY DISEASE: ICD-10-CM

## 2023-08-24 DIAGNOSIS — I10 BENIGN ESSENTIAL HYPERTENSION: ICD-10-CM

## 2023-08-24 DIAGNOSIS — F32.A ANXIETY AND DEPRESSION: ICD-10-CM

## 2023-08-24 DIAGNOSIS — M54.31 SCIATICA OF RIGHT SIDE: Primary | ICD-10-CM

## 2023-08-24 DIAGNOSIS — F41.9 ANXIETY AND DEPRESSION: ICD-10-CM

## 2023-08-24 LAB
25(OH)D3+25(OH)D2 SERPL-MCNC: 21.8 NG/ML (ref 30–100)
ALBUMIN SERPL-MCNC: 4.8 G/DL (ref 3.5–5.2)
ALBUMIN/GLOB SERPL: 2 G/DL
ALP SERPL-CCNC: 137 U/L (ref 39–117)
ALT SERPL-CCNC: 21 U/L (ref 1–33)
AST SERPL-CCNC: 21 U/L (ref 1–32)
BASOPHILS # BLD AUTO: 0.06 10*3/MM3 (ref 0–0.2)
BASOPHILS NFR BLD AUTO: 0.9 % (ref 0–1.5)
BILIRUB SERPL-MCNC: 0.4 MG/DL (ref 0–1.2)
BUN SERPL-MCNC: 8 MG/DL (ref 8–23)
BUN/CREAT SERPL: 9.3 (ref 7–25)
CALCIUM SERPL-MCNC: 11 MG/DL (ref 8.6–10.5)
CHLORIDE SERPL-SCNC: 110 MMOL/L (ref 98–107)
CHOLEST SERPL-MCNC: 263 MG/DL (ref 0–200)
CO2 SERPL-SCNC: 27.9 MMOL/L (ref 22–29)
CREAT SERPL-MCNC: 0.86 MG/DL (ref 0.57–1)
EGFRCR SERPLBLD CKD-EPI 2021: 73.7 ML/MIN/1.73
EOSINOPHIL # BLD AUTO: 0.32 10*3/MM3 (ref 0–0.4)
EOSINOPHIL NFR BLD AUTO: 5 % (ref 0.3–6.2)
ERYTHROCYTE [DISTWIDTH] IN BLOOD BY AUTOMATED COUNT: 12.3 % (ref 12.3–15.4)
GLOBULIN SER CALC-MCNC: 2.4 GM/DL
GLUCOSE SERPL-MCNC: 102 MG/DL (ref 65–99)
HCT VFR BLD AUTO: 42.4 % (ref 34–46.6)
HDLC SERPL-MCNC: 39 MG/DL (ref 40–60)
HGB BLD-MCNC: 14.6 G/DL (ref 12–15.9)
IMM GRANULOCYTES # BLD AUTO: 0.02 10*3/MM3 (ref 0–0.05)
IMM GRANULOCYTES NFR BLD AUTO: 0.3 % (ref 0–0.5)
LDLC SERPL CALC-MCNC: 179 MG/DL (ref 0–100)
LYMPHOCYTES # BLD AUTO: 2.49 10*3/MM3 (ref 0.7–3.1)
LYMPHOCYTES NFR BLD AUTO: 39.2 % (ref 19.6–45.3)
MCH RBC QN AUTO: 31.5 PG (ref 26.6–33)
MCHC RBC AUTO-ENTMCNC: 34.4 G/DL (ref 31.5–35.7)
MCV RBC AUTO: 91.6 FL (ref 79–97)
MONOCYTES # BLD AUTO: 0.48 10*3/MM3 (ref 0.1–0.9)
MONOCYTES NFR BLD AUTO: 7.5 % (ref 5–12)
NEUTROPHILS # BLD AUTO: 2.99 10*3/MM3 (ref 1.7–7)
NEUTROPHILS NFR BLD AUTO: 47.1 % (ref 42.7–76)
NRBC BLD AUTO-RTO: 0 /100 WBC (ref 0–0.2)
PLATELET # BLD AUTO: 253 10*3/MM3 (ref 140–450)
POTASSIUM SERPL-SCNC: 5.8 MMOL/L (ref 3.5–5.2)
PROT SERPL-MCNC: 7.2 G/DL (ref 6–8.5)
RBC # BLD AUTO: 4.63 10*6/MM3 (ref 3.77–5.28)
SODIUM SERPL-SCNC: 151 MMOL/L (ref 136–145)
TRIGL SERPL-MCNC: 238 MG/DL (ref 0–150)
VIT B12 SERPL-MCNC: 818 PG/ML (ref 211–946)
VLDLC SERPL CALC-MCNC: 45 MG/DL (ref 5–40)
WBC # BLD AUTO: 6.36 10*3/MM3 (ref 3.4–10.8)

## 2023-08-24 PROCEDURE — 1170F FXNL STATUS ASSESSED: CPT | Performed by: NURSE PRACTITIONER

## 2023-08-24 PROCEDURE — 1160F RVW MEDS BY RX/DR IN RCRD: CPT | Performed by: NURSE PRACTITIONER

## 2023-08-24 PROCEDURE — 1159F MED LIST DOCD IN RCRD: CPT | Performed by: NURSE PRACTITIONER

## 2023-08-24 PROCEDURE — G0439 PPPS, SUBSEQ VISIT: HCPCS | Performed by: NURSE PRACTITIONER

## 2023-08-24 PROCEDURE — 3080F DIAST BP >= 90 MM HG: CPT | Performed by: NURSE PRACTITIONER

## 2023-08-24 PROCEDURE — 3077F SYST BP >= 140 MM HG: CPT | Performed by: NURSE PRACTITIONER

## 2023-08-24 RX ORDER — TRAMADOL HYDROCHLORIDE 50 MG/1
50 TABLET ORAL EVERY 6 HOURS PRN
Qty: 120 TABLET | Refills: 5 | Status: SHIPPED | OUTPATIENT
Start: 2023-08-24

## 2023-08-24 RX ORDER — MONTELUKAST SODIUM 10 MG/1
10 TABLET ORAL EVERY EVENING
Qty: 90 TABLET | Refills: 3 | Status: SHIPPED | OUTPATIENT
Start: 2023-08-24

## 2023-08-24 RX ORDER — PROPRANOLOL HYDROCHLORIDE 10 MG/1
10 TABLET ORAL 2 TIMES DAILY
Qty: 60 TABLET | Refills: 0 | Status: SHIPPED | OUTPATIENT
Start: 2023-08-24

## 2023-08-24 RX ORDER — LOSARTAN POTASSIUM 50 MG/1
50 TABLET ORAL 2 TIMES DAILY
Qty: 180 TABLET | Refills: 0 | Status: SHIPPED | OUTPATIENT
Start: 2023-08-24

## 2023-08-24 RX ORDER — MOMETASONE FUROATE 50 UG/1
2 SPRAY, METERED NASAL DAILY
Qty: 17 G | Refills: 1 | Status: SHIPPED | OUTPATIENT
Start: 2023-08-24

## 2023-08-24 NOTE — PROGRESS NOTES
The ABCs of the Annual Wellness Visit  Subsequent Medicare Wellness Visit    Subjective    Allie Temple is a 68 y.o. female who presents for a Subsequent Medicare Wellness Visit.    The following portions of the patient's history were reviewed and   updated as appropriate: allergies, current medications, past family history, past medical history, past social history, past surgical history, and problem list.    Compared to one year ago, the patient feels her physical   health is the same.    Compared to one year ago, the patient feels her mental   health is the same.    Recent Hospitalizations:  She was not admitted to the hospital during the last year.       Current Medical Providers:  Patient Care Team:  Yeimi Biggs APRN as PCP - General (Family Medicine)  Jase Evans MD as Referring Physician (General Surgery)  Larry Jhaveri MD as Consulting Physician (Hematology and Oncology)    Outpatient Medications Prior to Visit   Medication Sig Dispense Refill    magnesium gluconate (MAGONATE) 500 MG tablet Take 1 tablet by mouth Daily.      Multiple Vitamins-Minerals (CENTRUM SILVER PO) Take 1 tablet by mouth Daily. womens vitamin      losartan (Cozaar) 50 MG tablet Take 1 tablet by mouth 2 (Two) Times a Day. 180 tablet 0    mometasone (NASONEX) 50 MCG/ACT nasal spray 2 sprays into the nostril(s) as directed by provider Daily. 17 g 1    montelukast (SINGULAIR) 10 MG tablet Take 1 tablet by mouth Every Evening. 90 tablet 3    propranolol (INDERAL) 10 MG tablet Take 1 tablet by mouth 2 (Two) Times a Day. 60 tablet 0    tiZANidine (ZANAFLEX) 4 MG tablet Take 1 tablet by mouth At Night As Needed for Muscle Spasms.      acetaminophen (TYLENOL) 500 MG tablet Take 1 tablet by mouth Every 6 (Six) Hours As Needed for Mild Pain. (Patient not taking: Reported on 8/24/2023)      atorvastatin (LIPITOR) 20 MG tablet Take 1 tablet by mouth Every Night. (Patient not taking: Reported on 8/24/2023) 90 tablet 0     No  "facility-administered medications prior to visit.       Opioid medication/s are on active medication list.  and I have evaluated her active treatment plan and pain score trends (see table).  There were no vitals filed for this visit.  I have reviewed the chart for potential of high risk medication and harmful drug interactions in the elderly.          Aspirin is not on active medication list.  Aspirin use is not indicated based on review of current medical condition/s. Risk of harm outweighs potential benefits.  .    Patient Active Problem List   Diagnosis    Generalized anxiety disorder    Benign essential hypertension    Carpal tunnel syndrome    Chronic pain syndrome    Radial styloid tenosynovitis    Osteoarthritis of hand    Depression    Degeneration of intervertebral disc of lumbar region    Dyslipidemia    Menopausal flushing    Tendinitis of shoulder    Scapulocostal syndrome    Bruit    Allergic rhinitis    Lumbar degenerative disc disease    Malignant neoplasm of upper-inner quadrant of left breast in female, estrogen receptor negative    Preop cardiovascular exam    Abnormal echocardiogram    Precordial pain    Daytime somnolence    Encounter for long-term (current) use of high-risk medication    DDD (degenerative disc disease), thoracic    Thoracic radiculopathy    Cervical radiculopathy    Hypertension     Advance Care Planning   Advance Care Planning     Advance Directive is not on file.  ACP discussion was declined by the patient. Patient does not have an advance directive, declines further assistance.     Objective    Vitals:    08/24/23 0905   BP: 142/90   Pulse: 95   SpO2: 96%   Weight: 101 kg (223 lb)   Height: 175.3 cm (69\")     Estimated body mass index is 32.93 kg/mý as calculated from the following:    Height as of this encounter: 175.3 cm (69\").    Weight as of this encounter: 101 kg (223 lb).    BMI is >= 30 and <35. (Class 1 Obesity). The following options were offered after discussion;: " weight loss educational material (shared in after visit summary), exercise counseling/recommendations, and nutrition counseling/recommendations      Does the patient have evidence of cognitive impairment? No          HEALTH RISK ASSESSMENT    Smoking Status:  Social History     Tobacco Use   Smoking Status Every Day    Packs/day: 0.25    Years: 48.00    Pack years: 12.00    Types: Cigarettes   Smokeless Tobacco Never   Tobacco Comments    3-4 cigarettes a day. Trying to quit.      Alcohol Consumption:  Social History     Substance and Sexual Activity   Alcohol Use No     Fall Risk Screen:    STEADI Fall Risk Assessment was completed, and patient is at LOW risk for falls.Assessment completed on:2023    Depression Screenin/24/2023     9:13 AM   PHQ-2/PHQ-9 Depression Screening   Little Interest or Pleasure in Doing Things 0-->not at all   Feeling Down, Depressed or Hopeless 0-->not at all   PHQ-9: Brief Depression Severity Measure Score 0       Health Habits and Functional and Cognitive Screenin/24/2023     9:00 AM   Functional & Cognitive Status   Do you have difficulty preparing food and eating? No   Do you have difficulty bathing yourself, getting dressed or grooming yourself? No   Do you have difficulty using the toilet? No   Do you have difficulty moving around from place to place? Yes   Do you have trouble with steps or getting out of a bed or a chair? Yes   Current Diet Other   Dental Exam Not up to date   Eye Exam Up to date   Exercise (times per week) 0 times per week   Current Exercises Include No Regular Exercise   Do you need help using the phone?  No   Are you deaf or do you have serious difficulty hearing?  No   Do you need help to go to places out of walking distance? No   Do you need help shopping? No   Do you need help preparing meals?  No   Do you need help with housework?  Yes   Do you need help with laundry? No   Do you need help taking your medications? No   Do you need help  managing money? No   Do you ever drive or ride in a car without wearing a seat belt? Yes   Have you felt unusual stress, anger or loneliness in the last month? No   Who do you live with? Child   If you need help, do you have trouble finding someone available to you? No   Have you been bothered in the last four weeks by sexual problems? No   Do you have difficulty concentrating, remembering or making decisions? No       Age-appropriate Screening Schedule:  Refer to the list below for future screening recommendations based on patient's age, sex and/or medical conditions. Orders for these recommended tests are listed in the plan section. The patient has been provided with a written plan.    Health Maintenance   Topic Date Due    COVID-19 Vaccine (1) 08/26/2023 (Originally 1955)    PAP SMEAR  04/24/2024 (Originally 9/8/2019)    Pneumococcal Vaccine 65+ (1 - PCV) 08/24/2024 (Originally 3/9/1961)    LIPID PANEL  09/07/2023    INFLUENZA VACCINE  10/01/2023    ANNUAL WELLNESS VISIT  08/24/2024    DXA SCAN  04/20/2025    TDAP/TD VACCINES (2 - Td or Tdap) 09/08/2026    COLORECTAL CANCER SCREENING  09/08/2026    HEPATITIS C SCREENING  Addressed                  CMS Preventative Services Quick Reference  Risk Factors Identified During Encounter  None Identified  The above risks/problems have been discussed with the patient.  Pertinent information has been shared with the patient in the After Visit Summary.  An After Visit Summary and PPPS were made available to the patient.    Follow Up:   Next Medicare Wellness visit to be scheduled in 1 year.       Additional E&M Note during same encounter follows:  Patient has multiple medical problems which are significant and separately identifiable that require additional work above and beyond the Medicare Wellness Visit.      Chief Complaint  Back Pain, Hypertension, Anxiety, Depression, Establish Care, and Medicare Wellness-subsequent    Subjective        HPI  Allie Temple is  "also being seen today for medicare wellness She reports she has constant sciatic pain running from her back to her right thigh - she is unable to do housework that she would like to do because of pain.  She is an active woman, but has to spend a lot of time sitting due to pain.  She reports she has had therapy without relief of her symptoms.  She used to take \"tabs\" and they were the only thing that helped her.  Dr. Khan told her she had to come off of the tabs, so she weaned herself off.  She does not think she has had Tramadol before -  will trial for pain relief.         Objective   Vital Signs:  /90   Pulse 95   Ht 175.3 cm (69\")   Wt 101 kg (223 lb)   SpO2 96%   BMI 32.93 kg/mý     Physical Exam  Constitutional:       Appearance: Normal appearance.   HENT:      Head: Normocephalic and atraumatic.      Mouth/Throat:      Mouth: Mucous membranes are moist.   Eyes:      Pupils: Pupils are equal, round, and reactive to light.   Cardiovascular:      Rate and Rhythm: Normal rate and regular rhythm.      Pulses: Normal pulses.      Heart sounds: Normal heart sounds.   Pulmonary:      Effort: Pulmonary effort is normal.      Breath sounds: Normal breath sounds.   Abdominal:      General: Bowel sounds are normal.   Musculoskeletal:         General: Normal range of motion.   Skin:     General: Skin is warm and dry.      Capillary Refill: Capillary refill takes less than 2 seconds.   Neurological:      General: No focal deficit present.      Mental Status: She is alert.   Psychiatric:         Mood and Affect: Mood normal.                           Assessment and Plan   Diagnoses and all orders for this visit:    1. Sciatica of right side (Primary)  -     traMADol (ULTRAM) 50 MG tablet; Take 1 tablet by mouth Every 6 (Six) Hours As Needed for Moderate Pain.  Dispense: 120 tablet; Refill: 5    2. Anxiety and depression  -     propranolol (INDERAL) 10 MG tablet; Take 1 tablet by mouth 2 (Two) Times a Day.  " Dispense: 60 tablet; Refill: 0  -     Vitamin B12    3. Seasonal allergic rhinitis, unspecified trigger  -     montelukast (SINGULAIR) 10 MG tablet; Take 1 tablet by mouth Every Evening.  Dispense: 90 tablet; Refill: 3  -     mometasone (NASONEX) 50 MCG/ACT nasal spray; 2 sprays into the nostril(s) as directed by provider Daily.  Dispense: 17 g; Refill: 1    4. Benign essential hypertension  -     losartan (Cozaar) 50 MG tablet; Take 1 tablet by mouth 2 (Two) Times a Day.  Dispense: 180 tablet; Refill: 0  -     CBC & Differential  -     Comprehensive Metabolic Panel    5. Lipid screening  -     Lipid Panel    6. Vitamin D deficiency disease  -     Vitamin D,25-Hydroxy              Follow Up   Return in about 6 months (around 2/24/2024).  Patient was given instructions and counseling regarding her condition or for health maintenance advice. Please see specific information pulled into the AVS if appropriate.

## 2024-03-27 ENCOUNTER — NURSE TRIAGE (OUTPATIENT)
Dept: CALL CENTER | Facility: HOSPITAL | Age: 69
End: 2024-03-27
Payer: COMMERCIAL

## 2024-03-27 NOTE — TELEPHONE ENCOUNTER
"HUB  B/P concerns     She knows her B/P is high but she does not know what it is. Does not have a b/p cuff. Anahy has told her to get a cuff but she hasn't  Stopped taking B/P medication 3 days ago, Losartan 50 mg daily, because it was causing chest pain, would get dizzy and gasp for air.    Has had no chest pain for 3 days since stopping the B/P medication.    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    Warm transfer to PCP office for an appointment spoke with Kiya  Appointment offered tomorrow morning but  patient requests an appointment on Friday. Appointment scheduled.  Reason for Disposition   [1] Caller requests to speak ONLY to PCP AND [2] URGENT question    Additional Information   Negative: Lab calling with strep throat test results and triager can call in prescription   Negative: Lab calling with urinalysis test results and triager can call in prescription   Negative: Medication questions   Negative: Medication renewal and refill questions   Negative: Pre-operative or pre-procedural questions   Negative: ED call to PCP (i.e., primary care provider; doctor, NP, or PA)   Negative: Doctor (or NP/PA) call to PCP   Negative: Call about patient who is currently hospitalized   Negative: Lab or radiology calling with CRITICAL test results   Negative: [1] Follow-up call from patient regarding patient's clinical status AND [2] information urgent    Answer Assessment - Initial Assessment Questions  1. REASON FOR CALL or QUESTION: \"What is your reason for calling today?\" or \"How can I best  help you?\" or \"What question do you have that I can help answer?\"  Caller is wanting an appointment  2. CALLER: Document the source of call. (e.g., laboratory, patient).  Allie Temple    Protocols used: PCP Call - No Triage-ADULT-AH    "

## 2024-03-29 ENCOUNTER — TELEPHONE (OUTPATIENT)
Dept: FAMILY MEDICINE CLINIC | Facility: CLINIC | Age: 69
End: 2024-03-29

## 2024-03-29 ENCOUNTER — OFFICE VISIT (OUTPATIENT)
Dept: FAMILY MEDICINE CLINIC | Facility: CLINIC | Age: 69
End: 2024-03-29
Payer: MEDICARE

## 2024-03-29 VITALS
OXYGEN SATURATION: 98 % | DIASTOLIC BLOOD PRESSURE: 80 MMHG | BODY MASS INDEX: 32.05 KG/M2 | HEART RATE: 77 BPM | SYSTOLIC BLOOD PRESSURE: 162 MMHG | WEIGHT: 216.4 LBS | HEIGHT: 69 IN

## 2024-03-29 DIAGNOSIS — M54.6 CHRONIC MIDLINE THORACIC BACK PAIN: ICD-10-CM

## 2024-03-29 DIAGNOSIS — G89.29 CHRONIC MIDLINE THORACIC BACK PAIN: ICD-10-CM

## 2024-03-29 DIAGNOSIS — I10 PRIMARY HYPERTENSION: Primary | ICD-10-CM

## 2024-03-29 DIAGNOSIS — F41.9 ANXIETY: ICD-10-CM

## 2024-03-29 PROCEDURE — 3077F SYST BP >= 140 MM HG: CPT | Performed by: NURSE PRACTITIONER

## 2024-03-29 PROCEDURE — 99214 OFFICE O/P EST MOD 30 MIN: CPT | Performed by: NURSE PRACTITIONER

## 2024-03-29 PROCEDURE — 3079F DIAST BP 80-89 MM HG: CPT | Performed by: NURSE PRACTITIONER

## 2024-03-29 RX ORDER — LORAZEPAM 0.5 MG/1
0.5 TABLET ORAL EVERY 12 HOURS
Qty: 60 TABLET | Refills: 5 | Status: SHIPPED | OUTPATIENT
Start: 2024-03-29

## 2024-03-29 RX ORDER — SENNOSIDES A AND B 8.6 MG/1
1 TABLET, FILM COATED ORAL DAILY
COMMUNITY

## 2024-03-29 RX ORDER — ENALAPRIL MALEATE 2.5 MG/1
2.5 TABLET ORAL 2 TIMES DAILY
Qty: 60 TABLET | Refills: 11 | Status: SHIPPED | OUTPATIENT
Start: 2024-03-29

## 2024-03-29 NOTE — PROGRESS NOTES
Subjective   Allie Temple is a 69 y.o. female. Presents today for   Chief Complaint   Patient presents with    Hypertension     Elevated       History Of Present Illness  High Blood pressure - stopped her medications    Hypertension:  Losartan and propanolol - patient took herself off      Patient Active Problem List   Diagnosis    Generalized anxiety disorder    Benign essential hypertension    Carpal tunnel syndrome    Chronic pain syndrome    Radial styloid tenosynovitis    Osteoarthritis of hand    Depression    Degeneration of intervertebral disc of lumbar region    Dyslipidemia    Menopausal flushing    Tendinitis of shoulder    Scapulocostal syndrome    Bruit    Allergic rhinitis    Lumbar degenerative disc disease    Malignant neoplasm of upper-inner quadrant of left breast in female, estrogen receptor negative    Preop cardiovascular exam    Abnormal echocardiogram    Precordial pain    Daytime somnolence    Encounter for long-term (current) use of high-risk medication    DDD (degenerative disc disease), thoracic    Thoracic radiculopathy    Cervical radiculopathy    Hypertension       Social History     Socioeconomic History    Marital status:      Spouse name: Pawan    Number of children: 3    Years of education: High school   Tobacco Use    Smoking status: Every Day     Current packs/day: 0.25     Average packs/day: 0.3 packs/day for 48.0 years (12.0 ttl pk-yrs)     Types: Cigarettes    Smokeless tobacco: Never    Tobacco comments:     3-4 cigarettes a day. Trying to quit.    Vaping Use    Vaping status: Never Used   Substance and Sexual Activity    Alcohol use: No    Drug use: No    Sexual activity: Yes     Partners: Male     Birth control/protection: Post-menopausal       Allergies   Allergen Reactions    Gabapentin Hallucinations    Effexor [Venlafaxine] Itching    Naproxen Itching     Pt reports severe vaginal itching     Zyrtec [Cetirizine] Itching     Non-effecious    Aspirin Unknown - Low  "Severity    Potassium Bicarbonate Unknown - Low Severity    Ibuprofen Unknown - Low Severity     Burns while urinating  Can take low dose Ibuprofen    Penicillins Rash    Sulfa Antibiotics Rash       Current Outpatient Medications on File Prior to Visit   Medication Sig Dispense Refill    mometasone (NASONEX) 50 MCG/ACT nasal spray 2 sprays into the nostril(s) as directed by provider Daily. 17 g 1    montelukast (SINGULAIR) 10 MG tablet Take 1 tablet by mouth Every Evening. 90 tablet 3    Multiple Vitamins-Minerals (CENTRUM SILVER PO) Take 1 tablet by mouth Daily. womens vitamin      senna 8.6 MG tablet Take 1 tablet by mouth Daily.      [DISCONTINUED] acetaminophen (TYLENOL) 500 MG tablet Take 1 tablet by mouth Every 6 (Six) Hours As Needed for Mild Pain. (Patient not taking: Reported on 3/29/2024)      [DISCONTINUED] losartan (Cozaar) 50 MG tablet Take 1 tablet by mouth 2 (Two) Times a Day. (Patient not taking: Reported on 3/29/2024) 180 tablet 0    [DISCONTINUED] magnesium gluconate (MAGONATE) 500 MG tablet Take 1 tablet by mouth Daily. (Patient not taking: Reported on 3/29/2024)      [DISCONTINUED] propranolol (INDERAL) 10 MG tablet Take 1 tablet by mouth 2 (Two) Times a Day. (Patient not taking: Reported on 3/29/2024) 60 tablet 0    [DISCONTINUED] traMADol (ULTRAM) 50 MG tablet Take 1 tablet by mouth Every 6 (Six) Hours As Needed for Moderate Pain. (Patient not taking: Reported on 3/29/2024) 120 tablet 5     No current facility-administered medications on file prior to visit.       Objective   Vitals:    03/29/24 1022   BP: 162/80   Pulse: 77   SpO2: 98%   Weight: 98.2 kg (216 lb 6.4 oz)   Height: 175.3 cm (69\")     Body mass index is 31.96 kg/m².    Physical Exam  Constitutional:       Appearance: She is obese.   HENT:      Head: Normocephalic and atraumatic.      Mouth/Throat:      Mouth: Mucous membranes are moist.   Eyes:      Pupils: Pupils are equal, round, and reactive to light.   Cardiovascular:      " Rate and Rhythm: Normal rate and regular rhythm.      Pulses: Normal pulses.      Heart sounds: Normal heart sounds.   Pulmonary:      Effort: Pulmonary effort is normal.      Breath sounds: Normal breath sounds.   Abdominal:      General: Bowel sounds are normal.   Musculoskeletal:         General: Normal range of motion.   Skin:     General: Skin is warm and dry.      Capillary Refill: Capillary refill takes less than 2 seconds.   Neurological:      General: No focal deficit present.      Mental Status: She is alert.   Psychiatric:         Mood and Affect: Mood normal.     Procedures     Assessment & Plan   Diagnoses and all orders for this visit:    1. Primary hypertension (Primary)  -     enalapril (Vasotec) 2.5 MG tablet; Take 1 tablet by mouth 2 (Two) Times a Day.  Dispense: 60 tablet; Refill: 11    2. Anxiety  -     LORazepam (Ativan) 0.5 MG tablet; Take 1 tablet by mouth Every 12 (Twelve) Hours.  Dispense: 60 tablet; Refill: 5    3. Chronic midline thoracic back pain  -     Baclofen 2 %, lidocaine 4 %, Ketamine HCl 4 %; Apply 1-2 g topically to the appropriate area as directed 3 (Three) to 4 (Four) times daily. Indications: chronic back pain  Dispense: 90 g; Refill: 5            Discussed Care Gaps, ordered referrals and encouraged vaccination updates.       - Pt agrees with plan of care and denies further questions/concerns today  - This document is intended for medical expert use only. Persons  reading this document without medical staff guidance may result in misinterpretation and unintended morbidity     Go to the ER for any possible life-threatening symptoms such as chest pain or shortness of air.      Please allow 3-5 business days for recommendations based on new results      I personally spent time with this patient, preparing for the visit, reviewing tests, obtaining and/or reviewing a separately obtained history, performing a medically appropriate examination and/or evaluation, counseling and  educating the patient/family/caregiver, ordering medications,  documenting information in the medical record and indepentently interpreting results.               Return in about 3 months (around 6/29/2024) for Recheck hypertension.

## 2024-03-29 NOTE — TELEPHONE ENCOUNTER
Caller: Allie Temple A    Relationship: Self    Best call back number: [phone]*    What was the call regarding: PATIENT CALLING STATING THAT [facility] NO LONGER CARRIES THE Baclofen 2 %, lidocaine 4 %, Ketamine HCl 4 % . THE PATIENT IS REQUESTING THAT THIS PRESCRIPTION BE SENT TO:    [id number] [place], [place] - [address] AT [facility] [address] [facility] - [phone] [facility] [phone]  [phone]

## 2024-05-07 ENCOUNTER — OFFICE VISIT (OUTPATIENT)
Dept: FAMILY MEDICINE CLINIC | Facility: CLINIC | Age: 69
End: 2024-05-07
Payer: MEDICARE

## 2024-05-07 VITALS
OXYGEN SATURATION: 98 % | HEIGHT: 69 IN | HEART RATE: 72 BPM | DIASTOLIC BLOOD PRESSURE: 80 MMHG | BODY MASS INDEX: 32.26 KG/M2 | WEIGHT: 217.8 LBS | SYSTOLIC BLOOD PRESSURE: 162 MMHG

## 2024-05-07 DIAGNOSIS — I48.91 ATRIAL FIBRILLATION, UNSPECIFIED TYPE: Primary | ICD-10-CM

## 2024-05-07 DIAGNOSIS — N20.0 KIDNEY STONE: ICD-10-CM

## 2024-05-07 DIAGNOSIS — F41.9 ANXIETY: ICD-10-CM

## 2024-05-07 PROCEDURE — 3077F SYST BP >= 140 MM HG: CPT | Performed by: NURSE PRACTITIONER

## 2024-05-07 PROCEDURE — 99214 OFFICE O/P EST MOD 30 MIN: CPT | Performed by: NURSE PRACTITIONER

## 2024-05-07 PROCEDURE — 3079F DIAST BP 80-89 MM HG: CPT | Performed by: NURSE PRACTITIONER

## 2024-05-07 PROCEDURE — 1126F AMNT PAIN NOTED NONE PRSNT: CPT | Performed by: NURSE PRACTITIONER

## 2024-05-07 RX ORDER — CEFDINIR 300 MG/1
1 CAPSULE ORAL EVERY 12 HOURS SCHEDULED
COMMUNITY
Start: 2024-04-27

## 2024-05-07 RX ORDER — DIAZEPAM 5 MG/1
5 TABLET ORAL AS NEEDED
Qty: 2 TABLET | Refills: 0 | Status: SHIPPED | OUTPATIENT
Start: 2024-05-07

## 2024-05-07 RX ORDER — BUSPIRONE HYDROCHLORIDE 15 MG/1
TABLET ORAL
Qty: 90 TABLET | Refills: 1 | Status: SHIPPED | OUTPATIENT
Start: 2024-05-07

## 2025-06-23 NOTE — ADDENDUM NOTE
Addended by: JUSTIN ERICKSON on: 3/16/2020 11:45 AM     Modules accepted: Orders    
moderate
0 (no pain/absence of nonverbal indicators of pain)

## (undated) DEVICE — ADHS SKIN DERMABOND TOP ADVANCED

## (undated) DEVICE — PK CHST BRST 40

## (undated) DEVICE — DRSNG TELFA PAD NONADH STR 1S 3X4IN

## (undated) DEVICE — SUT PROLN 3/0 SH D/A 36IN 8522H

## (undated) DEVICE — SYR LL TP 10ML STRL

## (undated) DEVICE — ANTIBACTERIAL UNDYED BRAIDED (POLYGLACTIN 910), SYNTHETIC ABSORBABLE SUTURE: Brand: COATED VICRYL

## (undated) DEVICE — INTENDED FOR TISSUE SEPARATION, AND OTHER PROCEDURES THAT REQUIRE A SHARP SURGICAL BLADE TO PUNCTURE OR CUT.: Brand: BARD-PARKER ® CARBON RIB-BACK BLADES

## (undated) DEVICE — 1010 S-DRAPE TOWEL DRAPE 10/BX: Brand: STERI-DRAPE™

## (undated) DEVICE — 3M™ STERI-STRIP™ COMPOUND BENZOIN TINCTURE 40 BAGS/CARTON 4 CARTONS/CASE C1544: Brand: 3M™ STERI-STRIP™

## (undated) DEVICE — BNDG ELAS CO-FLEX SLF ADHR 4IN5YD LF STRL

## (undated) DEVICE — 3M™ STERI-STRIP™ REINFORCED ADHESIVE SKIN CLOSURES, R1547, 1/2 IN X 4 IN (12 MM X 100 MM), 6 STRIPS/ENVELOPE: Brand: 3M™ STERI-STRIP™

## (undated) DEVICE — DRSNG SURESITE WNDW 4X4.5

## (undated) DEVICE — SPNG GZ WOVN 4X4IN 12PLY 10/BX STRL

## (undated) DEVICE — SOL NS 500ML

## (undated) DEVICE — GLV SURG BIOGEL LTX PF 8 1/2

## (undated) DEVICE — SUT VIC 3/0 TIES 18IN J110T

## (undated) DEVICE — APPL CHLORAPREP W/TINT 26ML ORNG

## (undated) DEVICE — ELECTRD BLD EDGE/INSUL1P 2.4X5.1MM STRL

## (undated) DEVICE — DECANT BG O JET

## (undated) DEVICE — LOU MINOR PROCEDURE: Brand: MEDLINE INDUSTRIES, INC.

## (undated) DEVICE — DRSNG WND BORDR/ADHS NONADHR/GZ LF 4X14IN STRL

## (undated) DEVICE — NDL HYPO PRECISIONGLIDE REG 25G 1 1/2

## (undated) DEVICE — CONVERTORS STOCKINETTE: Brand: CONVERTORS

## (undated) DEVICE — SUT VIC 3/0 SH 27IN J416H

## (undated) DEVICE — Device

## (undated) DEVICE — DRP C/ARM 41X74IN

## (undated) DEVICE — JACKSON-PRATT 100CC BULB RESERVOIR: Brand: CARDINAL HEALTH

## (undated) DEVICE — SUT MNCRYL 4/0 PS2 18 IN

## (undated) DEVICE — SUT VIC 2/0 TIES 18IN J111T